# Patient Record
Sex: FEMALE | Race: WHITE | NOT HISPANIC OR LATINO | Employment: UNEMPLOYED | URBAN - METROPOLITAN AREA
[De-identification: names, ages, dates, MRNs, and addresses within clinical notes are randomized per-mention and may not be internally consistent; named-entity substitution may affect disease eponyms.]

---

## 2021-08-20 ENCOUNTER — TRANSFERRED RECORDS (OUTPATIENT)
Dept: HEALTH INFORMATION MANAGEMENT | Facility: CLINIC | Age: 36
End: 2021-08-20
Payer: MEDICAID

## 2021-09-02 ENCOUNTER — TRANSFERRED RECORDS (OUTPATIENT)
Dept: HEALTH INFORMATION MANAGEMENT | Facility: CLINIC | Age: 36
End: 2021-09-02
Payer: MEDICAID

## 2021-09-17 ENCOUNTER — MEDICAL CORRESPONDENCE (OUTPATIENT)
Dept: HEALTH INFORMATION MANAGEMENT | Facility: CLINIC | Age: 36
End: 2021-09-17
Payer: MEDICAID

## 2021-09-17 ENCOUNTER — TRANSFERRED RECORDS (OUTPATIENT)
Dept: HEALTH INFORMATION MANAGEMENT | Facility: CLINIC | Age: 36
End: 2021-09-17
Payer: MEDICAID

## 2021-10-21 ENCOUNTER — TRANSFERRED RECORDS (OUTPATIENT)
Dept: HEALTH INFORMATION MANAGEMENT | Facility: CLINIC | Age: 36
End: 2021-10-21
Payer: MEDICAID

## 2021-11-01 ENCOUNTER — TRANSFERRED RECORDS (OUTPATIENT)
Dept: HEALTH INFORMATION MANAGEMENT | Facility: CLINIC | Age: 36
End: 2021-11-01
Payer: MEDICAID

## 2021-11-07 ENCOUNTER — TRANSFERRED RECORDS (OUTPATIENT)
Dept: HEALTH INFORMATION MANAGEMENT | Facility: CLINIC | Age: 36
End: 2021-11-07
Payer: MEDICAID

## 2021-11-08 ENCOUNTER — TRANSCRIBE ORDERS (OUTPATIENT)
Dept: OTHER | Age: 36
End: 2021-11-08
Payer: MEDICAID

## 2021-11-08 ENCOUNTER — MEDICAL CORRESPONDENCE (OUTPATIENT)
Dept: HEALTH INFORMATION MANAGEMENT | Facility: CLINIC | Age: 36
End: 2021-11-08
Payer: MEDICAID

## 2021-11-08 DIAGNOSIS — K63.1: Primary | ICD-10-CM

## 2021-11-09 ENCOUNTER — TRANSFERRED RECORDS (OUTPATIENT)
Dept: HEALTH INFORMATION MANAGEMENT | Facility: CLINIC | Age: 36
End: 2021-11-09
Payer: MEDICAID

## 2021-11-09 ENCOUNTER — MEDICAL CORRESPONDENCE (OUTPATIENT)
Dept: HEALTH INFORMATION MANAGEMENT | Facility: CLINIC | Age: 36
End: 2021-11-09
Payer: MEDICAID

## 2021-11-09 ENCOUNTER — TRANSCRIBE ORDERS (OUTPATIENT)
Dept: OTHER | Age: 36
End: 2021-11-09
Payer: MEDICAID

## 2021-11-09 DIAGNOSIS — N83.209 UNSPECIFIED OVARIAN CYST, UNSPECIFIED SIDE: Primary | ICD-10-CM

## 2021-12-10 NOTE — TELEPHONE ENCOUNTER
RECORDS RECEIVED FROM: PO upper rectal, sigmoid resection appendectomy (ready for reversal), large cyst near rectal stump   Appt date: 1/5/2022   NOTES STATUS DETAILS   OFFICE NOTE from referring provider  Received [facility]:  11/1/21 - [facility] OV with Dr. Leobardo Pepe   OFFICE NOTE from other specialist   Received [facility]:  10/21/21 - OV with Dr. Singh   DISCHARGE SUMMARY from hospital  N/A    DISCHARGE REPORT from the ER N/A    OPERATIVE REPORT  Received [facility]:  8/20/21 - OP Note for EXPLORATORY LAPAROTOMY, RECTAL SIGMOID RESECTION, APPENDECTOMY with Dr. Hernandez   MEDICATION LIST Received    LABS     PFC TESTING N/A    ANAL PAP N/A    BIOPSIES/PATHOLOGY RELATED TO DIAGNOSIS N/A    DIAGNOSTIC PROCEDURES     COLONOSCOPY N/A    UPPER ENDOSCOPY (EGD) N/A    FLEX SIGMOIDOSCOPY  N/A    ERCP N/A    IMAGING (DISC & REPORT)      CT  Received / Received [facility]:  9/17/21 - CT Abd/Pelvis    [facility]:  9/2/21 - CT Abd/Pelvis  8/20/21 - CT Abd/Pelvis   MRI N/A    XRAY Received [facility]:  12/6/21 - XR Fluroscopy Abdomen  10/21/21 - XR Fluroscopy Abdomen  7/14/21 - XR Fluroscopy Abdomen   ULTRASOUND (ENDOANAL/ENDORECTAL) Received [facility]:  9/26/15 - Us Pelvis     Records Requested  12/10/21    Facility  [facility]  Fax: [phone]   [facility]  Fax: [phone]     Outcome * 12/10/21 2:33 PM Faxed req to [facility] and [facility] for records and images to be sent to the clinic. - Marixa    * 12/15/21 7:28 AM Images received from [facility] and attached to the patient in PACs.  Faxed 2nd urg req to [facility] for recs/images. - Marixa    * 12/24/21 7:46 AM Images received and attached to the patient in PACs. - Marixa      no

## 2021-12-12 ENCOUNTER — HEALTH MAINTENANCE LETTER (OUTPATIENT)
Age: 36
End: 2021-12-12

## 2021-12-21 ENCOUNTER — TELEPHONE (OUTPATIENT)
Dept: OBGYN | Facility: CLINIC | Age: 36
End: 2021-12-21
Payer: MEDICAID

## 2021-12-21 DIAGNOSIS — N73.9 PELVIC ABSCESS IN FEMALE: Primary | ICD-10-CM

## 2021-12-21 NOTE — TELEPHONE ENCOUNTER
I have reviewed patient's CT results.  I recommend she get a pelvic ultrasound to help interpret what is going on as far as a questionable ovarian cyst vs abscess. Please help her schedule this.  The order is placed.  Thanks.    Liliya Hernandez,

## 2022-01-01 ASSESSMENT — ENCOUNTER SYMPTOMS
MUSCLE CRAMPS: 1
BACK PAIN: 1
DECREASED CONCENTRATION: 1
NERVOUS/ANXIOUS: 1
ARTHRALGIAS: 1
INSOMNIA: 1
DECREASED APPETITE: 1
NECK PAIN: 1
MYALGIAS: 1
DEPRESSION: 1

## 2022-01-04 ENCOUNTER — OFFICE VISIT (OUTPATIENT)
Dept: OBGYN | Facility: CLINIC | Age: 37
End: 2022-01-04
Attending: SURGERY
Payer: MEDICAID

## 2022-01-04 ENCOUNTER — ANCILLARY PROCEDURE (OUTPATIENT)
Dept: ULTRASOUND IMAGING | Facility: CLINIC | Age: 37
End: 2022-01-04
Attending: OBSTETRICS & GYNECOLOGY
Payer: MEDICAID

## 2022-01-04 DIAGNOSIS — R10.2 PELVIC PAIN IN FEMALE: Primary | ICD-10-CM

## 2022-01-04 DIAGNOSIS — Z43.3 COLOSTOMY CARE (H): ICD-10-CM

## 2022-01-04 DIAGNOSIS — Z90.710 STATUS POST EMERGENCY CESAREAN HYSTERECTOMY: ICD-10-CM

## 2022-01-04 DIAGNOSIS — N83.202 CYST OF LEFT OVARY: ICD-10-CM

## 2022-01-04 DIAGNOSIS — N73.9 PELVIC ABSCESS IN FEMALE: ICD-10-CM

## 2022-01-04 PROCEDURE — 76856 US EXAM PELVIC COMPLETE: CPT | Performed by: RADIOLOGY

## 2022-01-04 PROCEDURE — 76830 TRANSVAGINAL US NON-OB: CPT | Performed by: RADIOLOGY

## 2022-01-04 PROCEDURE — 99204 OFFICE O/P NEW MOD 45 MIN: CPT | Performed by: OBSTETRICS & GYNECOLOGY

## 2022-01-04 RX ORDER — HYDROCODONE BITARTRATE AND ACETAMINOPHEN 5; 325 MG/1; MG/1
1 TABLET ORAL EVERY 6 HOURS PRN
Status: ON HOLD | COMMUNITY
End: 2022-05-05

## 2022-01-04 NOTE — PROGRESS NOTES
Subjective  36 year old non-pregnant female presents today complaining of an ovarian cyst.  Patient has a complicated surgical history.  She had a c section on   Sept 10, 2020 due to a placenta previa.  At the time of the c section she was found to have an accreta so ended up with a C hysterectomy. She did fine after spending 16 days in the hospital.  She developed right sided abdominal pain in August of 2021.  The pain radiated down to hip and lower back as well.  She went into the ED and her right ovary was very enlarged.  On August 18, 2021 patient had a laparoscopic surgery done and had the cyst drained.  Two days after surgery, she developed severe pain.  She went back to the ED and was found to have a bowel perforation and had urgent surgery.  She ended up with a colostomy.  She is planning on meeting with a general surgeon at the  tomorrow to discuss reversal.  Her surgeon in [place] was not comfortable with this given the amount of scar tissue she states.  A few weeks ago patient developed abdominal pain.  She had an ultrasound done which showed a 2cm left ovarian cyst.  She is wanting to have both ovaries removed at the time of her colostomy reversal because she is prone to painful ovarian cysts and does not want to have to have another surgery.  We discussed this in detail.  We discussed the need for HRT if this was done and she is ok with that.  The pain is still there.  Pain is right sided.  Constant.  She is very nauseated.  Patient has never had any issues with endometriosis.  I discussed how I do not operate at the  and that she may need to find gyn there.  It is a 7 hour drive to [facility].        I personally reviewed the ultrasound and the findings showed a left ovarian cyst.    I also reviewed notes from previous office visits by Dr. Pepe.    ROS: 10 point ROS neg other than the symptoms noted above in the HPI.  History reviewed. No pertinent past medical history.  History reviewed. No pertinent  surgical history.  History reviewed. No pertinent family history.  Social History     Tobacco Use     Smoking status: Current Some Day Smoker     Smokeless tobacco: Never Used   Substance Use Topics     Alcohol use: Not Currently         Objective  Vitals: There were no vitals taken for this visit.  BMI= There is no height or weight on file to calculate BMI.    General appearance=well developed, well-nourished female  Gait=normal  Psych=mood is stable, alert and oriented x3      Pelvic ultrasound=1/4/20322:  FINDINGS:  Examination is limited due to shadowing bowel gas.     The uterus is surgically absent. Sonographic imaging of the midline  pelvis demonstrates no residual fluid collection. No free fluid in the  pelvis.     The right ovary is not visualized. The left ovary measures 4.0 x 2.8 x  2.0 cm. There is a small complex cystic area in the left ovary  measuring 1.9 x 2.8 x 1.2 cm, may represent a small hemorrhagic cyst.  There is normal blood flow to the left ovary.                                                                      IMPRESSION:  1.Limited exam due to bowel gas.   2. No fluid collection is visualized.  3. Small complex cystic area in the left ovary, may represent a small  hemorrhagic cyst. Follow-up ultrasound to ensure resolution to be  considered.  4. Nonvisualization of the right ovary.      Assessment  1.)  Bilateral lower pelvic pain  2.)  Left ovarian cyst  3.)  Colostomy in place  4.)  History of c hysterectomy due to placenta accreta      Plan  1.)  Follow-up with General Surgery at the  as scheduled  2.)  Follow-up with Gyn at the  for surgery      One undiagnosed new problem with uncertain prognosis and interpretation of ultrasound findings ordered by a different provider.  Nursing notes read and reviewed    Liliya Hernandez DO

## 2022-01-05 ENCOUNTER — PRE VISIT (OUTPATIENT)
Dept: SURGERY | Facility: CLINIC | Age: 37
End: 2022-01-05

## 2022-01-05 ENCOUNTER — OFFICE VISIT (OUTPATIENT)
Dept: SURGERY | Facility: CLINIC | Age: 37
End: 2022-01-05
Attending: SURGERY
Payer: MEDICAID

## 2022-01-05 VITALS
HEART RATE: 83 BPM | OXYGEN SATURATION: 97 % | DIASTOLIC BLOOD PRESSURE: 76 MMHG | TEMPERATURE: 98.4 F | WEIGHT: 188.4 LBS | HEIGHT: 67 IN | SYSTOLIC BLOOD PRESSURE: 116 MMHG | BODY MASS INDEX: 29.57 KG/M2

## 2022-01-05 DIAGNOSIS — Z93.3 COLOSTOMY STATUS (H): Primary | ICD-10-CM

## 2022-01-05 PROCEDURE — 99204 OFFICE O/P NEW MOD 45 MIN: CPT | Performed by: SURGERY

## 2022-01-05 ASSESSMENT — MIFFLIN-ST. JEOR: SCORE: 1587.21

## 2022-01-05 ASSESSMENT — PAIN SCALES - GENERAL: PAINLEVEL: MODERATE PAIN (5)

## 2022-01-05 NOTE — PATIENT INSTRUCTIONS
Follow up:  1. Get Colonoscopy locally  2. Follow up in 6 weeks for flex sig in clinic   -2 fleet enema's to prep for this  3. Quit partner referral - someone will call you   Please call with any questions or concerns regarding your clinic visit today.    It is a pleasure being involved in your health care.    Contacts post-consultation depending on your need:    Radiology Appointments [phone]    Schedule Clinic Appointments [phone] # 1   M-F 7:30 - 5 pm    [facility] [phone]    Clinic Fax Number [phone]    Surgery Scheduling [phone]    My Chart is available 24 hours a day and is a secure way to access your records and communicate with your care team.  I strongly recommend signing up if you haven't already done so, if you are comfortable with computers.  If you would like to inquire about this or are having problems with My Chart access, you may call [phone] or go online at [email or web address].  Please allow at least 24 hours for a response and extra time on weekends and Holidays.

## 2022-01-05 NOTE — PROGRESS NOTES
Colon and Rectal Surgery Clinic Note    RE: Vicki Shell.  : 1985.  NORMA: 2022.    Reason for visit: Colostomy reversal    HPI: 37 yo F presenting for reversal of colostomy.  -On , she underwent underwent a diagnostic laparoscopy, extensive lysis of adhesions, and a right ovarian cystectomy. Dr. Pepe was called in the operating room to evaluate for any potential bowel injury. There was an area of intestine, which was actually the appendix, that was sort of stuck to the ovary. This did not appear to be injured. There was no evidence of succus or spillage from what I could tell at that time in the operation. It appeared that all the work had been done on the right side for this cyst.   -On : presented with peritonitis, at which time she underwent emergent ex lap, upper rectal/sigmoid resection, and appendectomy.   Per OR note of Findings from Dr. Pepe: Upon entering the abdomen, there was succus and grossly formed stool within the abdominal cavity, there was a 1.5 cm hole or so along the left side of the rectum, kind of left midline rectum. Where the cystectomy had been performed on the right side, this area looked good, I did not see any evidence of injury on that side. There was fluid around the liver, spleen, mid abdomen. The stoma was created in the left lower quadrant. A blue Prolene stitch was placed on the distal rectal stump.  Hospital course was two weeks long, discharged on . She had an ileus, was on TPN. She had a CT can showing a left pleural effusion, left abdominal fluid collection and pelvic fluid collection. She was on antibiotics, and IR drained the left pleural effusion and placed an abdominal drain.    Interval Hx: Doing well overall, no issues with stoma. Weight stable. Chronic pelvic and back pain, suspected due to left ovarian cyst.  Last Colonoscopy: has never had one, no hx of GI malignancy    Medical history:  Chronic low back pain  Tobacco use. 0.5 ppd for 10  "years  Cervico-occipital Neuralgia    Surgical history:  9/10/20:HYSTERECTOMY ABDOMINAL, BILATERAL SALPINGECTOMY, INTRAOPERATIVE CYSTOSCOPY  Laparoscopic cystectomy and Emergent Ex lap as above.      Family history:  No Hx of IBD or GI malignancy    Medications:  Current Outpatient Medications   Medication Sig Dispense Refill     HYDROcodone-acetaminophen (NORCO) 5-325 MG tablet hydrocodone 5 mg-acetaminophen 325 mg tablet   One po bid as needed for pain       sertraline (ZOLOFT) 50 MG tablet every 24 hours         Allergies:  No Known Allergies    Social history:   Social History     Tobacco Use     Smoking status: Current Some Day Smoker     Smokeless tobacco: Never Used   Substance Use Topics     Alcohol use: Not Currently     Marital status: single.    ROS:  A complete review of systems was performed with the patient and all systems negative except as per HPI.    Physical Examination:  /76 (BP Location: Left arm, Patient Position: Sitting, Cuff Size: Adult Regular)   Pulse 83   Temp 98.4  F (36.9  C) (Oral)   Ht 1.702 m (5' 7\")   Wt 85.5 kg (188 lb 6.4 oz)   SpO2 97%   BMI 29.51 kg/m    General: Well hydrated. No acute distress.  Abdomen: Soft, NT, vertical midline incision well healed, left colostomy pink and healthy with stool in bag  Perianal external examination:  deferred      ASSESSMENT  1. Colostomy status  2. Current smoker  3. Chronic back pain    PLAN  1. I would like to wait at least 6 months prior to operating given the extent of peritonitis she had previously. Last operation was 8/20 and complicated by abscesses. At least March. I will see her in follow up in 6 weeks, mid February to see how she is doing.  2. Smoking cessation, Quit Partner referal. This is a requirement prior to proceeding with an extensive and high risk colostomy reversal.  3. Advised patient to begin protein shakes: Premier or Pure protein given high protein, low carb ratio for pre-operative rehab.  4. At time of " surgery, I would like her to be marked for possible diverting loop ileostomy.   5. At follow up, I will also perform a flex sigmoidoscopy to evaluate her rectal stump, and a CT with PO, IV and rectal contrast to evaluate her anatomy.  6. Colonoscopy in [place] is ok, needed prior to surgery to ensure no other lesions or polyps in remaining colon.    For surgery: will require cysto/stents, GYN involvement for likely left oophorectomy given recurrent cysts/scarring.    Risks, benefits, and alternatives of operative treatment were thoroughly discussed with the patient, she understands these well and agrees to proceed.    Time spent: 45 minutes.  >50% spent in discussing, counseling and coordinating care.    Tad Richardson M.D    Division of Colon and Rectal Surgery  [facility]    Referring Provider:  Leobardo Pepe  [facility]  [address] [place] [address]     Primary Care Provider:  Karen Hernandez

## 2022-01-05 NOTE — NURSING NOTE
"Chief Complaint   Patient presents with     New Patient       Vitals:    01/05/22 1023   BP: 116/76   BP Location: Left arm   Patient Position: Sitting   Cuff Size: Adult Regular   Pulse: 83   Temp: 98.4  F (36.9  C)   TempSrc: Oral   SpO2: 97%   Weight: 188 lb 6.4 oz   Height: 5' 7\"       Body mass index is 29.51 kg/m .          America Daley CMA    "

## 2022-01-07 PROBLEM — Z90.710 STATUS POST EMERGENCY CESAREAN HYSTERECTOMY: Status: ACTIVE | Noted: 2022-01-07

## 2022-01-07 PROBLEM — N83.202 CYST OF LEFT OVARY: Status: ACTIVE | Noted: 2022-01-07

## 2022-01-07 PROBLEM — Z43.3 COLOSTOMY CARE (H): Status: ACTIVE | Noted: 2022-01-07

## 2022-01-07 NOTE — PATIENT INSTRUCTIONS
Please call if you any questions.    [facility]  [address] [place]   [address]  [phone]        Liliya Hernandez,

## 2022-01-11 ENCOUNTER — TELEPHONE (OUTPATIENT)
Dept: SURGERY | Facility: CLINIC | Age: 37
End: 2022-01-11
Payer: MEDICAID

## 2022-01-11 DIAGNOSIS — Z93.3 COLOSTOMY STATUS (H): Primary | ICD-10-CM

## 2022-01-11 NOTE — TELEPHONE ENCOUNTER
[facility] Call Center    Phone Message    May a detailed message be left on voicemail: yes     Reason for Call: Other: Vicki is calling in asking for a call back. She states that she would like to speak with someone about having Dr. Richardson take over her pain management now that she is seeing him. Please call back as soon as possible to discuss.     Action Taken: Message routed to:  [facility] ([facility] Colon/Rectal    Travel Screening: Not Applicable

## 2022-01-22 ENCOUNTER — TELEPHONE (OUTPATIENT)
Dept: ANESTHESIOLOGY | Facility: CLINIC | Age: 37
End: 2022-01-22
Payer: MEDICAID

## 2022-02-07 DIAGNOSIS — Z93.3 COLOSTOMY STATUS (H): Primary | ICD-10-CM

## 2022-02-15 ENCOUNTER — MYC MEDICAL ADVICE (OUTPATIENT)
Dept: SURGERY | Facility: CLINIC | Age: 37
End: 2022-02-15
Payer: MEDICAID

## 2022-02-16 ENCOUNTER — LAB (OUTPATIENT)
Dept: LAB | Facility: CLINIC | Age: 37
End: 2022-02-16
Payer: MEDICAID

## 2022-02-16 ENCOUNTER — ANCILLARY PROCEDURE (OUTPATIENT)
Dept: CT IMAGING | Facility: CLINIC | Age: 37
End: 2022-02-16
Attending: SURGERY
Payer: MEDICAID

## 2022-02-16 ENCOUNTER — OFFICE VISIT (OUTPATIENT)
Dept: SURGERY | Facility: CLINIC | Age: 37
End: 2022-02-16
Payer: MEDICAID

## 2022-02-16 VITALS
BODY MASS INDEX: 29.29 KG/M2 | HEART RATE: 75 BPM | SYSTOLIC BLOOD PRESSURE: 115 MMHG | HEIGHT: 67 IN | DIASTOLIC BLOOD PRESSURE: 71 MMHG | WEIGHT: 186.6 LBS | OXYGEN SATURATION: 100 %

## 2022-02-16 DIAGNOSIS — Z43.3 COLOSTOMY CARE (H): ICD-10-CM

## 2022-02-16 DIAGNOSIS — N83.202 CYST OF LEFT OVARY: Primary | ICD-10-CM

## 2022-02-16 DIAGNOSIS — Z93.3 COLOSTOMY STATUS (H): ICD-10-CM

## 2022-02-16 DIAGNOSIS — N83.202 CYST OF LEFT OVARY: ICD-10-CM

## 2022-02-16 LAB
ALBUMIN SERPL-MCNC: 3.7 G/DL (ref 3.4–5)
ALP SERPL-CCNC: 66 U/L (ref 40–150)
ALT SERPL W P-5'-P-CCNC: 20 U/L (ref 0–50)
ANION GAP SERPL CALCULATED.3IONS-SCNC: 6 MMOL/L (ref 3–14)
APTT PPP: 30 SECONDS (ref 22–38)
AST SERPL W P-5'-P-CCNC: 16 U/L (ref 0–45)
BASOPHILS # BLD AUTO: 0 10E3/UL (ref 0–0.2)
BASOPHILS NFR BLD AUTO: 1 %
BILIRUB SERPL-MCNC: 0.5 MG/DL (ref 0.2–1.3)
BUN SERPL-MCNC: 10 MG/DL (ref 7–30)
CALCIUM SERPL-MCNC: 8.6 MG/DL (ref 8.5–10.1)
CHLORIDE BLD-SCNC: 106 MMOL/L (ref 94–109)
CO2 SERPL-SCNC: 25 MMOL/L (ref 20–32)
CREAT SERPL-MCNC: 0.65 MG/DL (ref 0.52–1.04)
EOSINOPHIL # BLD AUTO: 0.1 10E3/UL (ref 0–0.7)
EOSINOPHIL NFR BLD AUTO: 2 %
ERYTHROCYTE [DISTWIDTH] IN BLOOD BY AUTOMATED COUNT: 13.8 % (ref 10–15)
GFR SERPL CREATININE-BSD FRML MDRD: >90 ML/MIN/1.73M2
GLUCOSE BLD-MCNC: 95 MG/DL (ref 70–99)
HCT VFR BLD AUTO: 39.5 % (ref 35–47)
HGB BLD-MCNC: 12.9 G/DL (ref 11.7–15.7)
IMM GRANULOCYTES # BLD: 0 10E3/UL
IMM GRANULOCYTES NFR BLD: 0 %
INR PPP: 1.02 (ref 0.85–1.15)
LYMPHOCYTES # BLD AUTO: 1.8 10E3/UL (ref 0.8–5.3)
LYMPHOCYTES NFR BLD AUTO: 23 %
MCH RBC QN AUTO: 27.2 PG (ref 26.5–33)
MCHC RBC AUTO-ENTMCNC: 32.7 G/DL (ref 31.5–36.5)
MCV RBC AUTO: 83 FL (ref 78–100)
MONOCYTES # BLD AUTO: 0.5 10E3/UL (ref 0–1.3)
MONOCYTES NFR BLD AUTO: 7 %
NEUTROPHILS # BLD AUTO: 5.4 10E3/UL (ref 1.6–8.3)
NEUTROPHILS NFR BLD AUTO: 67 %
NRBC # BLD AUTO: 0 10E3/UL
NRBC BLD AUTO-RTO: 0 /100
PLATELET # BLD AUTO: 200 10E3/UL (ref 150–450)
POTASSIUM BLD-SCNC: 4 MMOL/L (ref 3.4–5.3)
PREALB SERPL IA-MCNC: 23 MG/DL (ref 15–45)
PROT SERPL-MCNC: 7.3 G/DL (ref 6.8–8.8)
RBC # BLD AUTO: 4.75 10E6/UL (ref 3.8–5.2)
SODIUM SERPL-SCNC: 137 MMOL/L (ref 133–144)
WBC # BLD AUTO: 7.9 10E3/UL (ref 4–11)

## 2022-02-16 PROCEDURE — 85610 PROTHROMBIN TIME: CPT | Performed by: PATHOLOGY

## 2022-02-16 PROCEDURE — 74177 CT ABD & PELVIS W/CONTRAST: CPT | Performed by: RADIOLOGY

## 2022-02-16 PROCEDURE — 85730 THROMBOPLASTIN TIME PARTIAL: CPT | Performed by: PATHOLOGY

## 2022-02-16 PROCEDURE — 84134 ASSAY OF PREALBUMIN: CPT | Performed by: PATHOLOGY

## 2022-02-16 PROCEDURE — 80053 COMPREHEN METABOLIC PANEL: CPT | Performed by: PATHOLOGY

## 2022-02-16 PROCEDURE — 85025 COMPLETE CBC W/AUTO DIFF WBC: CPT | Performed by: PATHOLOGY

## 2022-02-16 PROCEDURE — 99214 OFFICE O/P EST MOD 30 MIN: CPT | Performed by: SURGERY

## 2022-02-16 PROCEDURE — 36415 COLL VENOUS BLD VENIPUNCTURE: CPT | Performed by: PATHOLOGY

## 2022-02-16 RX ORDER — POLYETHYLENE GLYCOL 3350 17 G/17G
238 POWDER, FOR SOLUTION ORAL SEE ADMIN INSTRUCTIONS
Qty: 14 PACKET | Refills: 0 | Status: ON HOLD | OUTPATIENT
Start: 2022-02-16 | End: 2022-05-05

## 2022-02-16 RX ORDER — NEOMYCIN SULFATE 500 MG/1
1000 TABLET ORAL EVERY 6 HOURS
Qty: 6 TABLET | Refills: 0 | Status: ON HOLD | OUTPATIENT
Start: 2022-02-16 | End: 2022-05-05

## 2022-02-16 RX ORDER — IOPAMIDOL 755 MG/ML
115 INJECTION, SOLUTION INTRAVASCULAR ONCE
Status: COMPLETED | OUTPATIENT
Start: 2022-02-16 | End: 2022-02-16

## 2022-02-16 RX ORDER — METRONIDAZOLE 500 MG/1
500 TABLET ORAL EVERY 6 HOURS
Qty: 3 TABLET | Refills: 0 | Status: ON HOLD | OUTPATIENT
Start: 2022-02-16 | End: 2022-05-05

## 2022-02-16 RX ORDER — MORPHINE SULFATE 15 MG/1
TABLET, FILM COATED, EXTENDED RELEASE ORAL
COMMUNITY
End: 2022-04-25

## 2022-02-16 RX ORDER — ONDANSETRON 4 MG/1
4 TABLET, FILM COATED ORAL EVERY 6 HOURS
Qty: 3 TABLET | Refills: 0 | Status: ON HOLD | OUTPATIENT
Start: 2022-02-16 | End: 2022-05-05

## 2022-02-16 RX ADMIN — IOPAMIDOL 115 ML: 755 INJECTION, SOLUTION INTRAVASCULAR at 08:14

## 2022-02-16 ASSESSMENT — PAIN SCALES - GENERAL: PAINLEVEL: MILD PAIN (2)

## 2022-02-16 NOTE — LETTER
2022       RE: Vicki Shell  [address]   [place] [address]     Dear Colleague,    Thank you for referring your patient, Vicki Shell, to the [facility] RE[facility] [place] at [facility]. Please see a copy of my visit note below.    Colon and Rectal Surgery Clinic Note    RE: Vicki Shell.  : 1985.  NORMA: 2022.    Reason for visit: Colostomy reversal     HPI (Initially seen on 22): 35 yo F presenting for reversal of colostomy.  -On , she underwent underwent a diagnostic laparoscopy, extensive lysis of adhesions, and a right ovarian cystectomy. Dr. Pepe was called in the operating room to evaluate for any potential bowel injury. There was an area of intestine, which was actually the appendix, that was sort of stuck to the ovary. This did not appear to be injured. There was no evidence of succus or spillage from what I could tell at that time in the operation. It appeared that all the work had been done on the right side for this cyst.   -On : presented with peritonitis, at which time she underwent emergent ex lap, upper rectal/sigmoid resection, and appendectomy.   Per OR note of Findings from Dr. Pepe: Upon entering the abdomen, there was succus and grossly formed stool within the abdominal cavity, there was a 1.5 cm hole or so along the left side of the rectum, kind of left midline rectum. Where the cystectomy had been performed on the right side, this area looked good, I did not see any evidence of injury on that side. There was fluid around the liver, spleen, mid abdomen. The stoma was created in the left lower quadrant. A blue Prolene stitch was placed on the distal rectal stump.  Hospital course was two weeks long, discharged on . She had an ileus, was on TPN. She had a CT can showing a left pleural effusion, left abdominal fluid collection and pelvic fluid collection. She was on antibiotics,  "and IR drained the left pleural effusion and placed an abdominal drain.  Seen on 1/5/22:: Doing well overall, no issues with stoma. Weight stable. Chronic pelvic and back pain, suspected due to left ovarian cyst.  Last Colonoscopy: reportedly normal, performed on 2/7.    Interval Hx: doing well, no issues. Eager to proceed with surgery. Colonoscopy performed and clean, CT performed, read pending. Quit smoking on 2/7.     Medical history:  Chronic low back pain  Tobacco use. 0.5 ppd for 10 years  Cervico-occipital Neuralgia     Surgical history:  9/10/20:HYSTERECTOMY ABDOMINAL, BILATERAL SALPINGECTOMY, INTRAOPERATIVE CYSTOSCOPY  Laparoscopic cystectomy and Emergent Ex lap as above.    Medications:  Current Outpatient Medications   Medication Sig Dispense Refill     HYDROcodone-acetaminophen (NORCO) 5-325 MG tablet hydrocodone 5 mg-acetaminophen 325 mg tablet   One po bid as needed for pain       sertraline (ZOLOFT) 50 MG tablet every 24 hours         Allergies:  No Known Allergies    Social history:   Social History     Tobacco Use     Smoking status: Current Some Day Smoker     Smokeless tobacco: Never Used   Substance Use Topics     Alcohol use: Not Currently     Marital status: single.    ROS:  A complete review of systems was performed with the patient and all systems negative except as per HPI.    Physical Examination:  Exam was chaperoned by Rama Swift CMA  /71 (BP Location: Left arm, Patient Position: Sitting, Cuff Size: Adult Regular)   Pulse 75   Ht 1.702 m (5' 7\")   Wt 84.6 kg (186 lb 9.6 oz)   SpO2 100%   BMI 29.23 kg/m    General: Well hydrated. No acute distress.  Abdomen: Soft, NT, nondisteded. Well healed midline incision with small lap incisions. Stoma healthy. No inguinal adenopathy palpated.  Perianal external examination:  Perianal skin: Intact with no excoriation or lichenification.  Lesions: No evidence of an external lesion, nodularity, or induration in the perianal " region.  Eversion of buttocks: There was not evidence of an anal fissure. Details: N/A.  Skin tags or external hemorrhoids: None.  Digital rectal examination: Was performed.   Sphincter tone: Good.  Palpable lesions: No.  Other: None.  Bimanual examination: was not performed.    Anoscopy: Was deferred    Procedures:  Prior to the start of the procedure and with procedural staff participation, I verbally confirmed the patient s identity using two indicators, relevant allergies, that the procedure was appropriate and matched the consent or emergent situation, and that the correct equipment/implants were available. Immediately prior to starting the procedure I conducted the Time Out with the procedural staff and re-confirmed the patient s name, procedure, and site/side. (The Joint Commission universal protocol was followed.)  Yes    Sedation (Moderate or Deep): None    Flexible sigmoidoscopy was performed to 22 cm to top of rectosigmoid junction. Marking sutures and staples seen. Pictures taken, uploaded into epic.      Imaging personally reviewed by me:  CT CAP:  pending    ASSESSMENT  1. Colostomy status  2. Current smoker  3. Chronic back pain     PLAN: Laparoscopic possible open colostomy reversal, cyst with stents, possible diverting loop ileostomy, possible bilateral oophorectomy.  1. I would like to wait at least 6 months prior to operating given the extent of peritonitis she had previously. Last operation was 8/20 and complicated by abscesses. Plan for surgery in March.   2. Smoking cessation, Quit Partner referal. This is a requirement prior to proceeding with an extensive and high risk colostomy reversal. Quit, last on 2/7.  3. Advised patient to continue protein shakes: Premier or Pure protein given high protein, low carb ratio for pre-operative rehab.  4. At time of surgery, I would like her to be marked for possible diverting loop ileostomy.   5. Colonoscopy performed locally, clean.   6. Preop labs: CBC,  CMP, prealbumin, coags.     For surgery: will require cysto/stents, GYN involvement for likely bilateral oophorectomy given recurrent cysts/scarring.     Risks, benefits, and alternatives of operative treatment were thoroughly discussed with the patient, she understands these well and agrees to proceed.    Time spent: 45 minutes.  >50% spent in discussing, counseling and coordinating care.    Tad Richardson M.D    Division of Colon and Rectal Surgery  [facility]    Referring Provider:  No referring provider defined for this encounter.     Primary Care Provider:  Karen Hernandez, thank you for allowing me to participate in the care of your patient.      Sincerely,    Tad Richardson MD, MD

## 2022-02-16 NOTE — NURSING NOTE
"Chief Complaint   Patient presents with     Flexible Sigmoidoscopy       Vitals:    02/16/22 0828   BP: 115/71   BP Location: Left arm   Patient Position: Sitting   Cuff Size: Adult Regular   Pulse: 75   SpO2: 100%   Weight: 186 lb 9.6 oz   Height: 5' 7\"       Body mass index is 29.23 kg/m .    Rama Swift CMA    "

## 2022-02-16 NOTE — PATIENT INSTRUCTIONS
Follow up:  1. Schedule surgery with Shavon  Appointments you will need  -Covid test, preop physical, pre op Ostomy marking, pre op labs     2. Referral sent to [facility], please call to set up an appointment with them    3. You will do a full bowel prep the day before surgery. You will receive a surgery packet with full instructions.        Schedule Clinic Appointments [phone] # 1   M-F 7:30 - 5 pm    [facility] [phone]    Surgery Scheduling [phone]      BOWEL PREP: GOLYTELY    FIVE DAYS BEFORE YOUR SURGERY  - Stop taking any of the following over-the-counter medications: Ibuprofen, Asprin,Iron supplements.    - If you are taking a blood thinner medication such as Coumadin, Plavix, or Warfarin, you MUST contact the prescribing physician regarding clearance for this procedure, or instructions for stopping/changing this medication before your procedure.     -Please contact the nurse line at [phone] option #3 for any questions regarding other medications.     THREE DAYS BEFORE YOUR SURGERY  - Begin restricted low-residue diet.  Suggested foods include: white bread or rolls, plain crackers, white rice, skinless potatoes, low fiber cereals, chicken, turkey, fish or seafood, and eggs.  You may also have applesauce, pear sauce, soft honeydew, cantaloupe, ripe banana, canned fruit without seeds or skins.      - Do not eat: Corn (any form), raw vegetables, foods with seeds, whole wheat or grain breads and cereals, brown or wild rice, granola, raisins and dried fruit, berries, popcorn, all varieties of nuts, peanuts, and seeds.     THE DAY BEFORE YOUR SURGERY  - Begin the allowed clear liquid diet at breakfast time.  Drink at least 1 (one) gallon of water or allowed clear liquids throughout the day.      - Fill the jug containing the Golytely powder with lukewarm water.  Shake vigorously until powder is thoroughly dissolved.  It is important that a full gallon of water be used to dissolve the  powder.  You should chill the solution for 3 hours after it has been mixed.      4:00 P.M.  Start drinking one 8-ounce glass of the solution every 15-30 minutes. If you start to feel nauseous, you may space out your drinking intervals.     You may have CLEAR LIQUIDS until 2 hours prior to your procedure.    ALLOWED CLEAR LIQUIDS  - Water  - Regular or decaf coffee (no cream)  - Jell-O or popsicles (no red or purple)  - Plain hard candy (no red or purple)  - Clear juices or Gatorade (no red or purple)  - Chicken broth (no vegetables or noodles)  - Ensure Clear or Boost Clear    DO NOT DRINK  - Milk or mild products such as ice cream, malts, or shakes  - Boost or other protein drinks  - Red or purple juices of any kind  - Maurice-aid, cranberry, cherry, or grape juice  - Juice with pulp (orange, grapefruit, pineapple, or tomato)  - Cream soups of any kind  - Alcoholic beverages

## 2022-02-24 ENCOUNTER — TELEPHONE (OUTPATIENT)
Dept: SURGERY | Facility: CLINIC | Age: 37
End: 2022-02-24

## 2022-02-24 NOTE — TELEPHONE ENCOUNTER
Regarding scheduling combo surgery with Dr. Richardson and Dr. Wills.    Waiting on clearance from Dr. Wills, since he wants some things completed before he will clear patient for surgery.    Shavon Salomon  Kat-op Coordinator  [place]  Direct Phone: [phone]

## 2022-03-03 ENCOUNTER — PREP FOR PROCEDURE (OUTPATIENT)
Dept: SURGERY | Facility: CLINIC | Age: 37
End: 2022-03-03
Payer: MEDICAID

## 2022-03-03 NOTE — TELEPHONE ENCOUNTER
"Tier 2 Case Request received to schedule:    Patient Name: Vicki Shell   MRN: [id number]   Case#: [id number]   Surgeon(s) and Role:      * Tad Richardson MD - Primary   Date requested: * No dates entered *   Location: [facility] OR   Procedure(s):   CLOSURE, COLOSTOMY, LAPAROSCOPIC, POSSBILE OPEN, POSSIBLE LOOP ILEOSTOMY CREATION (N/A)     Debra: 240 mins  Elma: 90 mins - will do case mod after Elma sees patient    Multi-surgeon case:  YES- OBGYN FOR OOPHRECTOMY, DR LORD, AND UROLOGY FOR CYSTO STENTS    Anesthesia: GENERAL  Prep: FULL W/ ABX  Pre-Op: PAC  Labs: YES  WOC: YES STOMA MARKING  Special equipment: NO  Special Instructions: GYN AND URO COMBO    Schedule with Chantal Leahy NP 2-3 weeks after surgery.  Schedule with Surgeon 3-4 weeks after Chantal Leahy NP    On 3/3/2022:  In regards to my conversation with patient, sent the following [employer] message:    \"Hi Vicki,    We spoke on the phone today regarding your combo surgery with:    - Dr. Tad Richardson - primary    - Dr. Henry Lord - role to be determined after appt. on 3/9/2022    - Dr. Inocencio Washburn - urologist for Cystoscopy & Uretal Stent placement    We discussed that surgery is planned for April 28, 2022, but that if anything sooner opens up, we will let you know.    I will call you after you meet with Dr. Lord in order to schedule your related appointments.    For medical questions, please contact Deann RN: [phone], or send a [employer] message to your care team.    For non-medical questions, please contact me by replying to this message, or by calling me at [phone].    Thank-you,    Shavon Salomon  Kat-op Coordinator  [facility]  Direct Phone: [phone]\"     "

## 2022-03-08 NOTE — TELEPHONE ENCOUNTER
RECORDS STATUS - ALL OTHER DIAGNOSIS      RECORDS RECEIVED FROM: TERELL Gonzales (Imaging previously resolved), [facility]   DATE RECEIVED: 3/8   NOTES STATUS DETAILS   OFFICE NOTE from referring provider Tad Vallejo MD in [facility]: 3/8/22   DISCHARGE SUMMARY from hospital [facility] 21, 9/10/20, 9/17/15   DISCHARGE REPORT from the ER [facility] 22, 10/29/19, 9/26/15   OPERATIVE REPORT [facility] 21: EXPLORATORY LAPAROTOMY, colostomy  9/10/20: HYSTERECTOMY ABDOMINAL FOLLOWING  SECTION    MEDICATION LIST [facility]    LABS     ANYTHING RELATED TO DIAGNOSIS Epic 22   IMAGING (NEED IMAGES & REPORT)     CT SCANS PACS 22: Epic    21, 21, 21: [facility]   ULTRASOUND PACS 22: Epic    9/26/15: [facility]

## 2022-03-09 ENCOUNTER — VIRTUAL VISIT (OUTPATIENT)
Dept: ONCOLOGY | Facility: CLINIC | Age: 37
End: 2022-03-09
Attending: SURGERY
Payer: MEDICAID

## 2022-03-09 ENCOUNTER — PRE VISIT (OUTPATIENT)
Dept: ONCOLOGY | Facility: CLINIC | Age: 37
End: 2022-03-09
Payer: MEDICAID

## 2022-03-09 DIAGNOSIS — N83.202 CYST OF LEFT OVARY: ICD-10-CM

## 2022-03-09 PROCEDURE — 99205 OFFICE O/P NEW HI 60 MIN: CPT | Mod: 95 | Performed by: OBSTETRICS & GYNECOLOGY

## 2022-03-09 RX ORDER — MORPHINE SULFATE 30 MG/1
30 TABLET, FILM COATED, EXTENDED RELEASE ORAL EVERY 12 HOURS
Status: ON HOLD | COMMUNITY
End: 2023-03-10

## 2022-03-09 NOTE — PROGRESS NOTES
Vicki is a 36 year old who is being evaluated via a billable video visit.      If the video visit is dropped, the invitation should be resent by: Text to cell phone: [phone]  Will anyone else be joining your video visit? No      Video-Visit Details    60 minutes                            Consult Notes on Referred Patient    Date: 3/9/2022       Dr. Tad Richardson MD  [address] [place] [id number]       RE: Vicki Shell  : 1985  NORMA: 3/9/2022    Dear Dr. Tad Richardson:    I had the pleasure of seeing your patient Vicki Shell here at the Gynecologic Cancer Clinic at the [facility] on 3/9/2022.  As you know she is a very pleasant 36 year old woman with a recent diagnosis of ovarian cyst.  Given these findings she was subsequently sent to the Gynecologic Cancer Clinic for new patient consultation.   The patient is a G5, P4, 3 vaginal deliveries followed by a  section for previa and accreta, underwent a  hysterectomy, is not sure whether likely tubes were removed at that time.  That was about a year and a half ago, then in 2021 she had a right ovarian cyst and underwent a diagnostic laparoscopy, which was complicated by adhesions and then drainage of that ovarian cyst.  She unfortunately developed a bowel leak postoperatively, underwent an exploratory laparotomy, low anterior resection with end colostomy and Mrelin's pouch.  Has since then had significant problems with pelvic pain, which is requiring daily narcotics.  She is taking 30 mg of morphine and then hydrocodone for breakthrough pain.  She has a recently seen one of my colleagues, Dr. Richardson, for consultation and the plan is to try to restore continence and reversal of the end colostomy on .  As a workup she has had a recent ultrasound that shows a physiologic cyst on the left ovary.  A recent CT shows a possible either cyst on the left ovary versus inclusion cyst of 4 x 5 cm.  The  patient otherwise is eating and drinking well, has no nausea, vomiting, fever or chills.  The end colostomy is working well.  She has also no change in her urinary habits.  No vaginal bleeding or discharge.            Past Medical History:  None    Past Surgical History:  1.   hysterectomy.  2.  Drainage of ovarian cyst.  3.  Exploratory laparotomy with end colostomy and washout.        Health Maintenance:  Health Maintenance Due   Topic Date Due     PREVENTIVE CARE VISIT  Never done     ADVANCE CARE PLANNING  Never done     COVID-19 Vaccine (1) Never done     HIV SCREENING  Never done     HEPATITIS C SCREENING  Never done     PAP  Never done     INFLUENZA VACCINE (1) 2021     No history of abnormal Pap smears.  Recent colonoscopy in February.            Current Medications:     has a current medication list which includes the following prescription(s): hydrocodone-acetaminophen, metronidazole, morphine, morphine, neomycin, ondansetron, polyethylene glycol, and sertraline.       Allergies:     Patient has no known allergies.        Social History:     Social History     Tobacco Use     Smoking status: Former Smoker     Smokeless tobacco: Never Used   Substance Use Topics     Alcohol use: Not Currently       History   Drug Use Unknown     Family History:  Father had gastric/esophageal cancer.    Physical Exam:     There were no vitals taken for this visit.  There is no height or weight on file to calculate BMI.    General Appearance: healthy and alert, no distress     Psychiatric: appropriate mood and affect                                   Assessment:    Vicki Shell is a 36 year old woman with a new diagnosis of ovarian cyst.     A total of 60 minutes was spent with the patient, 40 minutes of which were spent in counseling the patient and/or treatment planning.      1.  Ovarian cyst.  2.  Large-bowel leak with end colostomy after drainage of ovarian cyst.  3.  Chronic pelvic pain.    Discussed with  the patient, we will plan to obtain a pelvic MRI to further evaluate the cyst that she has, which given the size is likely not causing her chronic pelvic pain, but rather the adhesive disease and prior surgery.  We did discuss that given her age, we would not, unless needed, remove her ovaries as she would be postmenopausal with that.  If there is an indication for complex cyst, we could evaluate intraoperatively and remove one ovary if necessary.  We further discussed that if both ovaries were removed, she would be requiring hormone replacement therapy with that and discussed some of the pros and cons of that.  Further discussed that given her chronic pelvic pain requiring significant amount of narcotics, I would recommend her to see the Palliative Care Team as well as a pelvic floor physical therapist to further help with some of this more chronic-type pain.  The patient agrees with this plan.  Again, we will see her closer to the time of surgery with an MRI before.  We will plan to get that MRI Tuesday morning the week of her surgery, which is scheduled for Thursday, 04/28, and then I would see her for an in-person exam and visit on 04/27.  Again, we will be available for the surgery with Dr. Richardson as needed.  The patient agrees with the plan, is very appreciative of her care.  All questions were answered.          Thank you for allowing us to participate in the care of your patient.         Sincerely,    Henry Wills MD, MS    Department of Obstetrics and Gynecology   Division of Gynecologic Oncology   [facility]  Phone: [phone]      CC  Patient Care Team:  Karen Hernandez MD as PCP - General (Family Medicine)  Leobardo Pepe Georgina Marie, DO as Assigned OBGYN Provider  Cj Richardson MD as Assigned Surgical Provider  Cj Richardson MD as Referring Physician (Surgery)  Henry Wills MD as MD (Gynecologic Oncology)  CJ RICHARDSON

## 2022-03-09 NOTE — Clinical Note
"    3/9/2022         RE: Vicki Shell  [address] [facility] [address]        Dear Colleague,    Thank you for referring your patient, Vicki Shell, to the [facility]. Please see a copy of my visit note below.    Vicki is a 36 year old who is being evaluated via a billable video visit.      If the video visit is dropped, the invitation should be resent by: Text to cell phone: [phone]  Will anyone else be joining your video visit? No  {If patient encounters technical issues they should call [phone] :990254}    Video Start Time: {video visit start/end time for provider to select:152948}  Video-Visit Details    Type of service:  Video Visit    Video End Time:{video visit start/end time for provider to select:152948}    Originating Location (pt. Location): {video visit patient location:156078::\"Home\"}    Distant Location (provider location):  [facility]     Platform used for Video Visit: {Virtual Visit Platforms:193280::\"Yoopies\"}      Again, thank you for allowing me to participate in the care of your patient.        Sincerely,        Henry Wills MD  "

## 2022-03-09 NOTE — LETTER
3/9/2022       RE: Vicki Shell  [address] [facility] [facility] [address]     Dear Colleague,    Thank you [facility], [facility]icki Shell[facility]. Please see a copy of my visit note below.        60 minutes                            Consult Notes on Referred Patient    Date: 3/9/2022       Dr. Tad Richardson MD  [address] [place] [address]       RE: Vicki Shell  : 1985  NORMA: 3/9/2022    Dear Dr. Tad Richardson:    I had the pleasure of seeing your patient Vicki Shell here at the Gynecologic Cancer Clinic at the [facility] on 3/9/2022.  As you know she is a very pleasant 36 year old woman with a recent diagnosis of ovarian cyst.  Given these findings she was subsequently sent to the Gynecologic Cancer Clinic for new patient consultation.   The patient is a G5, P4, 3 vaginal deliveries followed by a  section for previa and accreta, underwent a  hysterectomy, is not sure whether likely tubes were removed at that time.  That was about a year and a half ago, then in 2021 she had a right ovarian cyst and underwent a diagnostic laparoscopy, which was complicated by adhesions and then drainage of that ovarian cyst.  She unfortunately developed a bowel leak postoperatively, underwent an exploratory laparotomy, low anterior resection with end colostomy and Merlin's pouch.  Has since then had significant problems with pelvic pain, which is requiring daily narcotics.  She is taking 30 mg of morphine and then hydrocodone for breakthrough pain.  She has a recently seen one of my colleagues, Dr. Richardson, for consultation and the plan is to try to restore continence and reversal of the end colostomy on .  As a workup she has had a recent ultrasound that shows a physiologic cyst on the left ovary.  A recent CT shows a possible either cyst on the  left ovary versus inclusion cyst of 4 x 5 cm.  The patient otherwise is eating and drinking well, has no nausea, vomiting, fever or chills.  The end colostomy is working well.  She has also no change in her urinary habits.  No vaginal bleeding or discharge.            Past Medical History:  None    Past Surgical History:  1.   hysterectomy.  2.  Drainage of ovarian cyst.  3.  Exploratory laparotomy with end colostomy and washout.        Health Maintenance:  Health Maintenance Due   Topic Date Due     PREVENTIVE CARE VISIT  Never done     ADVANCE CARE PLANNING  Never done     COVID-19 Vaccine (1) Never done     HIV SCREENING  Never done     HEPATITIS C SCREENING  Never done     PAP  Never done     INFLUENZA VACCINE (1) 2021     No history of abnormal Pap smears.  Recent colonoscopy in February.            Current Medications:     has a current medication list which includes the following prescription(s): hydrocodone-acetaminophen, metronidazole, morphine, morphine, neomycin, ondansetron, polyethylene glycol, and sertraline.       Allergies:     Patient has no known allergies.        Social History:     Social History     Tobacco Use     Smoking status: Former Smoker     Smokeless tobacco: Never Used   Substance Use Topics     Alcohol use: Not Currently       History   Drug Use Unknown           Family History:     The patient's family history is notable for ***.    No family history on file.      Physical Exam:     There were no vitals taken for this visit.  There is no height or weight on file to calculate BMI.    General Appearance: healthy and alert, no distress     Psychiatric: appropriate mood and affect                                   Assessment:    Vicki Shell is a 36 year old woman with a new diagnosis of ovarian cyst.     A total of 60 minutes was spent with the patient, 40 minutes of which were spent in counseling the patient and/or treatment planning.      1.  Ovarian cyst.  2.   Large-bowel leak with end colostomy after drainage of ovarian cyst.  3.  Chronic pelvic pain.    Discussed with the patient, we will plan to obtain a pelvic MRI to further evaluate the cyst that she has, which given the size is likely not causing her chronic pelvic pain, but rather the adhesive disease and prior surgery.  We did discuss that given her age, we would not, unless needed, remove her ovaries as she would be postmenopausal with that.  If there is an indication for complex cyst, we could evaluate intraoperatively and remove one ovary if necessary.  We further discussed that if both ovaries were removed, she would be requiring hormone replacement therapy with that and discussed some of the pros and cons of that.  Further discussed that given her chronic pelvic pain requiring significant amount of narcotics, I would recommend her to see the Palliative Care Team as well as a pelvic floor physical therapist to further help with some of this more chronic-type pain.  The patient agrees with this plan.  Again, we will see her closer to the time of surgery with an MRI before.  We will plan to get that MRI Tuesday morning the week of her surgery, which is scheduled for Thursday, 04/28, and then I would see her for an in-person exam and visit on 04/27.  Again, we will be available for the surgery with Dr. Richardson as needed.  The patient agrees with the plan, is very appreciative of her care.  All questions were answered.          Thank you for allowing us to participate in the care of your patient.         Sincerely,    Henry Wills MD, MS    Department of Obstetrics and Gynecology   Division of Gynecologic Oncology   [facility]  Phone: [phone]      CC  Patient Care Team:  Karen Hernandez MD as PCP - General (Family Medicine)  Leobardo Pepe Georgina Marie, DO as Assigned OBGYN Provider  Tad Richardson MD as Assigned Surgical Provider  Tad Richardson  MD as Referring Physician (Surgery)  Henry Wills MD as MD (Gynecologic Oncology)  CJ DE LA TORRE

## 2022-03-09 NOTE — LETTER
3/9/2022      RE: Vicki Shell  [address]  [facility] [address]                               Consult Notes on Referred Patient    Date: 3/9/2022       Dr. Tad Richardson MD  [address] [place] [id number]       RE: Vicki Shell  : 1985  NORMA: 3/9/2022    Dear Dr. Tad Richardson:    I had the pleasure of seeing your patient Vicki Shell here at the Gynecologic Cancer Clinic at the [facility] on 3/9/2022.  As you know she is a very pleasant 36 year old woman with a recent diagnosis of ovarian cyst.  Given these findings she was subsequently sent to the Gynecologic Cancer Clinic for new patient consultation.   The patient is a G5, P4, 3 vaginal deliveries followed by a  section for previa and accreta, underwent a  hysterectomy, is not sure whether likely tubes were removed at that time.  That was about a year and a half ago, then in 2021 she had a right ovarian cyst and underwent a diagnostic laparoscopy, which was complicated by adhesions and then drainage of that ovarian cyst.  She unfortunately developed a bowel leak postoperatively, underwent an exploratory laparotomy, low anterior resection with end colostomy and Merlin's pouch.  Has since then had significant problems with pelvic pain, which is requiring daily narcotics.  She is taking 30 mg of morphine and then hydrocodone for breakthrough pain.  She has a recently seen one of my colleagues, Dr. Richardson, for consultation and the plan is to try to restore continence and reversal of the end colostomy on .  As a workup she has had a recent ultrasound that shows a physiologic cyst on the left ovary.  A recent CT shows a possible either cyst on the left ovary versus inclusion cyst of 4 x 5 cm.  The patient otherwise is eating and drinking well, has no nausea, vomiting, fever or chills.  The end colostomy is working well.  She has also no change in her urinary habits.  No vaginal bleeding or  discharge.            Past Medical History:  None    Past Surgical History:  1.   hysterectomy.  2.  Drainage of ovarian cyst.  3.  Exploratory laparotomy with end colostomy and washout.        Health Maintenance:  Health Maintenance Due   Topic Date Due     PREVENTIVE CARE VISIT  Never done     ADVANCE CARE PLANNING  Never done     COVID-19 Vaccine (1) Never done     HIV SCREENING  Never done     HEPATITIS C SCREENING  Never done     PAP  Never done     INFLUENZA VACCINE (1) 2021     No history of abnormal Pap smears.  Recent colonoscopy in February.            Current Medications:     has a current medication list which includes the following prescription(s): hydrocodone-acetaminophen, metronidazole, morphine, morphine, neomycin, ondansetron, polyethylene glycol, and sertraline.       Allergies:     Patient has no known allergies.        Social History:     Social History     Tobacco Use     Smoking status: Former Smoker     Smokeless tobacco: Never Used   Substance Use Topics     Alcohol use: Not Currently       History   Drug Use Unknown           Family History:     The patient's family history is notable for ***.    No family history on file.      Physical Exam:     There were no vitals taken for this visit.  There is no height or weight on file to calculate BMI.    General Appearance: healthy and alert, no distress     Psychiatric: appropriate mood and affect                                   Assessment:    Vicki Shell is a 36 year old woman with a new diagnosis of ovarian cyst.     A total of 60 minutes was spent with the patient, 40 minutes of which were spent in counseling the patient and/or treatment planning.      1.  Ovarian cyst.  2.  Large-bowel leak with end colostomy after drainage of ovarian cyst.  3.  Chronic pelvic pain.    Discussed with the patient, we will plan to obtain a pelvic MRI to further evaluate the cyst that she has, which given the size is likely not causing her  chronic pelvic pain, but rather the adhesive disease and prior surgery.  We did discuss that given her age, we would not, unless needed, remove her ovaries as she would be postmenopausal with that.  If there is an indication for complex cyst, we could evaluate intraoperatively and remove one ovary if necessary.  We further discussed that if both ovaries were removed, she would be requiring hormone replacement therapy with that and discussed some of the pros and cons of that.  Further discussed that given her chronic pelvic pain requiring significant amount of narcotics, I would recommend her to see the Palliative Care Team as well as a pelvic floor physical therapist to further help with some of this more chronic-type pain.  The patient agrees with this plan.  Again, we will see her closer to the time of surgery with an MRI before.  We will plan to get that MRI Tuesday morning the week of her surgery, which is scheduled for Thursday, 04/28, and then I would see her for an in-person exam and visit on 04/27.  Again, we will be available for the surgery with Dr. Richardson as needed.  The patient agrees with the plan, is very appreciative of her care.  All questions were answered.          Thank you for allowing us to participate in the care of your patient.         Sincerely,    Henry Wills MD, MS    Department of Obstetrics and Gynecology   Division of Gynecologic Oncology   [facility]  Phone: [phone]      CC  Patient Care Team:  Karen Hernandez MD as PCP - General (Family Medicine)  Leobardo Pepe Georgina Marie, DO as Assigned OBGYN Provider  Henry Wills MD as MD (Gynecologic Oncology)

## 2022-03-10 ENCOUNTER — PREP FOR PROCEDURE (OUTPATIENT)
Dept: ONCOLOGY | Facility: CLINIC | Age: 37
End: 2022-03-10
Payer: MEDICAID

## 2022-03-22 DIAGNOSIS — Z11.59 ENCOUNTER FOR SCREENING FOR OTHER VIRAL DISEASES: Primary | ICD-10-CM

## 2022-03-23 NOTE — TELEPHONE ENCOUNTER
FUTURE VISIT INFORMATION      SURGERY INFORMATION:    Date: 22    Location: [facility] OR    Surgeon:  Tad Richardson MD Elliott, Sean Patrick, MD Winterhoff, Boris J, MD    Anesthesia Type:  general    Procedure: CLOSURE, COLOSTOMY, LAPAROSCOPIC, CREATION, ILEOSTOMY, LAPAROSCOPIC POSSIBLE OPEN CYSTOSCOPY, WITH URETERAL bilateral STENT INSERTION possible possible bilateral salpingo-oophorectomy    RECORDS REQUESTED FROM:       Primary Care Provider: Karen Hernandez MD- [facility]    Most recent EKG+ Tracin22- [facility]

## 2022-03-29 DIAGNOSIS — Z90.710 STATUS POST EMERGENCY CESAREAN HYSTERECTOMY: ICD-10-CM

## 2022-03-29 DIAGNOSIS — Z01.812 PRE-PROCEDURE LAB EXAM: Primary | ICD-10-CM

## 2022-03-30 ENCOUNTER — TELEPHONE (OUTPATIENT)
Dept: UROLOGY | Facility: CLINIC | Age: 37
End: 2022-03-30
Payer: MEDICAID

## 2022-03-30 NOTE — TELEPHONE ENCOUNTER
Action 03/30/22 MMT   Action Taken  CSS faxed orders to [facility] at [phone] per pt request.

## 2022-03-30 NOTE — TELEPHONE ENCOUNTER
----- Message from Maggie Gallagher RN sent at 3/29/2022  3:58 PM CDT -----  Regarding: Pre op [facility]/UC/COVID To [facility] Octavio COOK @ (Fax) [phone]  Thank you, Maggie

## 2022-04-14 ENCOUNTER — TEAM CONFERENCE (OUTPATIENT)
Dept: SURGERY | Facility: CLINIC | Age: 37
End: 2022-04-14
Payer: MEDICAID

## 2022-04-14 NOTE — PROGRESS NOTES
COLON AND RECTAL SURGERY HUDDLE:    Patient was reviewed in preporation for their surgery the following was reviewed and has been completed:    Surgeon: Dr. Tad Richardson    Surgery & Date: 4/28 colostomy closure , possible DLI    Last MD Note: reviewed    Anesthesia Type: General    Other Providers: yes Dr. Plummer for cysto/stends and Dr. Wills for BSO    PAC: Yes    WOC: no- messaged Shavon   Addendum: done     Labs: Yes    Bowel Prep: Yes MiraLAX / Gatorade , Antibiotic and Magnesium Citrate    Packet: No--messaged Shavon   Addendum: done    Imaging: N/A    Post-Op Appointments: No--messaged Shavon   Addendum: per Dr. Debra cherry for one virtual visit     COVID: Yes    Is patient on TPN?: No   If yes, I contacted the TPN pharmacist by paging the  pharmacy at [phone] or calling [phone]. I also contacted Rajwinder CONNELLY with inpatient colon and rectal team.

## 2022-04-15 ENCOUNTER — TRANSFERRED RECORDS (OUTPATIENT)
Dept: HEALTH INFORMATION MANAGEMENT | Facility: CLINIC | Age: 37
End: 2022-04-15
Payer: MEDICAID

## 2022-04-15 NOTE — TELEPHONE ENCOUNTER
Left vm msg for patient explaining that I had just sent her Packet via [employer] (deja mi[employer]hart and Mail, and asked her to please review it and let me know if there are any questions. I also advised her to expect an important phone call next week that is explained in the Packet ([facility] for marking and education):    Your surgery is scheduled:    Date: Thursday April 28, 2022  ________________________________    Time: 7:30 AM*  ________________________________    Please arrive at:  5:30 AM*  ______________________    Surgeons' Names:  - Dr. Tad Richardson  - Dr. Henry Wills  -Dr. Inocencio Washburn      Pre-Op Physical Fax Numbers:         [employer] Pre-Admissions  [facility] Fax:  [phone] / Phone:  [phone]        Your surgery is located at:      [facility]      [facility] [address]C)      [place] [address]      [phone]      [email or web address]     *Times are tentative and may change. You can expect a call from the pre-admission nurses to confirm arrival and surgery start times if the times should change.     Required: Yes, you will need a  18 years or older to drive you home from your procedure as well as stay with you for 24 hours after your procedure, if you are discharged the same day as your procedure.    Surgery: CLOSURE, COLOSTOMY, LAPAROSCOPIC, POSSBILE OPEN, POSSIBLE LOOP ILEOSTOMY CREATION; CYSTOSCOPY, WITH BILATERAL URETERAL STENT INSERTION; POSSIBLE BILATERAL SALPINGO-OOPHORECTOMY    Upcoming Related Appointments:     Pre-operative Wound/Ostomy appointment:  - Please expect an important phone call from [facility] to set up an appointment with them for ostomy marking and education on either 4/26 or 4/27/2022.  - Their main phone number is [phone] (you may also call them).                                                                   Apr 26, 2022  7:30 AM  (Arrive by 7:00  "AM)  MR PELVIS (GYN) W CONTRAST with [id number]  [facility] [place] ([facility] ) [phone]     [address] [address]     Apr 26, 2022  9:15 AM  (Arrive by 9:00 AM)  PAC EVALUATION with Georgia Lisa PA-C  [facility] [place] ([facility] ) [phone]    [address] [facility] [address]th [facility] [address]     Apr 26, 2022 10:30 AM  Pre-procedure Covid with  COVID LAB  [facility] [place] ([facility] ) [phone]    [address] [address]     Apr 26, 2022 10:45 AM  LAB with UC LAB  [facility] [place] ([facility] ) [phone]    [address] [address]     Apr 27, 2022 11:20 AM  (Arrive by 11:05 AM)  Return Visit with Henry Wills MD  [facility] ([facility] ) [phone]    [address] [address]        - Post-operative appointments are needed, and can be set up at any time    Pre-surgical Preparation:      MiraLAX/Gatorade, Magnesium Citrate, Antibiotics, Zofran  - see \"Day Before Surgery\"  - obtain medications and ingredients in advance  - if you have diabetes or blood sugar concerns, please use Gatorade ZERO or Low Sugar, as per recommendation by your physician    Shavon Salomon  Kat-op Coordinator  [place]  Direct Phone: [phone]  "

## 2022-04-18 ENCOUNTER — TELEPHONE (OUTPATIENT)
Dept: SURGERY | Facility: CLINIC | Age: 37
End: 2022-04-18
Payer: MEDICAID

## 2022-04-19 ENCOUNTER — DOCUMENTATION ONLY (OUTPATIENT)
Dept: UROLOGY | Facility: CLINIC | Age: 37
End: 2022-04-19
Payer: MEDICAID

## 2022-04-19 NOTE — TELEPHONE ENCOUNTER
Received vm msg from [facility] stating that they can get patient in for an appointment on 4/26/2022 at 1:00 PM (ostomy marking and education).    I called her back, left  msg requesting that she please schedule patient's appointment with her clinic on 4/26/2022 at 1:00 PM. I requested that she call me back to confirm.    Left  msg for patient requesting that she call me back to review her appointments since a new one is being added.    Received 2nd  msg from [facility] stating that they scheduled patient's appointment as specified above.    Shavon Salomon  Kat-op Coordinator  [place]  Direct Phone: [phone]

## 2022-04-20 NOTE — TELEPHONE ENCOUNTER
Spoke with patient via phone, confirmed the following scheduling, then sent Updated Surgery Packet to patient via [employer] including related scheduling information and prep instructions:    Your surgery is scheduled:    Date: Thursday April 28, 2022  ________________________________    Time: 7:30 AM*  ________________________________    Please arrive at:  5:30 AM*  ______________________    Surgeons' Names:  - Dr. Tad Richardson  - Dr. Henry Wills  -Dr. Inocencio Washburn      Pre-Op Physical Fax Numbers:         [facility] Pre-Admissions  [facility] Fax:  [phone] / Phone:  [phone]        Your surgery is located at:      [facility]      University [address](3C)      [place] [address]      [phone]      [email or web address]     *Times are tentative and may change. You can expect a call from the pre-admission nurses to confirm arrival and surgery start times if the times should change.     Required: Yes, you will need a  18 years or older to drive you home from your procedure as well as stay with you for 24 hours after your procedure, if you are discharged the same day as your procedure.    Surgery: CLOSURE, COLOSTOMY, LAPAROSCOPIC, POSSBILE OPEN, POSSIBLE LOOP ILEOSTOMY CREATION; CYSTOSCOPY, WITH BILATERAL URETERAL STENT INSERTION; POSSIBLE BILATERAL SALPINGO-OOPHORECTOMY    Upcoming Related Appointments:     Pre-operative Wound/Ostomy appointment:  - Please expect an important phone call from [facility] to set up an appointment with them for ostomy marking and education on either 4/26 or 4/27/2022.  - Their main phone number is [phone] (you may also call them).                                                                   Apr 26, 2022  7:30 AM  (Arrive by 7:00 AM)  MR PELVIS (GYN) W CONTRAST with [id number]  [facility]  "and [facility] Center ) [phone]    [address] [facility] [address] [facility] [address]     Apr 26, 2022  9:15 AM  (Arrive by 9:00 AM)  PAC EVALUATION with Georgia Lisa PA-C  [facility] Preoperative Assessment [facility] [place] ([facility] ) [phone]    [address] [place] [address] [address] [facility] [address]     Apr 26, 2022 10:30 AM  Pre-procedure Covid with  COVID LAB  [facility] [place] ([facility] ) [phone]    [address] [facility] [address] [facility] [address]     Apr 26, 2022 10:45 AM  LAB with UC LAB  [facility] [place] ([facility] ) [phone]    [address] [place] [address]     Apr 26, 2022  1:00 PM  Nurse Only with [facility] WOUND OSTOMY CARE  [facility] Wound Ostomy Care ([facility] ) [phone]    [address] [place] [address]     Apr 27, 2022 11:20 AM  (Arrive by 11:05 AM)  Return Visit with Henry Wills MD  [facility] ([facility] ) [phone]    [address] [place] [address]         - Post-operative appointments are needed, and can be set up at any time    Pre-surgical Preparation:      MiraLAX/Gatorade, Magnesium Citrate, Antibiotics, Zofran  - see \"Day Before Surgery\"  - obtain medications and ingredients in advance  - if you have diabetes or blood sugar concerns, please use Gatorade ZERO or Low Sugar, as per recommendation by your physician    Shavon Salomon  Kat-op Coordinator  [place]  Direct Phone: [phone]    "

## 2022-04-22 ENCOUNTER — DOCUMENTATION ONLY (OUTPATIENT)
Dept: SURGERY | Facility: CLINIC | Age: 37
End: 2022-04-22
Payer: MEDICAID

## 2022-04-22 NOTE — PROGRESS NOTES
Per request from Dr. Richardson scheduled a virtual post-op appointment with patient 2-3 weeks after surgery - scheduled with Dr. Richardson on 5/25/2022 at 12:30 PM.    Shavon Salomon  Kat-op Coordinator  [place]  Direct Phone: [phone]

## 2022-04-25 ENCOUNTER — ANESTHESIA EVENT (OUTPATIENT)
Dept: SURGERY | Facility: CLINIC | Age: 37
DRG: 330 | End: 2022-04-25
Payer: MEDICAID

## 2022-04-25 RX ORDER — ACETAMINOPHEN 325 MG/1
325-650 TABLET ORAL EVERY 6 HOURS PRN
Status: ON HOLD | COMMUNITY
End: 2022-05-05

## 2022-04-25 RX ORDER — MULTIPLE VITAMINS W/ MINERALS TAB 9MG-400MCG
1 TAB ORAL DAILY
COMMUNITY

## 2022-04-25 NOTE — PROGRESS NOTES
Preoperative Assessment Center Medication History Note    Medication history completed on April 25, 2022 by this writer. See Epic admission navigator for prior to admission medications. Operating room staff will still need to confirm medications and last dose information on day of surgery.     Medication history interview sources  Patient interview: Yes  Care Everywhere records: Yes  Surescripts pharmacy refill records: No  Other (if applicable): no data in shared [place] ([place] data not available/not working?) - [place] [facility] does have recent rx listed for MS Contin 30 mg     Changes made to PTA medication list  Added: MVI, proimmune supplement   Deleted: MS Contin 15 mg.   Changed: sertraline dose/sig,     Additional medication history information (including reliability of information, actions taken by pharmacist):    -- patient is on daily opioid therapy, approximately 40 mg oral morphine equivalent per day.  No formal pain plan but can consult inpatient pain management team if issues with postoperative pain control.   -- No recent (within 30 days) course of antibiotics  -- No recent (within 30 days) course of systemic steroids  -- Patient reports not being on blood thinning medications    -- Patient declines being on any other prescription or over-the-counter medications    Prior to Admission medications    Medication Sig Last Dose Taking? Auth Provider   acetaminophen (TYLENOL) 325 MG tablet Take 325-650 mg by mouth every 6 hours as needed for mild pain Taking Yes Unknown, Entered By History   HYDROcodone-acetaminophen (NORCO) 5-325 MG tablet Take 1 tablet by mouth every 6 hours as needed Taking Yes Reported, Patient   morphine (MS CONTIN) 30 MG CR tablet Take 30 mg by mouth every morning Taking Yes Reported, Patient   multivitamin w/minerals (THERA-VIT-M) tablet Take 1 tablet by mouth daily Takes when she remembers Taking Yes Unknown, Entered By History   NONFORMULARY Pro Immune supplement (Selenium)  Takes 1  "tablet daily (when she remembers) Taking Yes Unknown, Entered By History   sertraline (ZOLOFT) 50 MG tablet Take 50 mg by mouth At Bedtime Taking Yes Reported, Patient   metroNIDAZOLE (FLAGYL) 500 MG tablet Take 1 tablet (500 mg) by mouth every 6 hours At 8:00 am, 2:00 pm, 8:00 pm the day prior to your surgery with neomycin and zofran.   Tad Richardson MD   neomycin (MYCIFRADIN) 500 MG tablet Take 2 tablets (1,000 mg) by mouth every 6 hours At 8:00 am, 2:00 pm, 8:00 pm the day prior to your surgery with flagyl and zofran.   Tad Richardson MD   ondansetron (ZOFRAN) 4 MG tablet Take 1 tablet (4 mg) by mouth every 6 hours At 8:00 am, 2:00 pm, 8:00 pm the day prior to your surgery with neomycin and flagyl.   Tad Richardson MD   polyethylene glycol (MIRALAX) 17 g packet Take 238 g by mouth See Admin Instructions Starting at 4 pm night prior to surgery. Refer to \"Getting Ready for Surgery\" instructions.   Tad Richardson MD        Medication history completed by: Sunday Ponce, [facility]    "

## 2022-04-26 ENCOUNTER — OFFICE VISIT (OUTPATIENT)
Dept: SURGERY | Facility: CLINIC | Age: 37
End: 2022-04-26
Payer: MEDICAID

## 2022-04-26 ENCOUNTER — PRE VISIT (OUTPATIENT)
Dept: SURGERY | Facility: CLINIC | Age: 37
End: 2022-04-26

## 2022-04-26 ENCOUNTER — HOSPITAL ENCOUNTER (OUTPATIENT)
Facility: CLINIC | Age: 37
Discharge: HOME | End: 2022-04-26
Attending: SURGERY | Admitting: FAMILY MEDICINE
Payer: MEDICAID

## 2022-04-26 ENCOUNTER — ANCILLARY PROCEDURE (OUTPATIENT)
Dept: MRI IMAGING | Facility: CLINIC | Age: 37
End: 2022-04-26
Attending: OBSTETRICS & GYNECOLOGY
Payer: MEDICAID

## 2022-04-26 ENCOUNTER — HOSPITAL ENCOUNTER (OUTPATIENT)
Dept: WOUND CARE | Facility: HOSPITAL | Age: 37
Discharge: HOME | End: 2022-04-26
Attending: SURGERY
Payer: MEDICAID

## 2022-04-26 ENCOUNTER — LAB (OUTPATIENT)
Dept: LAB | Facility: CLINIC | Age: 37
End: 2022-04-26
Attending: SURGERY

## 2022-04-26 VITALS
TEMPERATURE: 97.9 F | RESPIRATION RATE: 16 BRPM | HEART RATE: 69 BPM | SYSTOLIC BLOOD PRESSURE: 105 MMHG | HEIGHT: 67 IN | OXYGEN SATURATION: 99 % | BODY MASS INDEX: 29.18 KG/M2 | WEIGHT: 185.9 LBS | DIASTOLIC BLOOD PRESSURE: 69 MMHG

## 2022-04-26 DIAGNOSIS — N83.202 LEFT OVARIAN CYST: ICD-10-CM

## 2022-04-26 DIAGNOSIS — Z11.59 ENCOUNTER FOR SCREENING FOR OTHER VIRAL DISEASES: ICD-10-CM

## 2022-04-26 DIAGNOSIS — Z01.818 PREOP EXAMINATION: Primary | ICD-10-CM

## 2022-04-26 DIAGNOSIS — Z43.3 COLOSTOMY CARE (H): ICD-10-CM

## 2022-04-26 DIAGNOSIS — Z01.818 PREOP EXAMINATION: ICD-10-CM

## 2022-04-26 DIAGNOSIS — N83.202 CYST OF LEFT OVARY: ICD-10-CM

## 2022-04-26 LAB
ABO/RH(D): NORMAL
ALBUMIN UR-MCNC: NEGATIVE MG/DL
ANION GAP SERPL CALCULATED.3IONS-SCNC: 4 MMOL/L (ref 3–14)
ANTIBODY SCREEN: NEGATIVE
APPEARANCE UR: CLEAR
BILIRUB UR QL STRIP: NEGATIVE
BUN SERPL-MCNC: 9 MG/DL (ref 7–30)
CALCIUM SERPL-MCNC: 8.8 MG/DL (ref 8.5–10.1)
CHLORIDE BLD-SCNC: 107 MMOL/L (ref 94–109)
CO2 SERPL-SCNC: 28 MMOL/L (ref 20–32)
COLOR UR AUTO: ABNORMAL
CREAT SERPL-MCNC: 0.7 MG/DL (ref 0.52–1.04)
ERYTHROCYTE [DISTWIDTH] IN BLOOD BY AUTOMATED COUNT: 13 % (ref 10–15)
GFR SERPL CREATININE-BSD FRML MDRD: >90 ML/MIN/1.73M2
GLUCOSE BLD-MCNC: 90 MG/DL (ref 70–99)
GLUCOSE UR STRIP-MCNC: NEGATIVE MG/DL
HCT VFR BLD AUTO: 43.6 % (ref 35–47)
HGB BLD-MCNC: 13.9 G/DL (ref 11.7–15.7)
HGB UR QL STRIP: ABNORMAL
KETONES UR STRIP-MCNC: NEGATIVE MG/DL
LEUKOCYTE ESTERASE UR QL STRIP: NEGATIVE
MCH RBC QN AUTO: 27.5 PG (ref 26.5–33)
MCHC RBC AUTO-ENTMCNC: 31.9 G/DL (ref 31.5–36.5)
MCV RBC AUTO: 86 FL (ref 78–100)
NITRATE UR QL: NEGATIVE
PH UR STRIP: 6 [PH] (ref 5–7)
PLATELET # BLD AUTO: 227 10E3/UL (ref 150–450)
POTASSIUM BLD-SCNC: 4.2 MMOL/L (ref 3.4–5.3)
RBC # BLD AUTO: 5.05 10E6/UL (ref 3.8–5.2)
RBC URINE: <1 /HPF
SARS-COV-2 RNA RESP QL NAA+PROBE: NEGATIVE
SODIUM SERPL-SCNC: 139 MMOL/L (ref 133–144)
SP GR UR STRIP: 1.01 (ref 1–1.03)
SPECIMEN EXPIRATION DATE: NORMAL
SQUAMOUS EPITHELIAL: <1 /HPF
UROBILINOGEN UR STRIP-MCNC: NORMAL MG/DL
WBC # BLD AUTO: 5.2 10E3/UL (ref 4–11)
WBC URINE: <1 /HPF

## 2022-04-26 PROCEDURE — 81001 URINALYSIS AUTO W/SCOPE: CPT | Performed by: PATHOLOGY

## 2022-04-26 PROCEDURE — 86901 BLOOD TYPING SEROLOGIC RH(D): CPT

## 2022-04-26 PROCEDURE — 87086 URINE CULTURE/COLONY COUNT: CPT | Mod: 90 | Performed by: PATHOLOGY

## 2022-04-26 PROCEDURE — 999N000198 HC STATISTIC WOC PT EDUCATION, 16-30 MIN

## 2022-04-26 PROCEDURE — U0003 INFECTIOUS AGENT DETECTION BY NUCLEIC ACID (DNA OR RNA); SEVERE ACUTE RESPIRATORY SYNDROME CORONAVIRUS 2 (SARS-COV-2) (CORONAVIRUS DISEASE [COVID-19]), AMPLIFIED PROBE TECHNIQUE, MAKING USE OF HIGH THROUGHPUT TECHNOLOGIES AS DESCRIBED BY CMS-2020-01-R: HCPCS

## 2022-04-26 PROCEDURE — 99203 OFFICE O/P NEW LOW 30 MIN: CPT | Performed by: PHYSICIAN ASSISTANT

## 2022-04-26 PROCEDURE — A9585 GADOBUTROL INJECTION: HCPCS | Performed by: RADIOLOGY

## 2022-04-26 PROCEDURE — U0005 INFEC AGEN DETEC AMPLI PROBE: HCPCS

## 2022-04-26 PROCEDURE — 72197 MRI PELVIS W/O & W/DYE: CPT | Performed by: RADIOLOGY

## 2022-04-26 PROCEDURE — 86900 BLOOD TYPING SEROLOGIC ABO: CPT

## 2022-04-26 PROCEDURE — 86850 RBC ANTIBODY SCREEN: CPT

## 2022-04-26 RX ORDER — GADOBUTROL 604.72 MG/ML
10 INJECTION INTRAVENOUS ONCE
Status: COMPLETED | OUTPATIENT
Start: 2022-04-26 | End: 2022-04-26

## 2022-04-26 RX ADMIN — GADOBUTROL 8.5 ML: 604.72 INJECTION INTRAVENOUS at 08:21

## 2022-04-26 ASSESSMENT — LIFESTYLE VARIABLES: TOBACCO_USE: 1

## 2022-04-26 ASSESSMENT — PAIN SCALES - GENERAL: PAINLEVEL: MODERATE PAIN (5)

## 2022-04-26 NOTE — PROGRESS NOTES
PRE-OP OSTOMY CONSULTATION AND ASSESSMENT    INTAKE    Type of Stoma Planned: Potential Ileostomy    Diagnosis Pertinent to Stoma: bowel leak with previous surgery  Date of Diagnosis: August 2021  Type of Surgery: exploratory, takedown colostomy Surgery Date: 4/28  Surgeon: Tad Richardson MD      Hospital: [facility]    Current Living Situation: House  Anticipated Discharge Location Post Hospital Stay: House    EDUCATION ASSESSMENT  Present for Teaching Session: Patient and Spouse/Significant Other   Present: NA    Significant Vision Issues: No  Hand Dexterity Issues: No    Pertinent Information/Identified Barriers to Independent Care: None - patient has had a colostomy since August 2021    Stoma Site Marking    Assessed Patient while: Lying, Sitting and Standing  Marked in RLQ with: Permanent Marker, Covered with Tegaderm and Marker and extra Tegaderm sent with patient    EDUCATION    Teaching Folder Given: Yes  Instruction: Colostomy versus ileostomy expectations    Total Time Spent with Patient: 15 minutes

## 2022-04-26 NOTE — PATIENT INSTRUCTIONS
Preparing for Your Surgery      Name:  Vicki Shell   MRN:  [id number]   :  1985   Today's Date:  2022       Arriving for surgery:  Surgery date:  22  Arrival time:  5:30AM    Restrictions due to COVID 19       Effective 22 [facility] is implementing the following visitor policy:     1 person may accompany the patient through the Pre-Op process.      That same person may wait in the Surgery Waiting room, provided there is enough room to social distance         Inpatients are allowed 2 visitors per day for the duration of their stay.        Visitors must wear a mask.      Visitors must not be ill.      Visiting hours are 8 am to 8 pm.    [employer] parking is available for anyone with mobility limitations or disabilities.  ([place]  24 hours/ 7 days a week; [facility]  7 am- 3:30 pm, Mon- Fri)    Please come to:     [facility] [address]  [address] [place] [address]    -   Parking is available in the Patient Visitor Ramp on [facility].     -   When entering the hospital you will be asked COVID screening questions, you will then be directed to Registration.  Registration will direct you to the 3rd floor Surgery waiting room.     -   Please ask if you need an escort or a wheelchair to the Surgery Waiting Room.  Preop number- [phone]      What can I eat or drink?  - Begin Clear Liquid diet and Bowel prep on 22 per [facility] Rectal Instructions  -  You may have clear liquids until 2 hours before surgery. (Until 22, 5:30AM)      OPTIMAL RECOVERY AFTER SURGERY        Begin hydrating yourself by drinking at least 8-10 glasses of clear liquids for 24 hours before surgery:      Suggested clear liquids:   Water    Clear Juices   Clear Broth   Non- carbonated beverages    (Crystal Light or Maurice Aid)   Sodas    (Sprite, 7 up, ginger ale, seltzer)   Gatorade              Drink clear liquids up until 2 hours  before your surgery.       We would like you to purchase a drink such as Gatorade or Ensure Clear (not the milkshake type).  Drink this before bedtime and on the way into the hospital, drink between 8-10 ounces or until you feel hydrated.        Keeping well hydrated leads to your veins being plump, you wake up faster, and you are less likely to be nauseated. Start drinking water as soon as you can after surgery and advance to clear liquids and food as tolerated.  IV fluids contain salt, drinking fluids will minimize the amount of IV fluids you need and decrease the amount of salt you get.                 The most common reason for the patient to be readmitted is dehydration. Staying hydrated after you go home from the hospital is very important.  Ensure or Ensure Clear are good options to keep you hydrated.       -  No Alcohol for at least 24 hours before surgery     Which medicines can I take?    Hold Aspirin for 7 days before surgery.   Hold Multivitamins for 7 days before surgery.  Hold Supplements for 7 days before surgery.  Hold Ibuprofen (Advil, Motrin) for 1 day before surgery--unless otherwise directed by surgeon.  Hold Naproxen (Aleve) for 4 days before surgery.    -  PLEASE TAKE these medications the day of surgery:    Morphine(MS Contin)   Acetaminophen(Tylenol) as needed    Hydrocodone-Acetaminophen(Norco) as needed      How do I prepare myself?  - Please take 2 showers before surgery using Scrubcare or Hibiclens soap.    Use this soap only from the neck to your toes.     Leave the soap on your skin for one minute--then rinse thoroughly.      You may use your own shampoo and conditioner; no other hair products.   - Please remove all jewelry and body piercings.  - No lotions, deodorants or fragrance.  - No makeup or fingernail polish.   - Bring your ID and insurance card.    -If you have a Deep Brain Stimulator, Spinal Cord Stimulator or any neuro stimulator device---you must bring the remote control to  the hospital     - All patients are required to have a Covid-19 test within 4 days of surgery/procedure.      -Patients will be contacted by the [facility] scheduling team within 1 week of surgery to make an appointment.      - Patients may call the Scheduling team at [phone] if they have not been scheduled within 4 days of  surgery.          Questions or Concerns:    - For any questions regarding the day of surgery or your hospital stay, please contact the Pre Admission Nursing Office at [phone].       - If you have health changes between today and your surgery please call your surgeon.       For questions after surgery please call your surgeons office.

## 2022-04-26 NOTE — H&P
Pre-Operative H & P     CC:  Preoperative exam to assess for increased cardiopulmonary risk while undergoing surgery and anesthesia.    Date of Encounter: 4/26/2022  Primary Care Physician:  Karen Hernandez     Reason for visit:   Encounter Diagnoses   Name Primary?     Preop examination Yes     Colostomy care (H)      Left ovarian cyst        HPI  Vicki Shell is a 37 year old female who presents for pre-operative H & P in preparation for  Procedure Information     Case: [id number] Date/Time: 04/28/22 0730    Procedures:       CLOSURE, COLOSTOMY, LAPAROSCOPIC, (N/A Abdomen)      CREATION, ILEOSTOMY, LAPAROSCOPIC (N/A Abdomen)      POSSIBLE OPEN (N/A Abdomen)      CYSTOSCOPY, WITH URETERAL bilateral STENT INSERTION possible (Bilateral Urethra)      possible bilateral salpingo-oophorectomy (N/A Abdomen)    Anesthesia type: General    Diagnosis:       Colostomy care (H) [Z43.3]      Colostomy status (H) [Z93.3]      Cyst of left ovary [N83.202]    Pre-op diagnosis:       Colostomy care (H) [Z43.3]      Colostomy status (H) [Z93.3]      Cyst of left ovary [N83.202]    Location: [facility]  /  OR    Providers: Tad Richardson MD; Inocencio Washburn MD; Henry Wills MD          This is a pleasant 37 year old female with PMH significant for smoking.  Pt underwent diagnostic laparoscopy 8/2021 for a right ovarian cyst at OSH.  This was c/b adhesions.  She developed bowel leak and underwent subsequent exploratory lapartomy, LAR with end colostomy and Harmann's pouch.  She has significant pelvic pain since that time for which she takes opiate medication.  During work up for colostomy reversal, imaging revealed left ovarian cyst.  She has met with Dr. Richardson and Dr. Wills and is ready to proceed with the above surgery.  Today she states she is otherwise feeling well.  She denies N/V/F.  She has a good appetite and denies issues with her colostomy/stoma.      History is obtained from the patient and  "chart review    Hx of abnormal bleeding or anti-platelet use: denies    Menstrual history: No LMP recorded. Patient has had a hysterectomy.:      Past Medical History  Past Medical History:   Diagnosis Date     Colostomy in place (H)      Cyst of left ovary        Past Surgical History  Past Surgical History:   Procedure Laterality Date     LAPAROSCOPY DIAGNOSTIC (GYN)      for ovarian cyst     LOW ANTERIOR BOWEL RESECTION         Prior to Admission Medications  Current Outpatient Medications   Medication Sig Dispense Refill     acetaminophen (TYLENOL) 325 MG tablet Take 325-650 mg by mouth every 6 hours as needed for mild pain       HYDROcodone-acetaminophen (NORCO) 5-325 MG tablet Take 1 tablet by mouth every 6 hours as needed       morphine (MS CONTIN) 30 MG CR tablet Take 30 mg by mouth every morning       multivitamin w/minerals (THERA-VIT-M) tablet Take 1 tablet by mouth daily Takes when she remembers       NONFORMULARY Pro Immune supplement (Selenium)  Takes 1 tablet daily (when she remembers)       sertraline (ZOLOFT) 50 MG tablet Take 50 mg by mouth At Bedtime       metroNIDAZOLE (FLAGYL) 500 MG tablet Take 1 tablet (500 mg) by mouth every 6 hours At 8:00 am, 2:00 pm, 8:00 pm the day prior to your surgery with neomycin and zofran. 3 tablet 0     neomycin (MYCIFRADIN) 500 MG tablet Take 2 tablets (1,000 mg) by mouth every 6 hours At 8:00 am, 2:00 pm, 8:00 pm the day prior to your surgery with flagyl and zofran. 6 tablet 0     ondansetron (ZOFRAN) 4 MG tablet Take 1 tablet (4 mg) by mouth every 6 hours At 8:00 am, 2:00 pm, 8:00 pm the day prior to your surgery with neomycin and flagyl. 3 tablet 0     polyethylene glycol (MIRALAX) 17 g packet Take 238 g by mouth See Admin Instructions Starting at 4 pm night prior to surgery. Refer to \"Getting Ready for Surgery\" instructions. 14 packet 0       Allergies  No Known Allergies    Social History  Social History     Socioeconomic History     Marital status: Single    "  Spouse name: Not on file     Number of children: Not on file     Years of education: Not on file     Highest education level: Not on file   Occupational History     Not on file   Tobacco Use     Smoking status: Current Some Day Smoker     Smokeless tobacco: Never Used     Tobacco comment: 5-7 cigarettes per day   Vaping Use     Vaping Use: Never used   Substance and Sexual Activity     Alcohol use: Not Currently     Drug use: Never     Sexual activity: Yes     Partners: Male   Other Topics Concern     Not on file   Social History Narrative     Not on file     Social Determinants of Health     Financial Resource Strain: Not on file   Food Insecurity: Not on file   Transportation Needs: Not on file   Physical Activity: Not on file   Stress: Not on file   Social Connections: Not on file   Intimate Partner Violence: Not on file   Housing Stability: Not on file       Family History  No family history on file.    Review of Systems  The complete review of systems is negative other than noted in the HPI or here.     Anesthesia Evaluation   Pt has had prior anesthetic.     No history of anesthetic complications       ROS/MED HX  ENT/Pulmonary:     (+) tobacco use (5-7 cigarettes per day),     Neurologic:  - neg neurologic ROS     Cardiovascular:     (+) -----Previous cardiac testing   Echo: Date: Results:    Stress Test: Date: Results:    ECG Reviewed: Date: 1/18/22 Results:  SR  Cath: Date: Results:      METS/Exercise Tolerance: 4 - Raking leaves, gardening    Hematologic:  - neg hematologic  ROS     Musculoskeletal:       GI/Hepatic: Comment: Bowel leak s/p diag lap for ovarian cyst  Peritonitis s/p low anterior resection with end colostomy and Merlin's pouch      Renal/Genitourinary: Comment: S/p diagnostic laparoscopy c/b adhesions and drainage of ovarian cyst      Endo:  - neg endo ROS     Psychiatric/Substance Use:     (+) psychiatric history anxiety and depression     Infectious Disease:  - neg infectious disease  "ROS     Malignancy:  - neg malignancy ROS     Other:  - neg other ROS    (+) , H/O Chronic Pain,        /69 (BP Location: Left arm, Patient Position: Sitting, Cuff Size: Adult Regular)   Pulse 69   Temp 97.9  F (36.6  C) (Oral)   Resp 16   Ht 1.702 m (5' 7\")   Wt 84.3 kg (185 lb 14.4 oz)   SpO2 99%   Breastfeeding No   BMI 29.12 kg/m      Physical Exam   Constitutional: Awake, alert, cooperative, no apparent distress, and appears stated age.  Eyes: Pupils equal, round and reactive to light, extra ocular muscles intact, sclera clear, conjunctiva normal.  HENT: Normocephalic, oral pharynx with moist mucus membranes, good dentition. No goiter appreciated.   Respiratory: Clear to auscultation bilaterally, no crackles or wheezing.  Cardiovascular: Regular rate and rhythm, normal S1 and S2, and no murmur noted.  Carotids +2, no bruits. No edema. Palpable pulses to radial  DP and PT arteries.   GI: Normal bowel sounds, soft, non-distended, non-tender, no masses palpated, colostomy bag noted  Lymph/Hematologic: No cervical lymphadenopathy and no supraclavicular lymphadenopathy.  Genitourinary:  deferred  Skin: Warm and dry.  No rashes at anticipated surgical site.   Musculoskeletal: Full ROM of neck. There is no redness, warmth, or swelling of the joints. Gross motor strength is normal.    Neurologic: Awake, alert, oriented to name, place and time. Cranial nerves II-XII are grossly intact.   Neuropsychiatric: Calm, cooperative. Normal affect.       Prior Labs/Diagnostic Studies   All labs and imaging personally reviewed     Component      Latest Ref Rng & Units 4/26/2022   WBC      4.0 - 11.0 10e3/uL 5.2   RBC Count      3.80 - 5.20 10e6/uL 5.05   Hemoglobin      11.7 - 15.7 g/dL 13.9   Hematocrit      35.0 - 47.0 % 43.6   MCV      78 - 100 fL 86   MCH      26.5 - 33.0 pg 27.5   MCHC      31.5 - 36.5 g/dL 31.9   RDW      10.0 - 15.0 % 13.0   Platelet Count      150 - 450 10e3/uL 227     Component      Latest " Ref Rng & Units 4/26/2022   Sodium      133 - 144 mmol/L 139   Potassium      3.4 - 5.3 mmol/L 4.2   Chloride      94 - 109 mmol/L 107   Carbon Dioxide      20 - 32 mmol/L 28   Anion Gap      3 - 14 mmol/L 4   Urea Nitrogen      7 - 30 mg/dL 9   Creatinine      0.52 - 1.04 mg/dL 0.70   Calcium      8.5 - 10.1 mg/dL 8.8   Glucose      70 - 99 mg/dL 90   GFR Estimate      >60 mL/min/1.73m2 >90     EKG/ stress test - if available please see in ROS above       The patient's records and results personally reviewed by this provider.     Outside records reviewed from: Care Everywhere        Assessment      Vicki Shell is a 37 year old female seen as a PAC referral for risk assessment and optimization for anesthesia.    Plan/Recommendations  Pt will be optimized for the proposed procedure.  See below for details on the assessment, risk, and preoperative recommendations    NEUROLOGY  - No history of TIA, CVA or seizure  - Chronic Pain  On chronic opiates, morphine equivilant = 50 MME/Day   -Post Op delirium risk factors:  No risk identified    ENT  - No current airway concerns.  Will need to be reassessed day of surgery.  Mallampati: I  TM: > 3    CARDIAC  - No history of CAD, Hypertension and Afib  - METS (Metabolic Equivalents)  Patient performs 4 or more METS exercise without symptoms              RCRI-Low risk: Class 2 0.9% complication rate            Total Score: 1    RCRI: High Risk Surgery        PULMONARY    JOSE Low Risk            Total Score: 0      - Denies asthma or inhaler use     - Tobacco History      Pt endorses smoking 5-7 cigarettes per day.  She has previously quit but started again.  Encouraged cessation and avoidance of smoking 24-48 hours prior to surgery.  Pt declined in patient smoking cessation referral    History   Smoking Status     Current Some Day Smoker   Smokeless Tobacco     Never Used     Comment: 5-7 cigarettes per day       GI    PONV Medium Risk  Total Score: 2           1 AN PONV: Pt  "is Female    1 AN PONV: Intended Post Op Opioids        /RENAL  - Creatinine today 0.70    ENDOCRINE    - BMI: Estimated body mass index is 29.12 kg/m  as calculated from the following:    Height as of this encounter: 1.702 m (5' 7\").    Weight as of this encounter: 84.3 kg (185 lb 14.4 oz).  Overweight (BMI 25.0-29.9)  - No history of Diabetes Mellitus    HEME  VTE Low Risk 0.5%            Total Score: 4    VTE: Family Hx of VTE      - No history of abnormal bleeding or antiplatelet use.        PSYCH  - anxiety and depression treated with Zoloft        The patient is optimized for their procedure. AVS with information on surgery time/arrival time, meds and NPO status given by nursing staff. No further diagnostic testing indicated.      On the day of service:     Prep time: 14 minutes  Visit time: 10 minutes  Documentation time: 15 minutes  ------------------------------------------  Total time: 39 minutes      Georgia Lisa PA-C  Preoperative Assessment Center  [facility]  Clinic and Surgery Center  Phone: [phone]  Fax: [phone]  "

## 2022-04-27 ENCOUNTER — ONCOLOGY VISIT (OUTPATIENT)
Dept: ONCOLOGY | Facility: CLINIC | Age: 37
End: 2022-04-27
Attending: OBSTETRICS & GYNECOLOGY
Payer: MEDICAID

## 2022-04-27 VITALS
WEIGHT: 184.1 LBS | DIASTOLIC BLOOD PRESSURE: 73 MMHG | SYSTOLIC BLOOD PRESSURE: 122 MMHG | HEIGHT: 67 IN | BODY MASS INDEX: 28.9 KG/M2 | TEMPERATURE: 98.1 F | HEART RATE: 66 BPM | OXYGEN SATURATION: 98 %

## 2022-04-27 DIAGNOSIS — N83.202 CYST OF LEFT OVARY: Primary | ICD-10-CM

## 2022-04-27 LAB — BACTERIA UR CULT: NORMAL

## 2022-04-27 PROCEDURE — G0463 HOSPITAL OUTPT CLINIC VISIT: HCPCS

## 2022-04-27 PROCEDURE — 99214 OFFICE O/P EST MOD 30 MIN: CPT | Mod: 24 | Performed by: OBSTETRICS & GYNECOLOGY

## 2022-04-27 RX ORDER — MEPERIDINE HYDROCHLORIDE 25 MG/ML
12.5 INJECTION INTRAMUSCULAR; INTRAVENOUS; SUBCUTANEOUS
Status: CANCELLED | OUTPATIENT
Start: 2022-04-27

## 2022-04-27 RX ORDER — ONDANSETRON 2 MG/ML
4 INJECTION INTRAMUSCULAR; INTRAVENOUS EVERY 30 MIN PRN
Status: CANCELLED | OUTPATIENT
Start: 2022-04-27

## 2022-04-27 RX ORDER — SODIUM CHLORIDE, SODIUM LACTATE, POTASSIUM CHLORIDE, CALCIUM CHLORIDE 600; 310; 30; 20 MG/100ML; MG/100ML; MG/100ML; MG/100ML
INJECTION, SOLUTION INTRAVENOUS CONTINUOUS
Status: CANCELLED | OUTPATIENT
Start: 2022-04-27

## 2022-04-27 RX ORDER — ONDANSETRON 4 MG/1
4 TABLET, ORALLY DISINTEGRATING ORAL EVERY 30 MIN PRN
Status: CANCELLED | OUTPATIENT
Start: 2022-04-27

## 2022-04-27 RX ORDER — OXYCODONE HYDROCHLORIDE 5 MG/1
5 TABLET ORAL EVERY 4 HOURS PRN
Status: CANCELLED | OUTPATIENT
Start: 2022-04-27

## 2022-04-27 ASSESSMENT — PAIN SCALES - GENERAL: PAINLEVEL: MODERATE PAIN (5)

## 2022-04-27 NOTE — NURSING NOTE
"Oncology Rooming Note    April 27, 2022 11:17 AM   Vicki Shell is a 37 year old female who presents for:    Chief Complaint   Patient presents with     Oncology Clinic Visit     Cyst of left ovary      Initial Vitals: /73 (BP Location: Right arm, Patient Position: Sitting, Cuff Size: Adult Regular)   Pulse 66   Temp 98.1  F (36.7  C) (Oral)   Ht 1.702 m (5' 7\")   Wt 83.5 kg (184 lb 1.6 oz)   LMP  (LMP Unknown)   SpO2 98%   BMI 28.83 kg/m   Estimated body mass index is 28.83 kg/m  as calculated from the following:    Height as of this encounter: 1.702 m (5' 7\").    Weight as of this encounter: 83.5 kg (184 lb 1.6 oz). Body surface area is 1.99 meters squared.  Moderate Pain (5) Comment: Data Unavailable   No LMP recorded (lmp unknown). Patient has had a hysterectomy.  Allergies reviewed: Yes  Medications reviewed: Yes    Medications: Medication refills not needed today.  Pharmacy name entered into EPIC: [facility] [place], [place] - [address] [place] [address] [phone]    Clinical concerns: None     Mateo Garcia            "

## 2022-04-27 NOTE — Clinical Note
4/27/2022         RE: Vicki Shell  [address] [facility] [address]        Dear Colleague,    Thank you for referring your patient, Vicki Shell, to the [facility]. Please see a copy of my visit note below.    No notes on file    Again, thank you for allowing me to participate in the care of your patient.        Sincerely,        Henry Wills MD

## 2022-04-27 NOTE — LETTER
4/27/2022      RE: Vicki Shell  [address] [facility] [place] [address]       No notes on file    Henry Wills MD

## 2022-04-28 ENCOUNTER — HOSPITAL ENCOUNTER (INPATIENT)
Facility: CLINIC | Age: 37
LOS: 9 days | Discharge: HOME HEALTH | DRG: 330 | End: 2022-05-07
Attending: SURGERY | Admitting: SURGERY
Payer: MEDICAID

## 2022-04-28 ENCOUNTER — ANESTHESIA (OUTPATIENT)
Dept: SURGERY | Facility: CLINIC | Age: 37
DRG: 330 | End: 2022-04-28
Payer: MEDICAID

## 2022-04-28 DIAGNOSIS — G89.18 ACUTE POST-OPERATIVE PAIN: Primary | ICD-10-CM

## 2022-04-28 DIAGNOSIS — Z93.2 S/P ILEOSTOMY (H): ICD-10-CM

## 2022-04-28 DIAGNOSIS — L02.211 ABSCESS OF ABDOMINAL WALL: ICD-10-CM

## 2022-04-28 PROBLEM — Z93.3 COLOSTOMY STATUS (H): Status: ACTIVE | Noted: 2022-04-28

## 2022-04-28 LAB
CREAT SERPL-MCNC: 0.62 MG/DL (ref 0.52–1.04)
GFR SERPL CREATININE-BSD FRML MDRD: >90 ML/MIN/1.73M2
GLUCOSE BLDC GLUCOMTR-MCNC: 98 MG/DL (ref 70–99)

## 2022-04-28 PROCEDURE — 36415 COLL VENOUS BLD VENIPUNCTURE: CPT | Performed by: SURGERY

## 2022-04-28 PROCEDURE — 0D1B0Z4 BYPASS ILEUM TO CUTANEOUS, OPEN APPROACH: ICD-10-PCS | Performed by: SURGERY

## 2022-04-28 PROCEDURE — 250N000013 HC RX MED GY IP 250 OP 250 PS 637

## 2022-04-28 PROCEDURE — 82565 ASSAY OF CREATININE: CPT | Performed by: SURGERY

## 2022-04-28 PROCEDURE — 250N000011 HC RX IP 250 OP 636

## 2022-04-28 PROCEDURE — 258N000003 HC RX IP 258 OP 636: Performed by: SURGERY

## 2022-04-28 PROCEDURE — 88304 TISSUE EXAM BY PATHOLOGIST: CPT | Mod: TC | Performed by: SURGERY

## 2022-04-28 PROCEDURE — 0DQG0ZZ REPAIR LEFT LARGE INTESTINE, OPEN APPROACH: ICD-10-PCS | Performed by: SURGERY

## 2022-04-28 PROCEDURE — C1769 GUIDE WIRE: HCPCS | Performed by: SURGERY

## 2022-04-28 PROCEDURE — 250N000025 HC SEVOFLURANE, PER MIN: Performed by: SURGERY

## 2022-04-28 PROCEDURE — 120N000002 HC R&B MED SURG/OB UMMC

## 2022-04-28 PROCEDURE — 710N000010 HC RECOVERY PHASE 1, LEVEL 2, PER MIN: Performed by: SURGERY

## 2022-04-28 PROCEDURE — C9290 INJ, BUPIVACAINE LIPOSOME: HCPCS | Performed by: STUDENT IN AN ORGANIZED HEALTH CARE EDUCATION/TRAINING PROGRAM

## 2022-04-28 PROCEDURE — 272N000001 HC OR GENERAL SUPPLY STERILE: Performed by: SURGERY

## 2022-04-28 PROCEDURE — 250N000011 HC RX IP 250 OP 636: Performed by: NURSE ANESTHETIST, CERTIFIED REGISTERED

## 2022-04-28 PROCEDURE — 250N000013 HC RX MED GY IP 250 OP 250 PS 637: Performed by: SURGERY

## 2022-04-28 PROCEDURE — 258N000003 HC RX IP 258 OP 636: Performed by: ANESTHESIOLOGY

## 2022-04-28 PROCEDURE — 44626 REPAIR BOWEL OPENING: CPT | Mod: 22 | Performed by: SURGERY

## 2022-04-28 PROCEDURE — 250N000011 HC RX IP 250 OP 636: Performed by: SURGERY

## 2022-04-28 PROCEDURE — 88304 TISSUE EXAM BY PATHOLOGIST: CPT | Mod: 26 | Performed by: PATHOLOGY

## 2022-04-28 PROCEDURE — 250N000009 HC RX 250: Performed by: NURSE ANESTHETIST, CERTIFIED REGISTERED

## 2022-04-28 PROCEDURE — 250N000011 HC RX IP 250 OP 636: Performed by: ANESTHESIOLOGY

## 2022-04-28 PROCEDURE — 258N000003 HC RX IP 258 OP 636: Performed by: NURSE ANESTHETIST, CERTIFIED REGISTERED

## 2022-04-28 PROCEDURE — 999N000141 HC STATISTIC PRE-PROCEDURE NURSING ASSESSMENT: Performed by: SURGERY

## 2022-04-28 PROCEDURE — 370N000017 HC ANESTHESIA TECHNICAL FEE, PER MIN: Performed by: SURGERY

## 2022-04-28 PROCEDURE — 52005 CYSTO W/URTRL CATHJ: CPT | Mod: GC | Performed by: UROLOGY

## 2022-04-28 PROCEDURE — 250N000011 HC RX IP 250 OP 636: Performed by: STUDENT IN AN ORGANIZED HEALTH CARE EDUCATION/TRAINING PROGRAM

## 2022-04-28 PROCEDURE — C1765 ADHESION BARRIER: HCPCS | Performed by: SURGERY

## 2022-04-28 PROCEDURE — 250N000013 HC RX MED GY IP 250 OP 250 PS 637: Performed by: ANESTHESIOLOGY

## 2022-04-28 PROCEDURE — 0DNW0ZZ RELEASE PERITONEUM, OPEN APPROACH: ICD-10-PCS | Performed by: SURGERY

## 2022-04-28 PROCEDURE — 0DJD8ZZ INSPECTION OF LOWER INTESTINAL TRACT, VIA NATURAL OR ARTIFICIAL OPENING ENDOSCOPIC: ICD-10-PCS | Performed by: SURGERY

## 2022-04-28 PROCEDURE — 44310 ILEOSTOMY/JEJUNOSTOMY: CPT | Mod: GC | Performed by: SURGERY

## 2022-04-28 PROCEDURE — 360N000077 HC SURGERY LEVEL 4, PER MIN: Performed by: SURGERY

## 2022-04-28 RX ORDER — PROPOFOL 10 MG/ML
INJECTION, EMULSION INTRAVENOUS PRN
Status: DISCONTINUED | OUTPATIENT
Start: 2022-04-28 | End: 2022-04-28

## 2022-04-28 RX ORDER — NALOXONE HYDROCHLORIDE 0.4 MG/ML
0.4 INJECTION, SOLUTION INTRAMUSCULAR; INTRAVENOUS; SUBCUTANEOUS
Status: DISCONTINUED | OUTPATIENT
Start: 2022-04-28 | End: 2022-05-07 | Stop reason: HOSPADM

## 2022-04-28 RX ORDER — METHOCARBAMOL 100 MG/ML
1000 INJECTION, SOLUTION INTRAMUSCULAR; INTRAVENOUS ONCE
Status: COMPLETED | OUTPATIENT
Start: 2022-04-28 | End: 2022-04-28

## 2022-04-28 RX ORDER — ENOXAPARIN SODIUM 100 MG/ML
40 INJECTION SUBCUTANEOUS EVERY 24 HOURS
Status: DISCONTINUED | OUTPATIENT
Start: 2022-04-29 | End: 2022-04-29

## 2022-04-28 RX ORDER — ENOXAPARIN SODIUM 100 MG/ML
40 INJECTION SUBCUTANEOUS
Status: COMPLETED | OUTPATIENT
Start: 2022-04-28 | End: 2022-04-28

## 2022-04-28 RX ORDER — FLUMAZENIL 0.1 MG/ML
0.2 INJECTION, SOLUTION INTRAVENOUS
Status: DISCONTINUED | OUTPATIENT
Start: 2022-04-28 | End: 2022-05-07

## 2022-04-28 RX ORDER — ONDANSETRON 2 MG/ML
4 INJECTION INTRAMUSCULAR; INTRAVENOUS ONCE
Status: COMPLETED | OUTPATIENT
Start: 2022-04-28 | End: 2022-04-28

## 2022-04-28 RX ORDER — HYDRALAZINE HYDROCHLORIDE 20 MG/ML
2.5-5 INJECTION INTRAMUSCULAR; INTRAVENOUS EVERY 10 MIN PRN
Status: DISCONTINUED | OUTPATIENT
Start: 2022-04-28 | End: 2022-04-28 | Stop reason: HOSPADM

## 2022-04-28 RX ORDER — GLYCOPYRROLATE 0.2 MG/ML
INJECTION, SOLUTION INTRAMUSCULAR; INTRAVENOUS PRN
Status: DISCONTINUED | OUTPATIENT
Start: 2022-04-28 | End: 2022-04-28

## 2022-04-28 RX ORDER — DEXAMETHASONE SODIUM PHOSPHATE 4 MG/ML
INJECTION, SOLUTION INTRA-ARTICULAR; INTRALESIONAL; INTRAMUSCULAR; INTRAVENOUS; SOFT TISSUE PRN
Status: DISCONTINUED | OUTPATIENT
Start: 2022-04-28 | End: 2022-04-28

## 2022-04-28 RX ORDER — SODIUM CHLORIDE, SODIUM GLUCONATE, SODIUM ACETATE, POTASSIUM CHLORIDE AND MAGNESIUM CHLORIDE 526; 502; 368; 37; 30 MG/100ML; MG/100ML; MG/100ML; MG/100ML; MG/100ML
INJECTION, SOLUTION INTRAVENOUS CONTINUOUS PRN
Status: DISCONTINUED | OUTPATIENT
Start: 2022-04-28 | End: 2022-04-28

## 2022-04-28 RX ORDER — HYDROMORPHONE HCL IN WATER/PF 6 MG/30 ML
.2-.4 PATIENT CONTROLLED ANALGESIA SYRINGE INTRAVENOUS EVERY 10 MIN PRN
Status: DISCONTINUED | OUTPATIENT
Start: 2022-04-28 | End: 2022-04-28 | Stop reason: HOSPADM

## 2022-04-28 RX ORDER — FENTANYL CITRATE 50 UG/ML
25-50 INJECTION, SOLUTION INTRAMUSCULAR; INTRAVENOUS
Status: DISCONTINUED | OUTPATIENT
Start: 2022-04-28 | End: 2022-04-28 | Stop reason: HOSPADM

## 2022-04-28 RX ORDER — ACETAMINOPHEN 500 MG
1000 TABLET ORAL EVERY 6 HOURS
Status: DISPENSED | OUTPATIENT
Start: 2022-04-28 | End: 2022-05-01

## 2022-04-28 RX ORDER — CEFAZOLIN SODIUM/WATER 2 G/20 ML
2 SYRINGE (ML) INTRAVENOUS SEE ADMIN INSTRUCTIONS
Status: DISCONTINUED | OUTPATIENT
Start: 2022-04-28 | End: 2022-04-29

## 2022-04-28 RX ORDER — GABAPENTIN 300 MG/1
600 CAPSULE ORAL
Status: COMPLETED | OUTPATIENT
Start: 2022-04-28 | End: 2022-04-28

## 2022-04-28 RX ORDER — FENTANYL CITRATE 50 UG/ML
INJECTION, SOLUTION INTRAMUSCULAR; INTRAVENOUS PRN
Status: DISCONTINUED | OUTPATIENT
Start: 2022-04-28 | End: 2022-04-28

## 2022-04-28 RX ORDER — BUPIVACAINE HYDROCHLORIDE 2.5 MG/ML
INJECTION, SOLUTION EPIDURAL; INFILTRATION; INTRACAUDAL
Status: COMPLETED | OUTPATIENT
Start: 2022-04-28 | End: 2022-04-28

## 2022-04-28 RX ORDER — LIDOCAINE HYDROCHLORIDE 20 MG/ML
INJECTION, SOLUTION INFILTRATION; PERINEURAL PRN
Status: DISCONTINUED | OUTPATIENT
Start: 2022-04-28 | End: 2022-04-28

## 2022-04-28 RX ORDER — METRONIDAZOLE 500 MG/100ML
500 INJECTION, SOLUTION INTRAVENOUS
Status: COMPLETED | OUTPATIENT
Start: 2022-04-28 | End: 2022-04-28

## 2022-04-28 RX ORDER — ONDANSETRON 4 MG/1
4 TABLET, ORALLY DISINTEGRATING ORAL EVERY 6 HOURS PRN
Status: DISCONTINUED | OUTPATIENT
Start: 2022-04-28 | End: 2022-05-07 | Stop reason: HOSPADM

## 2022-04-28 RX ORDER — HYDROXYZINE HYDROCHLORIDE 25 MG/1
50 TABLET, FILM COATED ORAL EVERY 6 HOURS PRN
Status: DISCONTINUED | OUTPATIENT
Start: 2022-04-28 | End: 2022-04-30

## 2022-04-28 RX ORDER — LABETALOL HYDROCHLORIDE 5 MG/ML
5-10 INJECTION, SOLUTION INTRAVENOUS
Status: DISCONTINUED | OUTPATIENT
Start: 2022-04-28 | End: 2022-04-28 | Stop reason: HOSPADM

## 2022-04-28 RX ORDER — ACETAMINOPHEN 325 MG/1
975 TABLET ORAL ONCE
Status: COMPLETED | OUTPATIENT
Start: 2022-04-28 | End: 2022-04-28

## 2022-04-28 RX ORDER — SODIUM CHLORIDE, SODIUM LACTATE, POTASSIUM CHLORIDE, CALCIUM CHLORIDE 600; 310; 30; 20 MG/100ML; MG/100ML; MG/100ML; MG/100ML
INJECTION, SOLUTION INTRAVENOUS CONTINUOUS PRN
Status: DISCONTINUED | OUTPATIENT
Start: 2022-04-28 | End: 2022-04-28

## 2022-04-28 RX ORDER — SODIUM CHLORIDE, SODIUM LACTATE, POTASSIUM CHLORIDE, CALCIUM CHLORIDE 600; 310; 30; 20 MG/100ML; MG/100ML; MG/100ML; MG/100ML
INJECTION, SOLUTION INTRAVENOUS CONTINUOUS
Status: DISCONTINUED | OUTPATIENT
Start: 2022-04-28 | End: 2022-05-03

## 2022-04-28 RX ORDER — CEFAZOLIN SODIUM/WATER 2 G/20 ML
2 SYRINGE (ML) INTRAVENOUS
Status: COMPLETED | OUTPATIENT
Start: 2022-04-28 | End: 2022-04-28

## 2022-04-28 RX ORDER — DIAZEPAM 10 MG/2ML
2 INJECTION, SOLUTION INTRAMUSCULAR; INTRAVENOUS
Status: COMPLETED | OUTPATIENT
Start: 2022-04-28 | End: 2022-04-28

## 2022-04-28 RX ORDER — FENTANYL CITRATE 50 UG/ML
25-50 INJECTION, SOLUTION INTRAMUSCULAR; INTRAVENOUS EVERY 5 MIN PRN
Status: DISCONTINUED | OUTPATIENT
Start: 2022-04-28 | End: 2022-04-28 | Stop reason: HOSPADM

## 2022-04-28 RX ORDER — SODIUM CHLORIDE, SODIUM LACTATE, POTASSIUM CHLORIDE, CALCIUM CHLORIDE 600; 310; 30; 20 MG/100ML; MG/100ML; MG/100ML; MG/100ML
INJECTION, SOLUTION INTRAVENOUS CONTINUOUS
Status: DISCONTINUED | OUTPATIENT
Start: 2022-04-28 | End: 2022-04-28

## 2022-04-28 RX ORDER — FENTANYL CITRATE 50 UG/ML
25-50 INJECTION, SOLUTION INTRAMUSCULAR; INTRAVENOUS
Status: DISCONTINUED | OUTPATIENT
Start: 2022-04-28 | End: 2022-04-28

## 2022-04-28 RX ORDER — METHOCARBAMOL 500 MG/1
500 TABLET, FILM COATED ORAL ONCE
Status: DISCONTINUED | OUTPATIENT
Start: 2022-04-28 | End: 2022-04-28 | Stop reason: CLARIF

## 2022-04-28 RX ORDER — HYDROXYZINE HYDROCHLORIDE 25 MG/1
25 TABLET, FILM COATED ORAL EVERY 6 HOURS PRN
Status: DISCONTINUED | OUTPATIENT
Start: 2022-04-28 | End: 2022-04-30

## 2022-04-28 RX ORDER — KETAMINE HYDROCHLORIDE 50 MG/ML
INJECTION, SOLUTION INTRAMUSCULAR; INTRAVENOUS PRN
Status: DISCONTINUED | OUTPATIENT
Start: 2022-04-28 | End: 2022-04-28

## 2022-04-28 RX ORDER — NALOXONE HYDROCHLORIDE 0.4 MG/ML
0.2 INJECTION, SOLUTION INTRAMUSCULAR; INTRAVENOUS; SUBCUTANEOUS
Status: DISCONTINUED | OUTPATIENT
Start: 2022-04-28 | End: 2022-05-07 | Stop reason: HOSPADM

## 2022-04-28 RX ORDER — LIDOCAINE 40 MG/G
CREAM TOPICAL
Status: DISCONTINUED | OUTPATIENT
Start: 2022-04-28 | End: 2022-05-07 | Stop reason: HOSPADM

## 2022-04-28 RX ORDER — METHOCARBAMOL 500 MG/1
500 TABLET, FILM COATED ORAL 4 TIMES DAILY
Status: DISCONTINUED | OUTPATIENT
Start: 2022-04-28 | End: 2022-04-28

## 2022-04-28 RX ORDER — ONDANSETRON 2 MG/ML
INJECTION INTRAMUSCULAR; INTRAVENOUS PRN
Status: DISCONTINUED | OUTPATIENT
Start: 2022-04-28 | End: 2022-04-28

## 2022-04-28 RX ORDER — ONDANSETRON 2 MG/ML
4 INJECTION INTRAMUSCULAR; INTRAVENOUS EVERY 6 HOURS PRN
Status: DISCONTINUED | OUTPATIENT
Start: 2022-04-28 | End: 2022-05-07 | Stop reason: HOSPADM

## 2022-04-28 RX ORDER — GABAPENTIN 300 MG/1
300 CAPSULE ORAL 3 TIMES DAILY
Status: DISCONTINUED | OUTPATIENT
Start: 2022-04-28 | End: 2022-05-07 | Stop reason: HOSPADM

## 2022-04-28 RX ORDER — EPHEDRINE SULFATE 50 MG/ML
INJECTION, SOLUTION INTRAMUSCULAR; INTRAVENOUS; SUBCUTANEOUS PRN
Status: DISCONTINUED | OUTPATIENT
Start: 2022-04-28 | End: 2022-04-28

## 2022-04-28 RX ORDER — ACETAMINOPHEN 325 MG/1
975 TABLET ORAL ONCE
Status: DISCONTINUED | OUTPATIENT
Start: 2022-04-28 | End: 2022-04-28

## 2022-04-28 RX ORDER — METHOCARBAMOL 750 MG/1
750 TABLET, FILM COATED ORAL EVERY 6 HOURS PRN
Status: DISCONTINUED | OUTPATIENT
Start: 2022-04-28 | End: 2022-05-07 | Stop reason: HOSPADM

## 2022-04-28 RX ADMIN — FENTANYL CITRATE 100 MCG: 50 INJECTION, SOLUTION INTRAMUSCULAR; INTRAVENOUS at 13:09

## 2022-04-28 RX ADMIN — ACETAMINOPHEN 1000 MG: 500 TABLET ORAL at 22:10

## 2022-04-28 RX ADMIN — ROCURONIUM BROMIDE 10 MG: 50 INJECTION, SOLUTION INTRAVENOUS at 10:53

## 2022-04-28 RX ADMIN — FENTANYL CITRATE 25 MCG: 50 INJECTION, SOLUTION INTRAMUSCULAR; INTRAVENOUS at 14:25

## 2022-04-28 RX ADMIN — HYDROMORPHONE HYDROCHLORIDE 0.4 MG: 0.2 INJECTION, SOLUTION INTRAMUSCULAR; INTRAVENOUS; SUBCUTANEOUS at 14:47

## 2022-04-28 RX ADMIN — FENTANYL CITRATE 50 MCG: 50 INJECTION, SOLUTION INTRAMUSCULAR; INTRAVENOUS at 14:28

## 2022-04-28 RX ADMIN — FENTANYL CITRATE 100 MCG: 50 INJECTION, SOLUTION INTRAMUSCULAR; INTRAVENOUS at 07:48

## 2022-04-28 RX ADMIN — ENOXAPARIN SODIUM 40 MG: 40 INJECTION SUBCUTANEOUS at 07:29

## 2022-04-28 RX ADMIN — SODIUM CHLORIDE, POTASSIUM CHLORIDE, SODIUM LACTATE AND CALCIUM CHLORIDE: 600; 310; 30; 20 INJECTION, SOLUTION INTRAVENOUS at 07:43

## 2022-04-28 RX ADMIN — FENTANYL CITRATE 50 MCG: 50 INJECTION, SOLUTION INTRAMUSCULAR; INTRAVENOUS at 13:13

## 2022-04-28 RX ADMIN — HYDROMORPHONE HYDROCHLORIDE 0.2 MG: 0.2 INJECTION, SOLUTION INTRAMUSCULAR; INTRAVENOUS; SUBCUTANEOUS at 14:35

## 2022-04-28 RX ADMIN — ROCURONIUM BROMIDE 10 MG: 50 INJECTION, SOLUTION INTRAVENOUS at 11:05

## 2022-04-28 RX ADMIN — METHOCARBAMOL 750 MG: 750 TABLET ORAL at 23:10

## 2022-04-28 RX ADMIN — Medication 2 G: at 08:24

## 2022-04-28 RX ADMIN — MIDAZOLAM 2 MG: 1 INJECTION INTRAMUSCULAR; INTRAVENOUS at 07:20

## 2022-04-28 RX ADMIN — Medication: at 19:20

## 2022-04-28 RX ADMIN — ROCURONIUM BROMIDE 10 MG: 50 INJECTION, SOLUTION INTRAVENOUS at 10:10

## 2022-04-28 RX ADMIN — ONDANSETRON 4 MG: 4 TABLET, ORALLY DISINTEGRATING ORAL at 22:09

## 2022-04-28 RX ADMIN — Medication 5 MG: at 09:11

## 2022-04-28 RX ADMIN — SUGAMMADEX 200 MG: 100 INJECTION, SOLUTION INTRAVENOUS at 13:36

## 2022-04-28 RX ADMIN — FENTANYL CITRATE 50 MCG: 50 INJECTION, SOLUTION INTRAMUSCULAR; INTRAVENOUS at 07:34

## 2022-04-28 RX ADMIN — METHOCARBAMOL 500 MG: 500 TABLET ORAL at 19:34

## 2022-04-28 RX ADMIN — ROCURONIUM BROMIDE 10 MG: 50 INJECTION, SOLUTION INTRAVENOUS at 11:52

## 2022-04-28 RX ADMIN — Medication 2 G: at 12:25

## 2022-04-28 RX ADMIN — SODIUM CHLORIDE, POTASSIUM CHLORIDE, SODIUM LACTATE AND CALCIUM CHLORIDE: 600; 310; 30; 20 INJECTION, SOLUTION INTRAVENOUS at 22:11

## 2022-04-28 RX ADMIN — DIAZEPAM 2 MG: 5 INJECTION, SOLUTION INTRAMUSCULAR; INTRAVENOUS at 15:06

## 2022-04-28 RX ADMIN — PHENYLEPHRINE HYDROCHLORIDE 200 MCG: 10 INJECTION INTRAVENOUS at 09:52

## 2022-04-28 RX ADMIN — SODIUM CHLORIDE, SODIUM GLUCONATE, SODIUM ACETATE, POTASSIUM CHLORIDE AND MAGNESIUM CHLORIDE: 526; 502; 368; 37; 30 INJECTION, SOLUTION INTRAVENOUS at 08:19

## 2022-04-28 RX ADMIN — SERTRALINE HYDROCHLORIDE 50 MG: 50 TABLET ORAL at 22:09

## 2022-04-28 RX ADMIN — Medication: at 15:27

## 2022-04-28 RX ADMIN — SODIUM CHLORIDE, POTASSIUM CHLORIDE, SODIUM LACTATE AND CALCIUM CHLORIDE: 600; 310; 30; 20 INJECTION, SOLUTION INTRAVENOUS at 14:45

## 2022-04-28 RX ADMIN — KETAMINE HYDROCHLORIDE 10 MG: 50 INJECTION, SOLUTION INTRAMUSCULAR; INTRAVENOUS at 12:57

## 2022-04-28 RX ADMIN — KETAMINE HYDROCHLORIDE 10 MG: 50 INJECTION, SOLUTION INTRAMUSCULAR; INTRAVENOUS at 08:21

## 2022-04-28 RX ADMIN — ACETAMINOPHEN 1000 MG: 500 TABLET ORAL at 17:45

## 2022-04-28 RX ADMIN — DIAZEPAM 2 MG: 5 INJECTION, SOLUTION INTRAMUSCULAR; INTRAVENOUS at 15:24

## 2022-04-28 RX ADMIN — FENTANYL CITRATE 50 MCG: 50 INJECTION, SOLUTION INTRAMUSCULAR; INTRAVENOUS at 13:34

## 2022-04-28 RX ADMIN — FENTANYL CITRATE 100 MCG: 50 INJECTION, SOLUTION INTRAMUSCULAR; INTRAVENOUS at 09:31

## 2022-04-28 RX ADMIN — DEXAMETHASONE SODIUM PHOSPHATE 6 MG: 4 INJECTION, SOLUTION INTRA-ARTICULAR; INTRALESIONAL; INTRAMUSCULAR; INTRAVENOUS; SOFT TISSUE at 08:43

## 2022-04-28 RX ADMIN — SODIUM CHLORIDE, SODIUM GLUCONATE, SODIUM ACETATE, POTASSIUM CHLORIDE AND MAGNESIUM CHLORIDE: 526; 502; 368; 37; 30 INJECTION, SOLUTION INTRAVENOUS at 11:19

## 2022-04-28 RX ADMIN — FENTANYL CITRATE 50 MCG: 50 INJECTION, SOLUTION INTRAMUSCULAR; INTRAVENOUS at 11:05

## 2022-04-28 RX ADMIN — FENTANYL CITRATE 50 MCG: 50 INJECTION, SOLUTION INTRAMUSCULAR; INTRAVENOUS at 14:09

## 2022-04-28 RX ADMIN — GABAPENTIN 600 MG: 300 CAPSULE ORAL at 07:03

## 2022-04-28 RX ADMIN — KETAMINE HYDROCHLORIDE 20 MG: 50 INJECTION, SOLUTION INTRAMUSCULAR; INTRAVENOUS at 07:53

## 2022-04-28 RX ADMIN — BUPIVACAINE 20 ML: 13.3 INJECTION, SUSPENSION, LIPOSOMAL INFILTRATION at 07:21

## 2022-04-28 RX ADMIN — BUPIVACAINE HYDROCHLORIDE 20 ML: 2.5 INJECTION, SOLUTION EPIDURAL; INFILTRATION; INTRACAUDAL; PERINEURAL at 07:21

## 2022-04-28 RX ADMIN — KETAMINE HYDROCHLORIDE 10 MG: 50 INJECTION, SOLUTION INTRAMUSCULAR; INTRAVENOUS at 11:34

## 2022-04-28 RX ADMIN — ONDANSETRON 4 MG: 2 INJECTION INTRAMUSCULAR; INTRAVENOUS at 12:44

## 2022-04-28 RX ADMIN — FENTANYL CITRATE 50 MCG: 50 INJECTION, SOLUTION INTRAMUSCULAR; INTRAVENOUS at 07:56

## 2022-04-28 RX ADMIN — KETAMINE HYDROCHLORIDE 10 MG: 50 INJECTION, SOLUTION INTRAMUSCULAR; INTRAVENOUS at 10:33

## 2022-04-28 RX ADMIN — KETAMINE HYDROCHLORIDE 10 MG: 50 INJECTION, SOLUTION INTRAMUSCULAR; INTRAVENOUS at 09:30

## 2022-04-28 RX ADMIN — PROPOFOL 200 MG: 10 INJECTION, EMULSION INTRAVENOUS at 08:00

## 2022-04-28 RX ADMIN — SODIUM CHLORIDE, SODIUM GLUCONATE, SODIUM ACETATE, POTASSIUM CHLORIDE AND MAGNESIUM CHLORIDE: 526; 502; 368; 37; 30 INJECTION, SOLUTION INTRAVENOUS at 13:34

## 2022-04-28 RX ADMIN — FENTANYL CITRATE 25 MCG: 50 INJECTION, SOLUTION INTRAMUSCULAR; INTRAVENOUS at 14:15

## 2022-04-28 RX ADMIN — HYDROMORPHONE HYDROCHLORIDE 0.2 MG: 0.2 INJECTION, SOLUTION INTRAMUSCULAR; INTRAVENOUS; SUBCUTANEOUS at 14:55

## 2022-04-28 RX ADMIN — HYDROMORPHONE HYDROCHLORIDE 0.4 MG: 0.2 INJECTION, SOLUTION INTRAMUSCULAR; INTRAVENOUS; SUBCUTANEOUS at 14:43

## 2022-04-28 RX ADMIN — FENTANYL CITRATE 50 MCG: 50 INJECTION, SOLUTION INTRAMUSCULAR; INTRAVENOUS at 07:21

## 2022-04-28 RX ADMIN — METRONIDAZOLE 500 MG: 500 INJECTION, SOLUTION INTRAVENOUS at 07:43

## 2022-04-28 RX ADMIN — GLYCOPYRROLATE 0.1 MG: 0.2 INJECTION, SOLUTION INTRAMUSCULAR; INTRAVENOUS at 09:02

## 2022-04-28 RX ADMIN — METHOCARBAMOL 1000 MG: 100 INJECTION, SOLUTION INTRAMUSCULAR; INTRAVENOUS at 15:49

## 2022-04-28 RX ADMIN — ACETAMINOPHEN 975 MG: 325 TABLET ORAL at 07:02

## 2022-04-28 RX ADMIN — GLYCOPYRROLATE 0.1 MG: 0.2 INJECTION, SOLUTION INTRAMUSCULAR; INTRAVENOUS at 09:09

## 2022-04-28 RX ADMIN — Medication: at 15:56

## 2022-04-28 RX ADMIN — ROCURONIUM BROMIDE 100 MG: 50 INJECTION, SOLUTION INTRAVENOUS at 08:00

## 2022-04-28 RX ADMIN — LIDOCAINE HYDROCHLORIDE 100 MG: 20 INJECTION, SOLUTION INFILTRATION; PERINEURAL at 13:35

## 2022-04-28 RX ADMIN — ROCURONIUM BROMIDE 20 MG: 50 INJECTION, SOLUTION INTRAVENOUS at 09:30

## 2022-04-28 RX ADMIN — HYDROXYZINE HYDROCHLORIDE 50 MG: 25 TABLET, FILM COATED ORAL at 22:09

## 2022-04-28 RX ADMIN — GABAPENTIN 300 MG: 300 CAPSULE ORAL at 19:34

## 2022-04-28 RX ADMIN — ONDANSETRON 4 MG: 2 INJECTION INTRAMUSCULAR; INTRAVENOUS at 14:08

## 2022-04-28 RX ADMIN — FENTANYL CITRATE 50 MCG: 50 INJECTION, SOLUTION INTRAMUSCULAR; INTRAVENOUS at 14:34

## 2022-04-28 ASSESSMENT — ACTIVITIES OF DAILY LIVING (ADL)
ADLS_ACUITY_SCORE: 4

## 2022-04-28 ASSESSMENT — LIFESTYLE VARIABLES: TOBACCO_USE: 1

## 2022-04-28 NOTE — OP NOTE
[facility] Colorectal Surgery Operative Report  April 28, 2022    PREOPERATIVE DIAGNOSIS:  1. Colostomy status    2. History of rectal inury  3. Chronic pain  4. Current smoker    POSTOPERATIVE DIAGNOSIS:   1. Colostomy status    2. History of rectal inury  3. Chronic pain  4. Current smoker    PROCEDURE:  1. Exploratory laparotomy  2. Colostomy reversal  3. Flexible sigmoidoscopy  4. Diverting loop ileostomy  5. Bilateral ureteral stent placement and cystoscopy (Dr. Plummer)  6. Modifier 22 given extensive lysis of adhesions requiring >120 minutes of additional operation, and complexity of operation.    ANESTHESIA: General endotracheal anesthesia plus local anesthesia.    SURGEON:  Tad Richardson M.D.    ASSISTANT(S): Akbar Higuera CRS Fellow    INDICATIONS FOR PROCEDURE  Vicki Shell is a 37 year old female who previously underwent an ovarian cyst drainage complicated by a rectal injury, identified two days after the index operation. At that time, she presented with feculent peritonitis, underwent an exploratory laparotomy with creation of an end colostomy. She wished I thoroughly discussed the risks, benefits, and alternatives of operative treatment with the patient and she agreed to proceed.    General risks related to abdominal surgery were reviewed with the patient. These include, but are not limited to, death, myocardial infarction, pneumonia, urinary tract infection, deep venous thrombosis with or without pulmonary embolus, abdominal infection from bowel injury or abscess, fistula, anastomotic leak that may require reoperation and a stoma, ureteral injury, bowel obstruction, wound infection, and bleeding.      OPERATIVE PROCEDURE: After obtaining informed consent, the patient was brought to the operating room and placed in the lithotomy position. Appropriate preoperative mechanical and chemical deep venous thrombosis prophylaxis, as well as preoperative prophylactic parenteral antibiotics were given. General  "endotracheal anesthesia was gently induced. Bilateral lower extremity pneumatic compression devices were applied and all pressure points were cushioned. The perineum was prepped, and the urology team placed bilateral ureteral stents and a onofre catheter. Please see their operative report. The abdomen, vagina, and rectum were then prepped and draped in the standard sterile fashion. After a \"time-out\" was performed, an incision was made at the mucocutaneous junction of her colostomy, and the colostomy was carefully dissected from from its surrounding soft tissue. The abdomen was entered without any obvious injury to the colon or small bowel. Once we entered the abdomen, it became clear that much of the sigmoid colon was preserved, and readily reached the pelvis without any undue tension.     The abdomen was then re-entered through a vertical midline incision at the site of an old laparotomy scar. Multiple adhesions were encountered. These were taken down with a combination of sharp dissection and electrocautery. Once free in the abdomen, the extra large Brian was placed, and the omentum and small bowel were packed superiorly in the abdomen to expose the pelvis. The end colostomy was closed with 0 silk suture, and tucked into the left upper abdomen.     The pelvis was densely scarred. The rectum was anteriorly scarred to the fallopian tubes and ovaries bilaterally, and then distally to the vaginal cuff. The ureters were bilaterally readily identified by palpating the ureteral stents. Of note, the ureters were protected for the entire duration of the operation.     The TME plane was then identified, as was the superior hemorrhoidal artery. In order to enter the TME plane and dissect the rectum free, the superior hemorrhoidal artery was dissected within the mesorectum, and doubly ligated with suture ligature. We then entered the TME plane and dissected along the plane to mobilize the rectum enough to allow dissection of " the ovaries and fallopian tubes from the rectum. To safely preserve the vagina and rectum, EEA sizers were placed in both structured, which facilitated dissection of dense scar tissues between theses structures. We carefully then were able to identify the planes between the bilateral ovaries and fallopian tubes and the rectum to mobilize them, and lateralize them. Once we did so, it became apparent that they were densely scarred to the rectal stump, which was now able to be directly visualized with the staple line and sutures. We also proceeded to mobilize the anterior pertioneal reflection enough to safely staple with the remaining stump. With the EEA sizer in place, the rectovaginal septum was carefully dissected sharply with scissors.     The assisted the went below and performed a flexible sigmoidoscopy. The rectum appeared healthy and viable, and a pneumatic air leak test was negative for any injuries. The colon was then prepared for the anastomosis. The suture was cut exposing the colonic lumen. The head of the EEA stapler was then brought out through the tinea ~5 cm from the cut edge of the colon. The EEA was fixated with a 2-0 prolene, and the colonic end was stapled using the TA 90 stapler. The staple line was hemostatic. The corresponding mesentery of the colostomy trim was ligated with suture ligation, also was hemostatic.     The assistant then went below.  A 29 mm EEA stapler was then gently passed through the anus up the rectum, and positioned at the of the proximal rectum. The stapler post was then brought through the corner of the rectal staple line with the careful assistance of the abdominal surgeon to not incorporate the linear staple-line. The anvil was then connected to the post of the surgical stapler and both colon ends were brought together. The colon ends appeared to be well vascularized, and with no evidence of tension or torsion. After a short pause, the stapler was fired creating an  end-to-side colorectal anastomosis. The stapler was then carefully removed without difficulty. On the back-table, the assistant surgeon assured adequate integrity of the stapler anastomtoic rings. A repeat flexible sigmoidoscopy was performed with no evidence of ongoing bleeding or air leak.     Peritoneal lavage was performed using several liters of water. The abdomen was inspected for hemostasis which was confirmed. A segment of small intestine was identified 50 cm from the ileocecal valve for the ostomy site. This segment was pulled through the ostomy site, and was confirmed to be afferent superior, efferent superior limbs. The midline fascia was closed using 0 PDS and the subcutaneous tissue was irrigated with dilute betadine.  The skin was closed with interrupted 3-0 vicryl and running 4-0 Monocryl. The loop ileostomy was then matured in standard Joanna fashion at the afferent limb, and efferent limb matured at the mucocutaneous junction.    The Prevena was applied to the midline incision and an ostomy appliance to the ostomy site. All sponge and needle counts were correct x 2 at the end of the procedure. The patient was extubated and taken to PACU in stable condition. The ureteral stents were removed at the end of the case. The urine was notably tea colored without obvious gross blood.    COMPLICATIONS: none.    ESTIMATED BLOOD LOSS: 200 mL.    REPLACEMENT:     - Crystalloid: 3600 mL.     - Colloid: 0 mL.     - Blood products: 0 .    URINE OUTPUT: 400 mL, bloody    SPECIMEN(S): colostomy trim    OPERATIVE COUNT: Complete.    OPERATIVE FINDINGS:   1. Dense adhesions in the pelvis, with bilateral ovaries adhered to the anterior rectum. Vaginal cuff densely adhered below this area. Ovaries and vaginal cuff were protected at all times.   2. Negative rectal stump leak test, negative pneumatic anastomotic leak test.  3. LLQ loop ileostomy at site of colostomy reversal    Tad Richardson MD  Division of Colon and  Rectal Surgery  [facility]  [phone]

## 2022-04-28 NOTE — OR NURSING
TAP block performed without complications.  VSS.  See MAR for medications given.  Pt tolerated well.  Will continue to monitor.

## 2022-04-28 NOTE — ANESTHESIA POSTPROCEDURE EVALUATION
Patient: Vicki Shell    Procedure: Procedure(s):  CLOSURE, COLOSTOMY, LAPAROSCOPIC, converted to open colostomy reversal with loop ileostomy creation  CREATION, ILEOSTOMY, LAPAROSCOPIC converted to open  Sigmoidoscopy flexible  CYSTOSCOPY, WITH URETERAL bilateral STENT INSERTION       Anesthesia Type:  General    Note:  Disposition: Inpatient   Postop Pain Control:            Sign Out: Pain at Preoperative BASELINE   PONV: No   Neuro/Psych: Uneventful            Sign Out: Acceptable/Baseline neuro status   Airway/Respiratory: Uneventful            Sign Out: Acceptable/Baseline resp. status   CV/Hemodynamics: Uneventful            Sign Out: Acceptable CV status; No obvious hypovolemia; No obvious fluid overload   Other NRE: NONE   DID A NON-ROUTINE EVENT OCCUR? No    Event details/Postop Comments:  Pt is resting comfortably with PCA. Awakens easily to verbal stimulation and responds appropriately to questions. She does endorse some ongoing back pain however pt has back pain at baseline, for which she is on chronic opioids and has had multiple injections. Her back pain is likely flared 2/2 positioning/being still for her case and has been improving in PACU with adjunctive medications and Dilaudid PCA. Pt has been vitally stable and sleeping comfortably. She denies any nausea, weakness, chest pain or shortness of breath. Breathing is non-labored and is moving all extremities.              Last vitals:  Vitals Value Taken Time   /76 04/28/22 1600   Temp 37.2  C (99  F) 04/28/22 1530   Pulse 68 04/28/22 1606   Resp 13 04/28/22 1606   SpO2 100 % 04/28/22 1606   Vitals shown include unvalidated device data.    Electronically Signed By: Emerita Tubbs MD  April 28, 2022  4:08 PM

## 2022-04-28 NOTE — PLAN OF CARE
Goal Outcome Evaluation:                    TYLER JAMA and Dr. Shafer at bedside several times during PACU stay.

## 2022-04-28 NOTE — ANESTHESIA PROCEDURE NOTES
Airway       Patient location during procedure: OR       Procedure Start/Stop Times: 4/28/2022 8:02 AM  Staff -        CRNA: Neal Mendosa APRN CRNA       Performed By: CRNAIndications and Patient Condition       Indications for airway management: perfecto-procedural       Induction type:intravenous       Mask difficulty assessment: 1 - vent by mask    Final Airway Details       Final airway type: endotracheal airway       Successful airway: ETT - single  Endotracheal Airway Details        ETT size (mm): 7.0       Cuffed: yes       Successful intubation technique: direct laryngoscopy       DL Blade Type: MAC 3       Adjucts: stylet       Position: Right       Measured from: lips       Secured at (cm): 21       Bite block used: None    Post intubation assessment        Placement verified by: capnometry and equal breath sounds        Number of attempts at approach: 1       Secured with: commercial tube munguia and silk tape       Ease of procedure: easy       Dentition: Intact    Medication(s) Administered   Medication Administration Time: 4/28/2022 8:02 AM

## 2022-04-28 NOTE — OP NOTE
OPERATIVE REPORT  DATE: 04/28/22     PREOPERATIVE DIAGNOSIS:   1. Colostomy status  2. History of rectal injury    POSTOPERATIVE DIAGNOSIS:  Same    PROCEDURES PERFORMED:   1. Cystourethroscopy with placement of bilateral ureteral stents    STAFF SURGEON: Inocencio Washburn MD, MS    RESIDENT: Didier Sky MD    ANESTHESIA: General    ESTIMATED BLOOD LOSS: 0 mL.     DRAINS:   - Bilateral 5 Fr externalized open ended ureteral catheters  - 16 Fr onofre catheter (10 mL in balloon)    OPERATIVE INDICATIONS:   Patient is a 37 year old female who presented with a hx of colostomy status 2/2 prior rectal injury elected for colostomy takedown with Colorectal surgery who requested Urology assistance in placement of bilateral ureteral stents to aid in identification of ureters during their surgery. The patient was counseled on the alternatives, risks, and benefits and elected to proceed with the above stated procedure.    DESCRIPTION OF PROCEDURE:    After informed consent was obtained, the patient was taken to the operating room, and moved to the operating table.  After adequate anesthesia was induced, the patient was repositioned in dorsal lithotomy position and prepped and draped in the usual sterile fashion. A timeout was taken to confirm correct patient, procedure and laterality.     A 19-[place] cystoscope was inserted into a well lubricated urethra. The urethra was unremarkable.  The bladder was free of tumors, stones or diverticuli.  The media was clear.  Bilateral ureteral orifices were orthotopic.  A Sensor guidewire was advanced into the right UO with the aid of a 5-[place] open ended ureteral catheter. This catheter was advanced to approximately 30 cm kourtney and the wire was removed. The wire was then advanced into the left UO with the aid of a 5-[place] open ended ureteral catheter. This catheter was advanced to approximately 30 cm kourtney and the wire was removed. Both catheters passed up under direct visualization and  passed easily with no appreciable resistance. The scope was backed out over both indwelling ureteral stents. A 16 Fr onofre catheter was then placed with 10 mL sterile water in the balloon taking care to keep the ureteral stents in position. Both ureteral stents were tied to the onofre catheter x 2. The ureteral stents were place through the onofre catheter and into the onofre tubing with aid of a 14 gauge angiocath needle and Sensor wire. The onofre and stents were attached to a sterile drainage bag.    The patient tolerated the procedure well.  There were no complications. The case was then turned over to the Colorectal surgery service to perform their portion of the procedure. Please refer to their operative report.     PLAN:   - Onofre and stents to be removed per primary team, Colorectal surgery.    Didier Sky MD  Urology, PGY-4

## 2022-04-28 NOTE — PROVIDER NOTIFICATION
Vicki Shell 22 7b   Pt c/o extreme pain, pca pump is not helping with the pain. Pt was wondering if the frequency or dosage can be change. Or if she can have anything else to help with the pain.  thanks  [id number]

## 2022-04-28 NOTE — ANESTHESIA CARE TRANSFER NOTE
Patient: Vicki Shell    Procedure: Procedure(s):  CLOSURE, COLOSTOMY, LAPAROSCOPIC, converted to open colostomy reversal with loop ileostomy creation  CREATION, ILEOSTOMY, LAPAROSCOPIC converted to open  Sigmoidoscopy flexible  CYSTOSCOPY, WITH URETERAL bilateral STENT INSERTION       Diagnosis: Colostomy care (H) [Z43.3]  Colostomy status (H) [Z93.3]  Cyst of left ovary [N83.202]  Diagnosis Additional Information: No value filed.    Anesthesia Type:   General     Note:    Oropharynx: oropharynx clear of all foreign objects  Level of Consciousness: awake  Oxygen Supplementation: face mask  Level of Supplemental Oxygen (L/min / FiO2): 6  Independent Airway: airway patency satisfactory and stable  Dentition: dentition unchanged  Vital Signs Stable: post-procedure vital signs reviewed and stable    Patient transferred to: PACU    Handoff Report: Identifed the Patient, Identified the Reponsible Provider, Reviewed the pertinent medical history, Discussed the surgical course, Reviewed Intra-OP anesthesia mangement and issues during anesthesia, Set expectations for post-procedure period and Allowed opportunity for questions and acknowledgement of understanding      Vitals:  Vitals Value Taken Time   /79 04/28/22 1356   Temp 36.5  C (97.7  F) 04/28/22 1351   Pulse 105 04/28/22 1356   Resp 10 04/28/22 1356   SpO2 100 % 04/28/22 1356   Vitals shown include unvalidated device data.    Electronically Signed By: CHANCE Nichole CRNA  April 28, 2022  1:59 PM

## 2022-04-28 NOTE — OR NURSING
Patient prolonged PACU stay secondary to chronic back pain increase. Valium give per orders. Patient sleeping soundly between cares. Taking in ice chips without issue. Wakes to verbal stimulation. Utilizing Dilaudid PCA appropriately. TYLER JAMA at bedside. Tearful but reported better pain control. AVSS. Report was given to Beatriz LEDESMA, whom transported patient to .

## 2022-04-28 NOTE — BRIEF OP NOTE
[facility]    Brief Operative Note    Pre-operative diagnosis: Colostomy care (H) [Z43.3]  Colostomy status (H) [Z93.3]  Cyst of left ovary [N83.202]  Post-operative diagnosis Colostomy status    Procedure: Procedure(s):  CLOSURE, COLOSTOMY, LAPAROSCOPIC, converted to open colostomy reversal with loop ileostomy creation  CREATION, ILEOSTOMY, LAPAROSCOPIC converted to open  Sigmoidoscopy flexible  CYSTOSCOPY, WITH URETERAL bilateral STENT INSERTION  Surgeon: Surgeon(s) and Role:  Panel 1:     * Tad Richardson MD - Primary  Panel 2:     * Inocencio Washburn MD - Primary     * Didier Sky MD - Resident - Assisting  Anesthesia: Combined General with Block   Estimated Blood Loss: 200 ml  IVF 3600 mL   mL    Drains: None  Specimens:   ID Type Source Tests Collected by Time Destination   1 : colostomy trim Tissue Other SURGICAL PATHOLOGY EXAM Tad Richardson MD 4/28/2022 12:04 PM      Findings:   Extensive pelvic adhesions with matting of ovaries to anterior rectum and rectal stump. Vagina and vaginal cuff protected. Negative rectal stump airleak test. Healthy end to side colorectal anastomosis with negative airleak test. LLQ ileostomy through same colostomy site.  Complications: None.  Implants:  None      Tad Richardson MD  Division of Colon and Rectal Surgery  [facility]  [phone]

## 2022-04-28 NOTE — PROVIDER NOTIFICATION
"Provider Little Esther Rodríguez, notified, \"7B. BRANDIE Shell ([id number]) Hello, patient is crying and hard to comfort with pain, despite of increase in PCA settings. I offered anti anxiety meds, essential oils, pt refused. Pt wants to see medical team at the bedside. [phone]\"  Sydney Sweet RN on 4/28/2022 at 6:58 PM    "

## 2022-04-28 NOTE — ANESTHESIA PREPROCEDURE EVALUATION
Anesthesia Pre-Procedure Evaluation    Patient: Vicki Shell   MRN: [id number] : 1985        Procedure : Procedure(s):  CLOSURE, COLOSTOMY, LAPAROSCOPIC,  CREATION, ILEOSTOMY, LAPAROSCOPIC  POSSIBLE OPEN  CYSTOSCOPY, WITH URETERAL bilateral STENT INSERTION possible  possible bilateral salpingo-oophorectomy          Past Medical History:   Diagnosis Date     Colostomy in place (H)      Cyst of left ovary       Past Surgical History:   Procedure Laterality Date     LAPAROSCOPY DIAGNOSTIC (GYN)      for ovarian cyst     LOW ANTERIOR BOWEL RESECTION        No Known Allergies   Social History     Tobacco Use     Smoking status: Current Some Day Smoker     Smokeless tobacco: Never Used     Tobacco comment: 5-7 cigarettes per day   Substance Use Topics     Alcohol use: Not Currently      Wt Readings from Last 1 Encounters:   22 83.1 kg (183 lb 3.2 oz)        Anesthesia Evaluation   Pt has had prior anesthetic.     No history of anesthetic complications       ROS/MED HX  ENT/Pulmonary:     (+) tobacco use, Current use,     Neurologic:  - neg neurologic ROS     Cardiovascular:       METS/Exercise Tolerance: >4 METS    Hematologic:       Musculoskeletal: Comment: Low back pain      GI/Hepatic: Comment: Ovarian cyst removal surgery c/b rectal perforation s/p colostomy, abdominal abscess    Abdominal pain      Renal/Genitourinary:  - neg Renal ROS     Endo:  - neg endo ROS     Psychiatric/Substance Use:     (+) H/O chronic opiod use .     Infectious Disease:       Malignancy:       Other:            Physical Exam    Airway        Mallampati: II   TM distance: > 3 FB   Neck ROM: full   Mouth opening: > 3 cm    Respiratory Devices and Support         Dental  no notable dental history         Cardiovascular          Rhythm and rate: regular and normal     Pulmonary           breath sounds clear to auscultation           OUTSIDE LABS:  CBC:   Lab Results   Component Value Date    WBC 5.2 2022    WBC 7.9  02/16/2022    HGB 13.9 04/26/2022    HGB 12.9 02/16/2022    HCT 43.6 04/26/2022    HCT 39.5 02/16/2022     04/26/2022     02/16/2022     BMP:   Lab Results   Component Value Date     04/26/2022     02/16/2022    POTASSIUM 4.2 04/26/2022    POTASSIUM 4.0 02/16/2022    CHLORIDE 107 04/26/2022    CHLORIDE 106 02/16/2022    CO2 28 04/26/2022    CO2 25 02/16/2022    BUN 9 04/26/2022    BUN 10 02/16/2022    CR 0.70 04/26/2022    CR 0.65 02/16/2022    GLC 98 04/28/2022    GLC 90 04/26/2022     COAGS:   Lab Results   Component Value Date    PTT 30 02/16/2022    INR 1.02 02/16/2022     POC: No results found for: BGM, HCG, HCGS  HEPATIC:   Lab Results   Component Value Date    ALBUMIN 3.7 02/16/2022    PROTTOTAL 7.3 02/16/2022    ALT 20 02/16/2022    AST 16 02/16/2022    ALKPHOS 66 02/16/2022    BILITOTAL 0.5 02/16/2022     OTHER:   Lab Results   Component Value Date    MANNY 8.8 04/26/2022       Anesthesia Plan    ASA Status:  2   NPO Status:  NPO Appropriate    Anesthesia Type: General.     - Airway: ETT         Techniques and Equipment:     - Lines/Monitors: 2nd IV, Arterial Line     Consents    Anesthesia Plan(s) and associated risks, benefits, and realistic alternatives discussed. Questions answered and patient/representative(s) expressed understanding.    - Discussed:     - Discussed with:  Patient      - Extended Intubation/Ventilatory Support Discussed: No.      - Patient is DNR/DNI Status: No    Use of blood products discussed: Yes.     - Discussed with: Patient.     - Consented: consented to blood products            Reason for refusal: other.     Postoperative Care            Comments:                Luis Angel Shafer MD

## 2022-04-28 NOTE — ANESTHESIA PROCEDURE NOTES
Arterial Line Procedure Note    Pre-Procedure   Staff -        Anesthesiologist:  Luis Angel Shafer MD       Performed By: anesthesiologist       Location: OR       Pre-Anesthestic Checklist: patient identified, IV checked, risks and benefits discussed, informed consent, monitors and equipment checked, pre-op evaluation and at physician/surgeon's request  Timeout:       Correct Patient: Yes        Correct Procedure: Yes        Correct Site: Yes        Correct Position: Yes   Line Placement:   This line was placed Post Induction starting at 4/28/2022 8:08 AM and ending at 4/28/2022 8:15 AM  Procedure   Procedure: arterial line, new line and elective       Laterality: left       Insertion Site: radial.  Sterile Prep        Standard elements of sterile barrier followed       Skin prep: Chloraprep  Insertion/Injection        Technique: ultrasound guided        Catheter Type/Size: 20 G, 1.75 in/4.5 cm quick cath (integral wire)  Narrative         Secured by: suture       Tegaderm dressing used.       Complications: None apparent,        Arterial waveform: Yes

## 2022-04-28 NOTE — ANESTHESIA PROCEDURE NOTES
TAP Procedure Note    Pre-Procedure   Staff -        Anesthesiologist:  Héctor Wilson MD       Resident/Fellow: Linda Wallis MD       Performed By: resident       Location: pre-op       Procedure Start/Stop Times: 4/28/2022 7:21 AM and 4/28/2022 7:26 AM       Pre-Anesthestic Checklist: patient identified, IV checked, site marked, risks and benefits discussed, informed consent, monitors and equipment checked, pre-op evaluation, at physician/surgeon's request and post-op pain management  Timeout:       Correct Patient: Yes        Correct Procedure: Yes        Correct Site: Yes        Correct Position: Yes        Correct Laterality: Yes        Site Marked: Yes  Procedure Documentation  Procedure: TAP       Laterality: bilateral       Patient Position: supine       Patient Prep/Sterile Barriers: sterile gloves, mask       Skin prep: Chloraprep       Insertion Site: T8-9.       Needle Type: short bevel       Needle Gauge: 21.        Needle Length (millimeters): 110           Catheter threaded easily.             Ultrasound guided       1. Ultrasound was used to identify targeted nerve, plexus, vascular marker, or fascial plane and place a needle adjacent to it in real-time.       2. Ultrasound was used to visualize the spread of anesthetic in close proximity to the above referenced structure.       3. A permanent image is entered into the patient's record.    Assessment/Narrative         The placement was negative for: blood aspirated, painful injection and site bleeding       Paresthesias: No.       Bolus given via needle. no blood aspirated via catheter.        Secured via.        Insertion/Infusion Method: Single Shot       Complications: none       Injection made incrementally with aspirations every 5 mL.    Medication(s) Administered   Bupivacaine 0.25% PF (Infiltration) - Infiltration   20 mL - 4/28/2022 7:21:00 AM  Bupivacaine liposome (Exparel) 1.3% LA inj susp (Infiltration) - Infiltration   20 mL  - 4/28/2022 7:21:00 AM  Medication Administration Time: 4/28/2022 7:21 AM

## 2022-04-28 NOTE — PROGRESS NOTES
Admitted/transferred from: PACU   2 RN   skin assessment completed by Scott Alvarez, RN and Jesus Martinez RN .  Skin assessment finding: L ileostomy,provena on midline incision   Interventions/actions: mepilex on saccrum/coccyx, pillows in use    Will continue to monitor.

## 2022-04-29 ENCOUNTER — APPOINTMENT (OUTPATIENT)
Dept: PHYSICAL THERAPY | Facility: CLINIC | Age: 37
DRG: 330 | End: 2022-04-29
Attending: SURGERY
Payer: MEDICAID

## 2022-04-29 LAB
ANION GAP SERPL CALCULATED.3IONS-SCNC: 4 MMOL/L (ref 3–14)
BUN SERPL-MCNC: 5 MG/DL (ref 7–30)
CALCIUM SERPL-MCNC: 8.1 MG/DL (ref 8.5–10.1)
CHLORIDE BLD-SCNC: 108 MMOL/L (ref 94–109)
CO2 SERPL-SCNC: 28 MMOL/L (ref 20–32)
CREAT SERPL-MCNC: 0.6 MG/DL (ref 0.52–1.04)
ERYTHROCYTE [DISTWIDTH] IN BLOOD BY AUTOMATED COUNT: 13.2 % (ref 10–15)
GFR SERPL CREATININE-BSD FRML MDRD: >90 ML/MIN/1.73M2
GLUCOSE BLD-MCNC: 99 MG/DL (ref 70–99)
HCT VFR BLD AUTO: 35.8 % (ref 35–47)
HGB BLD-MCNC: 11.5 G/DL (ref 11.7–15.7)
MAGNESIUM SERPL-MCNC: 2.3 MG/DL (ref 1.6–2.3)
MCH RBC QN AUTO: 28 PG (ref 26.5–33)
MCHC RBC AUTO-ENTMCNC: 32.1 G/DL (ref 31.5–36.5)
MCV RBC AUTO: 87 FL (ref 78–100)
PLATELET # BLD AUTO: 229 10E3/UL (ref 150–450)
POTASSIUM BLD-SCNC: 4.4 MMOL/L (ref 3.4–5.3)
RBC # BLD AUTO: 4.11 10E6/UL (ref 3.8–5.2)
SODIUM SERPL-SCNC: 140 MMOL/L (ref 133–144)
WBC # BLD AUTO: 10.9 10E3/UL (ref 4–11)

## 2022-04-29 PROCEDURE — 999N000111 HC STATISTIC OT IP EVAL DEFER

## 2022-04-29 PROCEDURE — 36415 COLL VENOUS BLD VENIPUNCTURE: CPT | Performed by: SURGERY

## 2022-04-29 PROCEDURE — G0463 HOSPITAL OUTPT CLINIC VISIT: HCPCS

## 2022-04-29 PROCEDURE — 85027 COMPLETE CBC AUTOMATED: CPT | Performed by: SURGERY

## 2022-04-29 PROCEDURE — 250N000011 HC RX IP 250 OP 636: Performed by: PHYSICIAN ASSISTANT

## 2022-04-29 PROCEDURE — 250N000013 HC RX MED GY IP 250 OP 250 PS 637

## 2022-04-29 PROCEDURE — 258N000003 HC RX IP 258 OP 636: Performed by: SURGERY

## 2022-04-29 PROCEDURE — 120N000002 HC R&B MED SURG/OB UMMC

## 2022-04-29 PROCEDURE — 250N000013 HC RX MED GY IP 250 OP 250 PS 637: Performed by: SURGERY

## 2022-04-29 PROCEDURE — 250N000011 HC RX IP 250 OP 636: Performed by: SURGERY

## 2022-04-29 PROCEDURE — 97530 THERAPEUTIC ACTIVITIES: CPT | Mod: GP

## 2022-04-29 PROCEDURE — 80048 BASIC METABOLIC PNL TOTAL CA: CPT | Performed by: SURGERY

## 2022-04-29 PROCEDURE — 83735 ASSAY OF MAGNESIUM: CPT | Performed by: SURGERY

## 2022-04-29 PROCEDURE — 97161 PT EVAL LOW COMPLEX 20 MIN: CPT | Mod: GP

## 2022-04-29 RX ORDER — ENOXAPARIN SODIUM 100 MG/ML
40 INJECTION SUBCUTANEOUS EVERY 24 HOURS
Status: DISCONTINUED | OUTPATIENT
Start: 2022-04-29 | End: 2022-05-05

## 2022-04-29 RX ORDER — ENOXAPARIN SODIUM 100 MG/ML
40 INJECTION SUBCUTANEOUS EVERY 24 HOURS
Status: DISCONTINUED | OUTPATIENT
Start: 2022-04-29 | End: 2022-04-29

## 2022-04-29 RX ADMIN — ACETAMINOPHEN 1000 MG: 500 TABLET ORAL at 18:02

## 2022-04-29 RX ADMIN — ENOXAPARIN SODIUM 40 MG: 40 INJECTION SUBCUTANEOUS at 12:50

## 2022-04-29 RX ADMIN — ACETAMINOPHEN 1000 MG: 500 TABLET ORAL at 12:49

## 2022-04-29 RX ADMIN — GABAPENTIN 300 MG: 300 CAPSULE ORAL at 16:00

## 2022-04-29 RX ADMIN — GABAPENTIN 300 MG: 300 CAPSULE ORAL at 19:46

## 2022-04-29 RX ADMIN — ACETAMINOPHEN 1000 MG: 500 TABLET ORAL at 05:37

## 2022-04-29 RX ADMIN — METHOCARBAMOL 750 MG: 750 TABLET ORAL at 16:01

## 2022-04-29 RX ADMIN — ACETAMINOPHEN 1000 MG: 500 TABLET ORAL at 23:54

## 2022-04-29 RX ADMIN — GABAPENTIN 300 MG: 300 CAPSULE ORAL at 08:41

## 2022-04-29 RX ADMIN — SODIUM CHLORIDE, POTASSIUM CHLORIDE, SODIUM LACTATE AND CALCIUM CHLORIDE: 600; 310; 30; 20 INJECTION, SOLUTION INTRAVENOUS at 08:40

## 2022-04-29 RX ADMIN — SERTRALINE HYDROCHLORIDE 50 MG: 50 TABLET ORAL at 21:26

## 2022-04-29 RX ADMIN — METHOCARBAMOL 750 MG: 750 TABLET ORAL at 08:41

## 2022-04-29 RX ADMIN — Medication: at 12:10

## 2022-04-29 ASSESSMENT — ACTIVITIES OF DAILY LIVING (ADL)
ADLS_ACUITY_SCORE: 6
ADLS_ACUITY_SCORE: 8
ADLS_ACUITY_SCORE: 6
ADLS_ACUITY_SCORE: 8
ADLS_ACUITY_SCORE: 6
ADLS_ACUITY_SCORE: 8
ADLS_ACUITY_SCORE: 6
ADLS_ACUITY_SCORE: 4
ADLS_ACUITY_SCORE: 6
ADLS_ACUITY_SCORE: 6
ADLS_ACUITY_SCORE: 4
ADLS_ACUITY_SCORE: 6
ADLS_ACUITY_SCORE: 8
ADLS_ACUITY_SCORE: 6
ADLS_ACUITY_SCORE: 6

## 2022-04-29 NOTE — PROVIDER NOTIFICATION
7b rm 22 amie bianchi c/o nausea unrelieved by zofran, can she have more antiemetics? thanks! nina #[phone]

## 2022-04-29 NOTE — PLAN OF CARE
"Shift [id number]  Neuro: A/O x4 calls appropriately.  Respiratory: weaned down to room air oxygen saturation >94%. Denies SOB.  Cardiac: WDL denies cardiac chest pain  Diet: advanced to low fiber diet tolerating with fair appetite.   GI/: faint BM, ileostomy in place adequate output noted, ostomy WDL. Onofre removed this shift voiding with AUOP.  Incision/Drains: midline incision to abdomen wound vac in place. ileostomy to LLQ appliance changed x2 this shift. Output noted to ileostomy   IV Access: R PIV infusing PCA dilaudid and LR at 50ml/hr. L PIV SL.  Pain: reports constant pain to abdominal incision, managed with PCA pain pump, scheduled tylenol, gabapentine and PRN robaxin. Ice/cold pack utilized.   Labs: reviewed.  VS: /57 (BP Location: Right arm)   Pulse 67   Temp 99.1  F (37.3  C) (Oral)   Resp 18   Ht 1.702 m (5' 7\")   Wt 83.1 kg (183 lb 3.2 oz)   SpO2 95%   BMI 28.69 kg/m     Activity: up with assist x1 with gait belt to hallway and bathroom.     New Changes this shift: onofre removed. Ambulated to bathroom and to hallway x2 this shift.   Plan: Continue to manage pain, encourage activity out of bed.         "

## 2022-04-29 NOTE — PROGRESS NOTES
SPIRITUAL HEALTH SERVICES  [facility]  Unit: 7B    Referral Source: Admission     Illness Narrative: Pt shared that she is recovering surgery that took place yesterday.    Distress: Pt has been working through complications of a ruptured colon that took place this past fall.  Pt shared that she is a long way from home and that she has 4 children and a S.O. back home near [place], [place] and it is hard to be away from her family.    Coping: Vicki stated that she does have communications with her children, but it is not the same as being in the same room with them.   Vicki requested a prayer for the restoration of her digestive track and being able to live a more normal life.  Vicki named that family back home has been supportive of her children's needs.    Prayer was shared with Vicki and she is very open to future visits from [facility]..    Plan: I will continue to follow Vicki while she remains on Unit 7B.         Duane F Bauer  Chaplain Resident  Pager number: [phone]  * [facility] remains available 24/7 for emergent requests/referrals, either by having the switchboard page the on-call  or by entering an ASAP/STAT consult in Epic (this will also page the on-call ).*

## 2022-04-29 NOTE — CONSULTS
Pain Service Consultation Note  [facility]      Patient Name: Vicki Shell  MRN: [id number]   Age: 37 year old  Sex: female  Date: April 29, 2022                                      Reviewed: Yes    Referring Provider:  BRENNA Workman                                             Referring Service:  Colorectal Surgery  Reason for Consultation: Post-op pain control eval and recommendations     Assessment/Recommendations:  37 year old old female w/ PMHx significant for Chronic back and abdominal pain (on chronic opioids), ovarian cyst drainage c/b rectal injury with feculent peritonitis s/p ex lap w/ end colostomy creation who underwent Ex lap colostomy reversal , flex sig and DLI on 4/28.  She continues to have poorly controlled pain and is currently on a dilaudid PCA at 0.1 mg/hr with 0.4mg pushes q10min. Objectively pt appears very comfortable during interview, lapsing into sleep frequently though easily arousable. She received a long acting local anesthetic with the regional block so further regional or local anesthetic use is limited. Discussed transition from PCA to the following regimen with pt. She endorsed understanding of plan and agreement.     Plan:   Recommend transitioning from PCA dilaudid to following regimen:     Start:  Dilaudid PO 4-6mg q3h PRN  Dilaudid IV 0.5-1mg q3h PRN  Continue Acetominophen 1000mg q6h  Continue Atarax 25-50mg q6h PRN  Optimize conservative therapies (heat, ice, biofreeze) as they have provided significant relief in the past  Work with PT (has also provided relief in the past)  Could consider starting PRN Robaxin if she is experiencing back spasms    Bowel regimen per primary team      Thank you for the opportunity to participate in the care of Vicki Shell  Pain Service will continue to follow.    Discussed with attending anesthesiologist  Primary Service Contacted with Recommendations? Yes    Thank you for the opportunity to participate  in the care of Vicki Shell    Contact information:  Mon - Friday 8 AM - 3 PM: [phone]  After Hours, Weekends and Holidays: [phone]    Emerita Tubbs MD  4/29/2022      Chief Complaint:  Back and stomach pain      History of Present Illness:  Vicki Shell is a 37 year old old female with a PMHx of Chronic back and abdominal pain (PTA on [place] [facility], and [place]) who had an ovarian cyst drainage that was c/b rectal injury s/p ex lap w/ end colostomy creation who underwent ex lap colostomy reversal , flex sig and DLI on 4/28. She continues to have poorly controlled pain and is currently on a dilaudid PCA at 0.1 mg/hr with 0.4mg pushes q10min.     The pain is reported to be acute on chronic, located in the middle abdomen near her surgical site, and in her low back. Post operatively Her abdominal pain radiates around the stomach and her back pain radiates down her legs. Her pain at base line ranged from 3/10 to 8-9/10. She currently feels her pain is at a constant 7-10/10 and her goal is for it to get to a 3-4/10. She has been able to sleep though notes she frequently wakes up and feels significant pain. She has followed with a a pain clinic near her home in [facility] and has previously utilized PT, Chiropractic, massage, a TENS unit, medication, and injections (epidural and SI joint) with varying degrees of relief. She feels she does not want to pursue injections again due to a traumatic experience having a abdominal abscess and lung drained that was very painful and performed without anesthesia. Additionally he had previously discussed starting pregabalin with her pain provider however never started taking it and she didn't want to take any additional pills and have any of the reported side effects.     Per [place]  review pulled from system on 04/29/22.     Past Medical History:  Past Medical History:   Diagnosis Date     Colostomy in place (H)      Cyst of left ovary        Family History:    History reviewed. No  "pertinent family history.    Social History:  Social History     Tobacco Use     Smoking status: Current Some Day Smoker     Smokeless tobacco: Never Used     Tobacco comment: 5-7 cigarettes per day   Substance Use Topics     Alcohol use: Not Currently         Tobacco:   Current smoker (5-7 cigarettes per day)  ETOH:  Denies current use  H/O Substance Abuse:  denies      Review of Systems:  Complete ROS reviewed. Unless otherwise noted, all other systems found to be negative.        Laboratory Results:  Recent Labs   Lab Test 04/29/22  0849 04/26/22  1017 02/16/22  0952   INR  --   --  1.02      < > 200   PTT  --   --  30   BUN 5*   < > 10    < > = values in this interval not displayed.       Allergies:  No Known Allergies      Pain Medications:  Pain Medications/Prescriber: Karen Hernandez     Current Pain Related Medications:  Medications related to Pain Management (From now, onward)    Start     Dose/Rate Route Frequency Ordered Stop    04/28/22 2136  hydrOXYzine (ATARAX) tablet 25 mg        \"Or\" Linked Group Details    25 mg Oral EVERY 6 HOURS PRN 04/28/22 2136 04/28/22 2136  hydrOXYzine (ATARAX) tablet 50 mg        \"Or\" Linked Group Details    50 mg Oral EVERY 6 HOURS PRN 04/28/22 2136 04/28/22 2100  methocarbamol (ROBAXIN) tablet 750 mg         750 mg Oral EVERY 6 HOURS PRN 04/28/22 2046 04/28/22 2000  gabapentin (NEURONTIN) capsule 300 mg         300 mg Oral 3 TIMES DAILY 04/28/22 1644      04/28/22 1900  HYDROmorphone (DILAUDID) PCA 0.2 mg/mL OPIOID TOLERANT          Intravenous CONTINUOUS 04/28/22 1838      04/28/22 1700  acetaminophen (TYLENOL) tablet 1,000 mg         1,000 mg Oral EVERY 6 HOURS 04/28/22 1644 05/01/22 1659    04/28/22 1558  bupivacaine liposome (EXPAREL) LA inj was given in the infiltration site to produce post-op analgesia. Duration of action is up to 72 hours. Other \"liz\" meds should not be given for 96 hours except for lidocaine 4% patch. This is for INFORMATION " "ONLY.          Does not apply CONTINUOUS PRN 04/28/22 1558 05/02/22 1557    04/28/22 0621  lidocaine 1 % 0.1-1 mL         0.1-1 mL Other EVERY 1 HOUR PRN 04/28/22 0621      04/28/22 0621  lidocaine (LMX4) cream          Topical EVERY 1 HOUR PRN 04/28/22 0621              Physical Exam:  Vitals: /59 (BP Location: Right arm)   Pulse 80   Temp 36.7  C (98  F)   Resp 18   Ht 1.702 m (5' 7\")   Wt 83.1 kg (183 lb 3.2 oz)   SpO2 98%   BMI 28.69 kg/m      Physical Exam:   CONSTITUTIONAL/GENERAL APPEARANCE:  NAD.   EYES: EOMI, sclera anicteric, PERRLA  ENT/NECK: atraumatic, lips and oral mucous membranes dry  RESPIRATORY: non-labored breathing. No cough, wheeze  CV: RRR, no audible rubs, gallops, or murmurs  ABDOMEN: Soft, central wound vac present and new colostomy site. TTP, non-distended  MUSCULOSKELETAL/BACK/SPINE/EXTREMITIES: Moves all extremities purposefully.  No edema or obvious joint deformities   NEURO: Drowsy, frequently dosing during interview. Alert spontaneously or to voice. Oriented x3. Answers questions appropriately  SKIN/VASCULAR EXAM:  No jaundice, no visible rashes or lesions        "

## 2022-04-29 NOTE — PHARMACY-ADMISSION MEDICATION HISTORY
Admission Medication History Completed by Pharmacy    See [facility] Admission Navigator for allergy information, preferred outpatient pharmacy, prior to admission medications and immunization status.     Medication History Sources:     Med history completed by clinic pharmacist 4/26    Changes made to PTA medication list (reason):    Added: None    Deleted: None    Changed: None    Additional Information:    None    Prior to Admission medications    Medication Sig Last Dose Taking? Auth Provider   acetaminophen (TYLENOL) 325 MG tablet Take 325-650 mg by mouth every 6 hours as needed for mild pain Past Month at Unknown time Yes Unknown, Entered By History   HYDROcodone-acetaminophen (NORCO) 5-325 MG tablet Take 1 tablet by mouth every 6 hours as needed 4/28/2022 at 0100 Yes Reported, Patient   metroNIDAZOLE (FLAGYL) 500 MG tablet Take 1 tablet (500 mg) by mouth every 6 hours At 8:00 am, 2:00 pm, 8:00 pm the day prior to your surgery with neomycin and zofran. 4/27/2022 at 2000 Yes Tad Richardson MD   morphine (MS CONTIN) 30 MG CR tablet Take 30 mg by mouth every morning 4/28/2022 at Unknown time Yes Reported, Patient   multivitamin w/minerals (THERA-VIT-M) tablet Take 1 tablet by mouth daily Takes when she remembers Past Month at Unknown time Yes Unknown, Entered By History   neomycin (MYCIFRADIN) 500 MG tablet Take 2 tablets (1,000 mg) by mouth every 6 hours At 8:00 am, 2:00 pm, 8:00 pm the day prior to your surgery with flagyl and zofran. 4/27/2022 at 2000 Yes Tad Richardson MD   NONFORMULARY Pro Immune supplement (Selenium)  Takes 1 tablet daily (when she remembers) Past Week at Unknown time Yes Unknown, Entered By History   ondansetron (ZOFRAN) 4 MG tablet Take 1 tablet (4 mg) by mouth every 6 hours At 8:00 am, 2:00 pm, 8:00 pm the day prior to your surgery with neomycin and flagyl. 4/27/2022 at 2000 Yes Tad Richardson MD   polyethylene glycol (MIRALAX) 17 g packet Take 238 g by mouth See Admin Instructions  "Starting at 4 pm night prior to surgery. Refer to \"Getting Ready for Surgery\" instructions. 4/27/2022 at 1600 Yes Tad Richardson MD   sertraline (ZOLOFT) 50 MG tablet Take 50 mg by mouth At Bedtime 4/27/2022 at 2000 Yes Reported, Patient       Date completed: 04/29/22    Medication history completed by: Catarina Tomas, MarlenyD, BCPS            "

## 2022-04-29 NOTE — PROGRESS NOTES
"/64 (BP Location: Right arm)   Pulse 78   Temp 97.1  F (36.2  C) (Axillary)   Resp 14   Ht 1.702 m (5' 7\")   Wt 83.1 kg (183 lb 3.2 oz)   SpO2 97%   BMI 28.69 kg/m       NEURO: A&Ox4, calls appropriately   RESP: Denies SOB, Sating >95% on 2L nc  CARDIAC: WNL, denies chest pain   GI/: Urethral Catheter with AUOP, Ileostomy with minimal output  DIET: Clear Liquid diet   PAIN: C/o extreme pain, on PCA Dilaudid, scheduled tylenol  SKIN: 2 RN skin checked-L ileostomy, midline incision that is   LDAs: L piv sl, R piv infusing, onofre catheter, provena on midline incision.   ACTIVITY: was not OOB this shift  PLAN: continue POC, discontinue onofre POD1;   "

## 2022-04-29 NOTE — PROGRESS NOTES
CLINICAL NUTRITION SERVICES  Reason for Assessment: s/p colostomy takedown and loop ileostomy creation.  Nutrition education regarding s/p ileostomy diet including low fiber  Diet History: not discussed as pt in some discomfort  Nutrition Diagnosis:  Food- and nutrition-related knowledge deficit r/t no previous knowledge of additional diet recommendations for s/p ileostomy beyond those for prior colostomy.  Food- and nutrition-related knowledge deficit r/t not ready for diet or lifestyle changes AEB  Food- and nutrition-related knowledge deficit r/t limited adherence to nutrition-related recommendations AEB  Interventions:  Provided very brief instruction on diet main points-separate liquids from solids, limit sugar--especially in liquid form, low fiber.  Provided handouts: List of fiber content of menu items, low fiber menu options (noting higher sugar that should be used with caution).  Ileostomy Nutrition Therapy, Low Fiber (13 grams) Nutrition Therapy.   Goals:   Patient will verbalize understanding of diet principles noted above.   Follow-up:    Will plan to check in with pt re: any questions about ed materials early next week as she was in some discomfort.    Patient to ask any further nutrition-related questions before discharge.  In addition, pt may request outpatient RD appointment.    Ivana Vasquez RD, LD   7B (M-F) Pager: [phone]  RD Weekend/Holiday Pager: [phone]

## 2022-04-29 NOTE — PLAN OF CARE
Vital signs:  Temp: 97.5  F (36.4  C) Temp src: Oral BP: 116/64 Pulse: 65   Resp: 16 SpO2: 98 % O2 Device: Nasal cannula Oxygen Delivery: 2 LPM     Shift: 3496-0899    NEURO: lethargic at times. Oriented x4.   RESPIRATORY: denies SOB. sats 90s on 2LNC.   CARDIAC: VSS. Denies cardiac chest pain.   GI/: onofre, adequate red to pink urine output. Team aware. Colostomy. +BS. Scant output.   DIET: CLD. C/o hunger.   PAIN/NAUSEA: pain reportedly unmanaged w/ dilaudid PCA and prn atarax. This writer had to remind pt to use PCA doses. Pt appeared more comfortable, slept, and reported some relief after using the PCA consistently. Nausea worsened w/ episodes of severe pain. Applied pt's biofreeze to lower back. Ordered t-pump.   INCISION/DRAINS/SKIN: midline incision has wound vac in place. New ostomy is WDL. No new deficits.   IV ACCESS: L PIV SL. R PIV w/ NS @90ml/hr and dilaudid PCA.   ACTIVITY: in bed this shift.   LAB: reviewed.   CHANGES: pain still at 7/10 even with persistent PCA use.   PLAN: continue w/ POC. Consider alternative pain management.

## 2022-04-29 NOTE — PROGRESS NOTES
Colorectal Surgery Progress Note  [facility]  POD#1      Subjective:  Pain was very poorly controlled overnight and pt was in tears.  This morning doing a little bit better but much more room for improvement.  Starting to have stoma output.      Vitals:  Vitals:    04/28/22 2045 04/28/22 2231 04/29/22 0228 04/29/22 0712   BP: 107/64 111/54 116/64 110/59   BP Location: Right arm Right arm Right arm Right arm   Cuff Size:       Pulse: 78 72 65 80   Resp:  14 16 18   Temp:  (!) 96.4  F (35.8  C) 97.5  F (36.4  C) 98  F (36.7  C)   TempSrc:  Axillary Oral    SpO2: 97% 96% 98% 98%   Weight:       Height:         I/O:  I/O last 3 completed shifts:  In: 3778 [P.O.:10; I.V.:3768]  Out: 2010 [Urine:1785; Stool:25; Blood:200]    Physical Exam:  Gen: AAOx3, NAD  Pulm: Non-labored breathing  Abd: Soft, non-distended, appropriately tender, no guarding/rebound   Incision with prevena vac in place   Stoma pink and viable with liquid stool and gas in bag  Ext:  Warm and well-perfused    BMP  Recent Labs   Lab 04/28/22  1718 04/28/22  0558 04/26/22  1017   NA  --   --  139   POTASSIUM  --   --  4.2   CHLORIDE  --   --  107   CO2  --   --  28   BUN  --   --  9   CR 0.62  --  0.70   GLC  --  98 90     CBC  Recent Labs   Lab 04/26/22  1017   WBC 5.2   HGB 13.9   HCT 43.6            ASSESSMENT: This is a 37 year old female PMH of ovarian cyst drainage c/b rectal injury with feculent peritonitis s/p ex lap w/ end colostomy creation.  Now s/p Ex Lap, colostomy reversal, flex sig, DLI on 4/28.    Neuro/Pain: would apprec pain team recs.  Can take oral medications.  Currently ordered for dilaudid PCA with 0.1 continuous and 0.4 bumps q10 mins with sub-optimal pain control.  Also tylenol QID scheduled, gabapentin, robaxin, atarax.  Will need to further discuss if pt can take NSADS.   CV: no acute concerns  PULM: encourage IS.  GI/FEN:   - likely adv to LRD  - IVF @ 90  - WOCN for ileostomy  - ambulate  -  pravena vac will be removed prior to discharge  : remove onofre  Heme:  Hgb pending  ID: no acute concerns  Endocrine: no acute concerns    Activity: as tolerated.  Ppx: lovenox ppx  Dispo: TBBRENNA Fernandez.................4/29/2022   7:52 AM  Colon and Rectal Surgery    Patient was seen and discussed with Dr. Higuera    The above plan of care was performed and communicated to me by Dr. Richardson

## 2022-04-29 NOTE — PROGRESS NOTES
"Assessment of new loop Ileostomy:  Surgery date:  4/28/22  Surgeon:  Dr Richardson  Procedure:  s/p Ex Lap, colostomy reversal, flex sig, DLI on 4/28.  Related diagnosis:  ovarian cyst drainage c/b rectal injury with feculent peritonitis   s/p ex lap w/ end colostomy creation.      Pouching system in place on assessment today: Brenda two piece and cut to fit flat barrier with urostomy pouch placed by nursing staff after OR pouching system leaked  Pouch barrier status: Minimal melting  Pouch last changed/wear time: TBA  Reason for pouch change today: needs fecal pouch, protect exposed skin  Effectiveness of current pouching/ supply plan: Waiting for peristomal edema to decrease   Change made with ostomy management today: Yes  Pouching system placed today: [place] two piece flat with fecal pouch.  High output pouch in supplies  Supplies: gathered and at bedside       Last photo: 4/29/22  Stoma location: LLQ at site of previous colostomy   Stoma size: 1 1/2\" x 1 3/4 inches,   Stoma appearance: oval, moist, good turgor, edematous and flat  Mucocutaneous junction:  Intact with puckering from sutures  Peristomal complication(s): Moisture-Associated Skin Damage (MASD) due to leakage   Output: liquid and brown,   Output volume emptied during visit: 20ml  Abdominal assessment: Firm  Surgical site(s): NPWT dressing in use-Provena  NG still in place? No  Pain: Cramping, Sharp and Stabbing  Is patient still on a PCA? Yes    Ostomy education assessment:  Participant of teaching session today: patient   Education completed today: Initial fitting, Pouching system assessment  and Pouch change demonstration  Educational materials/methods: Verbal and Demonstration  Education still needed: Adjustment of pouching plan, Pouch change return demonstration and Low fiber diet   Learning Comprehension:   Psychosocial assessment: pt independent with cares with previous colostomy   Patient readiness for education today: distracted by " pain  Following today's visit: patient  is able to demonstrate;         1. How to empty their pouch? Yes        2. How to change their pouch? No and Demo provided but independent with previous pouching         3. How to read and record intake and output correctly? No  Preparation for discharge completed: No discharge preparation started yet  Preparation for discharge still needed: Placed prescription recommendations in discharge navigator for MD to sign, Ensured patient has extra supplies for discharge, Discussed how to order supplies after discharge , Ordered samples from  after gaining consent from patient/caregiver, Discussed how and when to make an outpatient WOC nurse appointment after discharge, Prepared for discharge home with home care and Discuss signs/symptoms of when to seek medical attention  Pt support system on discharge: lives independently  WOC recommend home care? Yes and By WOC RN if possible

## 2022-04-29 NOTE — PROGRESS NOTES
"   04/29/22 1349   Quick Adds   Type of Visit Initial PT Evaluation       Present no   Living Environment   People in Home significant other;child(santosh), dependent   Current Living Arrangements house   Home Accessibility stairs to enter home   Number of Stairs, Main Entrance 5   Stair Railings, Main Entrance   (wall railing on one side)   Transportation Anticipated car, drives self;family or friend will provide   Living Environment Comments Pt lives in single story home with significant other and 4 children. 4-5 ILIANA. All needs can be met on main level. Pt reports tub shower.   Self-Care   Usual Activity Tolerance good   Current Activity Tolerance fair   Regular Exercise No   Equipment Currently Used at Home colostomy/ostomy supplies   Fall history within last six months no   Activity/Exercise/Self-Care Comment Pt IND at baseline with mobility, ADLs, and IADLs.   General Information   Onset of Illness/Injury or Date of Surgery 04/28/22   Referring Physician Tad Richardson MD   Patient/Family Therapy Goals Statement (PT) to return home   Pertinent History of Current Problem (include personal factors and/or comorbidities that impact the POC) Per EMR: \"This is a 37 year old female PMH of ovarian cyst drainage c/b rectal injury with feculent peritonitis s/p ex lap w/ end colostomy creation.  Now s/p Ex Lap, colostomy reversal, flex sig, DLI on 4/28.\"   Existing Precautions/Restrictions abdominal   Weight-Bearing Status - LUE partial weight-bearing (% in comments)  (10# lifting restriction)   Weight-Bearing Status - RUE partial weight-bearing (% in comments)  (10# lifting restriction)   Weight-Bearing Status - LLE full weight-bearing   Weight-Bearing Status - RLE full weight-bearing   General Observations Activity: ambulate with assist   Cognition   Affect/Mental Status (Cognition) WFL;other (see comments)  (fatigued)   Orientation Status (Cognition) oriented x 4   Pain Assessment   Patient " Currently in Pain Yes, see Vital Sign flowsheet  (8/10 abdominal pain)   Integumentary/Edema   Integumentary/Edema Comments abdominal incision   Posture    Posture Forward head position;Protracted shoulders   Range of Motion (ROM)   ROM Comment B LE WFL   Strength (Manual Muscle Testing)   Strength Comments B LE 3/5, generalized weakness   Bed Mobility   Comment, (Bed Mobility) sup <> sit with CGA, verbal cues for seqeuncing/log roll technique   Transfers   Comment, (Transfers) sit <> stand with CGA and use of IV pole   Gait/Stairs (Locomotion)   Comment, (Gait/Stairs) Pt ambulates with IV pole and CGA. Pt demos slowed gait speed, short step lengths, and flexed forward posture secondary to pain   Balance   Balance Comments Pt demos good seated balance. Uses IV pole for support in standing   Sensory Examination   Sensory Perception patient reports no sensory changes   Clinical Impression   Criteria for Skilled Therapeutic Intervention Yes, treatment indicated   PT Diagnosis (PT) impaired functional mobility   Influenced by the following impairments pain, weakness, decreased endurance/activity tolerance   Functional limitations due to impairments bed mobility, transfers, gait, stairs   Clinical Presentation (PT Evaluation Complexity) Stable/Uncomplicated   Clinical Presentation Rationale clinical judgement   Clinical Decision Making (Complexity) low complexity   Planned Therapy Interventions (PT) balance training;bed mobility training;gait training;neuromuscular re-education;patient/family education;home exercise program;stair training;strengthening;transfer training;progressive activity/exercise   Anticipated Equipment Needs at Discharge (PT)   (TBD)   Risk & Benefits of therapy have been explained evaluation/treatment results reviewed;care plan/treatment goals reviewed   Clinical Impression Comments Pt presenting post surgically with the aformentioned impairments affecting IND with functional mobility and activity  tolerance. Pt to benefit from skilled inpatient PT in order to address these impairments and progress towards PLOF.   PT Discharge Planning   PT Discharge Recommendation (DC Rec) home with assist   PT Rationale for DC Rec Pt below functional mobility baseline, primarily limited by pain. Anticipate over LOS and improved pain control, pt will progress to d/c home with family assist for heavier ADLs/IADLS.   PT Brief overview of current status CGA with IV pole and gait belt. Encourage frequent ambulation   Plan of Care Review   Plan of Care Reviewed With patient   Total Evaluation Time   Total Evaluation Time (Minutes) 8   Physical Therapy Goals   PT Frequency 6x/week   PT Predicted Duration/Target Date for Goal Attainment 05/13/22   PT Goals Bed Mobility;Transfers;Gait;Stairs   PT: Bed Mobility Modified independent;Supine to/from sit;Within precautions   PT: Transfers Modified independent;Sit to/from stand;Within precautions   PT: Gait Modified independent;Greater than 200 feet   PT: Stairs Modified independent;5 stairs  (railing)

## 2022-04-30 LAB
ANION GAP SERPL CALCULATED.3IONS-SCNC: 5 MMOL/L (ref 3–14)
BUN SERPL-MCNC: 5 MG/DL (ref 7–30)
CALCIUM SERPL-MCNC: 8.2 MG/DL (ref 8.5–10.1)
CHLORIDE BLD-SCNC: 106 MMOL/L (ref 94–109)
CO2 SERPL-SCNC: 28 MMOL/L (ref 20–32)
CREAT SERPL-MCNC: 0.51 MG/DL (ref 0.52–1.04)
GFR SERPL CREATININE-BSD FRML MDRD: >90 ML/MIN/1.73M2
GLUCOSE BLD-MCNC: 106 MG/DL (ref 70–99)
HOLD SPECIMEN: NORMAL
MAGNESIUM SERPL-MCNC: 2.1 MG/DL (ref 1.6–2.3)
POTASSIUM BLD-SCNC: 3.8 MMOL/L (ref 3.4–5.3)
SODIUM SERPL-SCNC: 139 MMOL/L (ref 133–144)

## 2022-04-30 PROCEDURE — 250N000011 HC RX IP 250 OP 636: Performed by: SURGERY

## 2022-04-30 PROCEDURE — 36415 COLL VENOUS BLD VENIPUNCTURE: CPT | Performed by: PHYSICIAN ASSISTANT

## 2022-04-30 PROCEDURE — 250N000011 HC RX IP 250 OP 636: Performed by: PHYSICIAN ASSISTANT

## 2022-04-30 PROCEDURE — 250N000013 HC RX MED GY IP 250 OP 250 PS 637: Performed by: SURGERY

## 2022-04-30 PROCEDURE — 120N000002 HC R&B MED SURG/OB UMMC

## 2022-04-30 PROCEDURE — 83735 ASSAY OF MAGNESIUM: CPT | Performed by: PHYSICIAN ASSISTANT

## 2022-04-30 PROCEDURE — 250N000013 HC RX MED GY IP 250 OP 250 PS 637

## 2022-04-30 PROCEDURE — 80048 BASIC METABOLIC PNL TOTAL CA: CPT | Performed by: PHYSICIAN ASSISTANT

## 2022-04-30 PROCEDURE — 999N000248 HC STATISTIC IV INSERT WITH US BY RN

## 2022-04-30 PROCEDURE — 250N000011 HC RX IP 250 OP 636

## 2022-04-30 RX ORDER — MENTHOL AND METHYL SALICYLATE 7.6; 29 G/100G; G/100G
OINTMENT TOPICAL EVERY 6 HOURS PRN
Status: DISCONTINUED | OUTPATIENT
Start: 2022-04-30 | End: 2022-05-07 | Stop reason: HOSPADM

## 2022-04-30 RX ORDER — HYDROXYZINE HYDROCHLORIDE 25 MG/1
25 TABLET, FILM COATED ORAL EVERY 6 HOURS
Status: DISCONTINUED | OUTPATIENT
Start: 2022-04-30 | End: 2022-05-07 | Stop reason: HOSPADM

## 2022-04-30 RX ORDER — HYDROMORPHONE HYDROCHLORIDE 1 MG/ML
.5-1 INJECTION, SOLUTION INTRAMUSCULAR; INTRAVENOUS; SUBCUTANEOUS
Status: DISCONTINUED | OUTPATIENT
Start: 2022-04-30 | End: 2022-04-30

## 2022-04-30 RX ORDER — LIDOCAINE 4 G/G
1 PATCH TOPICAL
Status: DISCONTINUED | OUTPATIENT
Start: 2022-04-30 | End: 2022-05-07 | Stop reason: HOSPADM

## 2022-04-30 RX ORDER — HYDROXYZINE HYDROCHLORIDE 25 MG/1
50 TABLET, FILM COATED ORAL EVERY 6 HOURS
Status: DISCONTINUED | OUTPATIENT
Start: 2022-04-30 | End: 2022-05-07 | Stop reason: HOSPADM

## 2022-04-30 RX ORDER — HYDROMORPHONE HYDROCHLORIDE 4 MG/1
4 TABLET ORAL
Status: DISCONTINUED | OUTPATIENT
Start: 2022-04-30 | End: 2022-04-30

## 2022-04-30 RX ADMIN — HYDROXYZINE HYDROCHLORIDE 50 MG: 25 TABLET, FILM COATED ORAL at 21:03

## 2022-04-30 RX ADMIN — GABAPENTIN 300 MG: 300 CAPSULE ORAL at 14:47

## 2022-04-30 RX ADMIN — ONDANSETRON 4 MG: 2 INJECTION INTRAMUSCULAR; INTRAVENOUS at 09:48

## 2022-04-30 RX ADMIN — HYDROMORPHONE HYDROCHLORIDE 4 MG: 4 TABLET ORAL at 08:40

## 2022-04-30 RX ADMIN — ENOXAPARIN SODIUM 40 MG: 40 INJECTION SUBCUTANEOUS at 11:02

## 2022-04-30 RX ADMIN — PROCHLORPERAZINE EDISYLATE 5 MG: 5 INJECTION INTRAMUSCULAR; INTRAVENOUS at 11:01

## 2022-04-30 RX ADMIN — GABAPENTIN 300 MG: 300 CAPSULE ORAL at 08:40

## 2022-04-30 RX ADMIN — GABAPENTIN 300 MG: 300 CAPSULE ORAL at 21:04

## 2022-04-30 RX ADMIN — ACETAMINOPHEN 1000 MG: 500 TABLET ORAL at 17:35

## 2022-04-30 RX ADMIN — ACETAMINOPHEN 1000 MG: 500 TABLET ORAL at 05:07

## 2022-04-30 RX ADMIN — METHOCARBAMOL 750 MG: 750 TABLET ORAL at 08:40

## 2022-04-30 RX ADMIN — METHOCARBAMOL 750 MG: 750 TABLET ORAL at 14:47

## 2022-04-30 RX ADMIN — ACETAMINOPHEN 1000 MG: 500 TABLET ORAL at 11:08

## 2022-04-30 RX ADMIN — LIDOCAINE 1 PATCH: 246 PATCH TOPICAL at 13:08

## 2022-04-30 RX ADMIN — HYDROMORPHONE HYDROCHLORIDE 6 MG: 4 TABLET ORAL at 11:02

## 2022-04-30 RX ADMIN — SERTRALINE HYDROCHLORIDE 50 MG: 50 TABLET ORAL at 21:07

## 2022-04-30 RX ADMIN — HYDROXYZINE HYDROCHLORIDE 50 MG: 25 TABLET, FILM COATED ORAL at 14:47

## 2022-04-30 RX ADMIN — Medication: at 12:39

## 2022-04-30 RX ADMIN — HYDROMORPHONE HYDROCHLORIDE 1 MG: 1 INJECTION, SOLUTION INTRAMUSCULAR; INTRAVENOUS; SUBCUTANEOUS at 06:12

## 2022-04-30 RX ADMIN — HYDROMORPHONE HYDROCHLORIDE 1 MG: 1 INJECTION, SOLUTION INTRAMUSCULAR; INTRAVENOUS; SUBCUTANEOUS at 09:44

## 2022-04-30 ASSESSMENT — ACTIVITIES OF DAILY LIVING (ADL)
ADLS_ACUITY_SCORE: 8

## 2022-04-30 NOTE — PROGRESS NOTES
"INPATIENT PAIN SERVICE BRIEF PROGRESS NOTE    Vicki was seen laying in bed this evening accompanied by her bedside RN. She states her pain is better controlled compared to yesterday, but she has not yet reached satisfactory analgesia. She states her pain is in her \"pelvis and SI joints.\" She is tearful during discussion. Primary team managing PCA.    Yefri Peoples MD  Anesthesiology, CA-2, PGY3    "

## 2022-04-30 NOTE — PROVIDER NOTIFICATION
7B 223  Vicki Shell.  Patient is sobbing reporting excruciating amount of pain rating 8-10/10 to abdomen, gave PRN Dilaudid but this is not effective.   Karishma [phone]  Paged Pool Max MD.

## 2022-04-30 NOTE — PROGRESS NOTES
"Pain Service Progress Note  [facility]  Date: April 30, 2022      Patient Name: Vicki Shell  MRN: [id number]  Age: 37 year old  Sex: female      Assessment:  37 year old old female w/ PMHx significant for Chronic back and abdominal pain (on chronic opioids), ovarian cyst drainage c/b rectal injury with feculent peritonitis s/p ex lap w/ end colostomy creation who underwent Ex lap colostomy reversal , flex sig and DLI on 4/28.      Plan:     Dilaudid PO 4-6mg q3h PRN  Dilaudid IV 0.5-1mg q3h PRN  Continue Acetominophen 1000mg q6h  Continue Atarax 25-50mg q6h PRN  Optimize conservative therapies (heat, ice, biofreeze) as they have provided significant relief in the past  Work with PT (has also provided relief in the past)  Continue prn Robaxin    Will continue to monitor the transition from continuous      Bowel regimen per primary team      Pain Service will continue to follow.    Discussed with attending anesthesiologist      Overnight Events: Continuous iv dilaudid was stoppend    Subjective: Increased pain after stopping iv infusion, prn uv  Nausea: No  Vomiting: No  Pruritus: No  Symptoms of LAST: No    Pain Location: abdomen    Pain Intensity:    Pain at Rest: 7/10   Pain with Activity: 5/10  Comfort Goal: 4/10   Baseline Pain: 4/10   Satisfied with your level of pain control: No    Diet: Low Fiber Diet  Diet    Relevant Labs:  Recent Labs   Lab Test 04/30/22  0720 04/29/22  0849 04/26/22  1017 02/16/22  0952   INR  --   --   --  1.02   PLT  --  229   < > 200   PTT  --   --   --  30   BUN 5* 5*   < > 10    < > = values in this interval not displayed.       Physical Exam:  Vitals: /70   Pulse 102   Temp 37.1  C (98.7  F) (Oral)   Resp 18   Ht 1.702 m (5' 7\")   Wt 83.1 kg (183 lb 3.2 oz)   SpO2 98%   BMI 28.69 kg/m      Physical Exam:   Orientation:  Alert, oriented, and in no acute distress: Yes  Sedation: No        Relevant Medications:  Current Pain " "Medications:  Medications related to Pain Management (From now, onward)    Start     Dose/Rate Route Frequency Ordered Stop    04/30/22 1600  lidocaine patch in PLACE          Transdermal EVERY 8 HOURS 04/30/22 1142      04/30/22 1200  hydrOXYzine (ATARAX) tablet 25 mg        \"Or\" Linked Group Details    25 mg Oral EVERY 6 HOURS 04/30/22 1139      04/30/22 1200  hydrOXYzine (ATARAX) tablet 50 mg        \"Or\" Linked Group Details    50 mg Oral EVERY 6 HOURS 04/30/22 1139      04/30/22 1200  Lidocaine (LIDOCARE) 4 % Patch 1 patch         1 patch  over 12 Hours Transdermal EVERY 24 HOURS 0800 04/30/22 1142      04/30/22 1200  HYDROmorphone (DILAUDID) PCA 0.2 mg/mL OPIOID TOLERANT          Intravenous CONTINUOUS 04/30/22 1144      04/28/22 2100  methocarbamol (ROBAXIN) tablet 750 mg         750 mg Oral EVERY 6 HOURS PRN 04/28/22 2046      04/28/22 2000  gabapentin (NEURONTIN) capsule 300 mg         300 mg Oral 3 TIMES DAILY 04/28/22 1644      04/28/22 1700  acetaminophen (TYLENOL) tablet 1,000 mg         1,000 mg Oral EVERY 6 HOURS 04/28/22 1644 05/01/22 1659    04/28/22 1558  bupivacaine liposome (EXPAREL) LA inj was given in the infiltration site to produce post-op analgesia. Duration of action is up to 72 hours. Other \"liz\" meds should not be given for 96 hours except for lidocaine 4% patch. This is for INFORMATION ONLY.          Does not apply CONTINUOUS PRN 04/28/22 1558 05/02/22 1557    04/28/22 0621  lidocaine 1 % 0.1-1 mL         0.1-1 mL Other EVERY 1 HOUR PRN 04/28/22 0621      04/28/22 0621  lidocaine (LMX4) cream          Topical EVERY 1 HOUR PRN 04/28/22 0621            Primary Service Contacted with Recommendations? Yes    Heidi Oneal MD  4/30/2022      Total time spent 25 minutes with greater than 50% in consultation, education and coordination of care.      Contact Info (24 hour job code pager is the last 4 digits) For in-house use only:   Job code ID: [id number]   [facility] [address]  [facility] [address]  [place] " phone: dial * * * [phone], enter jobcode ID, then enter call-back number.    Text: Use [employer] on the Intranet <Paging/Directory> tab and enter Jobcode ID.   If no call back at any time, contact the hospital  and ask for Regional Anesthesia attending or backup

## 2022-04-30 NOTE — DISCHARGE SUMMARY
[facility]  Discharge Summary  Colon and Rectal Surgery     Vicki Shell MRN# [id number]   YOB: 1985 Age: 37 year old     Date of Admission:  4/28/2022  Date of Discharge::  5/7/2022  Admitting Physician:  Tad Richardson MD  Discharge Physician:  Tad Richardson MD  Primary Care Physician:        Karen Hernandez          Admission Diagnoses:   Colostomy care (H) [Z43.3]  Colostomy status (H) [Z93.3]  Cyst of left ovary [N83.202]  Undesired colostomy  Chronic pain on chronic opiates           Discharge Diagnosis:   Colostomy care (H) [Z43.3]  Colostomy status (H) [Z93.3]  Cyst of left ovary [N83.202]  Undesired colostomy  Kat-stomal abdominal wall abscess  Chronic pain on chronic opiates   Hypokalemia  Abdominal wall abscess         Procedures:   4/28/22   PROCEDURES PERFORMED:   1. Cystourethroscopy with placement of bilateral ureteral stents  PROCEDURE PERFORMED:  1. Exploratory laparotomy  2. Colostomy reversal  3. Flexible sigmoidoscopy  4. Diverting loop ileostomy  5. Bilateral ureteral stent placement and cystoscopy (Dr. Plummer)  6. Modifier 22 given extensive lysis of adhesions requiring >120 minutes of additional operation, and complexity of operation.     5/6/2022: US guided aspiration of peristomal fluid collection in two locations yielding thick purulent serosanginous fluid, approximately 4mL removed and sent for cultures.          Consultations:   WOUND OSTOMY CONTINENCE NURSE  IP CONSULT  OCCUPATIONAL THERAPY ADULT IP CONSULT  PHYSICAL THERAPY ADULT IP CONSULT  PAIN MANAGEMENT ADULT IP CONSULT  NURSING TO CONSULT FOR VASCULAR ACCESS CARE IP CONSULT  CARE MANAGEMENT / SOCIAL WORK IP CONSULT  NURSING TO CONSULT FOR VASCULAR ACCESS CARE IP CONSULT  SMOKING CESSATION PROGRAM IP CONSULT  INTERVENTIONAL RADIOLOGY ADULT/PEDS IP CONSULT         Imaging Studies:     Results for orders placed or performed in visit on 04/26/22   MR Pelvis (GYN)  wo & w Contrast    Narrative    EXAMINATION: MR PELVIS (GYN) WO & W CONTRAST, 4/26/2022 8:31 AM    COMPARISON: CT 12/16/2022, 1/4/2022    HISTORY: Cyst of left ovary    TECHNIQUE: Multiplanar, multisequence imaging was obtained of the  pelvis without and with intravenous contrast. Contrast dose: 8.5 ML  Gadavist    FINDINGS: Postoperative changes of hysterectomy. The ovaries are  normal in size and abutting one another within the midline pelvis.  Along the posterior aspect of the left ovary, there is a peripherally  enhancing, centrally T2 hypointense and mildly T1 hyperintense lesion  measuring 2.8 x 1.8 cm (image 6:17). The ovaries otherwise demonstrate  normal follicular architecture.    Postoperative changes of the low anterior resection. The proximal  aspect of the rectal stump abuts the superior aspect of ovaries.  Partially visualized diverting left lower quadrant ostomy. Visualized  bowel within the pelvis is nondilated. Urinary bladder is  unremarkable.    No suspicious pelvic adenopathy. No free fluid. Soft tissue is  unremarkable. No suspicious osseous lesions.      Impression    IMPRESSION:   1. Peripheral enhancing, and T2 hypointense/T1 hyperintense ovarian  lesion measuring up to 2.8 cm, previously 5.7 cm, findings consistent  with resolving hemorrhagic cyst.  2. Postoperative changes of hysterectomy and low anterior resection.  The bilateral ovaries and proximal aspect of the rectal stump appear  clumped within the mid pelvis, potentially due to pelvic adhesions.    I have personally reviewed the examination and initial interpretation  and I agree with the findings.    ALBERTO DOZIER DO         SYSTEM ID:  [id number]              Medications Prior to Admission:     Medications Prior to Admission   Medication Sig Dispense Refill Last Dose     morphine (MS CONTIN) 30 MG CR tablet Take 30 mg by mouth every morning   4/28/2022 at Unknown time     multivitamin w/minerals (THERA-VIT-M) tablet Take 1 tablet  "by mouth daily Takes when she remembers   Past Month at Unknown time     NONFORMULARY Pro Immune supplement (Selenium)  Takes 1 tablet daily (when she remembers)   Past Week at Unknown time     sertraline (ZOLOFT) 50 MG tablet Take 50 mg by mouth At Bedtime   4/27/2022 at 2000     [DISCONTINUED] acetaminophen (TYLENOL) 325 MG tablet Take 325-650 mg by mouth every 6 hours as needed for mild pain   Past Month at Unknown time     [DISCONTINUED] HYDROcodone-acetaminophen (NORCO) 5-325 MG tablet Take 1 tablet by mouth every 6 hours as needed   4/28/2022 at 0100     [DISCONTINUED] metroNIDAZOLE (FLAGYL) 500 MG tablet Take 1 tablet (500 mg) by mouth every 6 hours At 8:00 am, 2:00 pm, 8:00 pm the day prior to your surgery with neomycin and zofran. 3 tablet 0 4/27/2022 at 2000     [DISCONTINUED] neomycin (MYCIFRADIN) 500 MG tablet Take 2 tablets (1,000 mg) by mouth every 6 hours At 8:00 am, 2:00 pm, 8:00 pm the day prior to your surgery with flagyl and zofran. 6 tablet 0 4/27/2022 at 2000     [DISCONTINUED] ondansetron (ZOFRAN) 4 MG tablet Take 1 tablet (4 mg) by mouth every 6 hours At 8:00 am, 2:00 pm, 8:00 pm the day prior to your surgery with neomycin and flagyl. 3 tablet 0 4/27/2022 at 2000     [DISCONTINUED] polyethylene glycol (MIRALAX) 17 g packet Take 238 g by mouth See Admin Instructions Starting at 4 pm night prior to surgery. Refer to \"Getting Ready for Surgery\" instructions. 14 packet 0 4/27/2022 at 1600              Discharge Medications:     Current Discharge Medication List      START taking these medications    Details   amoxicillin-clavulanate (AUGMENTIN) 875-125 MG tablet Take 1 tablet by mouth every 12 hours for 10 days  Qty: 20 tablet, Refills: 0    Associated Diagnoses: Abscess of abdominal wall      gabapentin (NEURONTIN) 300 MG capsule Take 1 capsule (300 mg) by mouth 3 times daily  Qty: 40 capsule, Refills: 0    Associated Diagnoses: Acute post-operative pain; S/P ileostomy (H)      hydrOXYzine " (ATARAX) 25 MG tablet Take 1 tablet (25 mg) by mouth every 8 hours as needed for itching or anxiety  Qty: 15 tablet, Refills: 0    Associated Diagnoses: Acute post-operative pain      Lidocaine (LIDOCARE) 4 % Patch Place 1 patch onto the skin every 24 hours To prevent lidocaine toxicity, patient should be patch free for 12 hrs daily.  Qty: 5 patch, Refills: 0    Associated Diagnoses: Acute post-operative pain; S/P ileostomy (H)      methocarbamol (ROBAXIN) 750 MG tablet Take 1 tablet (750 mg) by mouth every 6 hours as needed for muscle spasms  Qty: 50 tablet, Refills: 0    Associated Diagnoses: Acute post-operative pain; S/P ileostomy (H)      !! oxyCODONE (ROXICODONE) 5 MG tablet Take 1-2 tablets (5-10 mg) by mouth every 6 hours as needed for moderate to severe pain Oxycodone 5-10mg every 6 hours as needed x2 days, then oxycodone 5-10mg every 8 hours as needed x2 days, then oxycodone 5-10mg every 12 hours as needed x2 days. Then back to home pre-op pain regimen.  Qty: 36 tablet, Refills: 0    Associated Diagnoses: Acute post-operative pain; S/P ileostomy (H); Abscess of abdominal wall      !! oxyCODONE IR (ROXICODONE) 10 MG tablet Take 1-1.5 tablets (10-15 mg) by mouth every 4 hours as needed for moderate to severe pain Oxycodone 10-15mg every 4 hours as needed x2 days then decrease to oxycodone 10mg every 4 hours as needed x2 days.  Qty: 30 tablet, Refills: 0    Associated Diagnoses: Acute post-operative pain       !! - Potential duplicate medications found. Please discuss with provider.      CONTINUE these medications which have NOT CHANGED    Details   morphine (MS CONTIN) 30 MG CR tablet Take 30 mg by mouth every morning      multivitamin w/minerals (THERA-VIT-M) tablet Take 1 tablet by mouth daily Takes when she remembers      NONFORMULARY Pro Immune supplement (Selenium)  Takes 1 tablet daily (when she remembers)      sertraline (ZOLOFT) 50 MG tablet Take 50 mg by mouth At Bedtime         STOP taking these  medications       acetaminophen (TYLENOL) 325 MG tablet Comments:   Reason for Stopping:         HYDROcodone-acetaminophen (NORCO) 5-325 MG tablet Comments:   Reason for Stopping:         metroNIDAZOLE (FLAGYL) 500 MG tablet Comments:   Reason for Stopping:         neomycin (MYCIFRADIN) 500 MG tablet Comments:   Reason for Stopping:         ondansetron (ZOFRAN) 4 MG tablet Comments:   Reason for Stopping:         polyethylene glycol (MIRALAX) 17 g packet Comments:   Reason for Stopping:                        Brief History of Illness:   Vicki Shell is a 37 year old female who previously underwent an ovarian cyst drainage complicated by a rectal injury, identified two days after the index operation. At that time, she presented with feculent peritonitis, underwent an exploratory laparotomy with creation of an end colostomy. She presents this admission for colostomy reversal.           Hospital Course:   Patient underwent the procedure stated above, please see operative notes for more details. Post-operatively, she was gently fluid resuscitated. Sommer was removed and pt was able to void. She had eventual return of bowel function and was able to tolerate small amounts of a low fiber diet.    On POD# 6 pt was noted to have erythema from inferior aspect of her stoma that extended to her left flank.  Pt was started on ancef and the erythema receded, however pt continued to have focal tenderness of her inferior aspect of stoma.  CT scan was obtained but was not definitive due to lack of oral contrast.  Abdominal US was obtained which showed a circumferential abscess around the stoma.  Pt subsequently underwent IR aspiration of the abscess. Cultures were sent and are pending at the time of discharge .  Pt was transitioned to augmentin for which she is to complete a 10 day course.     Given her chronic opioid use, acute pain service was consulted and provided recommendations for pain control. Post operative pain was  "controlled with dPCA transitioned to PO/IV dilaudid, tylenol, gabapentin, Robaxin and atarax. Pain was controlled on PO medications alone prior to discharge.  Per pain team recommendations, pt was given an oxycodone taper.  Pt was provided her first oxycodone prescription as listed above.  Pt was given a second oxycodone prescription that she can fill starting on May 11th to complete the taper.      Pt was seen by WOCN and taught how to manage her new ostomy. Pt was seen by PT/OT and deemed appropriate for discharge home.    Patient is to follow up in the Colon and Rectal Surgery Clinic in 2-3 week with Chantal Leahy NP and then with Dr. Richardson in 2-3 weeks after.          Day of Discharge Physical Exam:   Blood pressure 115/64, pulse 88, temperature 99.1  F (37.3  C), temperature source Oral, resp. rate 18, height 1.702 m (5' 7\"), weight 45.1 kg (99 lb 6.4 oz), SpO2 96 %, not currently breastfeeding.    Gen: AAOx3, NAD  Pulm: Non-labored breathing  Abd: Soft, appropriately tender, no guarding/rebound   Incision C/D/I    Stoma pink and viable with thick stool and gas in bag.  Left sided erythema has since resolved.  Still some firmness around stoma.   Ext:  Warm and well-perfused         Final Pathology Result:     Case Report   Surgical Pathology Report                         Case: [id number]                                   Authorizing Provider:  Tad Richardson MD       Collected:           04/28/2022 12:04 PM           Ordering Location:     [facility] MAIN OR                 Received:            04/28/2022 04:06 PM           Pathologist:           Deepali Cantrell MD                                                            Specimen:    Other, colostomy trim                                                                      Final Diagnosis   COLON, COLOSTOMY TRIM;  [place]-cutaneous anastomotic junction with nonspecific mucosa inflammation, congestion, and other features consistent with colostomy; no " dysplasia or malignancy        Collected 5/6/2022  3:02 PM     Status: Preliminary result     Visible to patient: No (not released)    Specimen Information: Abdomen; Abscess          0 Result Notes     Culture Culture in progress        1+ Candida albicans Abnormal     Susceptibilities not routinely done                 Discharge Instructions and Follow-Up:     Discharge Procedure Orders   Reason for your hospital stay   Order Comments: 4/28/2022:  PROCEDURE:  1. Exploratory laparotomy  2. Colostomy reversal  3. Flexible sigmoidoscopy  4. Diverting loop ileostomy  5. Bilateral ureteral stent placement and cystoscopy (Dr. Plummer)  6. Modifier 22 given extensive lysis of adhesions requiring >120 minutes of additional operation, and complexity of operation.     Activity   Order Comments: Your activity upon discharge:  ACTIVITY  -No lifting, pushing, pulling greater than 10 lbs and no strenuous exercise for 6 weeks   -No driving while on narcotic analgesics (i.e. Percocet, oxycodone, Vicodin)  -No driving until you are able to fully twist to both sides or slam on brakes quickly and without any pain  -We encourage walking at least 4-5 times per day     Order Specific Question Answer Comments   Is discharge order? Yes      Adult [facility] Follow-up and recommended labs and tests   Order Comments: WOUND CARE  -Inspect your wounds daily for signs of infection (increased redness, drainage, pain)  -Keep your wound clean and dry  -You may shower, but do not soak in tub or pool    NOTIFY  Please contact Anai Durand RN or Deann Lee RN at [phone] for problems after discharge such as:  -Temperature > 101F, chills, rigors, dizziness  -Redness around or purulent drainage from wound  -Inability to tolerate diet, nausea or vomiting  -You stop passing gas (or your stoma stops functioning), develop significant bloating, abdominal pain  -You have dark and low volume urine  -Have blood in stools/vomit  -Have severe  diarrhea/constipation  -Any other questions or concerns.  - At nights (after 4:30pm), on weekends, or if urgent, call [phone] and ask the  to speak with the on-call Colorectal Surgery resident or fellow    -Measure your total daily ileostomy output and record.  If you have LESS than 500mL or GREATER than 1200mL of ileostomy output within a 24 hour period, please notify the Colorectal Nurse.  If you have greater than 1000-1200mL in a 24 hour period or greater than 500cc for three consecutive 8 hour shifts, take Imodium 2mg once, stay hydrated especially with Pedialyte and call the Colorectal Clinic to further discuss.      Medication Instructions  Some of your medications may have changed. Please take only prescribed and resumed medications     FOLLOW-UP  1.  You will need to follow-up with Chantal Leahy NP in the Colon and Rectal Surgery clinic in 2-3 week(s) and then with CRS Staff: Dr. Tad Richardson in 2-3 weeks after.  Please contact our clinic scheduler (phone # [phone]) if you have not heard from our clinic in 3 business days afer discharge to schedule a follow-up appointment. Please bring your log of daily ileostomy outputs to your follow up clinic appointment.         ORAL REHYDRATION RECIPE for Home Use  With your new ileostomy, you are at increased risk for dehydration,  Especially if you have high ileostomy outputs, or low appetites, you can help prevent it by drinking this mixture as directed.  Some examples of commercially prepared Oral Rehydration Solutions are: Pedialyte (solution, powder packet, Freeze Pops), or Drip Drop.     Electrolyte Water   -4 cups of water (equal to 1 quart or 32 ounces)   -  teaspoon salt   -6 teaspoons sugar   -Crystal Light if desired (or other choice Nutrasweet or Splenda flavoring - SUGAR-FREE) to taste (recommend lemonade or orange-pineapple flavors, but any flavor will work)    Add two cups of tap water to a large container. With  measuring spoons (not table silverware), add the dry ingredients and stir or shake well. Add two additional cups of water. This will equal one quart of Electrolyte Water. Add sugar-free flavoring if desired. Best if chilled. Sip solution throughout the day. Discard after 24 hours.        Appointments on [place] and/or [facility] (with [facility] or [facility] provider or service). Call [phone] if you haven't heard regarding these appointments within 7 days of discharge.     Diet   Order Comments: Follow this diet upon discharge:   DIET  -Low Residue Diet for at least 4-6 weeks unless cleared by Colorectal surgery.  No raw vegetables, fruit skins, fibrous foods that require a lot of chewing, nuts, seeds, corn, popcorn.   -We recommend eating slowly, chewing thoroughly, eating small frequent meals throughout the day  -Stay well hydrated.    -Follow the food recommendations given by the Nutritionist and the Wound Ostomy Nurse.     Order Specific Question Answer Comments   Is discharge order? Yes             Home Health Care:     Arranged           Discharge Disposition:     Discharged to home      Condition at discharge: Stable      Attestation:  I have reviewed vital signs, medications, lab work, imaging. I agree with the findings and plan in this note.    Tad Richardson MD  Division of Colon and Rectal Surgery  [facility]  [phone]

## 2022-04-30 NOTE — PLAN OF CARE
"Shift 4386-1100  Neuro: A/O x4 calls appropriately.  Respiratory: on room air oxygen saturation >94%. Denies SOB.  Cardiac: WDL denies cardiac chest pain  Diet: advanced to low fiber diet tolerating with fair appetite.   GI/: +BM, ileostomy in place adequate output noted, ostomy WDL. Sommer removed this shift voiding with AUOP. Emesis x2 this shift gave ishmael and compazine x1.   Incision/Drains: midline incision to abdomen wound vac in place. ileostomy to LLQ with good output.  IV Access: R PIV infusing PCA dilaudid and LR at 50ml/hr. L PIV SL.  Pain: reports constant intolerable pain to abdominal incision, managed with (restarted) PCA pain pump, scheduled tylenol, gabapentine and PRN robaxin. Ice/cold pack utilized.   Labs: reviewed.  VS: BP (P) 116/66 (BP Location: Right arm)   Pulse (P) 85   Temp (P) 98.8  F (37.1  C) (Oral)   Resp (P) 18   Ht 1.702 m (5' 7\")   Wt 83.1 kg (183 lb 3.2 oz)   SpO2 (P) 95%   BMI 28.69 kg/m     Activity: up with assist x1 with gait belt to bathroom.      New Changes this shift: patient reports intolerable pain 10/10 uncontrols with oral and PIV dilaudid. MD notified, patient was restarted on PCA dilaudid which is effective to decrease pain. Currently rating pain at 6/10 with PCA pump.  Plan: Continue to manage pain, encourage activity out of bed.  "

## 2022-04-30 NOTE — PROGRESS NOTES
Colorectal Surgery Progress Note  [facility]  POD#2    Subjective: Feeling slightly improved today. Transitioned off PCA to IV/PO dilaudid this morning. Having abdominal pain one hour after IV dilaudid given and not yet received PO dilaudid. No nausea. Tolerating bites of low fiber foods. Voiding adequately. Has been up out of bed.    Vitals:  Vitals:    04/29/22 1527 04/29/22 1900 04/29/22 2244 04/30/22 0701   BP: 106/57 115/49 108/57 107/53   BP Location: Right arm Right arm Right arm Right arm   Patient Position:       Pulse: 67 91 77 66   Resp: 18 20 18 18   Temp: 99.1  F (37.3  C) 98.4  F (36.9  C) 97.8  F (36.6  C) 98.5  F (36.9  C)   TempSrc: Oral Oral Oral Oral   SpO2: 95% 95% 94% 94%   Weight:       Height:         I/O:  UOP 3150 +1x/ 810 +2x  Ostomy 250/ 300    Physical Exam:  Gen: AAOx3, NAD, appears uncomfortable sitting on side of bed  Pulm: Non-labored breathing  Abd: Soft, non-distended, appropriately tender, no guarding/rebound   Incision with prevena vac in place   Stoma pink and viable with liquid stool and gas in bag  Ext:  Warm and well-perfused    Labs  All labs reveiwed  K 3.8  Mg 2.1    ASSESSMENT: This is a 37 year old female PMH of ovarian cyst drainage c/b rectal injury with feculent peritonitis s/p ex lap w/ end colostomy creation.  Now s/p Ex Lap, colostomy reversal, flex sig, DLI on 4/28.    Neuro/Pain: apprec pain team recs, discontinue PCA and transition to PO Dilaudid with IV Dilaudid for breakthrough pain. These can be given concurrently. Continue tylenol QID scheduled, gabapentin, robaxin, atarax.  Avoiding NSADS.   CV: no acute concerns  PULM: encourage IS and ambulation  GI/FEN:   - LRD  - cap IVF  - WOCN for ileostomy  - monitor ileostomy output, will replace high output with IVF  - continue to monitor and replace electrolytes  - ambulate  - pravena vac will be removed prior to discharge  : onofre removed  Heme:  No acute concerns  ID: no acute  concerns  Endocrine: no acute concerns    Activity: as tolerated.  Ppx: lovenox ppx  Dispo: TBD, likely 1-2 days pending pain control on PO medications    Patient seen and examined with fellow who discussed with staff, Dr. Awan.    Cindi Saravia MD  General [facility] Resident

## 2022-04-30 NOTE — PROGRESS NOTES
Patient awake alert oriented x4. Pain controlled with PCA dilaudid. Vital sign stable. Ambulated to bathroom with stand by assist of 1.

## 2022-05-01 ENCOUNTER — APPOINTMENT (OUTPATIENT)
Dept: PHYSICAL THERAPY | Facility: CLINIC | Age: 37
DRG: 330 | End: 2022-05-01
Attending: SURGERY
Payer: MEDICAID

## 2022-05-01 LAB
ANION GAP SERPL CALCULATED.3IONS-SCNC: 6 MMOL/L (ref 3–14)
BUN SERPL-MCNC: 4 MG/DL (ref 7–30)
CALCIUM SERPL-MCNC: 8.3 MG/DL (ref 8.5–10.1)
CHLORIDE BLD-SCNC: 104 MMOL/L (ref 94–109)
CO2 SERPL-SCNC: 29 MMOL/L (ref 20–32)
CREAT SERPL-MCNC: 0.57 MG/DL (ref 0.52–1.04)
GFR SERPL CREATININE-BSD FRML MDRD: >90 ML/MIN/1.73M2
GLUCOSE BLD-MCNC: 100 MG/DL (ref 70–99)
GLUCOSE BLDC GLUCOMTR-MCNC: 100 MG/DL (ref 70–99)
GLUCOSE BLDC GLUCOMTR-MCNC: 101 MG/DL (ref 70–99)
GLUCOSE BLDC GLUCOMTR-MCNC: 94 MG/DL (ref 70–99)
MAGNESIUM SERPL-MCNC: 1.9 MG/DL (ref 1.6–2.3)
MAGNESIUM SERPL-MCNC: 1.9 MG/DL (ref 1.6–2.3)
PHOSPHATE SERPL-MCNC: 2.6 MG/DL (ref 2.5–4.5)
PLATELET # BLD AUTO: 201 10E3/UL (ref 150–450)
POTASSIUM BLD-SCNC: 3 MMOL/L (ref 3.4–5.3)
POTASSIUM BLD-SCNC: 3 MMOL/L (ref 3.4–5.3)
POTASSIUM BLD-SCNC: 3.1 MMOL/L (ref 3.4–5.3)
SODIUM SERPL-SCNC: 139 MMOL/L (ref 133–144)

## 2022-05-01 PROCEDURE — 84132 ASSAY OF SERUM POTASSIUM: CPT

## 2022-05-01 PROCEDURE — 36415 COLL VENOUS BLD VENIPUNCTURE: CPT | Performed by: PHYSICIAN ASSISTANT

## 2022-05-01 PROCEDURE — 250N000011 HC RX IP 250 OP 636: Performed by: PHYSICIAN ASSISTANT

## 2022-05-01 PROCEDURE — 83735 ASSAY OF MAGNESIUM: CPT | Performed by: PHYSICIAN ASSISTANT

## 2022-05-01 PROCEDURE — 36415 COLL VENOUS BLD VENIPUNCTURE: CPT

## 2022-05-01 PROCEDURE — 120N000002 HC R&B MED SURG/OB UMMC

## 2022-05-01 PROCEDURE — 84132 ASSAY OF SERUM POTASSIUM: CPT | Performed by: SURGERY

## 2022-05-01 PROCEDURE — 97530 THERAPEUTIC ACTIVITIES: CPT | Mod: GP

## 2022-05-01 PROCEDURE — 85049 AUTOMATED PLATELET COUNT: CPT | Performed by: SURGERY

## 2022-05-01 PROCEDURE — 84100 ASSAY OF PHOSPHORUS: CPT | Performed by: SURGERY

## 2022-05-01 PROCEDURE — 83735 ASSAY OF MAGNESIUM: CPT

## 2022-05-01 PROCEDURE — 250N000013 HC RX MED GY IP 250 OP 250 PS 637: Performed by: SURGERY

## 2022-05-01 PROCEDURE — 82310 ASSAY OF CALCIUM: CPT | Performed by: PHYSICIAN ASSISTANT

## 2022-05-01 PROCEDURE — 250N000013 HC RX MED GY IP 250 OP 250 PS 637

## 2022-05-01 PROCEDURE — 250N000011 HC RX IP 250 OP 636

## 2022-05-01 PROCEDURE — 36415 COLL VENOUS BLD VENIPUNCTURE: CPT | Performed by: SURGERY

## 2022-05-01 RX ORDER — MAGNESIUM OXIDE 400 MG/1
400 TABLET ORAL 2 TIMES DAILY
Status: DISCONTINUED | OUTPATIENT
Start: 2022-05-01 | End: 2022-05-02

## 2022-05-01 RX ORDER — POTASSIUM CHLORIDE 7.45 MG/ML
10 INJECTION INTRAVENOUS
Status: COMPLETED | OUTPATIENT
Start: 2022-05-01 | End: 2022-05-01

## 2022-05-01 RX ORDER — POTASSIUM CHLORIDE 750 MG/1
40 TABLET, EXTENDED RELEASE ORAL ONCE
Status: DISCONTINUED | OUTPATIENT
Start: 2022-05-01 | End: 2022-05-01

## 2022-05-01 RX ORDER — POTASSIUM CHLORIDE 750 MG/1
40 TABLET, EXTENDED RELEASE ORAL ONCE
Status: COMPLETED | OUTPATIENT
Start: 2022-05-01 | End: 2022-05-01

## 2022-05-01 RX ADMIN — POTASSIUM CHLORIDE 10 MEQ: 7.46 INJECTION, SOLUTION INTRAVENOUS at 12:32

## 2022-05-01 RX ADMIN — HYDROXYZINE HYDROCHLORIDE 50 MG: 25 TABLET, FILM COATED ORAL at 11:12

## 2022-05-01 RX ADMIN — ACETAMINOPHEN 1000 MG: 500 TABLET ORAL at 05:21

## 2022-05-01 RX ADMIN — Medication: at 06:52

## 2022-05-01 RX ADMIN — ENOXAPARIN SODIUM 40 MG: 40 INJECTION SUBCUTANEOUS at 11:12

## 2022-05-01 RX ADMIN — HYDROXYZINE HYDROCHLORIDE 25 MG: 25 TABLET, FILM COATED ORAL at 05:21

## 2022-05-01 RX ADMIN — ACETAMINOPHEN 1000 MG: 500 TABLET ORAL at 11:13

## 2022-05-01 RX ADMIN — GABAPENTIN 300 MG: 300 CAPSULE ORAL at 20:31

## 2022-05-01 RX ADMIN — POTASSIUM CHLORIDE 10 MEQ: 7.46 INJECTION, SOLUTION INTRAVENOUS at 11:13

## 2022-05-01 RX ADMIN — HYDROXYZINE HYDROCHLORIDE 25 MG: 25 TABLET, FILM COATED ORAL at 18:35

## 2022-05-01 RX ADMIN — SERTRALINE HYDROCHLORIDE 50 MG: 50 TABLET ORAL at 23:32

## 2022-05-01 RX ADMIN — Medication 400 MG: at 20:31

## 2022-05-01 RX ADMIN — POTASSIUM CHLORIDE 40 MEQ: 750 TABLET, EXTENDED RELEASE ORAL at 20:31

## 2022-05-01 RX ADMIN — Medication: at 23:05

## 2022-05-01 RX ADMIN — GABAPENTIN 300 MG: 300 CAPSULE ORAL at 08:24

## 2022-05-01 RX ADMIN — HYDROXYZINE HYDROCHLORIDE 50 MG: 25 TABLET, FILM COATED ORAL at 23:31

## 2022-05-01 RX ADMIN — GABAPENTIN 300 MG: 300 CAPSULE ORAL at 14:00

## 2022-05-01 ASSESSMENT — ACTIVITIES OF DAILY LIVING (ADL)
ADLS_ACUITY_SCORE: 6
ADLS_ACUITY_SCORE: 8
ADLS_ACUITY_SCORE: 6
ADLS_ACUITY_SCORE: 8
ADLS_ACUITY_SCORE: 6
ADLS_ACUITY_SCORE: 6
ADLS_ACUITY_SCORE: 8
ADLS_ACUITY_SCORE: 6
ADLS_ACUITY_SCORE: 8
ADLS_ACUITY_SCORE: 8
ADLS_ACUITY_SCORE: 6
ADLS_ACUITY_SCORE: 8
ADLS_ACUITY_SCORE: 6
ADLS_ACUITY_SCORE: 8

## 2022-05-01 NOTE — PROGRESS NOTES
"Pain Service Progress Note  [facility]  Date: 05/01/2022        Patient Name: Vicki Shell  MRN: [id number]  Age: 37 year old  Sex: female      Assessment:  37 year old old female w/ PMHx significant for Chronic back and abdominal pain (on chronic opioids), ovarian cyst drainage c/b rectal injury with feculent peritonitis s/p ex lap w/ end colostomy creation who underwent Ex lap colostomy reversal , flex sig and DLI on 4/28.      Plan:     Dilaudid PO 4-6mg q3h PRN   Dilaudid IV 0.5-1mg q3h PRN  Dilaudid IV 0.1 mg/hr restarted - will re-assess tomorrow  Continue Acetominophen 1000mg q6h  Continue Atarax 25-50mg q6h PRN  Optimize conservative therapies (heat, ice, biofreeze) as they have provided significant relief in the past  Work with PT (has also provided relief in the past)  Continue prn Robaxin       Bowel regimen per primary team      Pain Service will continue to follow.    Discussed with attending anesthesiologist      Overnight Events: Continuous iv dilaudid was resumed due to c/o worsening pain    Subjective: Increased pain after stopping iv infusion, prn uv  Nausea: No  Vomiting: No  Pruritus: No  Symptoms of LAST: No    Pain Location: abdomen    Pain Intensity:    Pain at Rest: 7/10   Pain with Activity: 5/10  Comfort Goal: 4/10   Baseline Pain: 4/10   Satisfied with your level of pain control: No    Diet: Low Fiber Diet  Diet    Relevant Labs:  Recent Labs   Lab Test 04/30/22  0720 04/29/22  0849 04/26/22  1017 02/16/22  0952   INR  --   --   --  1.02   PLT  --  229   < > 200   PTT  --   --   --  30   BUN 5* 5*   < > 10    < > = values in this interval not displayed.       Physical Exam:  Vitals: /64 (BP Location: Right arm)   Pulse 70   Temp 36.7  C (98  F) (Oral)   Resp 18   Ht 1.702 m (5' 7\")   Wt 45.1 kg (99 lb 6.4 oz)   SpO2 98%   BMI 15.57 kg/m      Physical Exam:   Orientation:  Alert, oriented, and in no acute distress: Yes  Sedation: No        Relevant " "Medications:  Current Pain Medications:  Medications related to Pain Management (From now, onward)      Start     Dose/Rate Route Frequency Ordered Stop    04/30/22 1600  lidocaine patch in PLACE          Transdermal EVERY 8 HOURS 04/30/22 1142      04/30/22 1200  hydrOXYzine (ATARAX) tablet 25 mg        \"Or\" Linked Group Details    25 mg Oral EVERY 6 HOURS 04/30/22 1139      04/30/22 1200  hydrOXYzine (ATARAX) tablet 50 mg        \"Or\" Linked Group Details    50 mg Oral EVERY 6 HOURS 04/30/22 1139      04/30/22 1200  Lidocaine (LIDOCARE) 4 % Patch 1 patch         1 patch  over 12 Hours Transdermal EVERY 24 HOURS 0800 04/30/22 1142      04/30/22 1200  HYDROmorphone (DILAUDID) PCA 0.2 mg/mL OPIOID TOLERANT          Intravenous CONTINUOUS 04/30/22 1144      04/28/22 2100  methocarbamol (ROBAXIN) tablet 750 mg         750 mg Oral EVERY 6 HOURS PRN 04/28/22 2046      04/28/22 2000  gabapentin (NEURONTIN) capsule 300 mg         300 mg Oral 3 TIMES DAILY 04/28/22 1644      04/28/22 1700  acetaminophen (TYLENOL) tablet 1,000 mg         1,000 mg Oral EVERY 6 HOURS 04/28/22 1644 05/01/22 1659    04/28/22 1558  bupivacaine liposome (EXPAREL) LA inj was given in the infiltration site to produce post-op analgesia. Duration of action is up to 72 hours. Other \"liz\" meds should not be given for 96 hours except for lidocaine 4% patch. This is for INFORMATION ONLY.          Does not apply CONTINUOUS PRN 04/28/22 1558 05/02/22 1557    04/28/22 0621  lidocaine 1 % 0.1-1 mL         0.1-1 mL Other EVERY 1 HOUR PRN 04/28/22 0621      04/28/22 0621  lidocaine (LMX4) cream          Topical EVERY 1 HOUR PRN 04/28/22 0621              Primary Service Contacted with Recommendations? Yes    Suri Duong MD  Anesthesiology Resident, PGY-4      Total time spent 25 minutes with greater than 50% in consultation, education and coordination of care.      Contact Info (24 hour job code pager is the last 4 digits) For in-house use only:   Job " code ID: [email or web address] [id number]   [facility] 0599  Peds 0602  [place] phone: dial * * * [phone], enter jobcode ID, then enter call-back number.    Text: Use [employer] on the Intranet <Paging/Directory> tab and enter Jobcode ID.   If no call back at any time, contact the hospital  and ask for [facility] Anest[facility] attending or backup

## 2022-05-01 NOTE — PROGRESS NOTES
"Colorectal Surgery Progress Note  [facility]  POD#3    Subjective: Pain intolerable after transition off PCA yesterday morning. PCA restarted with marginally better pain control. Had several episodes of emesis related to pain yesterday. Feeling better this morning. Describes feeling \"terrified\" to transition of PCA again. Willing to try oral/IV medications again tomorrow. Tolerating low fiber diet. Says Donnavenelijah vac is irritating her skin. Ostomy functioning.    Vitals:  Vitals:    04/30/22 1605 04/30/22 1900 04/30/22 2002 04/30/22 2213   BP: (P) 116/66  97/58 99/57   BP Location: (P) Right arm  Right arm Right leg   Patient Position:   Supine    Pulse: (P) 85  80 75   Resp: (P) 18 18 17 18   Temp: (P) 98.8  F (37.1  C)  98.3  F (36.8  C) 98.4  F (36.9  C)   TempSrc: (P) Oral  Oral Oral   SpO2: (P) 95%  98% 98%   Weight:    45.1 kg (99 lb 6.4 oz)   Height:         I/O:    UOP 1910 + 2x  Ostomy 950    Physical Exam:  Gen: AAOx3, NAD  Pulm: Non-labored breathing  Abd: Soft, mildly distended, appropriately tender, no guarding/rebound   Midline incision with prevena vac in place, good suction, some surrounding skin irritation, no spreading erythema or warmth   Stoma pink and viable with liquid stool and gas in bag  Ext:  Warm and well-perfused    Labs  All labs reveiwed  K 3.1  Mg 1.9    ASSESSMENT: This is a 37 year old female PMH of ovarian cyst drainage c/b rectal injury with feculent peritonitis s/p ex lap w/ end colostomy creation.  Now s/p Ex Lap, colostomy reversal, flex sig, DLI on 4/28.    Neuro/Pain: Failed transition off PCA yesterday. After discussion with patient, will keep PCA today and transition to oral/IV medication tomorrow. Appreciate pain service recommendations on dosing to make this transition as smooth as possible. Continue tylenol QID scheduled, gabapentin, robaxin, atarax.  Avoiding NSADS.   CV: no acute concerns  PULM: encourage IS and ambulation  GI/FEN:   - " LRD  - TKO IVF  - WOCN for ileostomy  - monitor ileostomy output, will replace high output with IVF  - continue to monitor and replace electrolytes, goal K 4.0, Mg 2.0  - ambulate  - pravena vac will be removed prior to discharge, plan to remove tomorrow.  : onofre removed  Heme:  No acute concerns  ID: no acute concerns  Endocrine: no acute concerns    Activity: as tolerated.  Ppx: lovenox ppx  Dispo: TBD, likely 2-3 days pending pain control on PO medications    Patient seen and examined with fellow who discussed with staff, Dr. Awan.    Cindi Saravia MD  General [facility] Resident

## 2022-05-02 ENCOUNTER — APPOINTMENT (OUTPATIENT)
Dept: PHYSICAL THERAPY | Facility: CLINIC | Age: 37
DRG: 330 | End: 2022-05-02
Attending: SURGERY
Payer: MEDICAID

## 2022-05-02 LAB
ANION GAP SERPL CALCULATED.3IONS-SCNC: 7 MMOL/L (ref 3–14)
BUN SERPL-MCNC: 4 MG/DL (ref 7–30)
CALCIUM SERPL-MCNC: 8.6 MG/DL (ref 8.5–10.1)
CHLORIDE BLD-SCNC: 106 MMOL/L (ref 94–109)
CO2 SERPL-SCNC: 25 MMOL/L (ref 20–32)
CREAT SERPL-MCNC: 0.55 MG/DL (ref 0.52–1.04)
GFR SERPL CREATININE-BSD FRML MDRD: >90 ML/MIN/1.73M2
GLUCOSE BLD-MCNC: 110 MG/DL (ref 70–99)
GLUCOSE BLDC GLUCOMTR-MCNC: 105 MG/DL (ref 70–99)
GLUCOSE BLDC GLUCOMTR-MCNC: 109 MG/DL (ref 70–99)
HOLD SPECIMEN: NORMAL
MAGNESIUM SERPL-MCNC: 2 MG/DL (ref 1.6–2.3)
PATH REPORT.COMMENTS IMP SPEC: NORMAL
PATH REPORT.COMMENTS IMP SPEC: NORMAL
PATH REPORT.FINAL DX SPEC: NORMAL
PATH REPORT.GROSS SPEC: NORMAL
PATH REPORT.MICROSCOPIC SPEC OTHER STN: NORMAL
PATH REPORT.RELEVANT HX SPEC: NORMAL
PHOTO IMAGE: NORMAL
POTASSIUM BLD-SCNC: 3.6 MMOL/L (ref 3.4–5.3)
SODIUM SERPL-SCNC: 138 MMOL/L (ref 133–144)

## 2022-05-02 PROCEDURE — 120N000002 HC R&B MED SURG/OB UMMC

## 2022-05-02 PROCEDURE — 250N000011 HC RX IP 250 OP 636: Performed by: PHYSICIAN ASSISTANT

## 2022-05-02 PROCEDURE — 250N000011 HC RX IP 250 OP 636: Performed by: SURGERY

## 2022-05-02 PROCEDURE — G0463 HOSPITAL OUTPT CLINIC VISIT: HCPCS

## 2022-05-02 PROCEDURE — 999N000199 HC STATISTIC WOC PT EDUCATION, 31-45 MIN

## 2022-05-02 PROCEDURE — 83735 ASSAY OF MAGNESIUM: CPT | Performed by: PHYSICIAN ASSISTANT

## 2022-05-02 PROCEDURE — 999N000248 HC STATISTIC IV INSERT WITH US BY RN

## 2022-05-02 PROCEDURE — 250N000013 HC RX MED GY IP 250 OP 250 PS 637

## 2022-05-02 PROCEDURE — 36415 COLL VENOUS BLD VENIPUNCTURE: CPT | Performed by: PHYSICIAN ASSISTANT

## 2022-05-02 PROCEDURE — 250N000011 HC RX IP 250 OP 636

## 2022-05-02 PROCEDURE — 97530 THERAPEUTIC ACTIVITIES: CPT | Mod: GP

## 2022-05-02 PROCEDURE — 99233 SBSQ HOSP IP/OBS HIGH 50: CPT | Performed by: PHYSICIAN ASSISTANT

## 2022-05-02 PROCEDURE — 250N000013 HC RX MED GY IP 250 OP 250 PS 637: Performed by: SURGERY

## 2022-05-02 PROCEDURE — 80048 BASIC METABOLIC PNL TOTAL CA: CPT | Performed by: PHYSICIAN ASSISTANT

## 2022-05-02 PROCEDURE — 97116 GAIT TRAINING THERAPY: CPT | Mod: GP

## 2022-05-02 PROCEDURE — 999N000111 HC STATISTIC OT IP EVAL DEFER

## 2022-05-02 RX ORDER — HYDROMORPHONE HYDROCHLORIDE 1 MG/ML
0.3 INJECTION, SOLUTION INTRAMUSCULAR; INTRAVENOUS; SUBCUTANEOUS 3 TIMES DAILY PRN
Status: DISCONTINUED | OUTPATIENT
Start: 2022-05-02 | End: 2022-05-07 | Stop reason: HOSPADM

## 2022-05-02 RX ORDER — POTASSIUM CHLORIDE 750 MG/1
10 TABLET, EXTENDED RELEASE ORAL ONCE
Status: COMPLETED | OUTPATIENT
Start: 2022-05-02 | End: 2022-05-02

## 2022-05-02 RX ORDER — OXYCODONE HYDROCHLORIDE 5 MG/1
5-10 TABLET ORAL
Status: DISCONTINUED | OUTPATIENT
Start: 2022-05-02 | End: 2022-05-03

## 2022-05-02 RX ORDER — MORPHINE SULFATE 15 MG/1
30 TABLET, FILM COATED, EXTENDED RELEASE ORAL DAILY
Status: DISCONTINUED | OUTPATIENT
Start: 2022-05-02 | End: 2022-05-07 | Stop reason: HOSPADM

## 2022-05-02 RX ORDER — MAGNESIUM OXIDE 400 MG/1
400 TABLET ORAL 2 TIMES DAILY
Status: DISCONTINUED | OUTPATIENT
Start: 2022-05-02 | End: 2022-05-02

## 2022-05-02 RX ORDER — MAGNESIUM SULFATE HEPTAHYDRATE 40 MG/ML
2 INJECTION, SOLUTION INTRAVENOUS ONCE
Status: COMPLETED | OUTPATIENT
Start: 2022-05-02 | End: 2022-05-02

## 2022-05-02 RX ADMIN — SERTRALINE HYDROCHLORIDE 50 MG: 50 TABLET ORAL at 21:23

## 2022-05-02 RX ADMIN — HYDROXYZINE HYDROCHLORIDE 50 MG: 25 TABLET, FILM COATED ORAL at 12:02

## 2022-05-02 RX ADMIN — OXYCODONE HYDROCHLORIDE 10 MG: 5 TABLET ORAL at 18:08

## 2022-05-02 RX ADMIN — OXYCODONE HYDROCHLORIDE 10 MG: 5 TABLET ORAL at 21:21

## 2022-05-02 RX ADMIN — HYDROMORPHONE HYDROCHLORIDE 0.3 MG: 1 INJECTION, SOLUTION INTRAMUSCULAR; INTRAVENOUS; SUBCUTANEOUS at 20:34

## 2022-05-02 RX ADMIN — GABAPENTIN 300 MG: 300 CAPSULE ORAL at 13:23

## 2022-05-02 RX ADMIN — ENOXAPARIN SODIUM 40 MG: 40 INJECTION SUBCUTANEOUS at 10:02

## 2022-05-02 RX ADMIN — Medication 400 MG: at 09:52

## 2022-05-02 RX ADMIN — MORPHINE SULFATE 30 MG: 15 TABLET, EXTENDED RELEASE ORAL at 13:23

## 2022-05-02 RX ADMIN — MAGNESIUM SULFATE IN WATER 2 G: 40 INJECTION, SOLUTION INTRAVENOUS at 20:20

## 2022-05-02 RX ADMIN — LIDOCAINE 1 PATCH: 246 PATCH TOPICAL at 09:53

## 2022-05-02 RX ADMIN — HYDROXYZINE HYDROCHLORIDE 50 MG: 25 TABLET, FILM COATED ORAL at 18:08

## 2022-05-02 RX ADMIN — GABAPENTIN 300 MG: 300 CAPSULE ORAL at 20:20

## 2022-05-02 RX ADMIN — GABAPENTIN 300 MG: 300 CAPSULE ORAL at 09:53

## 2022-05-02 RX ADMIN — HYDROXYZINE HYDROCHLORIDE 25 MG: 25 TABLET, FILM COATED ORAL at 05:37

## 2022-05-02 RX ADMIN — OXYCODONE HYDROCHLORIDE 10 MG: 5 TABLET ORAL at 14:45

## 2022-05-02 RX ADMIN — POTASSIUM CHLORIDE 10 MEQ: 750 TABLET, EXTENDED RELEASE ORAL at 12:03

## 2022-05-02 ASSESSMENT — ACTIVITIES OF DAILY LIVING (ADL)
ADLS_ACUITY_SCORE: 6
DEPENDENT_IADLS:: INDEPENDENT
ADLS_ACUITY_SCORE: 6

## 2022-05-02 NOTE — PROGRESS NOTES
Colorectal Surgery Progress Note  [facility]  POD#4    Subjective: Doing relatively better this morning. Says she finally had a decent night of sleep, sleeping hard for 6 hours straight. Took some longer walks yesterday and was more sore afterward, though pain control has been slightly better on PCA. No nausea. Tolerating low fiber diet well. Ostomy functioning. Passed some clots in urine, voiding spontaneously. Skin still irritated by Pravena vac but manageable with ice packs.    Vitals:  Vitals:    05/01/22 1657 05/01/22 2016 05/01/22 2227 05/02/22 0500   BP: (!) 168/82 123/64 121/70 114/62   BP Location: Right arm  Right arm Right arm   Cuff Size:    Adult Regular   Pulse: 61 84 65 77   Resp: 18 17 14 16   Temp: 97.6  F (36.4  C) 97.4  F (36.3  C) 98.7  F (37.1  C) 98.9  F (37.2  C)   TempSrc: Oral Oral Oral Oral   SpO2: 95% 98% 98% 95%   Weight:       Height:         I/O:    UOP 1850+ 5x   Ostomy 675cc    Physical Exam:  Gen: AAOx3, NAD  Pulm: Non-labored breathing  Abd: Soft, mildly distended, appropriately tender, no guarding/rebound   Midline incision with prevena vac in place, good suction, some surrounding skin irritation, no spreading erythema or warmth   Stoma pink and viable with liquid stool and gas in bag  Ext:  Warm and well-perfused    Labs  All labs reveiwed  BMP pending    ASSESSMENT: This is a 37 year old female PMH of ovarian cyst drainage c/b rectal injury with feculent peritonitis s/p ex lap w/ end colostomy creation.  Now s/p Ex Lap, colostomy reversal, flex sig, DLI on 4/28.    Neuro/Pain: Continuous dilaudid removed from PCA this morning. Will discuss with pain service how to make this transition as smooth as possible - timing to transition off PCA, restart MS contin, etc. Continue tylenol QID scheduled, gabapentin, robaxin, atarax.  Avoiding NSADS.   CV: no acute concerns  PULM: encourage IS and ambulation  GI/FEN:   - LRD  - TKO IVF  - WOCN for  ileostomy, ok to remove Pravena vac  - monitor ileostomy output, will replace high output with IVF  - continue to monitor and replace electrolytes, goal K 4.0, Mg 2.0  - ambulate  : onofre removed  Heme:  No acute concerns  ID: no acute concerns  Endocrine: no acute concerns    Activity: as tolerated.  Ppx: lovenox ppx  Dispo: TBD, likely 1-2 days pending pain control on PO medications    Patient seen and examined with fellow who discussed with staff, Dr. Awan.    Cindi Saravia MD  General [facility] Resident

## 2022-05-02 NOTE — PROGRESS NOTES
CLINICAL NUTRITION SERVICES - BRIEF NOTE     Nutrition Prescription    RECOMMENDATIONS FOR MDs/PROVIDERS TO ORDER:  None at present.     Recommendations already ordered by Registered Dietitian (RD):  - Trial Chocolate Ensure Max @ 2pm.   - Collaborate with providers--bedside RN (preferred use of IV Mg w/ ileostomy vs Mag Oxide), requested updated weight.    Future/Additional Recommendations:  - Follow intake, ileostomy output, electrolytes, ONS acceptance.      EVALUATION OF THE PROGRESS TOWARD GOALS   Diet: low fiber  Intake: Pt tolerating solid foods.         NEW FINDINGS   Labs--K+ 3.6 after replacement today.    Mg 2.0   Phos 2.6 on 5/1    Rx included Mag oxide BID x 2 days ordered 5/1 w/ HIGH Mg++ replacement protocol, HIGH K+ replacement protocol.    Wt: 45.1 kg (error) as prior wt was 83.1 kg     INTERVENTIONS  Education--reviewed ed materials again with pt re: low fiber, watching sugar for potential dumping and  liquids from solids.  Discussed low carb supplement drinking to try in case she'd like to use at home to help meet protein needs.   Collaborate with other providers    Monitoring/Evaluation  Progress toward goals will be monitored and evaluated per protocol.     Ivana Vasquez RD, LD   7B (M-F) Pager: [phone]  RD Weekend/Holiday Pager: [phone]

## 2022-05-02 NOTE — PLAN OF CARE
Occupational Therapy: Orders received. Chart reviewed and discussed with care team.? Occupational Therapy not indicated as no acute OT needs identified. IND with ADLs and PT meeting pt needs.? Defer discharge recommendations to PT.? Will complete orders.

## 2022-05-02 NOTE — DISCHARGE INSTRUCTIONS
__________________________________________________________  [facility]: Please fax to following agencies at time of patient d/c.  ____________________________________________________________    [facility]  Fax: [phone]      [facility]     Name: Vicki Shell  Date: 5/4/22    To order your ostomy supplies    The ostomy Supplier needs this supply list  to process your order. You will need to fax/deliver this list, along with your Insurance information. Your home care nurse can assist with this process.    Your Medical Supplier will need your surgeon's name, phone and fax number    Clinic:                     Phone                            Fax  Colorectal Surgery:    [phone]    Verbal Order for ostomy supplies for 1 Month per:  Dafne Leroy, RN, CWOCN                                                                Authorizing MD: Tad Donnelly    Quantity of pouches:  20 /mo.    Request the following supplies:      [place]        2 piece flat Cera Plus wafer 70 mm  # [id number]  Or   2 piece soft convex CeraPlus wafer 70 mm #62557   With                2 piece Fecal pouch with out Filter 70mm- # [id number]  Or  Coloplast  2 piece convex light AC barrier #09795  With                2 piece AC pouch without filter #04426      Accessories  2  barrier ring #0451     Adapt powder #7991    No sting film barrier # 2649                          Brava elastic barrier strips curved # [id number]                          Adapt medical adhesive remover #8350           Change your pouch every other day, more often if leaking. OK to decrease frequency of changes if seal improves     If you are cutting a hole in the wafer of your pouch, recheck stoma size and adjust pouch opening as needed every week    . Call the Ostomy Nurse at [facility]       [address] [place] [address], [place] : [phone]   Schedule a follow-up visit in 2 to 4 weeks  after your surgery, sooner if having problems Bring a complete set of pouch-changing supplies to this visit    [facility] department  [address] [place] [address]   number- [phone]     Problems you should Report  - The stoma turns blue or darker in color.  - Cuts or sores around the stoma.  - Red, raw or painful skin around the stoma.  - Any bulging of the skin around the stoma.  - A pouch that leaks every day.  - Problems making the right size hole in the pouch wafer.   Please call with any questions or concerns.

## 2022-05-02 NOTE — PLAN OF CARE
"/67 (BP Location: Right arm, Patient Position: Supine, Cuff Size: Adult Regular)   Pulse 69   Temp 98.7  F (37.1  C) (Oral)   Resp 16   Ht 1.702 m (5' 7\")   Wt 45.1 kg (99 lb 6.4 oz)   SpO2 97%   BMI 15.57 kg/m      Neuro: Alert and oriented x 4. Denies numbness and tingling.   Resp: WDL. RA. Rise and fall of chest equal bilaterally.   Cardiac: WDL. Denies chest pain.   GI/: Ileostomy w/ adequate output. Voiding spoanteously. Good appeteite.   Skin: Warm and dry. Erythema on abdomen beneath Tegaderm. Rash on right upper back/shoulder-Outlined with skin marker.   Activity: Independent. Ambulating in room and hallway.   Incision & Drainage: Midline surgical incision w/ slight open area at base, no drainage-provider updated. PIV SL.   Labs: Reviewed. Magnesium replaced.   Pain 6/10 on lower back and abdomen. Gave PRN oxycodone and applied lidocaine patches.   Changes: PCA discontinued. Wound vac removed by [facility] nurse.   Plan: Continue with plan of care.         Goal Outcome Evaluation:      "

## 2022-05-02 NOTE — PROGRESS NOTES
"Assessment of new loop Ileostomy:  Surgery date:  4/28/22  Surgeon:  Dr Richardson  Procedure:  s/p Ex Lap, colostomy reversal, flex sig, DLI on 4/28.  Related diagnosis:  ovarian cyst drainage c/b rectal injury with feculent peritonitis   s/p ex lap w/ end colostomy creation.      Pouching system in place on assessment today: Brenda two piece and cut to fit flat barrier with fecal pouch   Pouch barrier status: 20% melted  Pouch last changed/wear time: 3 days  Reason for pouch change today: ostomy education and routine schedule  Effectiveness of current pouching/ supply plan: Waiting for peristomal edema to decrease  may need convexity   Change made with ostomy management today: Yes  Pouching system placed today: [place] two piece flat with fecal pouch. Additional barrier ring to fill in crease from 3-5 o'clock. High output pouch in supplies  Supplies: at bedside       Last photo: 5/2/22  Stoma location: LLQ at site of previous colostomy   Stoma size: 1 1/2\" x 1 3/4 inches,   Stoma appearance: oval, moist, good turgor, edematous and flat  Mucocutaneous junction:  Intact with puckering from sutures  Peristomal complication(s): mechanical damage-scattered petechia superior to stoma.   Sightly raised confluent pink/red rash that is slightly warm extending from stoma to approximately 12 cm laterally   Output: liquid and brown,   Output volume emptied during visit: none  Abdominal assessment: Firm  Surgical site(s): NPWT dressing in use-Provena removed during visit per Colorectal request  NG still in place? No  Pain: Cramping, Sharp   Is patient still on a PCA? Yes    Ostomy education assessment:  Participant of teaching session today: patient   Education completed today: Stoma assessment, Pouching system assessment , Pouch change demonstration and Ostomy accessory product use   Educational materials/methods: Verbal and Demonstration  Education still needed: Adjustment of pouching plan, Pouch change return " demonstration and Low fiber diet   Learning Comprehension:   Psychosocial assessment: pt independent with cares with previous colostomy.  Her spouse has assisted with cares and is comfortable making new patterns and cutting out barriers  Patient readiness for education today: attentive and active participation   Following today's visit: patient  is able to demonstrate;         1. How to empty their pouch? Yes        2. How to change their pouch? No and Demo provided but independent with previous pouching         3. How to read and record intake and output correctly? No  Preparation for discharge completed: No discharge preparation started yet  Preparation for discharge still needed: Placed prescription recommendations in discharge navigator for MD to sign, Ensured patient has extra supplies for discharge, Discussed how to order supplies after discharge , Ordered samples from  after gaining consent from patient/caregiver, Discussed how and when to make an outpatient WOC nurse appointment after discharge, Prepared for discharge home with home care and Discuss signs/symptoms of when to seek medical attention  Pt support system on discharge: lives independently  WOC recommend home care? Yes and By WOC RN if possible

## 2022-05-02 NOTE — PLAN OF CARE
"BP (!) 168/82 (BP Location: Right arm)   Pulse 61   Temp 97.6  F (36.4  C) (Oral)   Resp 18   Ht 1.702 m (5' 7\")   Wt 45.1 kg (99 lb 6.4 oz)   SpO2 95%   BMI 15.57 kg/m      Neuro: Alert and oriented x 4. Denies numbness and tingling.   Resp: WDL. RA. Denies SOB.   Cardiac: WDL. Hypertensive, within range.   GI/: Ileostomy with adequate output. Voiding spontaneously.   Skin: Warm and dry. Erythema around midline wound.   Activity: Independent. Ambulated in hallway.   Pain: PCA. Pain 7/10.   Incision & Drainage: Wound vac negative pressure on midline abdomen. PIV SL.   Labs: Reviewed. Potassium replaced. Magnesium replacement ordered.   Changes: No significant changes.   Plan: Continue with plan of care.         Goal Outcome Evaluation: Improving         "

## 2022-05-02 NOTE — PLAN OF CARE
Physical Therapy Discharge Summary    Reason for therapy discharge:    All goals and outcomes met, no further needs identified.    Progress towards therapy goal(s). See goals on Care Plan in [facility] electronic health record for goal details.  Goals met    Therapy recommendation(s):    No further therapy is recommended.

## 2022-05-02 NOTE — PROGRESS NOTES
Pain Service Progress Note  [facility]  Date: May 2, 2022      Patient Name: Vicki Shell  MRN: [id number]  Age: 37 year old  Sex: female      Assessment:  Vicki Shell is a 37 year old female who has h/o previous   ovarian cyst drainage c/b rectal injury with feculent peritonitis and s/p ex lap w/ end colostomy creation.  She was admitted on 4/28/22  for Ex Lap, colostomy reversal, flex sig, DLI.  Vicki has been on chronic opioid pain management by her PCP.  PTA was on MSContin 30mg once in the morning and vicodine 5/325mg 1-3tabs/day.    Pain service was consulted and following to assist with acute post operative pain management.    Interval history:  5/2/22. Vicki states her pain has improved from previous days.  Pain is in the abdomen-sore with increased activities. Rates pain 6/10 which is tolerable for her.  Has been able to walk independently. She is ready to transition off the PCA Dilaudid to oral opioids.  Indications, risks and benefits of opioids were reviewed with her.      Plan/Recommendations:  1. Regional Anesthesia/Analgesia- had EXPAREL TAP blocks  2. Discontinue PCA Dilaudid.  3. Resume PTA Morphine continuous release (MSCONTIN) 30mg once daily (confirmed with patient that she takes once/day).   4. Start oxycodone 5-10mg PO Q 3 hours PRN.  (PTA was on Vicodon 5mg, 1-2tabs/day, sometimes 3tabs/day).    If need more pain relief, can consider changing to 10-15mg PO Q 4 hours PRN.  5. Continue gabapentin.  6. Start Dilaudid 0.3mg IV Q TID PRN for rescue.  7. Bowel regimen to prevent opioid induced constipation.        Pain Service will continue to follow.    Discussed with attending anesthesiologist              Diet: Low Fiber Diet  Diet    Relevant Labs:  Recent Labs   Lab Test 05/02/22  0939 05/01/22  0700 04/26/22  1017 02/16/22  0952   INR  --   --   --  1.02   PLT  --  201   < > 200   PTT  --   --   --  30   BUN 4* 4*   < > 10    < > = values in this interval not  "displayed.       Physical Exam:  Vitals: /67 (BP Location: Right arm, Patient Position: Supine, Cuff Size: Adult Regular)   Pulse 69   Temp 98.7  F (37.1  C) (Oral)   Resp 16   Ht 1.702 m (5' 7\")   Wt 45.1 kg (99 lb 6.4 oz)   SpO2 97%   BMI 15.57 kg/m      Physical Exam:     CONSTITUTIONAL/GENERAL APPEARANCE: AAOx3. Conversant.  EYES: EOMI, sclerae clear  ENT/NECK: neck is supple  RESPIRATORY: breathing on room air  CARDIOVASCULAR: HR within normal limits  GI:soft,  Generalized tenderness  MUSCULOSKELETAL/BACK/SPINE/EXTREMITIES: Moving UE and LE independently.     NEURO:  SILT to UE and LE.  SKIN/VASCULAR EXAM:  Dry and warm.  PSYCHIATRIC/BEHAVIORAL/OBSERVATIONS:  No objective signs of pain observed during our interview.   Judgment/Insight -fair   Orientation - x3   Memory -good   Mood and affect - calm, pleasant, cooperative          Relevant Medications:  Current Pain Medications:  Medications related to Pain Management (From now, onward)    Start     Dose/Rate Route Frequency Ordered Stop    05/02/22 0600  HYDROmorphone (DILAUDID) PCA 0.2 mg/mL OPIOID TOLERANT          Intravenous CONTINUOUS 05/02/22 0546      04/30/22 1600  lidocaine patch in PLACE          Transdermal EVERY 8 HOURS 04/30/22 1142      04/30/22 1200  hydrOXYzine (ATARAX) tablet 25 mg        \"Or\" Linked Group Details    25 mg Oral EVERY 6 HOURS 04/30/22 1139      04/30/22 1200  hydrOXYzine (ATARAX) tablet 50 mg        \"Or\" Linked Group Details    50 mg Oral EVERY 6 HOURS 04/30/22 1139      04/30/22 1200  Lidocaine (LIDOCARE) 4 % Patch 1 patch         1 patch  over 12 Hours Transdermal EVERY 24 HOURS 0800 04/30/22 1142      04/28/22 2100  methocarbamol (ROBAXIN) tablet 750 mg         750 mg Oral EVERY 6 HOURS PRN 04/28/22 2046      04/28/22 2000  gabapentin (NEURONTIN) capsule 300 mg         300 mg Oral 3 TIMES DAILY 04/28/22 1644      04/28/22 1558  bupivacaine liposome (EXPAREL) LA inj was given in the infiltration site to produce " "post-op analgesia. Duration of action is up to 72 hours. Other \"liz\" meds should not be given for 96 hours except for lidocaine 4% patch. This is for INFORMATION ONLY.          Does not apply CONTINUOUS PRN 04/28/22 1558 05/02/22 1557    04/28/22 0621  lidocaine 1 % 0.1-1 mL         0.1-1 mL Other EVERY 1 HOUR PRN 04/28/22 0621      04/28/22 0621  lidocaine (LMX4) cream          Topical EVERY 1 HOUR PRN 04/28/22 0621            Primary Service Contacted with Recommendations? Yes    Dyan Griffiths PA-C  5/2/2022    Time/Communication:  I personally spent 35  minutes with greater than 50% in consultation, education, counseling and coordination of care with primary team, RN.  See note above for details on conversation.      Contact Info (24 hour job code pager is the last 4 digits) For in-house use only:   Job code ID: [email or web address] [id number]   [facility]99  [facility] 0602  [email or web address] phone: dial * * * [phone], enter jobcode ID, then enter call-back number.    Text: Use [employer] on the Intranet <Paging/Directory> tab and enter Jobcode ID.   If no call back at any time, contact the hospital  and ask for Regional Anesthesia attending or backup   "

## 2022-05-02 NOTE — CONSULTS
Care Management Initial Consult    General Information  Assessment completed with: Patient,    Type of CM/SW Visit: Initial Assessment    Primary Care Provider verified and updated as needed: Yes   Readmission within the last 30 days:        Reason for Consult: discharge planning  Advance Care Planning: Advance Care Planning Reviewed: no concerns identified          Communication Assessment  Patient's communication style: spoken language (English or Bilingual)    Hearing Difficulty or Deaf: no   Wear Glasses or Blind: yes    Cognitive  Cognitive/Neuro/Behavioral: WDL  Level of Consciousness: alert  Arousal Level: opens eyes spontaneously  Orientation: oriented x 4  Mood/Behavior: calm, cooperative  Best Language: 0 - No aphasia  Speech: spontaneous, logical    Living Environment:   People in home: child(santosh), dependent     Current living Arrangements: house      Able to return to prior arrangements: yes       Family/Social Support:  Care provided by: self  Provides care for:    Marital Status: Lives with Significant Other  Significant Other          Description of Support System: Supportive, Involved    Support Assessment: Adequate family and caregiver support, Adequate social supports    Current Resources:   Patient receiving home care services: No     Community Resources: None  Equipment currently used at home: colostomy/ostomy supplies  Supplies currently used at home: None    Employment/Financial:  Employment Status:  (did not discuss)        Financial Concerns:             Lifestyle & Psychosocial Needs:  Social Determinants of Health     Tobacco Use: High Risk     Smoking Tobacco Use: Current Some Day Smoker     Smokeless Tobacco Use: Never Used   Alcohol Use: Not on file   Financial Resource Strain: Not on file   Food Insecurity: Not on file   Transportation Needs: Not on file   Physical Activity: Not on file   Stress: Not on file   Social Connections: Not on file   Intimate Partner Violence: Not on file    Depression: Not at risk     PHQ-2 Score: 0   Housing Stability: Not on file       Functional Status:  Prior to admission patient needed assistance:   Dependent ADLs:: Independent  Dependent IADLs:: Independent  Assesssment of Functional Status: At functional baseline    Mental Health Status:  Mental Health Status: No Current Concerns       Chemical Dependency Status:  Chemical Dependency Status: No Current Concerns             Values/Beliefs:  Spiritual, Cultural Beliefs, Baptism Practices, Values that affect care: no  Description of Beliefs that Will Affect Care: [employer]ian            Additional Information:  Patient is a 37 year old female with past medical history of ovarian cyst drainage c/b rectal injury with feculent peritonitis s/p ex lap with end colostomy creation.  Now s/p ex lap, colostomy reversal, flex sig, diverting loop ileostomy.  C RN is following for ileostomy education and is recommending home care at discharge.  Met with patient at bedside to introduce RNCC role and discuss discharge planning.  Patient lives in [place], [place] with her family.  Prior to admission she was independent with all ADL's and IADL's including caring for her colostomy.  Discussed home care recommendations.  She reported that she is not home bound so would not qualify for home care.  Pt feels like she will be ok without home care, but would like assistance with faxing a prescription for new ostomy supplies.  She gets her supplies from [facility](Ph: [phone], F: [phone]).  Will fax orders at time of discharge.  RNCC will continue to follow and remain available if further needs arise.              TRA Perez  Phone: [phone]  Pager: [phone]  To contact the weekend RNCC  [place] (0800 - 1630) Saturday and Sunday    Units: 4A, 4C, 4E, 5A and 5B- Pager 1: [phone]    Units: 6A, 6B, 6C, 6D- Pager 2: [phone]    Units: 7A, 7B, 7C, 7D, and 5C-Pager 3:  [phone]

## 2022-05-03 LAB
ANION GAP SERPL CALCULATED.3IONS-SCNC: 5 MMOL/L (ref 3–14)
BUN SERPL-MCNC: 5 MG/DL (ref 7–30)
CALCIUM SERPL-MCNC: 8.2 MG/DL (ref 8.5–10.1)
CHLORIDE BLD-SCNC: 108 MMOL/L (ref 94–109)
CO2 SERPL-SCNC: 26 MMOL/L (ref 20–32)
CREAT SERPL-MCNC: 0.56 MG/DL (ref 0.52–1.04)
ERYTHROCYTE [DISTWIDTH] IN BLOOD BY AUTOMATED COUNT: 13.1 % (ref 10–15)
GFR SERPL CREATININE-BSD FRML MDRD: >90 ML/MIN/1.73M2
GLUCOSE BLD-MCNC: 100 MG/DL (ref 70–99)
HCT VFR BLD AUTO: 30.1 % (ref 35–47)
HGB BLD-MCNC: 9.7 G/DL (ref 11.7–15.7)
HOLD SPECIMEN: NORMAL
MAGNESIUM SERPL-MCNC: 2.2 MG/DL (ref 1.6–2.3)
MCH RBC QN AUTO: 27.7 PG (ref 26.5–33)
MCHC RBC AUTO-ENTMCNC: 32.2 G/DL (ref 31.5–36.5)
MCV RBC AUTO: 86 FL (ref 78–100)
PLATELET # BLD AUTO: 243 10E3/UL (ref 150–450)
POTASSIUM BLD-SCNC: 3.2 MMOL/L (ref 3.4–5.3)
POTASSIUM BLD-SCNC: 3.9 MMOL/L (ref 3.4–5.3)
RBC # BLD AUTO: 3.5 10E6/UL (ref 3.8–5.2)
SODIUM SERPL-SCNC: 139 MMOL/L (ref 133–144)
WBC # BLD AUTO: 6.1 10E3/UL (ref 4–11)

## 2022-05-03 PROCEDURE — 82310 ASSAY OF CALCIUM: CPT | Performed by: PHYSICIAN ASSISTANT

## 2022-05-03 PROCEDURE — 36415 COLL VENOUS BLD VENIPUNCTURE: CPT | Performed by: STUDENT IN AN ORGANIZED HEALTH CARE EDUCATION/TRAINING PROGRAM

## 2022-05-03 PROCEDURE — 83735 ASSAY OF MAGNESIUM: CPT | Performed by: PHYSICIAN ASSISTANT

## 2022-05-03 PROCEDURE — 250N000013 HC RX MED GY IP 250 OP 250 PS 637: Performed by: PHYSICIAN ASSISTANT

## 2022-05-03 PROCEDURE — 99232 SBSQ HOSP IP/OBS MODERATE 35: CPT | Performed by: PHYSICIAN ASSISTANT

## 2022-05-03 PROCEDURE — 36415 COLL VENOUS BLD VENIPUNCTURE: CPT | Performed by: PHYSICIAN ASSISTANT

## 2022-05-03 PROCEDURE — 250N000011 HC RX IP 250 OP 636: Performed by: PHYSICIAN ASSISTANT

## 2022-05-03 PROCEDURE — 250N000011 HC RX IP 250 OP 636: Performed by: STUDENT IN AN ORGANIZED HEALTH CARE EDUCATION/TRAINING PROGRAM

## 2022-05-03 PROCEDURE — 250N000013 HC RX MED GY IP 250 OP 250 PS 637

## 2022-05-03 PROCEDURE — 250N000011 HC RX IP 250 OP 636

## 2022-05-03 PROCEDURE — 999N000199 HC STATISTIC WOC PT EDUCATION, 31-45 MIN

## 2022-05-03 PROCEDURE — 120N000002 HC R&B MED SURG/OB UMMC

## 2022-05-03 PROCEDURE — 84132 ASSAY OF SERUM POTASSIUM: CPT | Performed by: STUDENT IN AN ORGANIZED HEALTH CARE EDUCATION/TRAINING PROGRAM

## 2022-05-03 PROCEDURE — 85014 HEMATOCRIT: CPT | Performed by: PHYSICIAN ASSISTANT

## 2022-05-03 PROCEDURE — 250N000013 HC RX MED GY IP 250 OP 250 PS 637: Performed by: SURGERY

## 2022-05-03 RX ORDER — CEFAZOLIN SODIUM 1 G/3ML
1 INJECTION, POWDER, FOR SOLUTION INTRAMUSCULAR; INTRAVENOUS EVERY 8 HOURS
Status: DISCONTINUED | OUTPATIENT
Start: 2022-05-03 | End: 2022-05-05

## 2022-05-03 RX ORDER — OXYCODONE HYDROCHLORIDE 10 MG/1
10 TABLET ORAL EVERY 4 HOURS PRN
Status: DISCONTINUED | OUTPATIENT
Start: 2022-05-03 | End: 2022-05-07 | Stop reason: HOSPADM

## 2022-05-03 RX ORDER — POTASSIUM CHLORIDE 7.45 MG/ML
10 INJECTION INTRAVENOUS
Status: COMPLETED | OUTPATIENT
Start: 2022-05-03 | End: 2022-05-03

## 2022-05-03 RX ORDER — OXYCODONE HYDROCHLORIDE 10 MG/1
10 TABLET ORAL EVERY 4 HOURS PRN
Status: DISCONTINUED | OUTPATIENT
Start: 2022-05-03 | End: 2022-05-03

## 2022-05-03 RX ADMIN — ENOXAPARIN SODIUM 40 MG: 40 INJECTION SUBCUTANEOUS at 10:57

## 2022-05-03 RX ADMIN — OXYCODONE HYDROCHLORIDE 10 MG: 5 TABLET ORAL at 00:12

## 2022-05-03 RX ADMIN — OXYCODONE HYDROCHLORIDE 10 MG: 5 TABLET ORAL at 11:08

## 2022-05-03 RX ADMIN — CEFAZOLIN 1 G: 1 INJECTION, POWDER, FOR SOLUTION INTRAMUSCULAR; INTRAVENOUS at 12:36

## 2022-05-03 RX ADMIN — SERTRALINE HYDROCHLORIDE 50 MG: 50 TABLET ORAL at 21:32

## 2022-05-03 RX ADMIN — HYDROXYZINE HYDROCHLORIDE 50 MG: 25 TABLET, FILM COATED ORAL at 12:34

## 2022-05-03 RX ADMIN — HYDROXYZINE HYDROCHLORIDE 50 MG: 25 TABLET, FILM COATED ORAL at 05:31

## 2022-05-03 RX ADMIN — HYDROXYZINE HYDROCHLORIDE 50 MG: 25 TABLET, FILM COATED ORAL at 18:33

## 2022-05-03 RX ADMIN — OXYCODONE HYDROCHLORIDE 15 MG: 5 TABLET ORAL at 14:16

## 2022-05-03 RX ADMIN — POTASSIUM CHLORIDE 10 MEQ: 7.46 INJECTION, SOLUTION INTRAVENOUS at 10:56

## 2022-05-03 RX ADMIN — MORPHINE SULFATE 30 MG: 15 TABLET, EXTENDED RELEASE ORAL at 08:07

## 2022-05-03 RX ADMIN — OXYCODONE HYDROCHLORIDE 10 MG: 5 TABLET ORAL at 05:31

## 2022-05-03 RX ADMIN — CEFAZOLIN 1 G: 1 INJECTION, POWDER, FOR SOLUTION INTRAMUSCULAR; INTRAVENOUS at 20:59

## 2022-05-03 RX ADMIN — GABAPENTIN 300 MG: 300 CAPSULE ORAL at 08:07

## 2022-05-03 RX ADMIN — GABAPENTIN 300 MG: 300 CAPSULE ORAL at 20:27

## 2022-05-03 RX ADMIN — OXYCODONE HYDROCHLORIDE 15 MG: 5 TABLET ORAL at 18:32

## 2022-05-03 RX ADMIN — HYDROXYZINE HYDROCHLORIDE 50 MG: 25 TABLET, FILM COATED ORAL at 00:13

## 2022-05-03 RX ADMIN — GABAPENTIN 300 MG: 300 CAPSULE ORAL at 14:16

## 2022-05-03 RX ADMIN — LIDOCAINE 1 PATCH: 246 PATCH TOPICAL at 08:06

## 2022-05-03 RX ADMIN — HYDROMORPHONE HYDROCHLORIDE 0.3 MG: 1 INJECTION, SOLUTION INTRAMUSCULAR; INTRAVENOUS; SUBCUTANEOUS at 21:33

## 2022-05-03 RX ADMIN — POTASSIUM CHLORIDE 10 MEQ: 7.46 INJECTION, SOLUTION INTRAVENOUS at 12:28

## 2022-05-03 RX ADMIN — OXYCODONE HYDROCHLORIDE 15 MG: 5 TABLET ORAL at 22:49

## 2022-05-03 ASSESSMENT — ACTIVITIES OF DAILY LIVING (ADL)
ADLS_ACUITY_SCORE: 4
ADLS_ACUITY_SCORE: 6
ADLS_ACUITY_SCORE: 6
ADLS_ACUITY_SCORE: 4
ADLS_ACUITY_SCORE: 6
ADLS_ACUITY_SCORE: 6
ADLS_ACUITY_SCORE: 4
ADLS_ACUITY_SCORE: 4
ADLS_ACUITY_SCORE: 6
ADLS_ACUITY_SCORE: 4
ADLS_ACUITY_SCORE: 6
ADLS_ACUITY_SCORE: 4
ADLS_ACUITY_SCORE: 4
ADLS_ACUITY_SCORE: 6
ADLS_ACUITY_SCORE: 6
ADLS_ACUITY_SCORE: 4

## 2022-05-03 NOTE — PLAN OF CARE
Vitals:    22 1515 22 2307 22 0738   BP: 136/84 117/70 118/68 105/59   BP Location: Right arm Right arm Right arm Right arm   Patient Position:  Sitting  Semi-Obrien's   Cuff Size:       Pulse: 78 77 73 66   Resp: 18 16 16 16   Temp: 99.6  F (37.6  C) 99.4  F (37.4  C) 98.5  F (36.9  C) 99  F (37.2  C)   TempSrc: Oral Oral Oral Oral   SpO2:  100% 96% 95%   Weight:       Height:           Neuro:  alert and oriented     VS:afebrile vital stable, sating 96% on room air, non labor breathing,     B    Cardiac: HR 73, potassium replaced, recheck 3.6    Pain/Nausea: denies any nausea, schedule Morphine and increased Prn Oxy 15 mg Q 4 hrs with better pain control,     Lines/Drains: PIV TKO, Ancef antibiotic started and Ileostomy     Incision/Wound: midline incision derma bond intact, redness left flank                             Antibiotic started,     GI/: voiding adequate, with liquid out put from Ileostomy.    Diet/Appetite: good appetite, on regular diet     Activity: up ambulated independently,     Plan:  continue with plan of care.

## 2022-05-03 NOTE — PROGRESS NOTES
Pain Service Progress Note  [facility]  Date: May 3, 2022      Patient Name: Vicki Shell  MRN: [id number]  Age: 37 year old  Sex: female      Assessment:  Vicki Shell is a 37 year old female who has h/o previous   ovarian cyst drainage c/b rectal injury with feculent peritonitis and s/p ex lap w/ end colostomy creation.  She was admitted on 4/28/22       for Ex Lap, colostomy reversal, flex sig, DLI.  Vicki has been on chronic opioid pain management by her PCP.  PTA was on MSContin 30mg once in the morning and vicodin 5/325mg 1-3tabs/day.     Pain service was consulted and following to assist with acute post operative pain management.     Interval history:  5/3/22.  Vicki states pain is in the lower abdomen and is sore. Rates pain 7/10 which was more than yesterday and feels that she can use more pain medication. States that oral oxycodone works okay but can tell that PCA Dilaudid was more helpful.       Plan/Recommendations:  1. Increase oxycodone to 10-15mg PO Q 4 hours PRN.  Start with 15mg.    When closer to discharge taper to oxycodone 10mg PO Q 4 hours PRN x 1-2 days then to Q 6  Hours PRN x 1-2 days then to Q 8 hours PRN x 1-2 days then to Q12 hours PRN x 1-2 days then return to PTA baseline vicodin 5/325mg TID PRN (as prescribed by PCP-rodolfo Nugent [place]).  2. Continue PTA Morphine continuous release (MSCONTIN) 30mg once daily (confirmed with patient that she takes once/day).  3. Continue gabapentin.  4. Start Dilaudid 0.3mg IV Q TID PRN for rescue.   Discontinue on 5/4/22.  5. Bowel regimen to prevent opioid induced constipation.          Pain Service will sign off.    Discussed with attending anesthesiologist        Diet: Low Fiber Diet  Diet  Snacks/Supplements Adult: Ensure Max Protein (bariatric); Between Meals    Relevant Labs:  Recent Labs   Lab Test 05/03/22  0758 05/03/22  0742 04/26/22  1017 02/16/22  0952   INR  --   --   --  1.02     --    < > 200   PTT  --   " --   --  30   BUN  --  5*   < > 10    < > = values in this interval not displayed.       Physical Exam:  Vitals: /59 (BP Location: Right arm, Patient Position: Semi-Obrien's)   Pulse 66   Temp 99  F (37.2  C) (Oral)   Resp 16   Ht 1.702 m (5' 7\")   Wt 45.1 kg (99 lb 6.4 oz)   SpO2 95%   BMI 15.57 kg/m      CONSTITUTIONAL/GENERAL APPEARANCE: NAD. Conversant.  EYES: EOMI, sclerae clear  ENT/NECK: neck is supple  RESPIRATORY: breathing on room air  CARDIOVASCULAR: HR within normal limits  GI:soft, nontender, ostomy with output. Incision-c,d,i.  MUSCULOSKELETAL/BACK/SPINE/EXTREMITIES: Moving UE and LE independently.     NEURO:  AAOx3  SKIN/VASCULAR EXAM:  Dry and warm.  PSYCHIATRIC/BEHAVIORAL/OBSERVATIONS:  No objective signs of pain observed during our interview.   Judgment/Insight -fair   Orientation - x3   Memory -good   Mood and affect - calm, pleasant, cooperative          Relevant Medications:  Current Pain Medications:  Medications related to Pain Management (From now, onward)    Start     Dose/Rate Route Frequency Ordered Stop    05/02/22 1230  morphine (MS CONTIN) 12 hr tablet 30 mg         30 mg Oral DAILY 05/02/22 1222      05/02/22 1221  HYDROmorphone (PF) (DILAUDID) injection 0.3 mg         0.3 mg Intravenous 3 TIMES DAILY PRN 05/02/22 1222      05/02/22 1221  oxyCODONE (ROXICODONE) tablet 5-10 mg         5-10 mg Oral EVERY 3 HOURS PRN 05/02/22 1222      04/30/22 1600  lidocaine patch in PLACE          Transdermal EVERY 8 HOURS 04/30/22 1142 04/30/22 1200  hydrOXYzine (ATARAX) tablet 25 mg        \"Or\" Linked Group Details    25 mg Oral EVERY 6 HOURS 04/30/22 1139      04/30/22 1200  hydrOXYzine (ATARAX) tablet 50 mg        \"Or\" Linked Group Details    50 mg Oral EVERY 6 HOURS 04/30/22 1139      04/30/22 1200  Lidocaine (LIDOCARE) 4 % Patch 1 patch         1 patch  over 12 Hours Transdermal EVERY 24 HOURS 0800 04/30/22 1142 04/28/22 2100  methocarbamol (ROBAXIN) tablet 750 mg         " 750 mg Oral EVERY 6 HOURS PRN 04/28/22 2046      04/28/22 2000  gabapentin (NEURONTIN) capsule 300 mg         300 mg Oral 3 TIMES DAILY 04/28/22 1644      04/28/22 0621  lidocaine 1 % 0.1-1 mL         0.1-1 mL Other EVERY 1 HOUR PRN 04/28/22 0621      04/28/22 0621  lidocaine (LMX4) cream          Topical EVERY 1 HOUR PRN 04/28/22 0621            Primary Service Contacted with Recommendations? Yes    Dyan Griffiths PA-C  5/3/2022    Time/Communication:  I personally spent 25  minutes with greater than 50% in consultation, education, counseling and coordination of care.  See note above for details on conversation.      Contact Info (24 hour job code pager is the last 4 digits) For in-house use only:   Job code ID: [place] [id number]   [employer] [employer] 0599  Peds 0602  [place] phone: dial * * * [phone], enter jobcode ID, then enter call-back number.    Text: Use [employer] on the Intranet <Paging/Directory> tab and enter Jobcode ID.   If no call back at any time, contact the hospital  and ask for Regional Anesthesia attending or backup

## 2022-05-03 NOTE — PROGRESS NOTES
"/68 (BP Location: Right arm)   Pulse 73   Temp 98.5  F (36.9  C) (Oral)   Resp 16   Ht 1.702 m (5' 7\")   Wt 45.1 kg (99 lb 6.4 oz)   SpO2 96%   BMI 15.57 kg/m        Patient alert oriented x 4. Able to make needs known. Pain controlled with Oxy and Dilaudid. Uneventful night.  "

## 2022-05-03 NOTE — PROGRESS NOTES
Colorectal Surgery Progress Note  [facility]  POD#5    Subjective: was feeling pretty good yesterday.  But has not felt the greatest since yesterday late afternoon to evening.  Took a shower yesterday afernoon/evening and was quite sore from that.  Did also receive some pain meds about 30 minutes late and feels like she has not caught up.  Has no energy to get up today.      Vitals:  Vitals:    05/02/22 1515 05/02/22 2014 05/02/22 2307 05/03/22 0738   BP: 136/84 117/70 118/68 105/59   BP Location: Right arm Right arm Right arm Right arm   Patient Position:  Sitting  Semi-Obrien's   Cuff Size:       Pulse: 78 77 73 66   Resp: 18 16 16 16   Temp: 99.6  F (37.6  C) 99.4  F (37.4  C) 98.5  F (36.9  C) 99  F (37.2  C)   TempSrc: Oral Oral Oral Oral   SpO2:  100% 96% 95%   Weight:       Height:         I/O:    UOP 1850+ 5x   Ostomy 675cc    Physical Exam:  Gen: AAOx3, NAD  Pulm: Non-labored breathing  Abd: Soft, mildly distended, appropriately tender, no guarding/rebound   Mid-line incision relatively clean and dry.  Mild scattered erythema.  A small tiny blister on the right side of incision.     Stoma pink and viable with liquid stool and gas in bag.  Erythema along the inferior aspect of the stoma that extended beyond the stoma wafer and continues to the left flank.  This is marked with purple pen.  Erythema is warm and blanching.   Ext:  Warm and well-perfused    Labs  All labs reveiwed    ASSESSMENT: This is a 37 year old female PMH of ovarian cyst drainage c/b rectal injury with feculent peritonitis s/p ex lap w/ end colostomy creation.  Now s/p Ex Lap, colostomy reversal, flex sig, DLI on 4/28.    Neuro/Pain: greatly appreciate pain service recs how to make this transition as smooth as possible.  Restart MS contin, etc. Continue tylenol QID scheduled, gabapentin, robaxin, atarax.  Avoiding NSADS.  Increase to oxycodone 10-15mg q4 hours today.  Will taper per Pain recs.    CV: no  acute concerns  PULM: encourage IS and ambulation  GI/FEN:   - LRD  - WOCN for ileostomy.  Pravena removed 5/2.  - monitor ileostomy output, will replace high output with IVF  - continue to monitor and replace electrolytes, goal K 4.0, Mg 2.0  - ambulate  - start ancef for the presume peristomal cellulitis.  WBC is 6 today though.    : no acute concerns  Heme:  No acute concerns  ID: no acute concerns  Endocrine: no acute concerns    Activity: as tolerated.  Ppx: lovenox ppx  Dispo: TBD, likely 1-2 days pending pain control on PO medications    Katia Workman PA-C ..................5/3/2022   11:28 AM  Colon and Rectal Surgery    The above plan of care was performed and communicated to me by Dr. Richardson.

## 2022-05-03 NOTE — PROGRESS NOTES
"SPIRITUAL HEALTH SERVICES  [facility]  Unit: 7B    Referral Source: Following     Illness Narrative: Pt had surgery for a past surgery correction.    Distress: Pt mentioned that her children are ready to have \"mom\" home and it remains hard for her to be as far as she is away from them as she is.  Vicki also named an experience of coming off her IV pain medication this Sunday as a bad experience because her pain was not being managed at the level that it needed to be.    Coping: Pt shared that it is likely that she is going to be able to be discharged from the hospital this Thursday that thought is bringing Vicki landy..   Vicki's expressed landy regarding the plan of her S.O. to drive down to the hospital tomorrow and bring her home to [place] on Thursday.    We shared prayers for her continued healing and that her children would continue to do OK while she remains away from home.    Plan: No plan to follow because of pt's discharge pending.         Duane F Bauer  Chaplain Resident  Pager number: [phone]  * [facility] remains available 24/7 for emergent requests/referrals, either by having the switchboard page the on-call  or by entering an ASAP/STAT consult in Epic (this will also page the on-call ).*   "

## 2022-05-03 NOTE — PROGRESS NOTES
"Assessment of new loop Ileostomy:  Surgery date:  4/28/22  Surgeon:  Dr Richardson  Procedure:  s/p Ex Lap, colostomy reversal, flex sig, DLI on 4/28.  Related diagnosis:  ovarian cyst drainage c/b rectal injury with feculent peritonitis   s/p ex lap w/ end colostomy creation.      Pouching system in place on assessment today: Brenda two piece and cut to fit flat barrier with fecal pouch   Pouch last changed/wear time: 3 days  Effectiveness of current pouching/ supply plan: Pouching was still intact with no leakage detected around barrier. Greenish-brown, thin liquid effluent present on assessment in pouch today.  Change made with ostomy management today: No  Pouching system placed today: Rudyard two piece flat with fecal pouch. Additional barrier ring to fill in crease from 3-5 o'clock. High output pouch in supplies  Supplies: at bedside       Last photo: 5/2/22  Stoma location: LLQ at site of previous colostomy   Stoma size: 1 1/2\" x 1 3/4 inches,   Stoma appearance: oval, moist, good turgor, edematous and flat  Mucocutaneous junction:  Intact with puckering from sutures  Peristomal complication(s): mechanical damage-scattered petechia superior to stoma.   Sightly raised confluent pink/red rash that is slightly warm extending from stoma to approximately 12 cm laterally -  ColoRectal team has outlined the area of erythema, and have noted Significant reduction with about 10% still pink/red near stoma site,compared to last picture on 5/2/2022.    Output: greenish-brown  Output volume emptied during visit: none  Abdominal assessment: Soft  Surgical site(s): midline surgical incision post Provena Vac removal on 4/28/22. Incision line CDI.   NG still in place? No  Pain: N/A  Is patient still on a PCA? No -  oral Oxycodone given to her at the beginning of Illeostomy teaching education.     Ostomy education assessment:  Participant of teaching session today: patient   Education completed today: Intake and output " recording, Fluid and electrolyte balance , Importance of hydration, When to seek medical attention, Low fiber diet , Odor/flatus management , Infection prevention/hygiene  and Lifestyle adjustments   Educational materials/methods: Verbal and Written  Education still needed: Pouch change return demonstration and Pouch emptying return demonstration  Learning Comprehension:   Psychosocial assessment: pt independent with cares with previous colostomy.  Her spouse has assisted with cares and is comfortable making new patterns and cutting out barriers  Patient readiness for education today: attentive, active participation and in significant pain via IV during Potassium IV infusion but this addressed and decreased to run at a slower rate in which pt was then able to resume education teaching.   Following today's visit: patient  is able to demonstrate;         1. How to empty their pouch? Yes        2. How to change their pouch? No; but independent with previous pouching         3. How to read and record intake and output correctly? No  Preparation for discharge completed: No discharge preparation started yet  Preparation for discharge still needed: No discharge preparation started yet, Placed prescription recommendations in discharge navigator for MD to sign, Ensured patient has extra supplies for discharge, Discussed how to order supplies after discharge , Ordered samples from  after gaining consent from patient/caregiver, Discussed how and when to make an outpatient [facility] nurse appointment after discharge, Prepared for discharge home with home care and Discuss signs/symptoms of when to seek medical attention  Pt support system on discharge: lives independently  WO recommend home care? pt is not anticipating being home bound and therefore does not qualify for home care

## 2022-05-04 ENCOUNTER — APPOINTMENT (OUTPATIENT)
Dept: CT IMAGING | Facility: CLINIC | Age: 37
DRG: 330 | End: 2022-05-04
Attending: PHYSICIAN ASSISTANT
Payer: MEDICAID

## 2022-05-04 LAB
MAGNESIUM SERPL-MCNC: 2.2 MG/DL (ref 1.6–2.3)
PLATELET # BLD AUTO: 240 10E3/UL (ref 150–450)

## 2022-05-04 PROCEDURE — 99406 BEHAV CHNG SMOKING 3-10 MIN: CPT

## 2022-05-04 PROCEDURE — 250N000011 HC RX IP 250 OP 636: Performed by: SURGERY

## 2022-05-04 PROCEDURE — G0463 HOSPITAL OUTPT CLINIC VISIT: HCPCS

## 2022-05-04 PROCEDURE — 74177 CT ABD & PELVIS W/CONTRAST: CPT | Mod: 26 | Performed by: RADIOLOGY

## 2022-05-04 PROCEDURE — 85049 AUTOMATED PLATELET COUNT: CPT | Performed by: SURGERY

## 2022-05-04 PROCEDURE — 250N000013 HC RX MED GY IP 250 OP 250 PS 637: Performed by: PHYSICIAN ASSISTANT

## 2022-05-04 PROCEDURE — 999N000033 HC STATISTIC CHRONIC PULMONARY DISEASE SPECIALIST

## 2022-05-04 PROCEDURE — 250N000013 HC RX MED GY IP 250 OP 250 PS 637: Performed by: SURGERY

## 2022-05-04 PROCEDURE — 250N000013 HC RX MED GY IP 250 OP 250 PS 637

## 2022-05-04 PROCEDURE — 74177 CT ABD & PELVIS W/CONTRAST: CPT

## 2022-05-04 PROCEDURE — 36415 COLL VENOUS BLD VENIPUNCTURE: CPT | Performed by: SURGERY

## 2022-05-04 PROCEDURE — 250N000011 HC RX IP 250 OP 636

## 2022-05-04 PROCEDURE — 83735 ASSAY OF MAGNESIUM: CPT | Performed by: STUDENT IN AN ORGANIZED HEALTH CARE EDUCATION/TRAINING PROGRAM

## 2022-05-04 PROCEDURE — 120N000002 HC R&B MED SURG/OB UMMC

## 2022-05-04 PROCEDURE — 250N000011 HC RX IP 250 OP 636: Performed by: PHYSICIAN ASSISTANT

## 2022-05-04 RX ORDER — IOPAMIDOL 755 MG/ML
70 INJECTION, SOLUTION INTRAVASCULAR ONCE
Status: COMPLETED | OUTPATIENT
Start: 2022-05-04 | End: 2022-05-04

## 2022-05-04 RX ADMIN — HYDROXYZINE HYDROCHLORIDE 50 MG: 25 TABLET, FILM COATED ORAL at 00:56

## 2022-05-04 RX ADMIN — HYDROXYZINE HYDROCHLORIDE 50 MG: 25 TABLET, FILM COATED ORAL at 06:53

## 2022-05-04 RX ADMIN — HYDROXYZINE HYDROCHLORIDE 50 MG: 25 TABLET, FILM COATED ORAL at 12:48

## 2022-05-04 RX ADMIN — GABAPENTIN 300 MG: 300 CAPSULE ORAL at 14:41

## 2022-05-04 RX ADMIN — HYDROXYZINE HYDROCHLORIDE 50 MG: 25 TABLET, FILM COATED ORAL at 17:28

## 2022-05-04 RX ADMIN — GABAPENTIN 300 MG: 300 CAPSULE ORAL at 19:39

## 2022-05-04 RX ADMIN — HYDROMORPHONE HYDROCHLORIDE 0.3 MG: 1 INJECTION, SOLUTION INTRAMUSCULAR; INTRAVENOUS; SUBCUTANEOUS at 17:30

## 2022-05-04 RX ADMIN — OXYCODONE HYDROCHLORIDE 15 MG: 5 TABLET ORAL at 19:38

## 2022-05-04 RX ADMIN — SERTRALINE HYDROCHLORIDE 50 MG: 50 TABLET ORAL at 22:11

## 2022-05-04 RX ADMIN — METHOCARBAMOL 750 MG: 750 TABLET ORAL at 22:11

## 2022-05-04 RX ADMIN — OXYCODONE HYDROCHLORIDE 15 MG: 5 TABLET ORAL at 23:41

## 2022-05-04 RX ADMIN — OXYCODONE HYDROCHLORIDE 15 MG: 5 TABLET ORAL at 10:33

## 2022-05-04 RX ADMIN — ENOXAPARIN SODIUM 40 MG: 40 INJECTION SUBCUTANEOUS at 12:48

## 2022-05-04 RX ADMIN — HYDROXYZINE HYDROCHLORIDE 50 MG: 25 TABLET, FILM COATED ORAL at 23:41

## 2022-05-04 RX ADMIN — GABAPENTIN 300 MG: 300 CAPSULE ORAL at 07:33

## 2022-05-04 RX ADMIN — LIDOCAINE 1 PATCH: 246 PATCH TOPICAL at 07:31

## 2022-05-04 RX ADMIN — HYDROMORPHONE HYDROCHLORIDE 0.3 MG: 1 INJECTION, SOLUTION INTRAMUSCULAR; INTRAVENOUS; SUBCUTANEOUS at 12:54

## 2022-05-04 RX ADMIN — CEFAZOLIN 1 G: 1 INJECTION, POWDER, FOR SOLUTION INTRAMUSCULAR; INTRAVENOUS at 12:48

## 2022-05-04 RX ADMIN — OXYCODONE HYDROCHLORIDE 15 MG: 5 TABLET ORAL at 14:41

## 2022-05-04 RX ADMIN — HYDROMORPHONE HYDROCHLORIDE 0.3 MG: 1 INJECTION, SOLUTION INTRAMUSCULAR; INTRAVENOUS; SUBCUTANEOUS at 06:54

## 2022-05-04 RX ADMIN — CEFAZOLIN 1 G: 1 INJECTION, POWDER, FOR SOLUTION INTRAMUSCULAR; INTRAVENOUS at 19:39

## 2022-05-04 RX ADMIN — CEFAZOLIN 1 G: 1 INJECTION, POWDER, FOR SOLUTION INTRAMUSCULAR; INTRAVENOUS at 05:06

## 2022-05-04 RX ADMIN — MORPHINE SULFATE 30 MG: 15 TABLET, EXTENDED RELEASE ORAL at 07:33

## 2022-05-04 RX ADMIN — IOPAMIDOL 70 ML: 755 INJECTION, SOLUTION INTRAVENOUS at 16:56

## 2022-05-04 RX ADMIN — OXYCODONE HYDROCHLORIDE 15 MG: 5 TABLET ORAL at 03:14

## 2022-05-04 ASSESSMENT — ACTIVITIES OF DAILY LIVING (ADL)
ADLS_ACUITY_SCORE: 4

## 2022-05-04 NOTE — PROGRESS NOTES
Colorectal Surgery Progress Note  [facility]  POD#6    Subjective:   Not feeling any better today compared to yesterday, still lacking energy. Pain medication increased yesterday but she feels like it has not made a difference. Notes having stool output from stoma that is watery in the morning and thickens throughout the day. No stool from rectum but has passed some blood. Her appetite is intact, eating well    Vitals:  Vitals:    05/03/22 0738 05/03/22 1521 05/03/22 2249 05/04/22 0735   BP: 105/59 109/61 110/54 103/56   BP Location: Right arm Right arm Right arm Right arm   Patient Position: Semi-Obrien's      Pulse: 66 78 81 62   Resp: 16 16 16 16   Temp: 99  F (37.2  C) 98.7  F (37.1  C) (!) 96  F (35.6  C) (!) 96.3  F (35.7  C)   TempSrc: Oral Oral Axillary Oral   SpO2: 95% 96% 97% 97%   Weight:       Height:         I/O:    UOP 1850+ 5x   Ostomy 675cc    Physical Exam:  Gen: AAOx3, NAD  Pulm: Non-labored breathing  Abd: Soft, mildly distended, appropriately tender, no guarding/rebound   Mid-line incision relatively clean and dry.  Mild scattered erythema.  A small tiny blister on the right side of incision.     Stoma pink and viable with liquid stool and gas in bag.  Erythema along the inferior aspect of the stoma resolved.  Ext:  Warm and well-perfused    Labs  All labs reveiwed  Plt and Mg Pending       ASSESSMENT: This is a 37 year old female PMH of ovarian cyst drainage c/b rectal injury with feculent peritonitis s/p ex lap w/ end colostomy creation.  Now s/p Ex Lap, colostomy reversal, flex sig, Diverting loop iliostomy on 4/28.    Neuro/Pain: greatly appreciate pain service recs how to make this transition as smooth as possible.  PCA was discontinued MS contin, etc. Continue tylenol QID scheduled, gabapentin, robaxin, atarax.  Avoiding NSADS.  Increase to oxycodone 10-15mg q4 hours 05/03.  Will taper per Pain recs.    CV: no acute concerns  PULM: encourage IS and  ambulation  GI/FEN:   - LRD  - WOCN for ileostomy.  Pravena removed 5/2.  - monitor ileostomy output, will replace high output with IVF  - continue to monitor and replace electrolytes, goal K 4.0, Mg 2.0  - ambulate  : no acute concerns  Heme:  No acute concerns  ID: feculent peritonitis  -- Continue Ancef today 05/04, transition to PO Bactrim starting tomorrow for 1 week.   Endocrine: no acute concerns    Activity: as tolerated.  Ppx: lovenox ppx  Dispo: Likely 1-2 days pending pain control on PO medications    Elmira Young, MS4        ---------------     I was present with the medical student who participated in the service and in the documentation of the note.  I have verified the history and personally performed the physical exam and medical decision making. Addenda have been made to the note as appropriate.  I agree with the assessment and plan of care as documented in the note.    Ian Griffith MD  Surgery Resident  PGY1

## 2022-05-04 NOTE — PLAN OF CARE
Time: 1900-0730  Admit Reason: Ostomy takedown reversal    Activity: Independent.  Diet: Low fiber diet.  Pain: Patient reporting moderate pain throughout shift; reported new onset of pain to left of ostomy. Given PRN oxycodone last at 0315.   Neuro: Alert, oriented x4. CMS intact. Denies numbness and tingling.  Cardiac: WDL. Denies chest pain.  Respiratory: WDL. Denies SOB. Room air. LS clear.  Skin: Bruising on R&L forearms. Redness on Left lower abdomen. Ostomy. Midline incision open to air, approximated, no drainage or edema.  GI/: Voiding spontaneously. Abdominal discomfort. Ostomy.   Lines/inf: Left PIV saline locked.

## 2022-05-04 NOTE — PROGRESS NOTES
"Assessment of new loop Ileostomy:  Surgery date:  4/28/22  Surgeon:  Dr Richardson  Procedure:  s/p Ex Lap, colostomy reversal, flex sig, DLI on 4/28.  Related diagnosis:  ovarian cyst drainage c/b rectal injury with feculent peritonitis   s/p ex lap w/ end colostomy creation.      Pouching system in place on assessment today: Brenda two piece and cut to fit flat barrier with fecal pouch   Pouch last changed/wear time: 2-3 days  Effectiveness of current pouching/ supply plan: Pouching was still intact with no leakage detected around barrier. 25% melted from 5-8 o'clock.  otherwise minimal melting    Change made with ostomy management today: No  Pouching system placed today: Brenda two piece flat with fecal pouch. Additional barrier ring to fill in crease from 3-5 o'clock. High output pouch in supplies  Supplies: at bedside       Date of photos: 5/2/22 and 5/4/22  Stoma location: LLQ at site of previous colostomy   Stoma size: 1 1/2\" x 1 3/4 inches,   Stoma appearance: oval, moist, good turgor, edematous and flat  Mucocutaneous junction:  Intact with puckering from sutures  Peristomal complication(s): mechanical damage-scattered petechia superior to stoma. Erythema at outside edges of the barrier  The previously raised confluent pink/red rash extending from stoma to approximately 12 cm laterally has resolved.  Peristomal induration from 3-9 o'clock does extend more laterally today     Output: greenish-brown  Output volume emptied during visit: none  Abdominal assessment: Soft  Surgical site(s): midline surgical incision post Provena Vac removal on 4/28/22. Incision line CDI.   NG still in place? No  Pain: N/A  Is patient still on a PCA? No -      Ostomy education assessment:  Participant of teaching session today: patient   Education completed today: Pouch change return demonstration and Discharge instructions  Educational materials/methods: Verbal and Written  Education still needed: Pouch change return " demonstration and Discharge instructions  Learning Comprehension:   Psychosocial assessment: pt independent with cares with previous colostomy.  Her spouse has assisted with cares and is comfortable making new patterns and cutting out barriers  Patient readiness for education today: attentive and active participation   Following today's visit: patient  is able to demonstrate;         1. How to empty their pouch? Yes        2. How to change their pouch? Yes;t independent with previous pouching         3. How to read and record intake and output correctly? Yes  Preparation for discharge completed: Placed prescription recommendations in discharge navigator for MD to sign, Ensured patient has extra supplies for discharge, Discussed how to order supplies after discharge , Ordered samples from  after gaining consent from patient/caregiver, Discussed how and when to make an outpatient [facility] nurse appointment after discharge and Discuss signs/symptoms of when to seek medical attention  Preparation for discharge still needed: none  Pt support system on discharge: lives independently with her spouse  [facility] recommend home care? pt is not anticipating being home bound and therefore does not qualify for home care  Quantity of pouches:  20 /mo.    Request the following supplies:      Brenda        2 piece flat Cera Plus wafer 70 mm  # [id number]  Or   2 piece soft convex CeraPlus wafer 70 mm #[id number]   With                2 piece Fecal pouch with out Filter 70mm- # [id number]  Or  Coloplast  2 piece convex light AC barrier #[id number]  With                2 piece AC pouch without filter #07775      Accessories  2  barrier ring #[id number]     Adapt powder #4054    No sting film barrier # [id number]                          Brava elastic barrier strips curved # [id number]                          Adapt medical adhesive remover #8899

## 2022-05-04 NOTE — CONSULTS
"Smoking Cessation Consult   2022    Patient: Vicki Shell      :  1985                    MRN:[id number]      Colostomy care (H) [Z43.3];Colostomy status (H) [Z93.3];Cyst of left ovary [N83.202] @HX    History of Present Illness: This is a 37 year old female PMH of ovarian cyst drainage c/b rectal injury with feculent peritonitis s/p ex lap w/ end colostomy creation.  Now s/p Ex Lap, colostomy reversal, flex sig, Diverting loop iliostomy on .    Reason for Consult: Patient identified as current everyday smoker per patient chart.     Patient willing to discuss current tobacco use. Smoking 5-7 cigs/dayy. Denies experiencing symptoms of withdrawal such as sleeplessness, headache, anxiety, irritability, and intense cravings to smoke. Not interested in NRT and counseling or relapse prevention plan. Offered and accepted the educational workbook, \"Quitting for Good with Treatment and Support.\"     Sukh Felix, RRT, CTTS  Chronic Pulmonary Disease Specialist  Cardiopulmonary Services   Office: [phone]  Pager: [phone]    "

## 2022-05-04 NOTE — PLAN OF CARE
Vitals:    05/03/22 0738 05/03/22 1521 05/03/22 2249 05/04/22 0735   BP: 105/59 109/61 110/54 103/56   BP Location: Right arm Right arm Right arm Right arm   Patient Position: Semi-Obrien's      Pulse: 66 78 81 62   Resp: 16 16 16 16   Temp: 99  F (37.2  C) 98.7  F (37.1  C) (!) 96  F (35.6  C) (!) 96.3  F (35.7  C)   TempSrc: Oral Oral Axillary Oral   SpO2: 95% 96% 97% 97%   Weight:       Height:           Neuro: alert and oriented     VS:afebrile vital stable, sating 97% on room air, lungs clear     Cardiac: HR  81    Pain/Nausea: denies any  nausea, Oxy and IV dilaudid for pain,    Lines/Drains: right PIV SL between antibiotic, Ileostomy pouch changed today, site little tender.                        Abdomen and pelvis CT ordered,     Incision/Wound:  abdominal midline incision derma bond intact, open to air.    GI/: voiding adequate, +BS ileostomy liquid soft out put recorded in I and O.    Diet/Appetite: good appetite with low fiber diet     Activity:up independently ambulated down the [place].    Plan: possible discharge tomorrow depending on what MD think the left lower quadrant site redness, continue with plan of care.

## 2022-05-05 ENCOUNTER — APPOINTMENT (OUTPATIENT)
Dept: ULTRASOUND IMAGING | Facility: CLINIC | Age: 37
DRG: 330 | End: 2022-05-05
Attending: PHYSICIAN ASSISTANT
Payer: MEDICAID

## 2022-05-05 LAB
HOLD SPECIMEN: NORMAL
MAGNESIUM SERPL-MCNC: 2.1 MG/DL (ref 1.6–2.3)
POTASSIUM BLD-SCNC: 3.9 MMOL/L (ref 3.4–5.3)

## 2022-05-05 PROCEDURE — 250N000011 HC RX IP 250 OP 636: Performed by: PHYSICIAN ASSISTANT

## 2022-05-05 PROCEDURE — 250N000013 HC RX MED GY IP 250 OP 250 PS 637: Performed by: PHYSICIAN ASSISTANT

## 2022-05-05 PROCEDURE — 250N000013 HC RX MED GY IP 250 OP 250 PS 637

## 2022-05-05 PROCEDURE — 250N000011 HC RX IP 250 OP 636

## 2022-05-05 PROCEDURE — 76705 ECHO EXAM OF ABDOMEN: CPT | Mod: 26 | Performed by: RADIOLOGY

## 2022-05-05 PROCEDURE — 36415 COLL VENOUS BLD VENIPUNCTURE: CPT | Performed by: SURGERY

## 2022-05-05 PROCEDURE — 76705 ECHO EXAM OF ABDOMEN: CPT

## 2022-05-05 PROCEDURE — 250N000013 HC RX MED GY IP 250 OP 250 PS 637: Performed by: SURGERY

## 2022-05-05 PROCEDURE — 120N000002 HC R&B MED SURG/OB UMMC

## 2022-05-05 PROCEDURE — 83735 ASSAY OF MAGNESIUM: CPT | Performed by: SURGERY

## 2022-05-05 PROCEDURE — 84132 ASSAY OF SERUM POTASSIUM: CPT | Performed by: SURGERY

## 2022-05-05 RX ORDER — GABAPENTIN 300 MG/1
300 CAPSULE ORAL 3 TIMES DAILY
Qty: 40 CAPSULE | Refills: 0 | Status: SHIPPED | OUTPATIENT
Start: 2022-05-05 | End: 2022-09-12

## 2022-05-05 RX ORDER — METHOCARBAMOL 750 MG/1
750 TABLET, FILM COATED ORAL EVERY 6 HOURS PRN
Qty: 50 TABLET | Refills: 0 | Status: SHIPPED | OUTPATIENT
Start: 2022-05-05 | End: 2022-09-12

## 2022-05-05 RX ORDER — LIDOCAINE 4 G/G
1 PATCH TOPICAL EVERY 24 HOURS
Qty: 5 PATCH | Refills: 0 | Status: SHIPPED | OUTPATIENT
Start: 2022-05-05 | End: 2023-01-02

## 2022-05-05 RX ADMIN — GABAPENTIN 300 MG: 300 CAPSULE ORAL at 07:48

## 2022-05-05 RX ADMIN — ENOXAPARIN SODIUM 40 MG: 40 INJECTION SUBCUTANEOUS at 11:37

## 2022-05-05 RX ADMIN — HYDROXYZINE HYDROCHLORIDE 50 MG: 25 TABLET, FILM COATED ORAL at 11:37

## 2022-05-05 RX ADMIN — LIDOCAINE 1 PATCH: 246 PATCH TOPICAL at 07:49

## 2022-05-05 RX ADMIN — OXYCODONE HYDROCHLORIDE 15 MG: 5 TABLET ORAL at 04:22

## 2022-05-05 RX ADMIN — HYDROXYZINE HYDROCHLORIDE 50 MG: 25 TABLET, FILM COATED ORAL at 18:04

## 2022-05-05 RX ADMIN — MORPHINE SULFATE 30 MG: 15 TABLET, EXTENDED RELEASE ORAL at 07:49

## 2022-05-05 RX ADMIN — SERTRALINE HYDROCHLORIDE 50 MG: 50 TABLET ORAL at 21:41

## 2022-05-05 RX ADMIN — GABAPENTIN 300 MG: 300 CAPSULE ORAL at 19:58

## 2022-05-05 RX ADMIN — AMOXICILLIN AND CLAVULANATE POTASSIUM 1 TABLET: 875; 125 TABLET, FILM COATED ORAL at 07:56

## 2022-05-05 RX ADMIN — GABAPENTIN 300 MG: 300 CAPSULE ORAL at 14:26

## 2022-05-05 RX ADMIN — CEFAZOLIN 1 G: 1 INJECTION, POWDER, FOR SOLUTION INTRAMUSCULAR; INTRAVENOUS at 04:23

## 2022-05-05 RX ADMIN — AMOXICILLIN AND CLAVULANATE POTASSIUM 1 TABLET: 875; 125 TABLET, FILM COATED ORAL at 19:58

## 2022-05-05 RX ADMIN — OXYCODONE HYDROCHLORIDE 15 MG: 5 TABLET ORAL at 16:03

## 2022-05-05 RX ADMIN — OXYCODONE HYDROCHLORIDE 15 MG: 5 TABLET ORAL at 11:37

## 2022-05-05 RX ADMIN — OXYCODONE HYDROCHLORIDE 15 MG: 5 TABLET ORAL at 20:04

## 2022-05-05 RX ADMIN — HYDROMORPHONE HYDROCHLORIDE 0.3 MG: 1 INJECTION, SOLUTION INTRAMUSCULAR; INTRAVENOUS; SUBCUTANEOUS at 14:21

## 2022-05-05 RX ADMIN — HYDROXYZINE HYDROCHLORIDE 50 MG: 25 TABLET, FILM COATED ORAL at 06:48

## 2022-05-05 RX ADMIN — HYDROMORPHONE HYDROCHLORIDE 0.3 MG: 1 INJECTION, SOLUTION INTRAMUSCULAR; INTRAVENOUS; SUBCUTANEOUS at 18:03

## 2022-05-05 ASSESSMENT — ACTIVITIES OF DAILY LIVING (ADL)
ADLS_ACUITY_SCORE: 4

## 2022-05-05 NOTE — PLAN OF CARE
Time: 1900-0730    Activity: Independent  Diet: Low fiber.  Pain: rated 7-8. Oxycodone given last at 0422.   Neuro: Alert, oriented x4. Denies numbness and tingling.  Cardiac: WDL. Denies chest pain.   Respiratory: Lung sounds clear. Denies SOB. Room air.  Skin: Bruising on forearms. Redness to left of ostomy. Midline incision dermabonded, no drainage or edema. Ileostomy. Skin around ostomy appliance is discolored from irritation.  GI/: Voiding spontaneously. Abdominal discomfort. Adequate ostomy output. CT scan on 5/4 shows possible abscess in abdomen.   Lines/inf: Left PIV saline locked.

## 2022-05-05 NOTE — PROGRESS NOTES
Colorectal Surgery Progress Note  [facility]  POD#7    Subjective:   Feeling about the same today. Pain is unchanged, still feels tenderness around ostomy where CT showed possible fluid collection. Continues having stool from ostomy. Appetite remains intact, eating well. No nausea or vomiting.     Vitals:  Vitals:    05/04/22 0735 05/04/22 1522 05/05/22 0016 05/05/22 0652   BP: 103/56 111/58 96/53 105/58   BP Location: Right arm Right arm Right arm Right arm   Patient Position:    Semi-Obrien's   Pulse: 62 67 68 65   Resp: 16 16 18 16   Temp: (!) 96.3  F (35.7  C) 96.8  F (36  C) 99  F (37.2  C) 98  F (36.7  C)   TempSrc: Oral Oral Oral Oral   SpO2: 97% 99% 97% 95%   Weight:       Height:         I/O:  PO 1877  + 2x   Ostomy 200    Physical Exam:  Gen: AAOx3, NAD  Pulm: Non-labored breathing  Abd: Soft, mildly distended, moderately tender near to ostomy with some underlying induration, no fluctuance, no purulent drainage expressed, no guarding/rebound. Mid-line incision relatively clean and dry.  Mild scattered erythema, improving. Stoma pink and viable with liquid stool and gas in bag.  Erythema along the inferiolateral aspect of the stoma resolving.  Ext:  Warm and well-perfused    Imaging   CT 05/04   IMPRESSION:   1. Post surgical changes of rectosigmoidectomy with interval takedown  left lower quadrant ileostomy. New fluid collection within the right  pelvis, difficult to evaluate given adjacent ascites and closely  opposing loops of bowel, however this collection does not appear to be  in connection with the adjacent bowel, raising concern for abscess.  2. Fluid and air collection at the ostomy site that makes a loop.  Difficult to determine if this is a loop of bowel in the patient's  ostomy or an abscess.  3. Stable hypodensity within the right lobe the liver, unchanged since  9/2/2021, favored benign.  4. Continued evolution of the left ovarian hemorrhagic  cyst.    Labs  All labs reveiwed  K and Mg Pending       ASSESSMENT: This is a 37 year old female PMH of ovarian cyst drainage c/b rectal injury with feculent peritonitis s/p ex lap w/ end colostomy creation.  Now s/p Ex Lap, colostomy reversal, flex sig, Diverting loop iliostomy on 4/28.    Neuro/Pain:   - MS contin. Continue tylenol QID scheduled, gabapentin, robaxin, atarax.  Avoiding NSAIDS.  Oxycodone 10-15mg q4 hours. Dilaudid 0.3mg TID PRN.  Will taper per Pain recs.    CV: no acute concerns  PULM: encourage IS and ambulation  GI/FEN:   - LRD  - WOCN for ileostomy.  Pravena removed 5/2.  - monitor ileostomy output, will replace high output with IVF  - continue to monitor and replace electrolytes, goal K 4.0, Mg 2.0  - ambulate  : no acute concerns  Heme:  No acute concerns  ID: feculent peritonitis  Streak of erythema and tenderness inferolateral to ostomy extending to flank  - Possible Fluid collection found on CT inferolateral to ostomy, unable to determine if this is a loop of bowel or an abscess, will obtain US of the abdomen to look for abscess.   - If abscess is seen, patient will undergo aspiration of the abscess.  - Discontinue Ancef and started on Augmentin 05/05    Endocrine: no acute concerns    Activity: as tolerated.  Ppx: lovenox ppx  Dispo: Likely 1-2 days pending pain control on PO medications    Elmira Young, MS4      ---------------     I was present with the medical student who participated in the service and in the documentation of the note.  I have verified the history and personally performed the physical exam and medical decision making. Addenda have been made to the note as appropriate.  I agree with the assessment and plan of care as documented in the note.    Ian Griffith MD  Surgery Resident  PGY1

## 2022-05-05 NOTE — CONSULTS
Patient is on IR schedule 5/6/2022 for a perfecto-stomal abscess aspiration - possible drain placement. Patient is a 37 year old female with past history of ovarian cyst drainage complicated by rectal injury and colostomy formation. now colostomy take down with loop ileostomy formation complicated by peristomal abscess. Team is open to IR discretion regarding aspiration vs. Drain placement. Plan to discuss at rounds and have a clear plan.   Labs WNL for procedure.    Orders for NPO, scrubs and antibiotics have been entered.   Medications to be held include: lovenox  Consent will be done prior to procedure.      Please contact IR charge nurse at [phone] for estimated time of procedure.     Case discussed with requesting provider Rajwinder Workman PA-C.    Jim Hernandez PA-C  Interventional Radiology  Phone: [phone]  Pager: [phone]

## 2022-05-05 NOTE — PLAN OF CARE
Pt alert follow commands, have difficulty finding words .  Denies pain this AM, up to bathroom voiding, belly distended,  Right abdominal Pleurex drain  dressing changed,   drain 500 ml dark brown drainage this AM,   Poor appetite, had a few bite of breakfast,  Bed alarm on, continue with comfort care.

## 2022-05-05 NOTE — PROGRESS NOTES
CRS Update    Abd US with circumferential perfecto-stomal abscess.  Discussed at length with radiology.     Appreciate IR evaluation for perfecto-stomal abscess aspiration vs drain placement.  Likely tomorrow 5/6.  NPO at MN.  Hold lovenox. Abscess cultures ordered they are signed and held.      Discussed with attending, Dr. Richardson.     Katia Workman PA-C  Colon and Rectal Surgery

## 2022-05-06 ENCOUNTER — APPOINTMENT (OUTPATIENT)
Dept: INTERVENTIONAL RADIOLOGY/VASCULAR | Facility: CLINIC | Age: 37
DRG: 330 | End: 2022-05-06
Attending: PHYSICIAN ASSISTANT
Payer: MEDICAID

## 2022-05-06 LAB
APPEARANCE FLD: ABNORMAL
CELL COUNT BODY FLUID SOURCE: ABNORMAL
COLOR FLD: ABNORMAL
EOSINOPHIL NFR FLD MANUAL: 1 %
GLUCOSE BLDC GLUCOMTR-MCNC: 109 MG/DL (ref 70–99)
GLUCOSE BLDC GLUCOMTR-MCNC: 82 MG/DL (ref 70–99)
GRAM STAIN RESULT: NORMAL
GRAM STAIN RESULT: NORMAL
HOLD SPECIMEN: NORMAL
INR PPP: 1.09 (ref 0.85–1.15)
KOH PREPARATION: NORMAL
KOH PREPARATION: NORMAL
LYMPHOCYTES NFR FLD MANUAL: 4 %
MAGNESIUM SERPL-MCNC: 2.1 MG/DL (ref 1.6–2.3)
MAGNESIUM SERPL-MCNC: 2.2 MG/DL (ref 1.6–2.3)
MONOS+MACROS NFR FLD MANUAL: 6 %
NEUTS BAND NFR FLD MANUAL: 91 %
POTASSIUM BLD-SCNC: 4 MMOL/L (ref 3.4–5.3)
SARS-COV-2 RNA RESP QL NAA+PROBE: NEGATIVE
WBC # FLD AUTO: 1363 /UL

## 2022-05-06 PROCEDURE — 250N000011 HC RX IP 250 OP 636: Performed by: PHYSICIAN ASSISTANT

## 2022-05-06 PROCEDURE — 250N000013 HC RX MED GY IP 250 OP 250 PS 637: Performed by: SURGERY

## 2022-05-06 PROCEDURE — 87210 SMEAR WET MOUNT SALINE/INK: CPT | Performed by: PHYSICIAN ASSISTANT

## 2022-05-06 PROCEDURE — 85610 PROTHROMBIN TIME: CPT | Performed by: PHYSICIAN ASSISTANT

## 2022-05-06 PROCEDURE — 87075 CULTR BACTERIA EXCEPT BLOOD: CPT | Performed by: PHYSICIAN ASSISTANT

## 2022-05-06 PROCEDURE — 84132 ASSAY OF SERUM POTASSIUM: CPT | Performed by: STUDENT IN AN ORGANIZED HEALTH CARE EDUCATION/TRAINING PROGRAM

## 2022-05-06 PROCEDURE — 76942 ECHO GUIDE FOR BIOPSY: CPT | Mod: 26 | Performed by: PHYSICIAN ASSISTANT

## 2022-05-06 PROCEDURE — 272N000502 IR SKIN SUBQ/SEROMA ABSCESS DRAIN

## 2022-05-06 PROCEDURE — 87205 SMEAR GRAM STAIN: CPT | Performed by: PHYSICIAN ASSISTANT

## 2022-05-06 PROCEDURE — 250N000011 HC RX IP 250 OP 636

## 2022-05-06 PROCEDURE — 76942 ECHO GUIDE FOR BIOPSY: CPT

## 2022-05-06 PROCEDURE — 83735 ASSAY OF MAGNESIUM: CPT | Performed by: SURGERY

## 2022-05-06 PROCEDURE — 250N000013 HC RX MED GY IP 250 OP 250 PS 637

## 2022-05-06 PROCEDURE — 89051 BODY FLUID CELL COUNT: CPT | Performed by: PHYSICIAN ASSISTANT

## 2022-05-06 PROCEDURE — 36415 COLL VENOUS BLD VENIPUNCTURE: CPT | Performed by: SURGERY

## 2022-05-06 PROCEDURE — 99152 MOD SED SAME PHYS/QHP 5/>YRS: CPT | Performed by: PHYSICIAN ASSISTANT

## 2022-05-06 PROCEDURE — U0005 INFEC AGEN DETEC AMPLI PROBE: HCPCS | Performed by: SURGERY

## 2022-05-06 PROCEDURE — 87070 CULTURE OTHR SPECIMN AEROBIC: CPT | Performed by: PHYSICIAN ASSISTANT

## 2022-05-06 PROCEDURE — 120N000002 HC R&B MED SURG/OB UMMC

## 2022-05-06 PROCEDURE — 0W9G3ZX DRAINAGE OF PERITONEAL CAVITY, PERCUTANEOUS APPROACH, DIAGNOSTIC: ICD-10-PCS | Performed by: PHYSICIAN ASSISTANT

## 2022-05-06 PROCEDURE — 250N000009 HC RX 250: Performed by: PHYSICIAN ASSISTANT

## 2022-05-06 PROCEDURE — 87106 FUNGI IDENTIFICATION YEAST: CPT | Performed by: PHYSICIAN ASSISTANT

## 2022-05-06 PROCEDURE — 83735 ASSAY OF MAGNESIUM: CPT | Performed by: STUDENT IN AN ORGANIZED HEALTH CARE EDUCATION/TRAINING PROGRAM

## 2022-05-06 PROCEDURE — 36415 COLL VENOUS BLD VENIPUNCTURE: CPT | Performed by: STUDENT IN AN ORGANIZED HEALTH CARE EDUCATION/TRAINING PROGRAM

## 2022-05-06 PROCEDURE — 250N000013 HC RX MED GY IP 250 OP 250 PS 637: Performed by: PHYSICIAN ASSISTANT

## 2022-05-06 PROCEDURE — 10160 PNXR ASPIR ABSC HMTMA BULLA: CPT | Performed by: PHYSICIAN ASSISTANT

## 2022-05-06 RX ORDER — NALOXONE HYDROCHLORIDE 0.4 MG/ML
0.4 INJECTION, SOLUTION INTRAMUSCULAR; INTRAVENOUS; SUBCUTANEOUS
Status: DISCONTINUED | OUTPATIENT
Start: 2022-05-06 | End: 2022-05-06

## 2022-05-06 RX ORDER — LIDOCAINE HYDROCHLORIDE 10 MG/ML
1-30 INJECTION, SOLUTION EPIDURAL; INFILTRATION; INTRACAUDAL; PERINEURAL
Status: COMPLETED | OUTPATIENT
Start: 2022-05-06 | End: 2022-05-06

## 2022-05-06 RX ORDER — LIDOCAINE 40 MG/G
CREAM TOPICAL
Status: DISCONTINUED | OUTPATIENT
Start: 2022-05-06 | End: 2022-05-07 | Stop reason: HOSPADM

## 2022-05-06 RX ORDER — HYDROXYZINE HYDROCHLORIDE 25 MG/1
25 TABLET, FILM COATED ORAL EVERY 8 HOURS PRN
Qty: 15 TABLET | Refills: 0 | Status: SHIPPED | OUTPATIENT
Start: 2022-05-06

## 2022-05-06 RX ORDER — FENTANYL CITRATE 50 UG/ML
25-50 INJECTION, SOLUTION INTRAMUSCULAR; INTRAVENOUS EVERY 5 MIN PRN
Status: DISCONTINUED | OUTPATIENT
Start: 2022-05-06 | End: 2022-05-07

## 2022-05-06 RX ORDER — OXYCODONE HYDROCHLORIDE 5 MG/1
5-10 TABLET ORAL EVERY 6 HOURS PRN
Qty: 36 TABLET | Refills: 0 | Status: SHIPPED | OUTPATIENT
Start: 2022-05-11 | End: 2022-09-12

## 2022-05-06 RX ORDER — NALOXONE HYDROCHLORIDE 0.4 MG/ML
0.2 INJECTION, SOLUTION INTRAMUSCULAR; INTRAVENOUS; SUBCUTANEOUS
Status: DISCONTINUED | OUTPATIENT
Start: 2022-05-06 | End: 2022-05-06

## 2022-05-06 RX ORDER — OXYCODONE HYDROCHLORIDE 5 MG/1
5-10 TABLET ORAL EVERY 6 HOURS PRN
Status: DISCONTINUED | OUTPATIENT
Start: 2022-05-11 | End: 2022-05-07 | Stop reason: HOSPADM

## 2022-05-06 RX ORDER — OXYCODONE HYDROCHLORIDE 10 MG/1
10-15 TABLET ORAL EVERY 4 HOURS PRN
Qty: 30 TABLET | Refills: 0 | Status: SHIPPED | OUTPATIENT
Start: 2022-05-07 | End: 2022-09-12

## 2022-05-06 RX ORDER — FLUMAZENIL 0.1 MG/ML
0.2 INJECTION, SOLUTION INTRAVENOUS
Status: DISCONTINUED | OUTPATIENT
Start: 2022-05-06 | End: 2022-05-07

## 2022-05-06 RX ADMIN — LIDOCAINE HYDROCHLORIDE 20 ML: 10 INJECTION, SOLUTION EPIDURAL; INFILTRATION; INTRACAUDAL; PERINEURAL at 15:17

## 2022-05-06 RX ADMIN — AMOXICILLIN AND CLAVULANATE POTASSIUM 1 TABLET: 875; 125 TABLET, FILM COATED ORAL at 20:30

## 2022-05-06 RX ADMIN — MORPHINE SULFATE 30 MG: 15 TABLET, EXTENDED RELEASE ORAL at 08:00

## 2022-05-06 RX ADMIN — HYDROMORPHONE HYDROCHLORIDE 0.3 MG: 1 INJECTION, SOLUTION INTRAMUSCULAR; INTRAVENOUS; SUBCUTANEOUS at 20:30

## 2022-05-06 RX ADMIN — MIDAZOLAM 0.5 MG: 1 INJECTION INTRAMUSCULAR; INTRAVENOUS at 14:56

## 2022-05-06 RX ADMIN — OXYCODONE HYDROCHLORIDE 15 MG: 5 TABLET ORAL at 22:27

## 2022-05-06 RX ADMIN — MIDAZOLAM 0.5 MG: 1 INJECTION INTRAMUSCULAR; INTRAVENOUS at 14:42

## 2022-05-06 RX ADMIN — FENTANYL CITRATE 25 MCG: 50 INJECTION, SOLUTION INTRAMUSCULAR; INTRAVENOUS at 14:47

## 2022-05-06 RX ADMIN — HYDROXYZINE HYDROCHLORIDE 50 MG: 25 TABLET, FILM COATED ORAL at 17:03

## 2022-05-06 RX ADMIN — HYDROXYZINE HYDROCHLORIDE 25 MG: 25 TABLET, FILM COATED ORAL at 00:22

## 2022-05-06 RX ADMIN — GABAPENTIN 300 MG: 300 CAPSULE ORAL at 13:21

## 2022-05-06 RX ADMIN — SERTRALINE HYDROCHLORIDE 50 MG: 50 TABLET ORAL at 22:27

## 2022-05-06 RX ADMIN — OXYCODONE HYDROCHLORIDE 15 MG: 5 TABLET ORAL at 06:40

## 2022-05-06 RX ADMIN — FENTANYL CITRATE 25 MCG: 50 INJECTION, SOLUTION INTRAMUSCULAR; INTRAVENOUS at 14:39

## 2022-05-06 RX ADMIN — HYDROXYZINE HYDROCHLORIDE 50 MG: 25 TABLET, FILM COATED ORAL at 12:18

## 2022-05-06 RX ADMIN — GABAPENTIN 300 MG: 300 CAPSULE ORAL at 20:30

## 2022-05-06 RX ADMIN — OXYCODONE HYDROCHLORIDE 15 MG: 5 TABLET ORAL at 17:03

## 2022-05-06 RX ADMIN — MIDAZOLAM 0.5 MG: 1 INJECTION INTRAMUSCULAR; INTRAVENOUS at 14:46

## 2022-05-06 RX ADMIN — HYDROMORPHONE HYDROCHLORIDE 0.3 MG: 1 INJECTION, SOLUTION INTRAMUSCULAR; INTRAVENOUS; SUBCUTANEOUS at 09:16

## 2022-05-06 RX ADMIN — MIDAZOLAM 0.5 MG: 1 INJECTION INTRAMUSCULAR; INTRAVENOUS at 14:40

## 2022-05-06 RX ADMIN — FENTANYL CITRATE 25 MCG: 50 INJECTION, SOLUTION INTRAMUSCULAR; INTRAVENOUS at 14:57

## 2022-05-06 RX ADMIN — HYDROXYZINE HYDROCHLORIDE 25 MG: 25 TABLET, FILM COATED ORAL at 06:41

## 2022-05-06 RX ADMIN — AMOXICILLIN AND CLAVULANATE POTASSIUM 1 TABLET: 875; 125 TABLET, FILM COATED ORAL at 08:00

## 2022-05-06 RX ADMIN — GABAPENTIN 300 MG: 300 CAPSULE ORAL at 08:00

## 2022-05-06 RX ADMIN — OXYCODONE HYDROCHLORIDE 15 MG: 5 TABLET ORAL at 11:13

## 2022-05-06 RX ADMIN — OXYCODONE HYDROCHLORIDE 15 MG: 5 TABLET ORAL at 00:22

## 2022-05-06 RX ADMIN — FENTANYL CITRATE 25 MCG: 50 INJECTION, SOLUTION INTRAMUSCULAR; INTRAVENOUS at 14:42

## 2022-05-06 RX ADMIN — HYDROMORPHONE HYDROCHLORIDE 0.3 MG: 1 INJECTION, SOLUTION INTRAMUSCULAR; INTRAVENOUS; SUBCUTANEOUS at 13:03

## 2022-05-06 ASSESSMENT — ACTIVITIES OF DAILY LIVING (ADL)
ADLS_ACUITY_SCORE: 4

## 2022-05-06 NOTE — PRE-PROCEDURE
GENERAL PRE-PROCEDURE:   Procedure:  Image guided aspiration of perfecto stomal fluid collection   Date/Time:  5/6/2022 2:17 PM    Written consent obtained?: Yes    Risks and benefits: Risks, benefits and alternatives were discussed    Consent given by:  Patient  Patient states understanding of procedure being performed: Yes    Patient's understanding of procedure matches consent: Yes    Procedure consent matches procedure scheduled: Yes    Expected level of sedation:  Moderate  Appropriately NPO:  Yes  ASA Class:  3  Mallampati  :  Grade 2- soft palate, base of uvula, tonsillar pillars, and portion of posterior pharyngeal wall visible  Lungs:  Lungs clear with good breath sounds bilaterally  Heart:  Normal heart sounds and rate  History & Physical reviewed:  History and physical reviewed and no updates needed  Statement of review:  I have reviewed the lab findings, diagnostic data, medications, and the plan for sedation

## 2022-05-06 NOTE — IR NOTE
Patient Name: Vicki Shell  Medical Record Number: [id number]  Today's Date: 5/6/2022    Procedure: ultrasound guided perfecto-stomal abscess aspiration  Proceduralist: Stella Workman PA-C  Pathology present: n/a    Procedure Start: 1439  Procedure end: 1518  Sedation medications administered: fentanyl 100mcg IV, versed 2mg IV    Report given to: Tiffany LEDESMA  : n/a    Other Notes: Pt arrived to IR room 6 from . Consent reviewed. Pt denies any questions or concerns regarding procedure. Pt positioned supine and monitored per protocol. Pt tolerated procedure without any noted complications. Specimens collected and sent to lab. Pt transferred back to .

## 2022-05-06 NOTE — PLAN OF CARE
"/58 (BP Location: Right arm, Patient Position: Supine)   Pulse 96   Temp 99.2  F (37.3  C) (Oral)   Resp 14   Ht 1.702 m (5' 7\")   Wt 45.1 kg (99 lb 6.4 oz)   SpO2 96%   BMI 15.57 kg/m        Neuro: A&O x 4. Denies numbness and tingling.   Resp: WDL. RA. Denies SOB.   Cardiac: WDL. Denies chest pain.   GI/: Voiding spontaneously. Ileostomy w/ adequate output.   Skin: Warm and dry.   Activity: Up ad el.   Incision & Drainage: PIV SL. Band aid at insertion site from IR abscess drainage procedure.    Labs: Reviewed. Potassium and magnesium replacement protocol performed.  Pain: 8/10. Gave PRN oxycodone and PRN dilaudid.   Changes: IR procedure this afternoon for abscess drainage.   Plan: Continue with plan of care.           Goal Outcome Evaluation: Improving     Plan of Care Reviewed With: patient                 "

## 2022-05-06 NOTE — PROCEDURES
[facility]    Procedure: IR Procedure Note    Date/Time: 5/6/2022 3:48 PM  Performed by: Stella Workman PA-C  Authorized by: Stella Workman PA-C       UNIVERSAL PROTOCOL   Site Marked: NA  Prior Images Obtained and Reviewed:  Yes  Required items: Required blood products, implants, devices and special equipment available    Patient identity confirmed:  Verbally with patient, arm band, provided demographic data and hospital-assigned identification number  Patient was reevaluated immediately before administering moderate or deep sedation or anesthesia  Confirmation Checklist:  Patient's identity using two indicators, relevant allergies, procedure was appropriate and matched the consent or emergent situation and correct equipment/implants were available  Time out: Immediately prior to the procedure a time out was called    Universal Protocol: the Joint Commission Universal Protocol was followed    Preparation: Patient was prepped and draped in usual sterile fashion       ANESTHESIA    Anesthesia: Local infiltration  Local Anesthetic:  Lidocaine 1% without epinephrine      SEDATION  Patient Sedated: Yes    Sedation:  Fentanyl and midazolam  Vital signs: Vital signs monitored during sedation    See dictated procedure note for full details.  Findings: 2 mg versed, 100 mcg fentanyl    Specimens: fluid and/or tissue for gram stain and culture and fluid and/or tissue for laboratory analysis    Complications: None    Condition: Stable    Plan: Transport back to inpatient bed for recovery       PROCEDURE  Describe Procedure: Completed US guided aspiration of peristomal fluid collection in two locations yielding thick purulent serosanguinous fluid collection with approximately 4 ml removed and sent for diagnostic testing.  Pass off given to Tad Donnelly at 3:45P.    See dictated note under imaging for more detailed information.        Patient Tolerance:  Patient tolerated the  procedure well with no immediate complications  Length of time physician/provider present for 1:1 monitoring during sedation: 20

## 2022-05-06 NOTE — PROGRESS NOTES
CLINICAL NUTRITION SERVICES - ASSESSMENT NOTE     Nutrition Prescription    RECOMMENDATIONS FOR MDs/PROVIDERS TO ORDER:  None at present    Malnutrition Status:    Patient does not meet two of the established criteria necessary for diagnosing malnutrition but is at risk for malnutrition (w/ high nutrient needs for healing if complications prevent return to good intake).    Recommendations already ordered by Registered Dietitian (RD):  - Continue Ensure Max Protein.    Future/Additional Recommendations:  Follow resume of diet, intake, ileostomy output, electrolytes, weights.      REASON FOR ASSESSMENT  Vicki Shell is a/an 37 year old female assessed by the dietitian for LOS.  Pt was hopefully for discharge 5/5 but abscess confirmed near ileostomy (located on LLQ using same location of prior colostomy).     Chart reviewed.  Per chart, PMH of ovarian cyst drainage (8/2021) c/b rectal injury with feculent peritonitis s/p ex lap w/ end colostomy creation.  Now s/p Ex Lap, colostomy reversal, flex sig, Diverting loop iliostomy on 4/28. Patient was found to have a peristomal abscess for which she is going down to IR for an abscess drain placement 5/6.      Pt seen 4/29 for brief education on post ileostomy diet and left ed materials and list of fiber content of hospital menu and foods allowed on low fiber diet.  Returned on 5/2 to review ed. Reinforced need benefits of  liquids from solids and minimizing sugar (especially in liquid form) to avoid rapid GI transit.     NUTRITION HISTORY  [place] and children.  Noted during her admission at OSH for ovarian cyst drainage c/b rectal injury and colostomy creation, she did require short term TPN.  Pt reports she was able to resume eating without much trouble with intake.      CURRENT NUTRITION ORDERS  Diet: { NPO (for IR aspiration of abscess).  Ensure Max protein @ 2pm q d  Intake/Tolerance: Pt advanced quickly to low fiber diet POD #1 as she had some  "stool output in her ileostomy.  Intake was noted to improve by 5/1 and she has been eating % of meals that are recorded.     Pt has been able to eat despite persistent abdominal pain and high narcotic needs.    She reports that her urine is not concentrated except this AM due to NPO status so feels she's been getting adequate fluids in recent days. Would like to continue the Ensure Max Protein.     LABS  Labs reviewed    MEDICATIONS  Medications reviewed    GI Pt's ileostomy output has been < 1000 ml despite good intake.   Suspect high narcotic needs have slowed transit time to improve absorption.   Noted peristomal abscess (surrounding majority of stoma) per Abd US 5/5.  Plan for IR to aspirate and possibly place drain.     ANTHROPOMETRICS  Height: 170.2 cm (5' 7\")  Most Recent Weight: 45.1 kg (99 lb 6.4 oz) ERROR 4/28: 83.1 kg (183 lb 3.2 oz)  IBW: 61.4 kg (135% IBW)  BMI: 28.7--Overweight BMI 25-29.9  Weight History: No (accurate) weight since admission.   Wt Readings from Last 10 Encounters:   04/28/22 83.1 g (183 lb 3.2 oz)   04/27/22 83.5 kg (184 lb 1.6 oz)   04/26/22 84.3 kg (185 lb 14.4 oz)   02/16/22 84.6 kg (186 lb 9.6 oz)   01/05/22 85.5 kg (188 lb 6.4 oz)   09/02/21  88.9 kg (195 lb 15.8 oz) Hospital stay   08/20/21 86.3 kg (190 lb 3.2 oz) Hosptal stay   07/01/21 87.6 kg (193 lb 1.6 oz)       Dosing Weight: 67 kg (based on 4/28 wt of 83.1 kg and IBW of 61.4 kg)    ASSESSED NUTRITION NEEDS  Estimated Energy Needs: 4024-9879 kcals/day (25 - 30 kcals/kg)  Justification: Maintenance  Estimated Protein Needs:  grams protein/day (1.2 - 1.5 grams of pro/kg)  Justification: Post-op  Estimated Fluid Needs: (1 mL/kcal) or more pending ostomy output  Justification: Maintenance and Per provider pending fluid status    PHYSICAL FINDINGS  See malnutrition section below.    MALNUTRITION  % Intake: No decreased intake noted  % Weight Loss: Weight loss does not meet criteria  Subcutaneous Fat Loss: None " observed  Muscle Loss: None observed  Fluid Accumulation/Edema: None noted  Malnutrition Diagnosis: Patient does not meet two of the established criteria necessary for diagnosing malnutrition but is at risk for malnutrition    NUTRITION DIAGNOSIS  Altered GI function related to takedown of colostomy and creation of loop ileostomy as evidenced by need for modified diet, op report.     INTERVENTIONS  Implementation  Nutrition Education: previously discussed 4/29 and 5/2.  Discussed possible increased ileostomy output once pt able to decrease narcotic use. Potential need to follow recommendations more closely re:  meals and liquids, limiting sugar in liquid form, increasing intake of electrolytes.   Medical food supplement therapy--continue.     Goals  Patient to consume % of nutritionally adequate meal trays TID, or the equivalent with supplements/snacks.     Monitoring/Evaluation  Progress toward goals will be monitored and evaluated per protocol.    Ivana Vasquez RD, LD   7B (M-F) Pager: [phone]  RD Weekend/Holiday Pager: [phone]

## 2022-05-06 NOTE — PROGRESS NOTES
Colorectal Surgery Progress Note  [facility]  POD#8    Subjective:   Feeling the same as she did yesterday. Patient is frustrated that she is unable to go home but understands that she has an abscess that needs to be drained and she is in agreement. No nausea or vomiting.No worsening abdominal pain. Going to IR today for drain placement      Vitals:  Vitals:    05/05/22 0016 05/05/22 0652 05/05/22 1755 05/05/22 2322   BP: 96/53 105/58 122/72 111/52   BP Location: Right arm Right arm Right arm Right arm   Patient Position:  Semi-Obrien's     Pulse: 68 65 93 73   Resp: 18 16 16 16   Temp: 99  F (37.2  C) 98  F (36.7  C) 98.2  F (36.8  C) 98.2  F (36.8  C)   TempSrc: Oral Oral Oral Oral   SpO2: 97% 95% 96% 97%   Weight:       Height:         I/O:    Intake/Output Summary (Last 24 hours) at 5/6/2022 0744  Last data filed at 5/6/2022 0600  Gross per 24 hour   Intake 620 ml   Output 2925 ml   Net -2305 ml         Physical Exam:  Gen: AAOx3, NAD  Pulm: Non-labored breathing  Abd: Soft, mildly distended, relatively unchanged from yesterdays exam: moderately tender near to ostomy with some underlying induration, no fluctuance, no purulent drainage expressed, no guarding/rebound. Mid-line incision relatively clean and dry.  Mild scattered erythema, improving. Stoma pink and viable with liquid stool and gas in bag.  Erythema along the inferiolateral aspect of the stoma resolving.  Ext:  Warm and well-perfused    Imaging   CT 05/04   IMPRESSION:   1. Post surgical changes of rectosigmoidectomy with interval takedown  left lower quadrant ileostomy. New fluid collection within the right  pelvis, difficult to evaluate given adjacent ascites and closely  opposing loops of bowel, however this collection does not appear to be  in connection with the adjacent bowel, raising concern for abscess.  2. Fluid and air collection at the ostomy site that makes a loop.  Difficult to determine if this is a loop of  bowel in the patient's  ostomy or an abscess.  3. Stable hypodensity within the right lobe the liver, unchanged since  9/2/2021, favored benign.  4. Continued evolution of the left ovarian hemorrhagic cyst.    Labs  All labs reveiwed      ASSESSMENT: This is a 37 year old female PMH of ovarian cyst drainage c/b rectal injury with feculent peritonitis s/p ex lap w/ end colostomy creation.  Now s/p Ex Lap, colostomy reversal, flex sig, Diverting loop iliostomy on 4/28. Patient was found to have a peristomal abscess for which she is going down to IR for an abscess drain placement 5/6    Neuro/Pain:   - MS contin. Continue tylenol QID scheduled, gabapentin, robaxin, atarax.  Avoiding NSAIDS.  Oxycodone 10-15mg q4 hours. Dilaudid 0.3mg TID PRN.  Will taper per Pain recs.    CV: no acute concerns  PULM: encourage IS and ambulation  GI/FEN:   - NPO for IR drain placement this morning  - WOCN for ileostomy.  Pravena removed 5/2.  - monitor ileostomy output, will replace high output with IVF  - continue to monitor and replace electrolytes, goal K 4.0, Mg 2.0  - ambulate  : no acute concerns  Heme:  No acute concerns  ID: feculent peritonitis, resolved  Streak of erythema and tenderness inferolateral to ostomy extending to flank  - Patient does have a perfecto-stomal abscess for which she will go down to IR for a drain placement. She is currently NPO for the procedure  - Augmentin 05/05    Endocrine: no acute concerns    Activity: as tolerated.  Ppx: lovenox ppx  Dispo: Likely in the next few days after source control with IR drain and Abx    Ian Griffith MD  Surgery Resident  [id number]

## 2022-05-07 VITALS
HEIGHT: 67 IN | SYSTOLIC BLOOD PRESSURE: 115 MMHG | WEIGHT: 99.4 LBS | HEART RATE: 88 BPM | RESPIRATION RATE: 18 BRPM | BODY MASS INDEX: 15.6 KG/M2 | TEMPERATURE: 99.1 F | OXYGEN SATURATION: 96 % | DIASTOLIC BLOOD PRESSURE: 64 MMHG

## 2022-05-07 LAB
MAGNESIUM SERPL-MCNC: 2.1 MG/DL (ref 1.6–2.3)
PLATELET # BLD AUTO: 311 10E3/UL (ref 150–450)
POTASSIUM BLD-SCNC: 4.3 MMOL/L (ref 3.4–5.3)

## 2022-05-07 PROCEDURE — 83735 ASSAY OF MAGNESIUM: CPT | Performed by: SURGERY

## 2022-05-07 PROCEDURE — 250N000013 HC RX MED GY IP 250 OP 250 PS 637: Performed by: SURGERY

## 2022-05-07 PROCEDURE — 250N000013 HC RX MED GY IP 250 OP 250 PS 637: Performed by: PHYSICIAN ASSISTANT

## 2022-05-07 PROCEDURE — 250N000013 HC RX MED GY IP 250 OP 250 PS 637

## 2022-05-07 PROCEDURE — 85049 AUTOMATED PLATELET COUNT: CPT | Performed by: SURGERY

## 2022-05-07 PROCEDURE — 36415 COLL VENOUS BLD VENIPUNCTURE: CPT | Performed by: SURGERY

## 2022-05-07 PROCEDURE — 250N000011 HC RX IP 250 OP 636

## 2022-05-07 PROCEDURE — 84132 ASSAY OF SERUM POTASSIUM: CPT | Performed by: SURGERY

## 2022-05-07 RX ADMIN — HYDROXYZINE HYDROCHLORIDE 50 MG: 25 TABLET, FILM COATED ORAL at 00:04

## 2022-05-07 RX ADMIN — HYDROMORPHONE HYDROCHLORIDE 0.3 MG: 1 INJECTION, SOLUTION INTRAMUSCULAR; INTRAVENOUS; SUBCUTANEOUS at 09:42

## 2022-05-07 RX ADMIN — LIDOCAINE 1 PATCH: 246 PATCH TOPICAL at 08:23

## 2022-05-07 RX ADMIN — HYDROXYZINE HYDROCHLORIDE 50 MG: 25 TABLET, FILM COATED ORAL at 11:30

## 2022-05-07 RX ADMIN — HYDROXYZINE HYDROCHLORIDE 50 MG: 25 TABLET, FILM COATED ORAL at 06:37

## 2022-05-07 RX ADMIN — MORPHINE SULFATE 30 MG: 15 TABLET, EXTENDED RELEASE ORAL at 08:21

## 2022-05-07 RX ADMIN — GABAPENTIN 300 MG: 300 CAPSULE ORAL at 08:21

## 2022-05-07 RX ADMIN — OXYCODONE HYDROCHLORIDE 15 MG: 5 TABLET ORAL at 06:37

## 2022-05-07 RX ADMIN — AMOXICILLIN AND CLAVULANATE POTASSIUM 1 TABLET: 875; 125 TABLET, FILM COATED ORAL at 08:22

## 2022-05-07 RX ADMIN — OXYCODONE HYDROCHLORIDE 15 MG: 5 TABLET ORAL at 11:31

## 2022-05-07 ASSESSMENT — ACTIVITIES OF DAILY LIVING (ADL)
ADLS_ACUITY_SCORE: 4

## 2022-05-07 NOTE — PLAN OF CARE
Vitals:    05/06/22 1515 05/06/22 2030 05/07/22 0011 05/07/22 0823   BP: 109/58 109/59 100/45 115/64   BP Location: Right arm Right arm Right arm Right arm   Patient Position: Supine      Cuff Size:       Pulse: 96 88 77 88   Resp: 14 17 18 18   Temp:  (!) 96  F (35.6  C) 98.1  F (36.7  C) 99.1  F (37.3  C)   TempSrc:  Oral Oral Oral   SpO2:   96% 96%   Weight:       Height:        Discharge script written, teaching has been done,  A copy of  discharge script given to pt,   discharge med script is ready at discharge pharmacy,  Pt discharged home.

## 2022-05-07 NOTE — PROGRESS NOTES
"/45 (BP Location: Right arm)   Pulse 77   Temp 98.1  F (36.7  C) (Oral)   Resp 18   Ht 1.702 m (5' 7\")   Wt 45.1 kg (99 lb 6.4 oz)   SpO2 96%   BMI 15.57 kg/m       Patient hopeful to go home tomorrow. Alert oriented x 4. Pain controlled with Dilaudid and Oxy.  "

## 2022-05-07 NOTE — PROGRESS NOTES
Colorectal Surgery Progress Note  [facility]  POD#9    Subjective:   Feeling fine today, same as she has been. Looking forward to going home. No nausea or vomiting.No worsening abdominal pain.      Vitals:  Vitals:    05/06/22 1515 05/06/22 2030 05/07/22 0011 05/07/22 0823   BP: 109/58 109/59 100/45 115/64   BP Location: Right arm Right arm Right arm Right arm   Patient Position: Supine      Cuff Size:       Pulse: 96 88 77 88   Resp: 14 17 18 18   Temp:  (!) 96  F (35.6  C) 98.1  F (36.7  C) 99.1  F (37.3  C)   TempSrc:  Oral Oral Oral   SpO2:   96% 96%   Weight:       Height:         I/O:    Intake/Output Summary (Last 24 hours) at 5/6/2022 0744  Last data filed at 5/6/2022 0600  Gross per 24 hour   Intake 620 ml   Output 2925 ml   Net -2305 ml         Physical Exam:  Gen: AAOx3, NAD  Pulm: Non-labored breathing  Abd: Soft, mildly distended, mildly tender near to ostomy where abscess was drained, no guarding/rebound. Mid-line incision relatively clean and dry.  Mild scattered erythema, improving. Stoma pink and viable with liquid stool and gas in bag.  Erythema along the inferiolateral aspect of the stoma resolving.  Ext:  Warm and well-perfused    Imaging   CT 05/04   IMPRESSION:   1. Post surgical changes of rectosigmoidectomy with interval takedown  left lower quadrant ileostomy. New fluid collection within the right  pelvis, difficult to evaluate given adjacent ascites and closely  opposing loops of bowel, however this collection does not appear to be  in connection with the adjacent bowel, raising concern for abscess.  2. Fluid and air collection at the ostomy site that makes a loop.  Difficult to determine if this is a loop of bowel in the patient's  ostomy or an abscess.  3. Stable hypodensity within the right lobe the liver, unchanged since  9/2/2021, favored benign.  4. Continued evolution of the left ovarian hemorrhagic cyst.    Labs  All labs reveiwed      ASSESSMENT: This  is a 37 year old female PMH of ovarian cyst drainage c/b rectal injury with feculent peritonitis s/p ex lap w/ end colostomy creation.  Now s/p Ex Lap, colostomy reversal, flex sig, Diverting loop iliostomy on 4/28. Patient was found to have a peristomal abscess for which she is going down to IR for an abscess drain placement 5/6. Patient to discharge 05/07    Neuro/Pain:   - Discharging with baseline home MS Contin and oxycodone for breakthrough pain.    CV: no acute concerns  PULM: encourage IS and ambulation  GI/FEN:   - IR site healing well  - WOCN for ileostomy.  Pravena removed 5/2.  - monitor ileostomy output, will replace high output with IVF  - continue to monitor and replace electrolytes, goal K 4.0, Mg 2.0  - ambulate  : no acute concerns  Heme:  No acute concerns  ID: feculent peritonitis, resolved  Streak of erythema and tenderness inferolateral to ostomy extending to flank  - Peristomal abscess drained in IR 05/06  - Continue Augmentin on discharge  Endocrine: no acute concerns    Activity: as tolerated.  Ppx: lovenox ppx  Dispo: Today 05/07    Elmira Young, MS4    Agree w/ above.  Kennedy Maier MD  Fellow in Colon and Rectal Surgery  [facility]  Pager: [phone]

## 2022-05-09 ENCOUNTER — PATIENT OUTREACH (OUTPATIENT)
Dept: SURGERY | Facility: CLINIC | Age: 37
End: 2022-05-09
Payer: MEDICAID

## 2022-05-09 NOTE — PROGRESS NOTES
Post Op Note     Called to check on patient postoperatively after hospital discharge.   Patient is s/p colostomy take down, colorectal anastomosis, DLI c/b peristomal abscess s/p IR aspiration with Dr. Tad Richardson   Admitted 4/28/2022 and discharged on 5/7/2022.    LVM x2 and sent a [facility] message to check in on patient. Will wait to hear back from patient     Patient's questions were answered to their stated satisfaction and they are in agreement with this plan.    BALTAZAR Ospina [phone]  Colon & Rectal Surgery Clinic  [facility]

## 2022-05-11 LAB — BACTERIA ABSC ANAEROBE+AEROBE CULT: ABNORMAL

## 2022-05-13 LAB — BACTERIA ABSC ANAEROBE+AEROBE CULT: NORMAL

## 2022-05-18 NOTE — PROGRESS NOTES
"Colon and Rectal Surgery Consult Video Note     Referring provider:  Tad Richardson MD  [address] [place] [address]     RE: Vicki Shell  : 1985  NORMA: 2022    Vicki Shell is a 37 year old female who is being evaluated via a billable video visit.      The patient has been notified of following:     \"This video visit will be conducted via a call between you and your physician/provider. We have found that certain health care needs can be provided without the need for an in-person physical exam.  This service lets us provide the care you need with a video conversation.  If a prescription is necessary we can send it directly to your pharmacy.  If lab work is needed we can place an order for that and you can then stop by our lab to have the test done at a later time.    Video visits are billed at different rates depending on your insurance coverage.  Please reach out to your insurance provider with any questions.    If during the course of the call the physician/provider feels a video visit is not appropriate, you will not be charged for this service.\"    Patient has given verbal consent for Video visit? Yes    Video Start Time: 12:40 PM       Assessment/Plan: 37 year old female who is 4 weeks s/p Exploratory laparotomy, Colostomy reversal, Flexible sigmoidoscopy, Diverting loop ileostomy with myself, and Bilateral ureteral stent placement and cystoscopy (Dr. Plummer) on 2022.  1. Schedule in person visit with me for a flexible sigmoidoscopy, to be seen same week as ileostomy reversal to minimize traveling.  2. Schedule GGE, may be done locally. After I have reviewed GGE, then we can schedule surgery.     Video-Visit Details    Type of service:  Video Visit    Video End Time (time video stopped): 12:55 PM    Originating Location (pt. Location): Home    Distant Location (provider location):  [facility] [place]     Mode of Communication:  " Video Conference via [employer]     Medical history:  Past Medical History:   Diagnosis Date     Colostomy in place (H)      Cyst of left ovary        Surgical history:  Past Surgical History:   Procedure Laterality Date     COMBINED CYSTOSCOPY, INSERT STENT URETER(S) Bilateral 2022    Procedure: CYSTOSCOPY, WITH URETERAL bilateral STENT INSERTION;  Surgeon: Inocencio Washburn MD;  Location: [facility] OR     IR SKIN SUBQ/SEROMA ABSCESS DRAIN  2022     LAPAROSCOPIC ASSISTED COLOSTOMY TAKEDOWN N/A 2022    Procedure: CLOSURE, COLOSTOMY, LAPAROSCOPIC, converted to open colostomy reversal with loop ileostomy creation;  Surgeon: Tad Richardson MD;  Location: [facility] OR     LAPAROSCOPIC ILEOSTOMY N/A 2022    Procedure: CREATION, ILEOSTOMY, LAPAROSCOPIC converted to open;  Surgeon: Tad Richardson MD;  Location: [facility] OR     LAPAROSCOPY DIAGNOSTIC (GYN)      for ovarian cyst     LOW ANTERIOR BOWEL RESECTION       SIGMOIDOSCOPY FLEXIBLE N/A 2022    Procedure: Sigmoidoscopy flexible;  Surgeon: Tad Richardson MD;  Location: [facility] OR       Problem list:  Patient Active Problem List    Diagnosis Date Noted     Colostomy status (H) 2022     Priority: Medium     Cyst of left ovary 2022     Priority: Medium     Colostomy care (H) 2022     Priority: Medium     Status post emergency  hysterectomy 2022     Priority: Medium       Medications:  Current Outpatient Medications   Medication Sig Dispense Refill     gabapentin (NEURONTIN) 300 MG capsule Take 1 capsule (300 mg) by mouth 3 times daily 40 capsule 0     hydrOXYzine (ATARAX) 25 MG tablet Take 1 tablet (25 mg) by mouth every 8 hours as needed for itching or anxiety 15 tablet 0     Lidocaine (LIDOCARE) 4 % Patch Place 1 patch onto the skin every 24 hours To prevent lidocaine toxicity, patient should be patch free for 12 hrs daily. 5 patch 0     methocarbamol (ROBAXIN) 750 MG tablet Take 1 tablet (750 mg) by mouth every 6 hours as  needed for muscle spasms 50 tablet 0     morphine (MS CONTIN) 30 MG CR tablet Take 30 mg by mouth every morning       multivitamin w/minerals (THERA-VIT-M) tablet Take 1 tablet by mouth daily Takes when she remembers       NONFORMULARY Pro Immune supplement (Selenium)  Takes 1 tablet daily (when she remembers)       oxyCODONE (ROXICODONE) 5 MG tablet Take 1-2 tablets (5-10 mg) by mouth every 6 hours as needed for moderate to severe pain Oxycodone 5-10mg every 6 hours as needed x2 days, then oxycodone 5-10mg every 8 hours as needed x2 days, then oxycodone 5-10mg every 12 hours as needed x2 days. Then back to home pre-op pain regimen. 36 tablet 0     oxyCODONE IR (ROXICODONE) 10 MG tablet Take 1-1.5 tablets (10-15 mg) by mouth every 4 hours as needed for moderate to severe pain Oxycodone 10-15mg every 4 hours as needed x2 days then decrease to oxycodone 10mg every 4 hours as needed x2 days. 30 tablet 0     sertraline (ZOLOFT) 50 MG tablet Take 50 mg by mouth At Bedtime         Allergies:  No Known Allergies    Family history:  No family history on file.    Social history:  Social History     Tobacco Use     Smoking status: Current Some Day Smoker     Smokeless tobacco: Never Used     Tobacco comment: 5-7 cigarettes per day   Substance Use Topics     Alcohol use: Not Currently    Marital status: single.      Tad Richardson MD  Division of Colon and Rectal Surgery  [facility]  [phone]       pt reports yesterday he hurt his left ankle during basketball , pt unable to stand for weight

## 2022-05-23 DIAGNOSIS — Z93.3 COLOSTOMY STATUS (H): Primary | ICD-10-CM

## 2022-05-25 ENCOUNTER — VIRTUAL VISIT (OUTPATIENT)
Dept: SURGERY | Facility: CLINIC | Age: 37
End: 2022-05-25
Payer: MEDICAID

## 2022-05-25 VITALS — WEIGHT: 171 LBS | BODY MASS INDEX: 26.84 KG/M2 | HEIGHT: 67 IN

## 2022-05-25 DIAGNOSIS — Z93.3 COLOSTOMY STATUS (H): Primary | ICD-10-CM

## 2022-05-25 PROCEDURE — 99024 POSTOP FOLLOW-UP VISIT: CPT | Mod: 95 | Performed by: SURGERY

## 2022-05-25 ASSESSMENT — PAIN SCALES - GENERAL: PAINLEVEL: MODERATE PAIN (5)

## 2022-05-25 NOTE — LETTER
2022       RE: Vicki Shell  [address] [facility] [address]     Dear Colleague,    Thank you for referring your patient, Vicki Shell, to the [facility] RE[facility]ERY [facility]IN[facility] [place] at [facility]. Please see a copy of my visit note below.    Colon and Rectal Surgery Consult Video Note     Referring provider:  Tad Richardson MD  [address] [place] [address]     RE: Vicki Shell  : 1985  NORMA: 2022      Assessment/Plan: 37 year old female who is 4 weeks s/p Exploratory laparotomy, Colostomy reversal, Flexible sigmoidoscopy, Diverting loop ileostomy with myself, and Bilateral ureteral stent placement and cystoscopy (Dr. Plummer) on 2022.  1. Schedule in person visit with me for a flexible sigmoidoscopy, to be seen same week as ileostomy reversal to minimize traveling.  2. Schedule GGE, may be done locally. After I have reviewed GGE, then we can schedule surgery.     Medical history:  Past Medical History:   Diagnosis Date     Colostomy in place (H)      Cyst of left ovary        Surgical history:  Past Surgical History:   Procedure Laterality Date     COMBINED CYSTOSCOPY, INSERT STENT URETER(S) Bilateral 2022    Procedure: CYSTOSCOPY, WITH URETERAL bilateral STENT INSERTION;  Surgeon: Inocencio Washburn MD;  Location: [facility]     IR SKIN SUBQ/SEROMA ABSCESS DRAIN  2022     LAPAROSCOPIC ASSISTED COLOSTOMY TAKEDOWN N/A 2022    Procedure: CLOSURE, COLOSTOMY, LAPAROSCOPIC, converted to open colostomy reversal with loop ileostomy creation;  Surgeon: Tad Richardson MD;  Location: [facility] OR     LAPAROSCOPIC ILEOSTOMY N/A 2022    Procedure: CREATION, ILEOSTOMY, LAPAROSCOPIC converted to open;  Surgeon: Tad Richardson MD;  Location: [facility] OR     LAPAROSCOPY DIAGNOSTIC (GYN)      for ovarian cyst     LOW ANTERIOR BOWEL RESECTION       SIGMOIDOSCOPY FLEXIBLE N/A 2022    Procedure:  Sigmoidoscopy flexible;  Surgeon: Tad Richardson MD;  Location: [facility] OR       Problem list:  Patient Active Problem List    Diagnosis Date Noted     Colostomy status (H) 2022     Priority: Medium     Cyst of left ovary 2022     Priority: Medium     Colostomy care (H) 2022     Priority: Medium     Status post emergency  hysterectomy 2022     Priority: Medium       Medications:  Current Outpatient Medications   Medication Sig Dispense Refill     gabapentin (NEURONTIN) 300 MG capsule Take 1 capsule (300 mg) by mouth 3 times daily 40 capsule 0     hydrOXYzine (ATARAX) 25 MG tablet Take 1 tablet (25 mg) by mouth every 8 hours as needed for itching or anxiety 15 tablet 0     Lidocaine (LIDOCARE) 4 % Patch Place 1 patch onto the skin every 24 hours To prevent lidocaine toxicity, patient should be patch free for 12 hrs daily. 5 patch 0     methocarbamol (ROBAXIN) 750 MG tablet Take 1 tablet (750 mg) by mouth every 6 hours as needed for muscle spasms 50 tablet 0     morphine (MS CONTIN) 30 MG CR tablet Take 30 mg by mouth every morning       multivitamin w/minerals (THERA-VIT-M) tablet Take 1 tablet by mouth daily Takes when she remembers       NONFORMULARY Pro Immune supplement (Selenium)  Takes 1 tablet daily (when she remembers)       oxyCODONE (ROXICODONE) 5 MG tablet Take 1-2 tablets (5-10 mg) by mouth every 6 hours as needed for moderate to severe pain Oxycodone 5-10mg every 6 hours as needed x2 days, then oxycodone 5-10mg every 8 hours as needed x2 days, then oxycodone 5-10mg every 12 hours as needed x2 days. Then back to home pre-op pain regimen. 36 tablet 0     oxyCODONE IR (ROXICODONE) 10 MG tablet Take 1-1.5 tablets (10-15 mg) by mouth every 4 hours as needed for moderate to severe pain Oxycodone 10-15mg every 4 hours as needed x2 days then decrease to oxycodone 10mg every 4 hours as needed x2 days. 30 tablet 0     sertraline (ZOLOFT) 50 MG tablet Take 50 mg by mouth At Bedtime          Allergies:  No Known Allergies    Family history:  No family history on file.    Social history:  Social History     Tobacco Use     Smoking status: Current Some Day Smoker     Smokeless tobacco: Never Used     Tobacco comment: 5-7 cigarettes per day   Substance Use Topics     Alcohol use: Not Currently    Marital status: single.        Tad Richardson MD  Division of Colon and Rectal Surgery  [facility]  [phone]

## 2022-05-30 NOTE — PROGRESS NOTES
Follow Up Notes on Referred Patient    Date: 2022       Dr. Nikki Sanchez MD  No address on file       RE: Vicki Shell  : 1985  NORMA: 2022    Dear Dr. Nikki Sanchez:    Vicki Shell is a 37 year old woman with a diagnosis of ovarian cyst. The patient is a G5, P4, 3 vaginal deliveries followed by a  section for previa and accreta, underwent a  hysterectomy, is not sure whether likely tubes were removed at that time.  That was about a year and a half ago, then in 2021 she had a right ovarian cyst and underwent a diagnostic laparoscopy, which was complicated by adhesions and then drainage of that ovarian cyst.  She unfortunately developed a bowel leak postoperatively, underwent an exploratory laparotomy, low anterior resection with end colostomy and Merlin's pouch.  Has since then had significant problems with pelvic pain, which is requiring daily narcotics.  She is taking 30 mg of morphine and then hydrocodone for breakthrough pain.  She has a recently seen one of my colleagues, Dr. Richardson, for consultation and the plan is to try to restore continence and reversal of the end colostomy on .  As a workup she has had a recent ultrasound that shows a physiologic cyst on the left ovary.  A recent CT shows a possible either cyst on the left ovary versus inclusion cyst of 4 x 5 cm.  The patient otherwise is eating and drinking well, has no nausea, vomiting, fever or chills.  The end colostomy is working well.  She has also no change in her urinary habits.  No vaginal bleeding or discharge.    Past Medical History:    Past Medical History:   Diagnosis Date     Colostomy in place (H)      Cyst of left ovary          Past Surgical History:    Past Surgical History:   Procedure Laterality Date     COMBINED CYSTOSCOPY, INSERT STENT URETER(S) Bilateral 2022    Procedure: CYSTOSCOPY, WITH URETERAL bilateral STENT INSERTION;  Surgeon: Inocencio Washburn MD;   [facility]     IR SKIN SUBQ/SEROMA ABSCESS DRAIN  5/6/2022     LAPAROSCOPIC ASSISTED COLOSTOMY TAKEDOWN N/A 4/28/2022    Procedure: CLOSURE, COLOSTOMY, LAPAROSCOPIC, converted to open colostomy reversal with loop ileostomy creation;  Surgeon: Tad Richardson MD;  [facility]     LAPAROSCOPIC ILEOSTOMY N/A 4/28/2022    Procedure: CREATION, ILEOSTOMY, LAPAROSCOPIC converted to open;  Surgeon: Tad Richardson MD;  [facility]     LAPAROSCOPY DIAGNOSTIC (GYN)      for ovarian cyst     LOW ANTERIOR BOWEL RESECTION       SIGMOIDOSCOPY FLEXIBLE N/A 4/28/2022    Procedure: Sigmoidoscopy flexible;  Surgeon: Tad Richardson MD;  Location[facility]         Health Maintenance Due   Topic Date Due     PREVENTIVE CARE VISIT  Never done     ADVANCE CARE PLANNING  Never done     COVID-19 Vaccine (1) Never done     Pneumococcal Vaccine: Pediatrics (0 to 5 Years) and At-Risk Patients (6 to 64 Years) (1 - PCV) Never done     HIV SCREENING  Never done     HEPATITIS C SCREENING  Never done     PAP  Never done       Current Medications:     Current Outpatient Medications   Medication Sig Dispense Refill     morphine (MS CONTIN) 30 MG CR tablet Take 30 mg by mouth every morning       multivitamin w/minerals (THERA-VIT-M) tablet Take 1 tablet by mouth daily Takes when she remembers       NONFORMULARY Pro Immune supplement (Selenium)  Takes 1 tablet daily (when she remembers)       sertraline (ZOLOFT) 50 MG tablet Take 50 mg by mouth At Bedtime       gabapentin (NEURONTIN) 300 MG capsule Take 1 capsule (300 mg) by mouth 3 times daily 40 capsule 0     hydrOXYzine (ATARAX) 25 MG tablet Take 1 tablet (25 mg) by mouth every 8 hours as needed for itching or anxiety 15 tablet 0     Lidocaine (LIDOCARE) 4 % Patch Place 1 patch onto the skin every 24 hours To prevent lidocaine toxicity, patient should be patch free for 12 hrs daily. 5 patch 0     methocarbamol (ROBAXIN) 750 MG tablet Take 1 tablet (750 mg) by mouth every 6 hours as  "needed for muscle spasms 50 tablet 0     oxyCODONE (ROXICODONE) 5 MG tablet Take 1-2 tablets (5-10 mg) by mouth every 6 hours as needed for moderate to severe pain Oxycodone 5-10mg every 6 hours as needed x2 days, then oxycodone 5-10mg every 8 hours as needed x2 days, then oxycodone 5-10mg every 12 hours as needed x2 days. Then back to home pre-op pain regimen. 36 tablet 0     oxyCODONE IR (ROXICODONE) 10 MG tablet Take 1-1.5 tablets (10-15 mg) by mouth every 4 hours as needed for moderate to severe pain Oxycodone 10-15mg every 4 hours as needed x2 days then decrease to oxycodone 10mg every 4 hours as needed x2 days. 30 tablet 0         Allergies:      No Known Allergies     Social History:     Social History     Tobacco Use     Smoking status: Current Some Day Smoker     Smokeless tobacco: Never Used     Tobacco comment: 5-7 cigarettes per day   Substance Use Topics     Alcohol use: Not Currently       History   Drug Use Unknown         Family History:     Father had gastric/esophageal cancer.    Physical Exam:     /73 (BP Location: Right arm, Patient Position: Sitting, Cuff Size: Adult Regular)   Pulse 66   Temp 98.1  F (36.7  C) (Oral)   Ht 1.702 m (5' 7\")   Wt 83.5 kg (184 lb 1.6 oz)   LMP  (LMP Unknown)   SpO2 98%   BMI 28.83 kg/m    Body mass index is 28.83 kg/m .    General Appearance: healthy and alert, no distress     Musculoskeletal: extremities non tender and without edema    Skin: no lesions or rashes     Neurological: normal gait, no gross defects     Psychiatric: appropriate mood and affect                               Hematological: normal cervical, supraclavicular and inguinal lymph nodes     Gastrointestinal:      Soft nontender nondistended no organomegaly well-healed midline incision healthy working and end colostomy.    Genitourinary: External genitalia and urethral meatus appears normal.  Vagina is smooth without nodularity or masses.  Cervix appears normal and without lesions.  " Bimanual exam reveal no masses, nodularity or fullness.  Recto-vaginal exam confirms these findings.      Assessment:    Vicki Shell is a 37 year old woman with a diagnosis of  ovarian cyst.     A total of 30 minutes was spent with the patient, 20 minutes of which were spent in counseling the patient and/or treatment planning.      #1 left ovarian cyst   #2 large bowel perforation with end colostomy after ovarian cyst drainage   #3 chronic narcotic use     I have reviewed with patient recent MRI suggesting a resolving left hemorrhage (hemorrhagic cyst.  Significantly decreased in size from over 5 cm to 20 cm.  Patient has no symptoms clear symptoms from this.  I discussed with the patient that we will be available during the surgery with my clinical colorectal surgery however we will plan not to remove her ovaries if they are normal.  Patient agrees with this plan she verbalized all care all questions were answered.    Henry Wills MD, MS    Department of Obstetrics and Gynecology   Division of Gynecologic Oncology   [facility]  Phone: [phone]        CC  Patient Care Team:  Karen Hernandez MD as PCP - General (Family Medicine)  Leobardo Pepe Georgina Marie, DO as Assigned OBGYN Provider  Tad Richardson MD as Assigned Surgical Provider  Tad Richardson MD as Referring Physician (Surgery)  Henry Wills MD as MD (Gynecologic Oncology)  Henry Wills MD as Assigned Cancer Care Provider  SELF, REFERRED

## 2022-06-03 ENCOUNTER — TRANSFERRED RECORDS (OUTPATIENT)
Dept: HEALTH INFORMATION MANAGEMENT | Facility: CLINIC | Age: 37
End: 2022-06-03
Payer: MEDICAID

## 2022-06-03 LAB — BACTERIA ABSC ANAEROBE+AEROBE CULT: ABNORMAL

## 2022-06-23 DIAGNOSIS — Z93.2 S/P ILEOSTOMY (H): Primary | ICD-10-CM

## 2022-06-28 ENCOUNTER — TRANSFERRED RECORDS (OUTPATIENT)
Dept: HEALTH INFORMATION MANAGEMENT | Facility: CLINIC | Age: 37
End: 2022-06-28

## 2022-06-28 LAB
CREATININE (EXTERNAL): 0.7 MG/DL (ref 0.6–1.3)
TSH SERPL-ACNC: 1.25 MIU/L (ref 0.45–5.33)

## 2022-06-29 ENCOUNTER — TELEPHONE (OUTPATIENT)
Dept: SURGERY | Facility: CLINIC | Age: 37
End: 2022-06-29

## 2022-06-29 ENCOUNTER — TRANSFERRED RECORDS (OUTPATIENT)
Dept: HEALTH INFORMATION MANAGEMENT | Facility: CLINIC | Age: 37
End: 2022-06-29

## 2022-06-29 LAB — TSH SERPL-ACNC: 1.25 MIU/L (ref 0.45–5.33)

## 2022-06-29 NOTE — TELEPHONE ENCOUNTER
Records Requested  06/29/22    [facility]ility  [facility]  Phone: [phone]   Outcome * 6/29/22 2:20 PM Called out to [facility] and MAIKEL asking for CT to be pushed to [place] PACs. - Marixa    * 6/30/22 9:01 AM Images received and attached to the patient in PACs. - Marixa    6/28/22 - CT Abd/Pelvis

## 2022-06-30 ENCOUNTER — HOSPITAL ENCOUNTER (INPATIENT)
Dept: GENERAL RADIOLOGY | Facility: CLINIC | Age: 37
Discharge: HOME | End: 2022-06-30
Attending: SURGERY
Payer: MEDICAID

## 2022-06-30 DIAGNOSIS — Z93.2 S/P ILEOSTOMY (H): Primary | ICD-10-CM

## 2022-06-30 DIAGNOSIS — Z93.2 S/P ILEOSTOMY (H): ICD-10-CM

## 2022-06-30 PROCEDURE — 72193 CT PELVIS W/DYE: CPT | Mod: 26 | Performed by: RADIOLOGY

## 2022-07-20 ENCOUNTER — TELEPHONE (OUTPATIENT)
Dept: SURGERY | Facility: CLINIC | Age: 37
End: 2022-07-20

## 2022-07-20 DIAGNOSIS — Z93.3 COLOSTOMY STATUS (H): Primary | ICD-10-CM

## 2022-07-20 NOTE — TELEPHONE ENCOUNTER
Records Requested  07/20/22    Facility  [facility]  Fax: [phone]   Outcome * 7/20/22 9:08 AM Faxed urg req to [facility] for images to be pushed to [place] PACs. - Marixa    * 7/21/22 10:53 AM Images received from [facility] and attached to the patient in PACs. - Marixa    7/18/22 - MRI Pelvis

## 2022-07-22 ENCOUNTER — HOSPITAL ENCOUNTER (INPATIENT)
Dept: GENERAL RADIOLOGY | Facility: CLINIC | Age: 37
Discharge: HOME | End: 2022-07-22
Attending: SURGERY
Payer: MEDICAID

## 2022-07-22 DIAGNOSIS — Z93.3 COLOSTOMY STATUS (H): ICD-10-CM

## 2022-07-22 PROCEDURE — 72197 MRI PELVIS W/O & W/DYE: CPT | Mod: 26 | Performed by: RADIOLOGY

## 2022-07-25 ENCOUNTER — PATIENT OUTREACH (OUTPATIENT)
Dept: GASTROENTEROLOGY | Facility: CLINIC | Age: 37
End: 2022-07-25

## 2022-07-25 ENCOUNTER — PREP FOR PROCEDURE (OUTPATIENT)
Dept: GASTROENTEROLOGY | Facility: CLINIC | Age: 37
End: 2022-07-25

## 2022-07-25 DIAGNOSIS — K91.30 ANASTOMOTIC STRICTURE OF COLORECTAL REGION: Primary | ICD-10-CM

## 2022-07-25 NOTE — TELEPHONE ENCOUNTER
Per Dr. Prieto  this would be a great case for a sigmoidoscopy and stent placement. Anai, if the patient can bowel prep that would be preferred over enemas. This could be at either [facility] or [facility], though Tad likes to join and therefore [facility] is preferred.     Please assist in scheduling:     Procedure/Imaging/Clinic: flex sig  Physician: Dr. Prieto  Timin/3  Procedure length:provider average  Anesthesia:MAC  Dx: anastomotic stricture  Tier:2  Location: [facility]       Called to discuss with patient. Explained they can expect a call from  for date and time of procedure, will need a , someone to stay with them for 24 hours and should stay in town for 24 hours (within 45 min of Hospital) post procedure    Patient needs to get pre-op physical completed. If outside [facility] system will need physical faxed to number [phone]   If you do not get a preop physical, your procedure could be cancelled, patient voiced understanding*    Preop Plan: PCP will do, PCP art, Karen Hernandez Review    Blood thinner -  no  ASA - no  Diabetic - no    COVID test discussed: will do home test    Patient Education r/t procedure:done    Does patient have any history of gastric bypass/gastric surgery/altered panc/bili anatomy?no    A pre-op nurse will call 1-2 days prior to the procedure.    NPO/Prep: Prep details reviewed sent via [employer]    Other specific details/comments:none     Verbalized understanding of all instructions. All questions answered.

## 2022-07-28 ENCOUNTER — TRANSFERRED RECORDS (OUTPATIENT)
Dept: HEALTH INFORMATION MANAGEMENT | Facility: CLINIC | Age: 37
End: 2022-07-28

## 2022-08-02 ENCOUNTER — ANESTHESIA EVENT (OUTPATIENT)
Dept: SURGERY | Facility: CLINIC | Age: 37
End: 2022-08-02
Payer: MEDICAID

## 2022-08-02 ASSESSMENT — LIFESTYLE VARIABLES: TOBACCO_USE: 1

## 2022-08-03 ENCOUNTER — APPOINTMENT (OUTPATIENT)
Dept: GENERAL RADIOLOGY | Facility: CLINIC | Age: 37
End: 2022-08-03
Attending: INTERNAL MEDICINE
Payer: MEDICAID

## 2022-08-03 ENCOUNTER — ANESTHESIA (OUTPATIENT)
Dept: SURGERY | Facility: CLINIC | Age: 37
End: 2022-08-03
Payer: MEDICAID

## 2022-08-03 ENCOUNTER — HOSPITAL ENCOUNTER (OUTPATIENT)
Facility: CLINIC | Age: 37
Discharge: HOME | End: 2022-08-03
Attending: INTERNAL MEDICINE | Admitting: INTERNAL MEDICINE
Payer: MEDICAID

## 2022-08-03 VITALS
OXYGEN SATURATION: 98 % | TEMPERATURE: 98.2 F | DIASTOLIC BLOOD PRESSURE: 74 MMHG | HEART RATE: 79 BPM | SYSTOLIC BLOOD PRESSURE: 103 MMHG | HEIGHT: 67 IN | RESPIRATION RATE: 16 BRPM | WEIGHT: 166.67 LBS | BODY MASS INDEX: 26.16 KG/M2

## 2022-08-03 LAB
FLEXIBLE SIGMOIDOSCOPY: NORMAL
GLUCOSE BLDC GLUCOMTR-MCNC: 106 MG/DL (ref 70–99)

## 2022-08-03 PROCEDURE — 250N000009 HC RX 250: Performed by: NURSE ANESTHETIST, CERTIFIED REGISTERED

## 2022-08-03 PROCEDURE — 250N000009 HC RX 250: Performed by: INTERNAL MEDICINE

## 2022-08-03 PROCEDURE — 370N000017 HC ANESTHESIA TECHNICAL FEE, PER MIN: Performed by: INTERNAL MEDICINE

## 2022-08-03 PROCEDURE — C1726 CATH, BAL DIL, NON-VASCULAR: HCPCS | Performed by: INTERNAL MEDICINE

## 2022-08-03 PROCEDURE — 999N000141 HC STATISTIC PRE-PROCEDURE NURSING ASSESSMENT: Performed by: INTERNAL MEDICINE

## 2022-08-03 PROCEDURE — 250N000011 HC RX IP 250 OP 636: Performed by: NURSE ANESTHETIST, CERTIFIED REGISTERED

## 2022-08-03 PROCEDURE — 82962 GLUCOSE BLOOD TEST: CPT

## 2022-08-03 PROCEDURE — 250N000011 HC RX IP 250 OP 636: Performed by: INTERNAL MEDICINE

## 2022-08-03 PROCEDURE — 999N000181 XR SURGERY CARM FLUORO GREATER THAN 5 MIN W STILLS: Mod: TC

## 2022-08-03 PROCEDURE — 258N000003 HC RX IP 258 OP 636: Performed by: INTERNAL MEDICINE

## 2022-08-03 PROCEDURE — 272N000001 HC OR GENERAL SUPPLY STERILE: Performed by: INTERNAL MEDICINE

## 2022-08-03 PROCEDURE — C1769 GUIDE WIRE: HCPCS | Performed by: INTERNAL MEDICINE

## 2022-08-03 PROCEDURE — 360N000075 HC SURGERY LEVEL 2, PER MIN: Performed by: INTERNAL MEDICINE

## 2022-08-03 PROCEDURE — 710N000012 HC RECOVERY PHASE 2, PER MINUTE: Performed by: INTERNAL MEDICINE

## 2022-08-03 PROCEDURE — 258N000003 HC RX IP 258 OP 636: Performed by: NURSE ANESTHETIST, CERTIFIED REGISTERED

## 2022-08-03 RX ORDER — HYDRALAZINE HYDROCHLORIDE 20 MG/ML
2.5-5 INJECTION INTRAMUSCULAR; INTRAVENOUS EVERY 10 MIN PRN
Status: DISCONTINUED | OUTPATIENT
Start: 2022-08-03 | End: 2022-08-03 | Stop reason: HOSPADM

## 2022-08-03 RX ORDER — ACETAMINOPHEN 325 MG/1
975 TABLET ORAL ONCE
Status: DISCONTINUED | OUTPATIENT
Start: 2022-08-03 | End: 2022-08-03 | Stop reason: HOSPADM

## 2022-08-03 RX ORDER — LIDOCAINE 40 MG/G
CREAM TOPICAL
Status: DISCONTINUED | OUTPATIENT
Start: 2022-08-03 | End: 2022-08-03 | Stop reason: HOSPADM

## 2022-08-03 RX ORDER — HYDROMORPHONE HYDROCHLORIDE 1 MG/ML
0.4 INJECTION, SOLUTION INTRAMUSCULAR; INTRAVENOUS; SUBCUTANEOUS EVERY 5 MIN PRN
Status: DISCONTINUED | OUTPATIENT
Start: 2022-08-03 | End: 2022-08-03 | Stop reason: HOSPADM

## 2022-08-03 RX ORDER — SODIUM CHLORIDE, SODIUM LACTATE, POTASSIUM CHLORIDE, CALCIUM CHLORIDE 600; 310; 30; 20 MG/100ML; MG/100ML; MG/100ML; MG/100ML
INJECTION, SOLUTION INTRAVENOUS CONTINUOUS
Status: DISCONTINUED | OUTPATIENT
Start: 2022-08-03 | End: 2022-08-03 | Stop reason: HOSPADM

## 2022-08-03 RX ORDER — METOPROLOL TARTRATE 1 MG/ML
1-2 INJECTION, SOLUTION INTRAVENOUS EVERY 5 MIN PRN
Status: DISCONTINUED | OUTPATIENT
Start: 2022-08-03 | End: 2022-08-03 | Stop reason: HOSPADM

## 2022-08-03 RX ORDER — PROPOFOL 10 MG/ML
INJECTION, EMULSION INTRAVENOUS CONTINUOUS PRN
Status: DISCONTINUED | OUTPATIENT
Start: 2022-08-03 | End: 2022-08-03

## 2022-08-03 RX ORDER — LIDOCAINE HYDROCHLORIDE 20 MG/ML
INJECTION, SOLUTION INFILTRATION; PERINEURAL PRN
Status: DISCONTINUED | OUTPATIENT
Start: 2022-08-03 | End: 2022-08-03

## 2022-08-03 RX ORDER — HALOPERIDOL 5 MG/ML
1 INJECTION INTRAMUSCULAR
Status: DISCONTINUED | OUTPATIENT
Start: 2022-08-03 | End: 2022-08-03 | Stop reason: HOSPADM

## 2022-08-03 RX ORDER — PROPOFOL 10 MG/ML
INJECTION, EMULSION INTRAVENOUS PRN
Status: DISCONTINUED | OUTPATIENT
Start: 2022-08-03 | End: 2022-08-03

## 2022-08-03 RX ORDER — SODIUM CHLORIDE, SODIUM LACTATE, POTASSIUM CHLORIDE, CALCIUM CHLORIDE 600; 310; 30; 20 MG/100ML; MG/100ML; MG/100ML; MG/100ML
INJECTION, SOLUTION INTRAVENOUS CONTINUOUS PRN
Status: DISCONTINUED | OUTPATIENT
Start: 2022-08-03 | End: 2022-08-03

## 2022-08-03 RX ORDER — CEFAZOLIN SODIUM 1 G/3ML
INJECTION, POWDER, FOR SOLUTION INTRAMUSCULAR; INTRAVENOUS PRN
Status: DISCONTINUED | OUTPATIENT
Start: 2022-08-03 | End: 2022-08-03

## 2022-08-03 RX ORDER — ONDANSETRON 2 MG/ML
4 INJECTION INTRAMUSCULAR; INTRAVENOUS
Status: DISCONTINUED | OUTPATIENT
Start: 2022-08-03 | End: 2022-08-03 | Stop reason: HOSPADM

## 2022-08-03 RX ORDER — OXYCODONE HYDROCHLORIDE 5 MG/1
5 TABLET ORAL
Status: DISCONTINUED | OUTPATIENT
Start: 2022-08-03 | End: 2022-08-03 | Stop reason: HOSPADM

## 2022-08-03 RX ADMIN — PROPOFOL 150 MCG/KG/MIN: 10 INJECTION, EMULSION INTRAVENOUS at 14:46

## 2022-08-03 RX ADMIN — CEFAZOLIN 2 G: 1 INJECTION, POWDER, FOR SOLUTION INTRAMUSCULAR; INTRAVENOUS at 14:51

## 2022-08-03 RX ADMIN — SODIUM CHLORIDE, POTASSIUM CHLORIDE, SODIUM LACTATE AND CALCIUM CHLORIDE: 600; 310; 30; 20 INJECTION, SOLUTION INTRAVENOUS at 14:41

## 2022-08-03 RX ADMIN — LIDOCAINE HYDROCHLORIDE 60 MG: 20 INJECTION, SOLUTION INFILTRATION; PERINEURAL at 14:45

## 2022-08-03 RX ADMIN — HYDROMORPHONE HYDROCHLORIDE 0.5 MG: 1 INJECTION, SOLUTION INTRAMUSCULAR; INTRAVENOUS; SUBCUTANEOUS at 15:51

## 2022-08-03 RX ADMIN — PROPOFOL 50 MG: 10 INJECTION, EMULSION INTRAVENOUS at 14:46

## 2022-08-03 NOTE — DISCHARGE INSTRUCTIONS
- Options include EUS guided recannalization with possible tandem approach from upstream across the   ileostomy (lower of the two) with a pediatric gastroscope to provide light guidance and or fluid target; this would need to occur at [facility] under deep sedation; we will reach out with a plan once reviewed with Dr Richardson                            - All medications, diet and activity may resume without delay

## 2022-08-03 NOTE — OR NURSING
Upon arrival pt states she does not have responsible adult here with her, Dr Prieto at bedside and confirmed that pt does require responsible adult. Pt has been trying to confirm friend or family member for over an hour. Dr Prieto aware.     Responsible adult contacted and confirmed to transport/stay with patient overnight.

## 2022-08-03 NOTE — ANESTHESIA CARE TRANSFER NOTE
Patient: Vicki Shell    Procedure: Procedure(s):  SIGMOIDOSCOPY, FLEXIBLE WITH CONTRAST       Diagnosis: Anastomotic stricture of colorectal region [K91.30]  Diagnosis Additional Information: No value filed.    Anesthesia Type:   MAC     Note:    Oropharynx: oropharynx clear of all foreign objects and spontaneously breathing  Level of Consciousness: awake  Oxygen Supplementation: room air    Independent Airway: airway patency satisfactory and stable  Dentition: dentition unchanged  Vital Signs Stable: post-procedure vital signs reviewed and stable  Report to RN Given: handoff report given  Patient transferred to: Phase II    Handoff Report: Identifed the Patient, Identified the Reponsible Provider, Reviewed the pertinent medical history, Discussed the surgical course, Reviewed Intra-OP anesthesia mangement and issues during anesthesia, Set expectations for post-procedure period and Allowed opportunity for questions and acknowledgement of understanding      Vitals:  Vitals Value Taken Time   BP     Temp     Pulse     Resp     SpO2         Electronically Signed By: CHANCE Hawkins CRNA  August 3, 2022  4:00 PM

## 2022-08-03 NOTE — OP NOTE
Flex Sig 08/03/2022 11:21 AM Nate Molina   [address] [place] [address] [phone]     Endoscopy Department   _______________________________________________________________________________   Patient Name: Vicki Shell            Procedure Date: 8/3/2022 11:21 AM   MRN: [id number]                       Account Number: [id number]   YOB: 1985              Admit Type: Outpatient   Age: 37                               Room: [place] [address]   Gender: Female                        Note Status: Finalized   Attending MD: PJ LUI MD  Total Sedation Time:   _______________________________________________________________________________       Procedure:             Flexible Sigmoidoscopy   Indications:           Ileo-rectal stenosis   Providers:             PJ LUI MD, Ashley JACOBSON   Patient Profile:       Ms Shell is a 38yo woman who had a complicated                          surgical procedure requiring ileostomy and ileorectal                          anastomosis. Imaging suggests tight stenosis of the                          latter and she now proceeds to sigmoidoscopy for                          management.   Referring MD:          CJ DE LA TORRE MD   Medicines:             Deep sedation was administered   Complications:         No immediate complications.   _______________________________________________________________________________   Procedure:             Pre-Anesthesia Assessment:                          - Prior to the procedure, a History and Physical was                          performed, and patient medications and allergies were                          reviewed. The patient is competent. The risks and                          benefits of the procedure and the sedation options and                          risks were discussed with the patient. All questions                           were answered and informed consent was obtained.                          Patient identification and proposed procedure were                          verified by the nurse in the pre-procedure area.                          Mental Status Examination: alert and oriented. Airway                          Examination: Mallampati Class II (the uvula but not                          tonsillar pillars visualized). Respiratory                          Examination: clear to auscultation. CV Examination:                          normal. ASA Grade Assessment: II - A patient with mild                          systemic disease. After reviewing the risks and                          benefits, the patient was deemed in satisfactory                          condition to undergo the procedure. The anesthesia                          plan was to use deep sedation / analgesia. Immediately                          prior to administration of medications, the patient                          was re-assessed for adequacy to receive sedatives. The                          heart rate, respiratory rate, oxygen saturations,                          blood pressure, adequacy of pulmonary ventilation, and                          response to care were monitored throughout the                          procedure. The physical status of the patient was                          re-assessed after the procedure. After obtaining                          informed consent, the scope was passed under direct                          vision. The was introduced through the anus and                          advanced to the ileo-rectal anastomosis. The 1T                          gastroscope was accomplished without difficulty. The                          patient tolerated the procedure well. The quality of                          the bowel preparation was adequate.                                                                                      Findings:        The patient was supine, frog legged and under deep sedation. Rectal exam        was unremarkable. The residual rectum was unremarkable appeared to        terminate at a blnid end. Neither contrast nor gas was found refluxing        upstream of fluoroscopy despite persistent wire and catheter probing,        contrast injection and use of a distal attachment. We attempted to        approach the stenosis from the ileostomy, however access to the        downstream bowel was not readily available and this was deferred.                                                                                     Impression:            - Complete obliteration of the ileorectal anastomosis;                          no intervention performed   Recommendation:        - Deeps sedation recovery with probable discharge home                          this afternoon                          - Options include EUS guided recannalization with                          possible tandem approach from upstream across the                          ileostomy (lower of the two) with a pediatric                          gastroscope to provide light guidance and or fluid for                          target; this would need to occur at [facility] under deep                          sedation; we will reach out with a plan once reviewed                          with Dr Richardson                          - All medications, diet and activity may resume                          without delay                          - The findings and recommendations were discussed with                          the patient                                                                                       electronically signed by GRETCHEN Prieto

## 2022-08-03 NOTE — ANESTHESIA POSTPROCEDURE EVALUATION
Patient: Vicki Shell    Procedure: Procedure(s):  SIGMOIDOSCOPY, FLEXIBLE WITH CONTRAST       Anesthesia Type:  MAC    Note:  Disposition: Outpatient   Postop Pain Control: Uneventful            Sign Out: Well controlled pain   PONV: No   Neuro/Psych: Uneventful            Sign Out: Acceptable/Baseline neuro status   Airway/Respiratory: Uneventful            Sign Out: Acceptable/Baseline resp. status   CV/Hemodynamics: Uneventful            Sign Out: Acceptable CV status; No obvious hypovolemia; No obvious fluid overload   Other NRE: NONE   DID A NON-ROUTINE EVENT OCCUR? No           Last vitals:  Vitals Value Taken Time   /71 08/03/22 1615   Temp 36.8  C (98.2  F) 08/03/22 1601   Pulse 64 08/03/22 1613   Resp 16 08/03/22 1615   SpO2 99 % 08/03/22 1615   Vitals shown include unvalidated device data.    Electronically Signed By: ALEXUS CAMARILLO MD  August 3, 2022  4:16 PM

## 2022-08-03 NOTE — ANESTHESIA PREPROCEDURE EVALUATION
Anesthesia Pre-Procedure Evaluation    Patient: Vicki Shell   MRN: [id number] : 1985        Procedure : Procedure(s):  SIGMOIDOSCOPY, FLEXIBLE          Past Medical History:   Diagnosis Date     Colostomy in place (H)      Cyst of left ovary       Past Surgical History:   Procedure Laterality Date     COMBINED CYSTOSCOPY, INSERT STENT URETER(S) Bilateral 2022    Procedure: CYSTOSCOPY, WITH URETERAL bilateral STENT INSERTION;  Surgeon: Inocencio Washburn MD;  [facility]     IR SKIN SUBQ/SEROMA ABSCESS DRAIN  2022     LAPAROSCOPIC ASSISTED COLOSTOMY TAKEDOWN N/A 2022    Procedure: CLOSURE, COLOSTOMY, LAPAROSCOPIC, converted to open colostomy reversal with loop ileostomy creation;  Surgeon: Tad Richardson MD;  [facility]     LAPAROSCOPIC ILEOSTOMY N/A 2022    Procedure: CREATION, ILEOSTOMY, LAPAROSCOPIC converted to open;  Surgeon: Tad Richardson MD;  [facility]     LAPAROSCOPY DIAGNOSTIC (GYN)      for ovarian cyst     LOW ANTERIOR BOWEL RESECTION       SIGMOIDOSCOPY FLEXIBLE N/A 2022    Procedure: Sigmoidoscopy flexible;  Surgeon: Tad Richardson MD;  [facility]: [facility]      No Known Allergies   Social History     Tobacco Use     Smoking status: Current Some Day Smoker     Smokeless tobacco: Never Used     Tobacco comment: 5-7 cigarettes per day   Substance Use Topics     Alcohol use: Not Currently      Wt Readings from Last 1 Encounters:   22 77.6 kg (171 lb)        Anesthesia Evaluation   Pt has had prior anesthetic.     No history of anesthetic complications       ROS/MED HX  ENT/Pulmonary:     (+) tobacco use, Current use,     Neurologic:  - neg neurologic ROS     Cardiovascular:  - neg cardiovascular ROS     METS/Exercise Tolerance: >4 METS    Hematologic:  - neg hematologic  ROS     Musculoskeletal: Comment: Low back pain      GI/Hepatic: Comment: Ovarian cyst removal surgery c/b rectal perforation s/p colostomy, abdominal abscess, diverting loop  ileostomy      Renal/Genitourinary: Comment: Hx bilateral ureteral stents      Endo:  - neg endo ROS     Psychiatric/Substance Use:     (+) psychiatric history anxiety H/O chronic opiod use .     Infectious Disease:  - neg infectious disease ROS     Malignancy:       Other:            Physical Exam    Airway        Mallampati: III   TM distance: > 3 FB   Neck ROM: full   Mouth opening: > 3 cm    Respiratory Devices and Support         Dental  no notable dental history         Cardiovascular          Rhythm and rate: regular and normal     Pulmonary           breath sounds clear to auscultation           OUTSIDE LABS:  CBC:   Lab Results   Component Value Date    WBC 6.1 05/03/2022    WBC 10.9 04/29/2022    HGB 9.7 (L) 05/03/2022    HGB 11.5 (L) 04/29/2022    HCT 30.1 (L) 05/03/2022    HCT 35.8 04/29/2022     05/07/2022     05/04/2022     BMP:   Lab Results   Component Value Date     05/03/2022     05/02/2022    POTASSIUM 4.3 05/07/2022    POTASSIUM 4.0 05/06/2022    CHLORIDE 108 05/03/2022    CHLORIDE 106 05/02/2022    CO2 26 05/03/2022    CO2 25 05/02/2022    BUN 5 (L) 05/03/2022    BUN 4 (L) 05/02/2022    CR 0.56 05/03/2022    CR 0.55 05/02/2022    GLC 82 05/06/2022     (H) 05/06/2022     COAGS:   Lab Results   Component Value Date    PTT 30 02/16/2022    INR 1.09 05/06/2022     POC: No results found for: BGM, HCG, HCGS  HEPATIC:   Lab Results   Component Value Date    ALBUMIN 3.7 02/16/2022    PROTTOTAL 7.3 02/16/2022    ALT 20 02/16/2022    AST 16 02/16/2022    ALKPHOS 66 02/16/2022    BILITOTAL 0.5 02/16/2022     OTHER:   Lab Results   Component Value Date    MANNY 8.2 (L) 05/03/2022    PHOS 2.6 05/01/2022    MAG 2.1 05/07/2022       Anesthesia Plan    ASA Status:  2   NPO Status:  NPO Appropriate    Anesthesia Type: MAC.   Induction: N/a.   Maintenance: TIVA.        Consents    Anesthesia Plan(s) and associated risks, benefits, and realistic alternatives discussed. Questions  answered and patient/representative(s) expressed understanding.    - Discussed:     - Discussed with:  Patient      - Extended Intubation/Ventilatory Support Discussed: No.      - Patient is DNR/DNI Status: No    Use of blood products discussed: No .     Postoperative Care    Pain management: IV analgesics, Oral pain medications, Multi-modal analgesia.   PONV prophylaxis: Ondansetron (or other 5HT-3), Background Propofol Infusion     Comments:                Ian Velasco MD

## 2022-08-04 ENCOUNTER — TELEPHONE (OUTPATIENT)
Dept: SURGERY | Facility: CLINIC | Age: 37
End: 2022-08-04

## 2022-08-04 NOTE — TELEPHONE ENCOUNTER
Called patient to offer her to move up her Ileostomy Closure surgery to 8/10 since there was a cancellation. Patient says she thinks she needs to do another procedure ahead of this now, after her colonoscopy results yesterday.    Spoke with Dr. Richardson via Deann, BALTAZAR, patient must see Dr. Prieto ahead of C&R surgery. Dr. Richardson said not to plan any date yet (so removing from wait list date 9/13/2022).    Spoke with patient again, confirmed the above. Agreed to be in touch after she meets with Dr. Prieto again.    Shavon Salomon  Kat-op Coordinator  [facility]  Direct Phone: [phone]

## 2022-08-08 ENCOUNTER — PATIENT OUTREACH (OUTPATIENT)
Dept: GASTROENTEROLOGY | Facility: CLINIC | Age: 37
End: 2022-08-08

## 2022-08-08 NOTE — TELEPHONE ENCOUNTER
Post flex sig w/ Dr. Prieto 8/3/22  Options include EUS guided recannalization with    possible tandem approach from upstream across the       ileostomy (lower of the two) with a pediatric    gastroscope to provide light guidance and or fluid for    target; this would need to occur at [facility] under deep   sedation; we will reach out with a plan once reviewed   with Dr Richardson     Please assist in scheduling:     Procedure/Imaging/Clinic: lower EUS/flex sig  Physician: Dr. Prieto  Timin/13  Procedure length:provider average  Anesthesia:MAC  Dx: colonic stricture  Tier:2  Location: [facility]       Comments: pt needs full prep- Miralax prep, MyChart sent    Dr. Prieto can update last H&P, pt will get covid test    ML

## 2022-08-16 ENCOUNTER — PREP FOR PROCEDURE (OUTPATIENT)
Dept: GASTROENTEROLOGY | Facility: CLINIC | Age: 37
End: 2022-08-16

## 2022-08-16 DIAGNOSIS — K91.30 COLORECTAL ANASTOMOTIC STRICTURE: Primary | ICD-10-CM

## 2022-09-13 ENCOUNTER — ANESTHESIA (OUTPATIENT)
Dept: SURGERY | Facility: CLINIC | Age: 37
End: 2022-09-13
Payer: MEDICAID

## 2022-09-13 ENCOUNTER — APPOINTMENT (OUTPATIENT)
Dept: GENERAL RADIOLOGY | Facility: CLINIC | Age: 37
End: 2022-09-13
Attending: INTERNAL MEDICINE
Payer: MEDICAID

## 2022-09-13 ENCOUNTER — ANESTHESIA EVENT (OUTPATIENT)
Dept: SURGERY | Facility: CLINIC | Age: 37
End: 2022-09-13
Payer: MEDICAID

## 2022-09-13 ENCOUNTER — HOSPITAL ENCOUNTER (OUTPATIENT)
Facility: CLINIC | Age: 37
Discharge: HOME | End: 2022-09-13
Attending: INTERNAL MEDICINE | Admitting: INTERNAL MEDICINE
Payer: MEDICAID

## 2022-09-13 VITALS
RESPIRATION RATE: 18 BRPM | BODY MASS INDEX: 26.4 KG/M2 | WEIGHT: 168.21 LBS | HEIGHT: 67 IN | TEMPERATURE: 98.2 F | HEART RATE: 82 BPM | OXYGEN SATURATION: 99 % | DIASTOLIC BLOOD PRESSURE: 75 MMHG | SYSTOLIC BLOOD PRESSURE: 108 MMHG

## 2022-09-13 LAB
FLEXIBLE SIGMOIDOSCOPY: NORMAL
GLUCOSE BLDC GLUCOMTR-MCNC: 97 MG/DL (ref 70–99)

## 2022-09-13 PROCEDURE — 250N000009 HC RX 250: Performed by: INTERNAL MEDICINE

## 2022-09-13 PROCEDURE — 710N000010 HC RECOVERY PHASE 1, LEVEL 2, PER MIN: Performed by: INTERNAL MEDICINE

## 2022-09-13 PROCEDURE — 250N000011 HC RX IP 250 OP 636: Performed by: STUDENT IN AN ORGANIZED HEALTH CARE EDUCATION/TRAINING PROGRAM

## 2022-09-13 PROCEDURE — 272N000001 HC OR GENERAL SUPPLY STERILE: Performed by: INTERNAL MEDICINE

## 2022-09-13 PROCEDURE — 250N000025 HC SEVOFLURANE, PER MIN: Performed by: INTERNAL MEDICINE

## 2022-09-13 PROCEDURE — 710N000012 HC RECOVERY PHASE 2, PER MINUTE: Performed by: INTERNAL MEDICINE

## 2022-09-13 PROCEDURE — 82962 GLUCOSE BLOOD TEST: CPT

## 2022-09-13 PROCEDURE — 370N000017 HC ANESTHESIA TECHNICAL FEE, PER MIN: Performed by: INTERNAL MEDICINE

## 2022-09-13 PROCEDURE — 255N000002 HC RX 255 OP 636: Performed by: INTERNAL MEDICINE

## 2022-09-13 PROCEDURE — C1769 GUIDE WIRE: HCPCS | Performed by: INTERNAL MEDICINE

## 2022-09-13 PROCEDURE — 250N000009 HC RX 250: Performed by: STUDENT IN AN ORGANIZED HEALTH CARE EDUCATION/TRAINING PROGRAM

## 2022-09-13 PROCEDURE — 999N000180 XR SURGERY CARM FLUORO LESS THAN 5 MIN: Mod: TC

## 2022-09-13 PROCEDURE — 360N000078 HC SURGERY LEVEL 5, PER MIN: Performed by: INTERNAL MEDICINE

## 2022-09-13 PROCEDURE — 258N000003 HC RX IP 258 OP 636: Performed by: STUDENT IN AN ORGANIZED HEALTH CARE EDUCATION/TRAINING PROGRAM

## 2022-09-13 PROCEDURE — 999N000141 HC STATISTIC PRE-PROCEDURE NURSING ASSESSMENT: Performed by: INTERNAL MEDICINE

## 2022-09-13 RX ORDER — FENTANYL CITRATE 50 UG/ML
25 INJECTION, SOLUTION INTRAMUSCULAR; INTRAVENOUS EVERY 5 MIN PRN
Status: DISCONTINUED | OUTPATIENT
Start: 2022-09-13 | End: 2022-09-13 | Stop reason: HOSPADM

## 2022-09-13 RX ORDER — SODIUM CHLORIDE, SODIUM LACTATE, POTASSIUM CHLORIDE, CALCIUM CHLORIDE 600; 310; 30; 20 MG/100ML; MG/100ML; MG/100ML; MG/100ML
INJECTION, SOLUTION INTRAVENOUS CONTINUOUS
Status: DISCONTINUED | OUTPATIENT
Start: 2022-09-13 | End: 2022-09-13 | Stop reason: HOSPADM

## 2022-09-13 RX ORDER — FENTANYL CITRATE 50 UG/ML
25 INJECTION, SOLUTION INTRAMUSCULAR; INTRAVENOUS
Status: DISCONTINUED | OUTPATIENT
Start: 2022-09-13 | End: 2022-09-13 | Stop reason: HOSPADM

## 2022-09-13 RX ORDER — LIDOCAINE 40 MG/G
CREAM TOPICAL
Status: DISCONTINUED | OUTPATIENT
Start: 2022-09-13 | End: 2022-09-13 | Stop reason: HOSPADM

## 2022-09-13 RX ORDER — CIPROFLOXACIN 2 MG/ML
INJECTION, SOLUTION INTRAVENOUS PRN
Status: DISCONTINUED | OUTPATIENT
Start: 2022-09-13 | End: 2022-09-13

## 2022-09-13 RX ORDER — ONDANSETRON 4 MG/1
4 TABLET, ORALLY DISINTEGRATING ORAL EVERY 30 MIN PRN
Status: DISCONTINUED | OUTPATIENT
Start: 2022-09-13 | End: 2022-09-13 | Stop reason: HOSPADM

## 2022-09-13 RX ORDER — ONDANSETRON 2 MG/ML
4 INJECTION INTRAMUSCULAR; INTRAVENOUS EVERY 30 MIN PRN
Status: DISCONTINUED | OUTPATIENT
Start: 2022-09-13 | End: 2022-09-13 | Stop reason: HOSPADM

## 2022-09-13 RX ORDER — IOPAMIDOL 510 MG/ML
INJECTION, SOLUTION INTRAVASCULAR PRN
Status: DISCONTINUED | OUTPATIENT
Start: 2022-09-13 | End: 2022-09-13 | Stop reason: HOSPADM

## 2022-09-13 RX ORDER — LIDOCAINE HYDROCHLORIDE 20 MG/ML
INJECTION, SOLUTION INFILTRATION; PERINEURAL PRN
Status: DISCONTINUED | OUTPATIENT
Start: 2022-09-13 | End: 2022-09-13

## 2022-09-13 RX ORDER — FENTANYL CITRATE 50 UG/ML
INJECTION, SOLUTION INTRAMUSCULAR; INTRAVENOUS PRN
Status: DISCONTINUED | OUTPATIENT
Start: 2022-09-13 | End: 2022-09-13

## 2022-09-13 RX ORDER — ONDANSETRON 2 MG/ML
INJECTION INTRAMUSCULAR; INTRAVENOUS PRN
Status: DISCONTINUED | OUTPATIENT
Start: 2022-09-13 | End: 2022-09-13

## 2022-09-13 RX ORDER — PROPOFOL 10 MG/ML
INJECTION, EMULSION INTRAVENOUS PRN
Status: DISCONTINUED | OUTPATIENT
Start: 2022-09-13 | End: 2022-09-13

## 2022-09-13 RX ORDER — DEXAMETHASONE SODIUM PHOSPHATE 4 MG/ML
INJECTION, SOLUTION INTRA-ARTICULAR; INTRALESIONAL; INTRAMUSCULAR; INTRAVENOUS; SOFT TISSUE PRN
Status: DISCONTINUED | OUTPATIENT
Start: 2022-09-13 | End: 2022-09-13

## 2022-09-13 RX ORDER — MEPERIDINE HYDROCHLORIDE 25 MG/ML
12.5 INJECTION INTRAMUSCULAR; INTRAVENOUS; SUBCUTANEOUS
Status: DISCONTINUED | OUTPATIENT
Start: 2022-09-13 | End: 2022-09-13 | Stop reason: HOSPADM

## 2022-09-13 RX ORDER — SODIUM CHLORIDE, SODIUM LACTATE, POTASSIUM CHLORIDE, CALCIUM CHLORIDE 600; 310; 30; 20 MG/100ML; MG/100ML; MG/100ML; MG/100ML
INJECTION, SOLUTION INTRAVENOUS CONTINUOUS PRN
Status: DISCONTINUED | OUTPATIENT
Start: 2022-09-13 | End: 2022-09-13

## 2022-09-13 RX ADMIN — Medication 50 MG: at 12:36

## 2022-09-13 RX ADMIN — FENTANYL CITRATE 100 MCG: 50 INJECTION, SOLUTION INTRAMUSCULAR; INTRAVENOUS at 12:35

## 2022-09-13 RX ADMIN — SUGAMMADEX 200 MG: 100 INJECTION, SOLUTION INTRAVENOUS at 14:25

## 2022-09-13 RX ADMIN — ONDANSETRON 4 MG: 2 INJECTION INTRAMUSCULAR; INTRAVENOUS at 14:16

## 2022-09-13 RX ADMIN — MIDAZOLAM 2 MG: 1 INJECTION INTRAMUSCULAR; INTRAVENOUS at 12:24

## 2022-09-13 RX ADMIN — FENTANYL CITRATE 50 MCG: 50 INJECTION, SOLUTION INTRAMUSCULAR; INTRAVENOUS at 13:46

## 2022-09-13 RX ADMIN — CIPROFLOXACIN 400 MG: 2 INJECTION INTRAVENOUS at 12:39

## 2022-09-13 RX ADMIN — LIDOCAINE HYDROCHLORIDE 60 MG: 20 INJECTION, SOLUTION INFILTRATION; PERINEURAL at 12:34

## 2022-09-13 RX ADMIN — DEXAMETHASONE SODIUM PHOSPHATE 8 MG: 4 INJECTION, SOLUTION INTRA-ARTICULAR; INTRALESIONAL; INTRAMUSCULAR; INTRAVENOUS; SOFT TISSUE at 12:39

## 2022-09-13 RX ADMIN — PROPOFOL 150 MG: 10 INJECTION, EMULSION INTRAVENOUS at 12:35

## 2022-09-13 RX ADMIN — SODIUM CHLORIDE, POTASSIUM CHLORIDE, SODIUM LACTATE AND CALCIUM CHLORIDE: 600; 310; 30; 20 INJECTION, SOLUTION INTRAVENOUS at 12:25

## 2022-09-13 ASSESSMENT — ACTIVITIES OF DAILY LIVING (ADL)
ADLS_ACUITY_SCORE: 35
ADLS_ACUITY_SCORE: 20
ADLS_ACUITY_SCORE: 20

## 2022-09-13 NOTE — DISCHARGE INSTRUCTIONS
[facility], [place]  Same-Day Surgery   Adult Discharge Orders & Instructions     For 24 hours after surgery    Get plenty of rest.  A responsible adult must stay with you for at least 24 hours after you leave the hospital.   Do not drive or use heavy equipment.  If you have weakness or tingling, don't drive or use heavy equipment until this feeling goes away.  Do not drink alcohol.  Avoid strenuous or risky activities.  Ask for help when climbing stairs.   You may feel lightheaded.  IF so, sit for a few minutes before standing.  Have someone help you get up.   If you have nausea (feel sick to your stomach): Drink only clear liquids such as apple juice, ginger ale, broth or 7-Up.  Rest may also help.  Be sure to drink enough fluids.  Move to a regular diet as you feel able.  You may have a slight fever. Call the doctor if your fever is over 100 F (37.7 C) (taken under the tongue) or lasts longer than 24 hours.  You may have a dry mouth, a sore throat, muscle aches or trouble sleeping.  These should go away after 24 hours.  Do not make important or legal decisions.   Call your doctor for any of the followin.  Signs of infection (fever, growing tenderness at the surgery site, a large amount of drainage or bleeding, severe pain, foul-smelling drainage, redness, swelling).    2. It has been over 8 to 10 hours since surgery and you are still not able to urinate (pass water).    3.  Headache for over 24 hours.    4.  Numbness, tingling or weakness the day after surgery (if you had spinal anesthesia).  To contact a doctor, call Dr. Prieto [phone] [OFFICE]  or:  [phone] and ask for the resident on call for gastroenterology  (answered 24 hours a day)      [facility]: [phone]       (TTY for hearing impaired: [phone])    [facility]: [phone]       (TTY for hearing impaired: [phone])

## 2022-09-13 NOTE — ANESTHESIA POSTPROCEDURE EVALUATION
Patient: Vicki Shell    Procedure: Procedure(s):  ULTRASOUND, LOWER GI TRACT, ENDOSCOPIC  esophagastroduodenoscopy,SIGMOIDOSCOPY, FLEXIBLE,possible stent placement, and necoratization       Anesthesia Type:  General    Note:  Disposition: Outpatient   Postop Pain Control: Uneventful            Sign Out: Well controlled pain   PONV: No   Neuro/Psych: Uneventful            Sign Out: Acceptable/Baseline neuro status   Airway/Respiratory: Uneventful            Sign Out: Acceptable/Baseline resp. status   CV/Hemodynamics: Uneventful            Sign Out: Acceptable CV status; No obvious hypovolemia; No obvious fluid overload   Other NRE: NONE   DID A NON-ROUTINE EVENT OCCUR? No           Last vitals:  Vitals Value Taken Time   /80 09/13/22 1500   Temp     Pulse 82 09/13/22 1506   Resp 48 09/13/22 1506   SpO2 100 % 09/13/22 1506   Vitals shown include unvalidated device data.    Electronically Signed By: Julio Cesar Shaw MD  September 13, 2022  3:07 PM

## 2022-09-13 NOTE — OP NOTE
Flex Sig 09/13/2022 12:28 PM Nate Molina   [address] [place] [address] [phone]     Endoscopy Department   _______________________________________________________________________________   Patient Name: Vciki Shell            Procedure Date: 9/13/2022 12:28 PM   MRN: [id number]                       Account Number: [id number]   YOB: 1985              Admit Type: Outpatient   Age: 37                               Room:  OR    Gender: Female                        Note Status: Finalized   Attending MD: PJ LUI MD  Total Sedation Time:   _______________________________________________________________________________       Procedure:             Flexible Sigmoidoscopy   Indications:           Anastomotic stenosis   Providers:             PJ LUI MD   Patient Profile:       Ms Shell is a 36yo woman status post ovarian cyst                          drainage complicated by a sigmoid/rectal injury                          requiring a sigmoidectomy with colorectal anastomosis                          and ileostomy, the former further complicated by                          stenosis and apparent obliteration of the lumen. She                          now returns for repeat attempt a recanalization by                          [facility] endoscopy.   Referring MD:          CJ DE LA TORRE MD   Medicines:             General Anesthesia   Complications:         No immediate complications.   _______________________________________________________________________________   Procedure:             Pre-Anesthesia Assessment:                          - Prior to the procedure, a History and Physical was                          performed, and patient medications and allergies were                          reviewed. The patient is competent. The risks and                          benefits of the procedure and the sedation options  and                          risks were discussed with the patient. All questions                          were answered and informed consent was obtained.                          Patient identification and proposed procedure were                          verified by the nurse in the pre-procedure area.                          Mental Status Examination: alert and oriented. Airway                          Examination: Mallampati Class II (the uvula but not                          tonsillar pillars visualized). Respiratory                          Examination: clear to auscultation. CV Examination:                          normal. ASA Grade Assessment: II - A patient with mild                          systemic disease. After reviewing the risks and                          benefits, the patient was deemed in satisfactory                          condition to undergo the procedure. The anesthesia                          plan was to use general anesthesia. Immediately prior                          to administration of medications, the patient was                          re-assessed for adequacy to receive sedatives. The                          heart rate, respiratory rate, oxygen saturations,                          blood pressure, adequacy of pulmonary ventilation, and                          response to care were monitored throughout the                          procedure. The physical status of the patient was                          re-assessed after the procedure. After obtaining                          informed consent, the scope was passed under direct                          vision. The 1T gastroscope was introduced through the                          anus and advanced to the sigmoid colon. The pediatric                          gastroscope was introduced through the ileostomy and                          advanced to the ascending colon. The flexible                          sigmoidoscopy was  technically difficult and complex                          due to abnormal anatomy. The quality of the bowel                          preparation was adequate.                                                                                    Findings:        The patient was supine and fluoroscopy was used for adjunct imaging        throughout.  films were grossly unremarkable. Abdominal exam        revealed a recessed double barrel ileostomy. A 1T gastroscope was passed        into the rectum which appeared to be a blind limb where the staples from        the anastomosis were discovered. This region was extensively explored        with wires, contrast and devices, however neither gas or contrast was        found refluxing upstream. We then passed a pediatric gastroscope per the        efferent limb of the deverting ileostomy. With some effort and time,        utilizing wires and contrast the distal ileum was traversed and found        multiple tight loops in the region of the anastomosis. Ultimately the        endoscope reached the IC valve and ascending colon, however we had no        more length to continue insertion. A decision was made to NOT pursue        relatively blind EUS recanalization given the risk to bypass a large        portion of the colon.                                                                                     Impression:            - Obliterated anastomosis not amenable to endoscopy                          recanalizaiton as described above   Recommendation:        - General anesthesia recovery with probable discharge                          home this afternoon                          - All medications, diet and activity may resume                          without delay                          - Follow up with Dr Richardson as scheduled                          - No plans for further endoscopy for management of                          this obliterated lumen at this moment                           - The findings and recommendations were discussed with                          the patient and their family                                                                                       electronically signed by GRETCHEN Lui   ________________________   PJ LUI MD   9/13/2022 2:43:41 PM   I was physically present for the entire viewing portion of the exam.   __________________________   Signature of teaching physician   Valery/Armand LUI MD   Number of Addenda: 0     Note Initiated On: 9/13/2022 12:28 PM   Scope In:   Scope Out:

## 2022-09-13 NOTE — H&P
Updated HP  Please refer to the HP from 8/1 of this year. Since then she underwent lower endoscopy for placement of a stent across a stenotic ileorectal anastomosis, however the lumen appeared obliterated. She now returns for repeat attempt at recanalization.     She has no interval medical issues.    No current facility-administered medications on file prior to encounter.  hydrOXYzine (ATARAX) 25 MG tablet, Take 1 tablet (25 mg) by mouth every 8 hours as needed for itching or anxiety  morphine (MS CONTIN) 30 MG CR tablet, Take 30 mg by mouth every morning  Lidocaine (LIDOCARE) 4 % Patch, Place 1 patch onto the skin every 24 hours To prevent lidocaine toxicity, patient should be patch free for 12 hrs daily.  multivitamin w/minerals (THERA-VIT-M) tablet, Take 1 tablet by mouth daily Takes when she remembers  NONFORMULARY, Pro Immune supplement (Selenium)  Takes 1 tablet daily (when she remembers)       No Known Allergies    Past Medical History:   Diagnosis Date     Colostomy in place (H)      Cyst of left ovary      Past Surgical History:   Procedure Laterality Date     COMBINED CYSTOSCOPY, INSERT STENT URETER(S) Bilateral 4/28/2022    Procedure: CYSTOSCOPY, WITH URETERAL bilateral STENT INSERTION;  Surgeon: Inocencio Washburn MD;  [facility]: [facility]     IR SKIN SUBQ/SEROMA ABSCESS DRAIN  5/6/2022     LAPAROSCOPIC ASSISTED COLOSTOMY TAKEDOWN N/A 4/28/2022    Procedure: CLOSURE, COLOSTOMY, LAPAROSCOPIC, converted to open colostomy reversal with loop ileostomy creation;  Surgeon: Tad Richardson MD;  Location: [facility] OR     LAPAROSCOPIC ILEOSTOMY N/A 4/28/2022    Procedure: CREATION, ILEOSTOMY, LAPAROSCOPIC converted to open;  Surgeon: Tad Richardson MD;  Location: [facility] OR     LAPAROSCOPY DIAGNOSTIC (GYN)      for ovarian cyst     LOW ANTERIOR BOWEL RESECTION       SIGMOIDOSCOPY FLEXIBLE N/A 4/28/2022    Procedure: Sigmoidoscopy flexible;  Surgeon: Tad Richardson MD;  Location: [facility] OR     SIGMOIDOSCOPY FLEXIBLE N/A  "8/3/2022    Procedure: SIGMOIDOSCOPY, FLEXIBLE WITH CONTRAST;  Surgeon: James Prieto MD;  Location: [facility] OR     /78 (BP Location: Left arm)   Pulse 84   Temp 98.4  F (36.9  C) (Oral)   Resp 15   Ht 1.702 m (5' 7\")   Wt 76.3 kg (168 lb 3.4 oz)   LMP  (LMP Unknown)   SpO2 98%   BMI 26.35 kg/m    Ileostomy appears healthy  CTAB  RRR    Plan: Per ileostomy/per rectal evaluation by white light end ultrasound at recanalization across the anastomosis  "

## 2022-09-13 NOTE — ANESTHESIA CARE TRANSFER NOTE
Patient: Vicki Shell    Procedure: Procedure(s):  ULTRASOUND, LOWER GI TRACT, ENDOSCOPIC  esophagastroduodenoscopy,SIGMOIDOSCOPY, FLEXIBLE,possible stent placement, and necoratization       Diagnosis: Colorectal anastomotic stricture [K91.30]  Diagnosis Additional Information: No value filed.    Anesthesia Type:   General     Note:    Oropharynx: oropharynx clear of all foreign objects and spontaneously breathing  Level of Consciousness: awake  Oxygen Supplementation: face mask    Independent Airway: airway patency satisfactory and stable  Dentition: dentition unchanged  Vital Signs Stable: post-procedure vital signs reviewed and stable  Report to RN Given: handoff report given  Patient transferred to: PACU  Comments: Patient transferred to PACU in stable condition, breathing spontaneously on face mask, VSS.  Report given to RN.  Handoff Report: Identifed the Patient, Identified the Reponsible Provider, Reviewed the pertinent medical history, Discussed the surgical course, Reviewed Intra-OP anesthesia mangement and issues during anesthesia, Set expectations for post-procedure period and Allowed opportunity for questions and acknowledgement of understanding      Vitals:  Vitals Value Taken Time   BP     Temp     Pulse 101 09/13/22 1440   Resp 34 09/13/22 1442   SpO2 100 % 09/13/22 1442   Vitals shown include unvalidated device data.    Electronically Signed By: CHANCE Bo CRNA  September 13, 2022  2:44 PM

## 2022-09-13 NOTE — ANESTHESIA PREPROCEDURE EVALUATION
Anesthesia Pre-Procedure Evaluation    Patient: Vicki Shell   MRN: [id number] : 1985        Procedure : Procedure(s):  ULTRASOUND, LOWER GI TRACT, ENDOSCOPIC  SIGMOIDOSCOPY, FLEXIBLE          Past Medical History:   Diagnosis Date     Colostomy in place (H)      Cyst of left ovary       Past Surgical History:   Procedure Laterality Date     COMBINED CYSTOSCOPY, INSERT STENT URETER(S) Bilateral 2022    Procedure: CYSTOSCOPY, WITH URETERAL bilateral STENT INSERTION;  Surgeon: Inocencio Washburn MD;  Location: [facility]     IR SKIN SUBQ/SEROMA ABSCESS DRAIN  2022     LAPAROSCOPIC ASSISTED COLOSTOMY TAKEDOWN N/A 2022    Procedure: CLOSURE, COLOSTOMY, LAPAROSCOPIC, converted to open colostomy reversal with loop ileostomy creation;  Surgeon: Tad Richardson MD;  [facility]: [facility]     LAPAROSCOPIC ILEOSTOMY N/A 2022    Procedure: CREATION, ILEOSTOMY, LAPAROSCOPIC converted to open;  Surgeon: Tad Richardson MD;  [facility]: [facility]     LAPAROSCOPY DIAGNOSTIC (GYN)      for ovarian cyst     LOW ANTERIOR BOWEL RESECTION       SIGMOIDOSCOPY FLEXIBLE N/A 2022    Procedure: Sigmoidoscopy flexible;  Surgeon: Tad Richardson MD;  Location: [facility]     SIGMOIDOSCOPY FLEXIBLE N/A 8/3/2022    Procedure: SIGMOIDOSCOPY, FLEXIBLE WITH CONTRAST;  Surgeon: James Prieto MD;  Location: [facility] OR      No Known Allergies   Social History     Tobacco Use     Smoking status: Current Some Day Smoker     Smokeless tobacco: Never Used     Tobacco comment: 5-7 cigarettes per day   Substance Use Topics     Alcohol use: Not Currently      Wt Readings from Last 1 Encounters:   22 76.3 kg (168 lb 3.4 oz)        Anesthesia Evaluation   Pt has had prior anesthetic. Type: General.        ROS/MED HX  ENT/Pulmonary:  - neg pulmonary ROS     Neurologic:  - neg neurologic ROS     Cardiovascular:  - neg cardiovascular ROS     METS/Exercise Tolerance: >4 METS    Hematologic:  - neg hematologic  ROS      Musculoskeletal:  - neg musculoskeletal ROS     GI/Hepatic:       Renal/Genitourinary:  - neg Renal ROS     Endo:  - neg endo ROS     Psychiatric/Substance Use: Comment: Morphine 30 mg ER BID - neg psychiatric ROS   (+) H/O chronic opiod use .     Infectious Disease:  - neg infectious disease ROS     Malignancy:  - neg malignancy ROS     Other:            Physical Exam    Airway        Mallampati: II   TM distance: > 3 FB   Neck ROM: full   Mouth opening: > 3 cm    Respiratory Devices and Support         Dental  no notable dental history         Cardiovascular   cardiovascular exam normal          Pulmonary   pulmonary exam normal                OUTSIDE LABS:  CBC:   Lab Results   Component Value Date    WBC 6.1 05/03/2022    WBC 10.9 04/29/2022    HGB 9.7 (L) 05/03/2022    HGB 11.5 (L) 04/29/2022    HCT 30.1 (L) 05/03/2022    HCT 35.8 04/29/2022     05/07/2022     05/04/2022     BMP:   Lab Results   Component Value Date     05/03/2022     05/02/2022    POTASSIUM 4.3 05/07/2022    POTASSIUM 4.0 05/06/2022    CHLORIDE 108 05/03/2022    CHLORIDE 106 05/02/2022    CO2 26 05/03/2022    CO2 25 05/02/2022    BUN 5 (L) 05/03/2022    BUN 4 (L) 05/02/2022    CR 0.56 05/03/2022    CR 0.55 05/02/2022    GLC 97 09/13/2022     (H) 08/03/2022     COAGS:   Lab Results   Component Value Date    PTT 30 02/16/2022    INR 1.09 05/06/2022     POC: No results found for: BGM, HCG, HCGS  HEPATIC:   Lab Results   Component Value Date    ALBUMIN 3.7 02/16/2022    PROTTOTAL 7.3 02/16/2022    ALT 20 02/16/2022    AST 16 02/16/2022    ALKPHOS 66 02/16/2022    BILITOTAL 0.5 02/16/2022     OTHER:   Lab Results   Component Value Date    MANNY 8.2 (L) 05/03/2022    PHOS 2.6 05/01/2022    MAG 2.1 05/07/2022       Anesthesia Plan    ASA Status:  2      Anesthesia Type: General.     - Airway: ETT   Induction: Intravenous.   Maintenance: Balanced.        Consents    Anesthesia Plan(s) and associated risks, benefits,  and realistic alternatives discussed. Questions answered and patient/representative(s) expressed understanding.    - Discussed:     - Discussed with:  Patient      - Extended Intubation/Ventilatory Support Discussed: No.      - Patient is DNR/DNI Status: No    Use of blood products discussed: Yes.     - Discussed with: Patient.     - Consented: consented to blood products            Reason for refusal: other.     Postoperative Care    Pain management: Multi-modal analgesia.   PONV prophylaxis: Ondansetron (or other 5HT-3), Dexamethasone or Solumedrol     Comments:              PAC Discussion and Assessment    ASA Classification: 2    Anesthetic techniques and relevant risks discussed: GA  Invasive monitoring and risk discussed: Yes    Possibility and Risk of blood transfusion discussed: Yes  NPO instructions given: No solids 6 hours before surgery, stop clear liquids 2 hours before surgery    Needs early admission to pre-op area: No                                             Chantal Crawford MD

## 2022-09-13 NOTE — ANESTHESIA POSTPROCEDURE EVALUATION
Patient: Vicki Shell    Procedure: Procedure(s):  ULTRASOUND, LOWER GI TRACT, ENDOSCOPIC  esophagastroduodenoscopy,SIGMOIDOSCOPY, FLEXIBLE,possible stent placement, and necoratization       Anesthesia Type:  General    Note:  Disposition: Outpatient   Postop Pain Control: Uneventful            Sign Out: Well controlled pain   PONV: No   Neuro/Psych: Uneventful            Sign Out: Acceptable/Baseline neuro status   Airway/Respiratory: Uneventful            Sign Out: Acceptable/Baseline resp. status   CV/Hemodynamics: Uneventful            Sign Out: Acceptable CV status; No obvious hypovolemia; No obvious fluid overload   Other NRE: NONE   DID A NON-ROUTINE EVENT OCCUR? No           Last vitals:  Vitals Value Taken Time   /70 09/13/22 1515   Temp     Pulse 83 09/13/22 1516   Resp 26 09/13/22 1516   SpO2 100 % 09/13/22 1516   Vitals shown include unvalidated device data.    Electronically Signed By: Chantal Crawford MD  September 13, 2022  3:17 PM

## 2022-09-13 NOTE — ANESTHESIA PROCEDURE NOTES
Airway       Patient location during procedure: OR       Procedure Start/Stop Times: 9/13/2022 12:38 PM  Staff -        Other Anesthesia Staff: Rylee Shaw       Performed By: ELYSE and with CRNAs       Procedure performed by resident/fellow/CRNA in presence of a teaching physician.    Consent for Airway        Urgency: elective  Indications and Patient Condition       Indications for airway management: perfecto-procedural       Induction type:intravenous       Mask difficulty assessment: 1 - vent by mask    Final Airway Details       Final airway type: endotracheal airway       Successful airway: ETT - single  Endotracheal Airway Details        ETT size (mm): 7.0       Cuffed: yes       Successful intubation technique: direct laryngoscopy       DL Blade Type: Lerma 2       Grade View of Cords: 1       Adjucts: stylet       Position: Right       Measured from: gums/teeth       Secured at (cm): 22       Bite block used: None    Post intubation assessment        Placement verified by: capnometry, equal breath sounds and chest rise        Number of attempts at approach: 1       Number of other approaches attempted: 0       Secured with: silk tape       Ease of procedure: easy       Dentition: Intact and Unchanged    Medication(s) Administered   Medication Administration Time: 9/13/2022 12:38 PM

## 2022-09-14 DIAGNOSIS — K56.699 COLON STRICTURE (H): Primary | ICD-10-CM

## 2022-09-14 DIAGNOSIS — Z93.3 COLOSTOMY STATUS (H): ICD-10-CM

## 2022-10-03 ENCOUNTER — HEALTH MAINTENANCE LETTER (OUTPATIENT)
Age: 37
End: 2022-10-03

## 2022-10-10 ENCOUNTER — TELEPHONE (OUTPATIENT)
Dept: SURGERY | Facility: CLINIC | Age: 37
End: 2022-10-10

## 2022-10-10 NOTE — TELEPHONE ENCOUNTER
Case Request received to schedule:    Patient Name: Vicki Shell   MRN: [id number]   Case#: [id number]   Surgeon(s) and Role:      * Tad Richardson MD - Primary   Date requested: * No dates entered *   Location: [facility]   Procedure(s):   exploratory laparotomy, redo colorectal anastomosis.     Additional Instructions for the Case  Multi-surgeon case:  Yes, urology for cysto stents  Time in minutes:  180  Anesthesia:gen  Prep: none  Pre-Op: pacno  Labs: yes  WOC: no  Special equipment: no  Special Instructions: no    Schedule with Chantal Leahy NP or Sharlene CONNELLY 2-3 weeks after surgery.  Schedule with Surgeon 3-4 weeks after Chantal Leahy NP      Schedule with Chantal Leahy NP or Sharlene CONNELLY 2-3 weeks after surgery.  Schedule with Surgeon 3-4 weeks after Chantal Leahy NP    On 10/10/2022:  Sent in basket message to Dr. Richardson asking if we are able to schedule surgery now that patient has had her endo procedures with Dr. Prieto. If we are able to move forward, then surgery date 12/14/2022 may work. Awaiting response. Ugo said that Dr. Inocencio Washburn can do Cysto/Stents on 12/14/2022.    Dr. Richardson replied that patient can move forward with scheduling this procedure (NOT the Ileostomy Closure). He said, though, that patient told him that she wants to schedule surgery in January and that it is no hurry.    Called patient to discuss scheduling, left vm msg for return call regarding scheduling.    Shavon Salomon  Kat-op Coordinator  [facility]  Direct Phone: [phone]

## 2022-10-11 NOTE — NURSING NOTE
"Chief Complaint   Patient presents with     Post-op Visit       Vitals:    05/25/22 1229   Weight: 171 lb   Height: 5' 7\"       Body mass index is 26.78 kg/m .    Rama Swift CMA    " Lithium 900mg Qdaily (not taken in two weeks)

## 2022-10-26 NOTE — TELEPHONE ENCOUNTER
"I spoke with patient about scheduling hers surgery with Dr. Richardson in January (surgery month per Dr. Richardson). Patient says that Dr. Richardson said that surgery could be anytime after October. I called Dr. Richardson and he again said January.    Patient was still concerned about waiting until January, so I said that I'd ask Dr. Richardson or BALTAZAR Zacarias, to call her to discuss her medical concerns before we proceed with scheduling.    Sent the following message to Dr. Tad Richardson and BALTAZAR Zacarias:    \"Hi Deann Mishra,    RE: Exploratory laparotomy, redo colorectal anastomosis (NOT Ileostomy Closure, correct?)     I told patient that you said January, and she again said that she said anytime after October. She said she's concerned about waiting until January since she 'has stuff past her loop ileostomy, and she's bloated and uncomfortable.'    I told her that I would check with you about her medical concerns, but said that you had told me all along that the surgery should be scheduled in January.    Could one of you please follow up with her regarding her medical concerns and maybe reassure her about a surgery date in January?    Thank-you,  Shavon\"    Shavon Salomon  Kat-op Coordinator  [facility]  Direct Phone: [phone]  "

## 2022-10-28 ENCOUNTER — MYC MEDICAL ADVICE (OUTPATIENT)
Dept: SURGERY | Facility: CLINIC | Age: 37
End: 2022-10-28

## 2022-10-28 NOTE — TELEPHONE ENCOUNTER
Patient called stating that neither Dr. Richardson nor Deann, RN, called her to discuss her concerns about waiting until January for her surgery. She is experiencing symptoms. Sent another message Dr. Richardson and Deann, RN, asking them to call patient regarding her medical concerns. Once this happens, then I will schedule patient's surgery.    Shavon Salomon  Kat-op Coordinator  [facility]  Direct Phone: [phone]

## 2022-11-17 ENCOUNTER — PREP FOR PROCEDURE (OUTPATIENT)
Dept: SURGERY | Facility: CLINIC | Age: 37
End: 2022-11-17

## 2022-11-22 ENCOUNTER — TELEPHONE (OUTPATIENT)
Dept: SURGERY | Facility: CLINIC | Age: 37
End: 2022-11-22

## 2022-11-22 NOTE — TELEPHONE ENCOUNTER
FUTURE VISIT INFORMATION      SURGERY INFORMATION:    Date: 23    [facility]    Surgeon:  Prasanth Alba MD Jahansouz, Cyrus, MD    Anesthesia Type:  General    Procedure: CYSTOSCOPY, WITH URETERAL STENT INSERTION exploratory laparotomy, redo colorectal anastomosis  RECORDS REQUESTED FROM:       Primary Care Provider:Karen Hernandez MD    Most recent EKG+ Tracin22- [facility]

## 2022-11-22 NOTE — TELEPHONE ENCOUNTER
Dr. Richardson can do patient's surgery on Weds 1/4/2023.     2 x Called patient to schedule surgery and related appointments, m for return call.    Shavon Salomon  Kat-op Coordinator  [facility]  Direct Phone: [phone]

## 2022-12-06 ENCOUNTER — MYC MEDICAL ADVICE (OUTPATIENT)
Dept: SURGERY | Facility: CLINIC | Age: 37
End: 2022-12-06

## 2022-12-06 NOTE — CONFIDENTIAL NOTE
Sent upcoming appointment reminder via [employer].     Appt date/time: 12/15/2022 at 11:30 am  Provider: Dr. Tad Richardson  Appt type: Video appt    Rama Swift CMA

## 2022-12-07 ENCOUNTER — TRANSFERRED RECORDS (OUTPATIENT)
Dept: HEALTH INFORMATION MANAGEMENT | Facility: CLINIC | Age: 37
End: 2022-12-07

## 2022-12-09 NOTE — PROGRESS NOTES
"Colon and Rectal Surgery Virtual Note    RE: Vicki Shell.  : 1985.  NORMA: 12/15/2022.    Vicki Shell is a 37 year old female who is being evaluated via a billable video visit.      The patient has been notified of following:     \"This video visit will be conducted via a call between you and your physician/provider. We have found that certain health care needs can be provided without the need for an in-person physical exam.  This service lets us provide the care you need with a video conversation.  If a prescription is necessary we can send it directly to your pharmacy.  If lab work is needed we can place an order for that and you can then stop by our lab to have the test done at a later time.    Video visits are billed at different rates depending on your insurance coverage.  Please reach out to your insurance provider with any questions.    If during the course of the call the physician/provider feels a video visit is not appropriate, you will not be charged for this service.\"    Patient has given verbal consent for telephone visit? Yes    Telephone Start Time: 10: 20 AM    Reason for visit: Follow up before surgery on 23    HPI: 37 year old female who is 4 weeks s/p Exploratory laparotomy, Colostomy reversal, Flexible sigmoidoscopy, Diverting loop ileostomy with myself, and Bilateral ureteral stent placement and cystoscopy (Dr. Plummer) on 2022.    On , she underwent underwent a diagnostic laparoscopy, extensive lysis of adhesions, and a right ovarian cystectomy. Dr. Pepe was called in the operating room to evaluate for any potential bowel injury. There was an area of intestine, which was actually the appendix, that was sort of stuck to the ovary. This did not appear to be injured. There was no evidence of succus or spillage from what I could tell at that time in the operation. It appeared that all the work had been done on the right side for this cyst.   -On : presented with " peritonitis, at which time she underwent emergent ex lap, upper rectal/sigmoid resection, and appendectomy.   Per OR note of Findings from Dr. Pepe: Upon entering the abdomen, there was succus and grossly formed stool within the abdominal cavity, there was a 1.5 cm hole or so along the left side of the rectum, kind of left midline rectum. Where the cystectomy had been performed on the right side, this area looked good, I did not see any evidence of injury on that side. There was fluid around the liver, spleen, mid abdomen. The stoma was created in the left lower quadrant. A blue Prolene stitch was placed on the distal rectal stump.  Hospital course was two weeks long, discharged on 9/5. She had an ileus, was on TPN. She had a CT can showing a left pleural effusion, left abdominal fluid collection and pelvic fluid collection. She was on antibiotics, and IR drained the left pleural effusion and placed an abdominal drain.  -Seen on 1/5/22:: Doing well overall, no issues with stoma. Weight stable. Chronic pelvic and back pain, suspected due to left ovarian cyst.      Last Colonoscopy: reportedly normal, performed on 2/7.     Interval Hx: Pain improved from yesterday. CT in ER yesterday negative for acute pathology contributing to her pelvic/coccygeal pain.     Flex sig with myself 9/13/22:  Findings:        The patient was supine and fluoroscopy was used for adjunct imaging throughout.  films were grossly unremarkable. Abdominal exam revealed a recessed double barrel ileostomy. A 1T gastroscope was passed into the rectum which appeared to be a blind limb where the staples from the anastomosis were discovered. This region was extensively explored with wires, contrast and devices, however neither gas or contrast was found refluxing upstream. We then passed a pediatric gastroscope per the efferent limb of the deverting ileostomy. With some effort and time, utilizing wires and contrast the distal ileum was traversed  and found multiple tight loops in the region of the anastomosis. Ultimately the endoscope reached the IC valve and ascending colon, however we had no more length to continue insertion. A decision was made to NOT pursue relatively blind EUS recanalization given the risk to bypass a large portion of the colon.     CT A/P 12/7/22  IMPRESSION: No evidence of extravasation or leakage of rectal contrast. No evidence of free air or free fluid. Cystic lesions in the low pelvis adjacent to the surgical changes may actually represent ovarian cysts given the appearance of associated   gonadal vessels.    CT A/P 12/14/22  1.No dilated loops of large or small bowel.   2.Loop of nonobstructed bowel in the ventral hernia.   3.Large amount of contrast within a loop of bowel in the right lower quadrant and rectum are unchanged since 12/7/2022, this is indeterminate, per report patient received oral contrast last week but not today.   4.There is one cystic lesion in the left lower quadrant that is getting bigger and one that is getting smaller.  These could represent ovarian follicles however infection not excluded.  Clinical correlation with prior surgery recommended. Consider transvaginal ultrasound if indicated.   5.Hepatomegaly.       Medical history:  Chronic low back pain  Tobacco use. 0.5 ppd for 10 years  Cervico-occipital Neuralgia     Surgical history:  9/10/20:HYSTERECTOMY ABDOMINAL, BILATERAL SALPINGECTOMY, INTRAOPERATIVE CYSTOSCOPY  Laparoscopic cystectomy and Emergent Ex lap as above.    Medical history:  Past Medical History:   Diagnosis Date     Colostomy in place (H)      Cyst of left ovary        Surgical history:  Past Surgical History:   Procedure Laterality Date     COMBINED CYSTOSCOPY, INSERT STENT URETER(S) Bilateral 4/28/2022    Procedure: CYSTOSCOPY, WITH URETERAL bilateral STENT INSERTION;  Surgeon: Inocencio Washburn MD;  [facility]     ENDOSCOPIC ULTRASOUND LOWER GASTROINTESTIONAL TRACT (GI) N/A  9/13/2022    Procedure: ULTRASOUND, LOWER GI TRACT, ENDOSCOPIC;  Surgeon: James Prieto MD;  [facility]     IR SKIN SUBQ/SEROMA ABSCESS DRAIN  5/6/2022     LAPAROSCOPIC ASSISTED COLOSTOMY TAKEDOWN N/A 4/28/2022    Procedure: CLOSURE, COLOSTOMY, LAPAROSCOPIC, converted to open colostomy reversal with loop ileostomy creation;  Surgeon: Tad Richardson MD;  [facility]     LAPAROSCOPIC ILEOSTOMY N/A 4/28/2022    Procedure: CREATION, ILEOSTOMY, LAPAROSCOPIC converted to open;  Surgeon: Tad Richardson MD;  [facility]     LAPAROSCOPY DIAGNOSTIC (GYN)      for ovarian cyst     LOW ANTERIOR BOWEL RESECTION       SIGMOIDOSCOPY FLEXIBLE N/A 4/28/2022    Procedure: Sigmoidoscopy flexible;  Surgeon: Tad Richardson MD;  [facility]     SIGMOIDOSCOPY FLEXIBLE N/A 8/3/2022    Procedure: SIGMOIDOSCOPY, FLEXIBLE WITH CONTRAST;  Surgeon: James Prieto MD;  [facility]     SIGMOIDOSCOPY FLEXIBLE N/A 9/13/2022    Procedure: esophagastroduodenoscopy,SIGMOIDOSCOPY, FLEXIBLE,possible stent placement, and necoratization;  Surgeon: James Prieto MD;  [facility]       Family history:  No family history on file.    Medications:  Current Outpatient Medications   Medication Sig Dispense Refill     hydrOXYzine (ATARAX) 25 MG tablet Take 1 tablet (25 mg) by mouth every 8 hours as needed for itching or anxiety 15 tablet 0     Lidocaine (LIDOCARE) 4 % Patch Place 1 patch onto the skin every 24 hours To prevent lidocaine toxicity, patient should be patch free for 12 hrs daily. 5 patch 0     morphine (MS CONTIN) 30 MG CR tablet Take 30 mg by mouth every morning       multivitamin w/minerals (THERA-VIT-M) tablet Take 1 tablet by mouth daily Takes when she remembers       NONFORMULARY Pro Immune supplement (Selenium)  Takes 1 tablet daily (when she remembers)         Allergies:  No Known Allergies    Social history:   Social History     Tobacco Use     Smoking status: Some Days     Smokeless  tobacco: Never     Tobacco comments:     5-7 cigarettes per day   Substance Use Topics     Alcohol use: Not Currently     Marital status: single.      ASSESSMENT  1. Missed rectal injury s/p ovarian cystectomy requiring emergent ex lap with End colostomy. Now s/p colostomy takedown and diverting loop ileostomy complicated by anastomotic stricture.  2. Former tobacco use.  3. Chronic back and pelvic pain.  4. Ovarian cysts     PLAN  1. I discussed at length our plan for the operation in January. I will resect her anastomosis and perform an end to end colorectal anastomosis and keep the diverting loop ileostomy, also plan for cysto/stents.    1. Preoperative labs: CBC, CMP, PTT/INR, Prealbumin.  2. No prep needed  3. Hold these medications prior to surgery: NSAIDs  4. Advised patient to begin protein shakes: Premier or Pure protein given high protein, low carb ratio for pre-operative rehab.    Risks, benefits, and alternatives of operative treatment were thoroughly discussed with the patient, he/she understands these well and agrees to proceed.    Telephone Visit Details    Type of service:  telephone Visit    telephone End Time (time telephone stopped): 10:35 AM    Originating Location (pt. Location): Home    Distant Location (provider location):  [facility] [place]     Mode of Communication:  Video Conference via [employer]il    20 minutes spent on the date of the encounter doing chart review, history, review of imaging, documentation ,and further activities as noted above, which includes my time spent on video with the patient/family.       Tad Richardson MD  Division of Colon and Rectal Surgery  [facility]  [phone]      Referring Provider:  Referred Self, MD  No address on file     Primary Care Provider:  Karen Hernandez

## 2022-12-13 ENCOUNTER — TELEPHONE (OUTPATIENT)
Dept: SURGERY | Facility: CLINIC | Age: 37
End: 2022-12-13

## 2022-12-13 NOTE — TELEPHONE ENCOUNTER
Records Requested    Facility  [facility] [place]  Fax: [phone]   Outcome * 12/13/22 8:49 AM Faxed urg req to [facility] for images to be pushed to vivit PACs. - Marixa    * 12/15/22 1:26 PM Images received from [facility] and attached to the patient in PACs. - Marixa    12/7/22 - CT Abd/Pelvis

## 2022-12-15 ENCOUNTER — VIRTUAL VISIT (OUTPATIENT)
Dept: SURGERY | Facility: CLINIC | Age: 37
End: 2022-12-15
Payer: MEDICAID

## 2022-12-15 VITALS — BODY MASS INDEX: 27 KG/M2 | WEIGHT: 172 LBS | HEIGHT: 67 IN

## 2022-12-15 DIAGNOSIS — Z93.2 S/P ILEOSTOMY (H): ICD-10-CM

## 2022-12-15 DIAGNOSIS — K56.699 COLON STRICTURE (H): Primary | ICD-10-CM

## 2022-12-15 PROCEDURE — 99212 OFFICE O/P EST SF 10 MIN: CPT | Mod: 95 | Performed by: SURGERY

## 2022-12-15 RX ORDER — HYDROCODONE BITARTRATE AND ACETAMINOPHEN 5; 325 MG/1; MG/1
1 TABLET ORAL EVERY 6 HOURS PRN
Status: ON HOLD | COMMUNITY
End: 2023-01-10

## 2022-12-15 ASSESSMENT — PAIN SCALES - GENERAL: PAINLEVEL: SEVERE PAIN (6)

## 2022-12-15 NOTE — LETTER
"12/15/2022       RE: Vicki Shell  [address] [facility] [address]     Dear Colleague,    Thank you for referring your patient, Vicki Shell, to the [facility] [place] [facility]. Please see a copy of my visit note below.    Colon and Rectal Surgery Virtual Note    RE: Vicki Shell.  : 1985.  NORMA: 12/15/2022.    Vicki Shell is a 37 year old female who is being evaluated via a billable video visit.      The patient has been notified of following:     \"This video visit will be conducted via a call between you and your physician/provider. We have found that certain health care needs can be provided without the need for an in-person physical exam.  This service lets us provide the care you need with a video conversation.  If a prescription is necessary we can send it directly to your pharmacy.  If lab work is needed we can place an order for that and you can then stop by our lab to have the test done at a later time.    Video visits are billed at different rates depending on your insurance coverage.  Please reach out to your insurance provider with any questions.    If during the course of the call the physician/provider feels a video visit is not appropriate, you will not be charged for this service.\"    Patient has given verbal consent for telephone visit? Yes    Telephone Start Time: 10: 20 AM    Reason for visit: Follow up before surgery on 23    HPI: 37 year old female who is 4 weeks s/p Exploratory laparotomy, Colostomy reversal, Flexible sigmoidoscopy, Diverting loop ileostomy with myself, and Bilateral ureteral stent placement and cystoscopy (Dr. Plummer) on 2022.    On , she underwent underwent a diagnostic laparoscopy, extensive lysis of adhesions, and a right ovarian cystectomy. Dr. Pepe was called in the operating room to evaluate for any potential bowel injury. There was an area of " intestine, which was actually the appendix, that was sort of stuck to the ovary. This did not appear to be injured. There was no evidence of succus or spillage from what I could tell at that time in the operation. It appeared that all the work had been done on the right side for this cyst.   -On 8/20: presented with peritonitis, at which time she underwent emergent ex lap, upper rectal/sigmoid resection, and appendectomy.   Per OR note of Findings from Dr. Pepe: Upon entering the abdomen, there was succus and grossly formed stool within the abdominal cavity, there was a 1.5 cm hole or so along the left side of the rectum, kind of left midline rectum. Where the cystectomy had been performed on the right side, this area looked good, I did not see any evidence of injury on that side. There was fluid around the liver, spleen, mid abdomen. The stoma was created in the left lower quadrant. A blue Prolene stitch was placed on the distal rectal stump.  Hospital course was two weeks long, discharged on 9/5. She had an ileus, was on TPN. She had a CT can showing a left pleural effusion, left abdominal fluid collection and pelvic fluid collection. She was on antibiotics, and IR drained the left pleural effusion and placed an abdominal drain.  -Seen on 1/5/22:: Doing well overall, no issues with stoma. Weight stable. Chronic pelvic and back pain, suspected due to left ovarian cyst.      Last Colonoscopy: reportedly normal, performed on 2/7.     Interval Hx: Pain improved from yesterday. CT in ER yesterday negative for acute pathology contributing to her pelvic/coccygeal pain.     Flex sig with myself 9/13/22:  Findings:        The patient was supine and fluoroscopy was used for adjunct imaging throughout.  films were grossly unremarkable. Abdominal exam revealed a recessed double barrel ileostomy. A 1T gastroscope was passed into the rectum which appeared to be a blind limb where the staples from the anastomosis were  discovered. This region was extensively explored with wires, contrast and devices, however neither gas or contrast was found refluxing upstream. We then passed a pediatric gastroscope per the efferent limb of the deverting ileostomy. With some effort and time, utilizing wires and contrast the distal ileum was traversed and found multiple tight loops in the region of the anastomosis. Ultimately the endoscope reached the IC valve and ascending colon, however we had no more length to continue insertion. A decision was made to NOT pursue relatively blind EUS recanalization given the risk to bypass a large portion of the colon.     CT A/P 12/7/22  IMPRESSION: No evidence of extravasation or leakage of rectal contrast. No evidence of free air or free fluid. Cystic lesions in the low pelvis adjacent to the surgical changes may actually represent ovarian cysts given the appearance of associated   gonadal vessels.    CT A/P 12/14/22  1.No dilated loops of large or small bowel.   2.Loop of nonobstructed bowel in the ventral hernia.   3.Large amount of contrast within a loop of bowel in the right lower quadrant and rectum are unchanged since 12/7/2022, this is indeterminate, per report patient received oral contrast last week but not today.   4.There is one cystic lesion in the left lower quadrant that is getting bigger and one that is getting smaller.  These could represent ovarian follicles however infection not excluded.  Clinical correlation with prior surgery recommended. Consider transvaginal ultrasound if indicated.   5.Hepatomegaly.       Medical history:  Chronic low back pain  Tobacco use. 0.5 ppd for 10 years  Cervico-occipital Neuralgia     Surgical history:  9/10/20:HYSTERECTOMY ABDOMINAL, BILATERAL SALPINGECTOMY, INTRAOPERATIVE CYSTOSCOPY  Laparoscopic cystectomy and Emergent Ex lap as above.    Medical history:  Past Medical History:   Diagnosis Date     Colostomy in place (H)      Cyst of left ovary         Surgical history:  Past Surgical History:   Procedure Laterality Date     COMBINED CYSTOSCOPY, INSERT STENT URETER(S) Bilateral 4/28/2022    Procedure: CYSTOSCOPY, WITH URETERAL bilateral STENT INSERTION;  Surgeon: Inocencio Washburn MD;  [facility]     ENDOSCOPIC ULTRASOUND LOWER GASTROINTESTIONAL TRACT (GI) N/A 9/13/2022    Procedure: ULTRASOUND, LOWER GI TRACT, ENDOSCOPIC;  Surgeon: James Prieto MD;  [facility]     IR SKIN SUBQ/SEROMA ABSCESS DRAIN  5/6/2022     LAPAROSCOPIC ASSISTED COLOSTOMY TAKEDOWN N/A 4/28/2022    Procedure: CLOSURE, COLOSTOMY, LAPAROSCOPIC, converted to open colostomy reversal with loop ileostomy creation;  Surgeon: Tad Richardson MD;  [facility]     LAPAROSCOPIC ILEOSTOMY N/A 4/28/2022    Procedure: CREATION, ILEOSTOMY, LAPAROSCOPIC converted to open;  Surgeon: Tad Richardson MD;  [facility]     LAPAROSCOPY DIAGNOSTIC (GYN)      for ovarian cyst     LOW ANTERIOR BOWEL RESECTION       SIGMOIDOSCOPY FLEXIBLE N/A 4/28/2022    Procedure: Sigmoidoscopy flexible;  Surgeon: Tad Richardson MD;  [facility]     SIGMOIDOSCOPY FLEXIBLE N/A 8/3/2022    Procedure: SIGMOIDOSCOPY, FLEXIBLE WITH CONTRAST;  Surgeon: James Prieto MD;  [facility]     SIGMOIDOSCOPY FLEXIBLE N/A 9/13/2022    Procedure: esophagastroduodenoscopy,SIGMOIDOSCOPY, FLEXIBLE,possible stent placement, and necoratization;  Surgeon: James Prieto MD;  [facility]       Family history:  No family history on file.    Medications:  Current Outpatient Medications   Medication Sig Dispense Refill     hydrOXYzine (ATARAX) 25 MG tablet Take 1 tablet (25 mg) by mouth every 8 hours as needed for itching or anxiety 15 tablet 0     Lidocaine (LIDOCARE) 4 % Patch Place 1 patch onto the skin every 24 hours To prevent lidocaine toxicity, patient should be patch free for 12 hrs daily. 5 patch 0     morphine (MS CONTIN) 30 MG CR tablet Take 30 mg by mouth every morning        multivitamin w/minerals (THERA-VIT-M) tablet Take 1 tablet by mouth daily Takes when she remembers       NONFORMULARY Pro Immune supplement (Selenium)  Takes 1 tablet daily (when she remembers)         Allergies:  No Known Allergies    Social history:   Social History     Tobacco Use     Smoking status: Some Days     Smokeless tobacco: Never     Tobacco comments:     5-7 cigarettes per day   Substance Use Topics     Alcohol use: Not Currently     Marital status: single.      ASSESSMENT  1. Missed rectal injury s/p ovarian cystectomy requiring emergent ex lap with End colostomy. Now s/p colostomy takedown and diverting loop ileostomy complicated by anastomotic stricture.  2. Former tobacco use.  3. Chronic back and pelvic pain.  4. Ovarian cysts     PLAN  1. I discussed at length our plan for the operation in January. I will resect her anastomosis and perform an end to end colorectal anastomosis and keep the diverting loop ileostomy, also plan for cysto/stents.    1. Preoperative labs: CBC, CMP, PTT/INR, Prealbumin.  2. No prep needed  3. Hold these medications prior to surgery: NSAIDs  4. Advised patient to begin protein shakes: Premier or Pure protein given high protein, low carb ratio for pre-operative rehab.    Risks, benefits, and alternatives of operative treatment were thoroughly discussed with the patient, he/she understands these well and agrees to proceed.    Telephone Visit Details    Type of service:  telephone Visit    telephone End Time (time telephone stopped): 10:35 AM    Originating Location (pt. Location): Home    Distant Location (provider location):  [facility] [place]     Mode of Communication:  Video Conference via Savision    20 minutes spent on the date of the encounter doing chart review, history, review of imaging, documentation ,and further activities as noted above, which includes my time spent on video with the patient/family.          Referring Provider:  Referred Self, MD  No address on file     Primary Care Provider:  Karen Hernandez       Again, thank you for allowing me to participate in the care of your patient.      Sincerely,    Tad Richardson MD, MD

## 2022-12-15 NOTE — NURSING NOTE
"Chief Complaint   Patient presents with     Follow Up       Vitals:    12/15/22 1005   Weight: 172 lb   Height: 5' 7\"       Body mass index is 26.94 kg/m .    Rama Swift CMA    "

## 2022-12-16 NOTE — TELEPHONE ENCOUNTER
On 11/29/2022:  Spoke with patient via phone, completed the following scheduling, then sent Surgery Packet to patient via MyChart and Mail including related scheduling information and instructions:    Your surgery is scheduled:    Date: Wednesday January 4, 2023  ________________________________    Time: 7:30 AM*  ________________________________    Please arrive at:  5:30 AM*  ______________________    Surgeon's Name: Dr. Tad Richardson  _______________________  - with Urologist Dr. Prasanth Alba for Cystoscopy and Uretal Stent Insertion      Pre-Op Physical Fax Numbers:         eal Pre-Admissions  [facility] Fax:  [phone] / Phone:  [phone]        Your surgery is located at:      [facility]      [facility] [address](3C)      [place] [address]      [phone]      [email or web address]     *Times are tentative and may change. You can expect a call from the pre-admission nurses to confirm arrival and surgery start times if the times should change.     Required: Yes, if discharging the same date as your procedure, then you will need someone age +18 to drive you home and to stay with you for 24 hours afterwards.    Surgery: Cystoscopy with Uretal Stent Insertion, Exploratory Laparotomy, Redo Colorectal Anastomosis    Upcoming Related Appointments:     Jan 03, 2023 10:45 AM  (Arrive by 10:30 AM)  PAC EVALUATION with Meagan Robles PA-C  [facility] Preoperative Assessment Center [facility] [place] ) [phone]    [address] [facility] 5th [facility] [address]     Jan 03, 2023 12:15 PM  LAB with [facility]  [facility] Lab [facility] [place] ) [phone] [address] [facility] [address] [place] [address]     Jan 20, 2023 11:30 AM  (Arrive by 11:15 AM)  Post Op with BRENNA Meyer [facility] and  [facility] [place] ([facility] [place] )   [phone]    [address] [facility] 4th [facility] [address]     Mar 09, 2023  3:30 PM  (Arrive by 3:15 PM)  Post Op with Tad Ricahrdson MD  [facility] [place] ([facility] [place] ) [phone]    [address] [address]     Shavon Salomon  Kat-op Coordinator  [place]  Direct Phone: [phone]

## 2022-12-19 DIAGNOSIS — K52.89 DIVERSION COLITIS: Primary | ICD-10-CM

## 2022-12-19 RX ORDER — METRONIDAZOLE 500 MG/1
500 TABLET ORAL 3 TIMES DAILY
Qty: 21 TABLET | Refills: 0 | Status: SHIPPED | OUTPATIENT
Start: 2022-12-19 | End: 2022-12-26

## 2022-12-20 ENCOUNTER — TEAM CONFERENCE (OUTPATIENT)
Dept: SURGERY | Facility: CLINIC | Age: 37
End: 2022-12-20

## 2022-12-20 NOTE — CONFIDENTIAL NOTE
COLON AND RECTAL SURGERY HUDDLE:    Patient was reviewed in preporation for their surgery the following was reviewed and has been completed:    Surgeon: Dr. Tad Richardson    Surgery & Date: 1/4 exploratory laparotomy, redo colorectal anastomosis     Last MD Note: reviewed    Anesthesia Type: General    Other Providers: Yes    PAC: Yes 1/3    WOC: No    Labs: Yes 1/3    Bowel Prep: N/A No Prep    Packet: Yes    Imaging: N/A    Post-Op Appointments: Yes    COVID: N/A    Is patient on TPN?: N/A   If yes, I contacted the TPN pharmacist by paging the  pharmacy at [phone] or calling [phone]. I also contacted Rajwinder CONNELLY with inpatient colon and rectal team.     Pre op call complete: Yes

## 2023-01-02 ENCOUNTER — ANESTHESIA EVENT (OUTPATIENT)
Dept: SURGERY | Facility: CLINIC | Age: 38
DRG: 331 | End: 2023-01-02
Payer: MEDICAID

## 2023-01-02 LAB
ABO/RH(D): NORMAL
ANTIBODY SCREEN: NEGATIVE
SPECIMEN EXPIRATION DATE: NORMAL

## 2023-01-02 RX ORDER — ACETAMINOPHEN 500 MG
500-1000 TABLET ORAL EVERY 6 HOURS PRN
Status: ON HOLD | COMMUNITY
End: 2023-01-06

## 2023-01-02 NOTE — ANESTHESIA PREPROCEDURE EVALUATION
Anesthesia Pre-Procedure Evaluation    Patient: Vicki Shell   MRN: [id number] : 1985        Procedure : Procedure(s):  CYSTOSCOPY, WITH URETERAL STENT INSERTION  exploratory laparotomy, redo colorectal anastomosis  PAC EVALUATION       Past Medical History:   Diagnosis Date     Colostomy in place (H)      Cyst of left ovary       Past Surgical History:   Procedure Laterality Date     COMBINED CYSTOSCOPY, INSERT STENT URETER(S) Bilateral 2022    Procedure: CYSTOSCOPY, WITH URETERAL bilateral STENT INSERTION;  Surgeon: Inocencio Washburn MD;  [facility]     ENDOSCOPIC ULTRASOUND LOWER GASTROINTESTIONAL TRACT (GI) N/A 2022    Procedure: ULTRASOUND, LOWER GI TRACT, ENDOSCOPIC;  Surgeon: James Prieto MD;  Location: [facility] OR     IR SKIN SUBQ/SEROMA ABSCESS DRAIN  2022     LAPAROSCOPIC ASSISTED COLOSTOMY TAKEDOWN N/A 2022    Procedure: CLOSURE, COLOSTOMY, LAPAROSCOPIC, converted to open colostomy reversal with loop ileostomy creation;  Surgeon: Tad Richardson MD;  [facility] OR     LAPAROSCOPIC ILEOSTOMY N/A 2022    Procedure: CREATION, ILEOSTOMY, LAPAROSCOPIC converted to open;  Surgeon: Tad Richardson MD;  [facility] OR     LAPAROSCOPY DIAGNOSTIC (GYN)      for ovarian cyst     LOW ANTERIOR BOWEL RESECTION       SIGMOIDOSCOPY FLEXIBLE N/A 2022    Procedure: Sigmoidoscopy flexible;  Surgeon: Tad Richardson MD;  [facility] OR     SIGMOIDOSCOPY FLEXIBLE N/A 8/3/2022    Procedure: SIGMOIDOSCOPY, FLEXIBLE WITH CONTRAST;  Surgeon: James Prieto MD;  [facility] OR     SIGMOIDOSCOPY FLEXIBLE N/A 2022    Procedure: esophagastroduodenoscopy,SIGMOIDOSCOPY, FLEXIBLE,possible stent placement, and necoratization;  Surgeon: James Prieto MD;  Location[facility] OR      No Known Allergies   Social History     Tobacco Use     Smoking status: Some Days     Smokeless tobacco: Never     Tobacco comments:     5-7 cigarettes per day   Substance Use  Topics     Alcohol use: Not Currently      Wt Readings from Last 1 Encounters:   12/15/22 78 kg (172 lb)        Anesthesia Evaluation   Pt has had prior anesthetic. Type: General and Regional.    No history of anesthetic complications       ROS/MED HX  ENT/Pulmonary:     (+) tobacco use, Past use, recent URI, resolved,     Neurologic:  - neg neurologic ROS     Cardiovascular:     (+) -----No previous cardiac testing  (-) hypertension, CAD and arrhythmias   METS/Exercise Tolerance: >4 METS    Hematologic:     (+) anemia,     Musculoskeletal:       GI/Hepatic: Comment: Rectal perforation s/p ovarian cystectomy with resection and anastomosis c/b anastomotic stricture   (-) GERD and esophageal disease   Renal/Genitourinary:  - neg Renal ROS     Endo:  - neg endo ROS     Psychiatric/Substance Use:     (+) psychiatric history anxiety H/O chronic opiod use .     Infectious Disease:  - neg infectious disease ROS     Malignancy:  - neg malignancy ROS     Other:  - neg other ROS    (+) , H/O Chronic Pain,        Physical Exam    Airway        Mallampati: II   TM distance: > 3 FB   Neck ROM: full   Mouth opening: > 3 cm    Respiratory Devices and Support         Dental  no notable dental history         Cardiovascular          Rhythm and rate: regular and normal     Pulmonary           breath sounds clear to auscultation           OUTSIDE LABS:  CBC:   Lab Results   Component Value Date    WBC 6.1 05/03/2022    WBC 10.9 04/29/2022    HGB 9.7 (L) 05/03/2022    HGB 11.5 (L) 04/29/2022    HCT 30.1 (L) 05/03/2022    HCT 35.8 04/29/2022     05/07/2022     05/04/2022     BMP:   Lab Results   Component Value Date     05/03/2022     05/02/2022    POTASSIUM 4.3 05/07/2022    POTASSIUM 4.0 05/06/2022    CHLORIDE 108 05/03/2022    CHLORIDE 106 05/02/2022    CO2 26 05/03/2022    CO2 25 05/02/2022    BUN 5 (L) 05/03/2022    BUN 4 (L) 05/02/2022    CR 0.56 05/03/2022    CR 0.55 05/02/2022    GLC 97 09/13/2022      (H) 08/03/2022     COAGS:   Lab Results   Component Value Date    PTT 30 02/16/2022    INR 1.09 05/06/2022     POC: No results found for: BGM, HCG, HCGS  HEPATIC:   Lab Results   Component Value Date    ALBUMIN 3.7 02/16/2022    PROTTOTAL 7.3 02/16/2022    ALT 20 02/16/2022    AST 16 02/16/2022    ALKPHOS 66 02/16/2022    BILITOTAL 0.5 02/16/2022     OTHER:   Lab Results   Component Value Date    MANNY 8.2 (L) 05/03/2022    PHOS 2.6 05/01/2022    MAG 2.1 05/07/2022       Anesthesia Plan    ASA Status:  2   NPO Status:  NPO Appropriate    Anesthesia Type: General.     - Airway: ETT   Induction: Intravenous.   Maintenance: Balanced.   Techniques and Equipment:     - Lines/Monitors: 2nd IV, BIS     - Blood: T&S     Consents    Anesthesia Plan(s) and associated risks, benefits, and realistic alternatives discussed. Questions answered and patient/representative(s) expressed understanding.    - Discussed:     - Discussed with:  Patient         Postoperative Care    Pain management: IV analgesics, Peripheral nerve block (Single Shot), Multi-modal analgesia, Oral pain medications.     - Plan for long acting post-op opioid use   PONV prophylaxis: Ondansetron (or other 5HT-3), Dexamethasone or Solumedrol, Background Propofol Infusion     Comments:              PAC Discussion and Assessment                                                      PAC Pharmacist Assessment: PREOPERATIVE PAIN CONSULT FOR POSTOPERATIVE PAIN MANAGEMENT  Vicki Shell was interviewed via phone on January 2, 2023 prior to PAC Clinic appointment. Patient is preparing for the planned procedure with Dr. Su and Dr. Richardson on 1/4/23 at the [facility] for CYSTOSCOPY, WITH URETERAL STENT INSERTION; exploratory laparotomy, redo colorectal anastomosis.  These recommendations are intended for patients admitted to the hospital after a procedure and are only valid for 30 days from the date of service. If there are  significant changes in opioid dosing between today and day of procedure the below recommendations may have to be adjusted.      RECOMMENDATIONS:   The following pain management recommendations are made based on information from today's visit and should not replace medical decision-making based on patient condition at the time of procedure or postoperatively.      - PREOPERATIVE:  + Long acting opioid -morphine extended release (MS Contin) 30 mg PO twice a day. Take usual dose on the morning of procedure.  + Before procedure recommend acetaminophen 975 mg PO in pre-op (Written in pre-op by PAC VAZQUEZ)    - INTRAOPERATIVE (Anesthesiologist/CRNA to consider):   + Regional anesthesia - Defer to RAPS team   + Ketamine IV intraoperatively   + Avoid remifentanil - to reduce risk of developing hyperalgesia    - POSTOPERATIVE INPATIENT MANAGEMENT:  + Please consult the inpatient pain management service for postoperative pain management recommendations.     Opioid analgesic:  + Note: if neuraxial opioid or other long acting opioid (eg. Methadone) is given during procedure contact on-call pain provider for adjustment in opioid plan  + Note if regional approach includes an opioid via the epidural then defer opioid management to RAPS team.     + Continue morphine extended release (MS Contin) 30 mg BID   + HYDROmorphone (Dilaudid) PCA recommended dose range 0.2-0.4 mg Q 10 min lockout interval with NO continuous rate. Start with 0.2 mg PCA dose Q 10 min lockout interval. If necessary for pain control and improvement in physical function, notify provider for PCA dose increase orders per recommended dose range. Hold or reduce dose for sedation.            Nonopioid analgesics:   + Defer use of NSAID to surgeon  + Start acetaminophen 975 mg PO every 6 hr scheduled if no concern about masking fevers  + Initiate lidocaine 4% patch - 1-3 patches every 24 hours scheduled (12 hr on 12 hr off). Apply to intact skin only.    + Initiate  menthol 5% patch - 1 patch every 8 hours PRN pain.  Apply to intact skin only.   + heat/ice PRN     Muscle Relaxant:   + start methocarbamol 500 mg PO q6hr PRN muscle spasm    Stool softeners/Laxatives:   + When appropriate start senna-docusate 1-2 tabs PO BID and Miralax 17 g daily to prevent opioid induced constipation.     Other:  + Recommend close monitoring of respiratory status postoperatively with capnography and continuous SpO2 monitoring. Would recommend continuing capnography beyond the usual 24 hr due to patient's opioid tolerance.     + Ketamine IV infusion.  If needed for acute postop uncontrolled pain, the primary service may decide to start ketamine infusion at 5 mg/hr.  Ketamine for acute pain management will be used as an analgesic medication in addition to opioids when usual analgesics are suboptimal.Only start ketamine IV if patient is stable from a cardiovascular standpoint.  Low dose ketamine may be administered on a regular nursing unit according to [facility] ketamine-low dose policy.  Please see policy for details on contraindications, adverse effects and monitoring.  Of note, sialorrhea is another potential side effect and must weight benefit/risk if patient having trouble protecting airway.  [email or web address]     -------------------------------------------------------------------------------------------------------------------  - OUTPATIENT MEDICATIONS (related to pain management):  -- Long-acting opioid: Morphine extended release (MS Contin) 30 mg every 12 hours  -- Short-acting opioid: Hydrocodone-acetaminophen (Norco) take 1 tablet every 6 hours as needed for pain (averages about 4 tablets/day)  -- Oral adjuvant(s): Acetaminophen 500 to 1000 mg every 6 hours as needed for pain  -- Topicals: none  -- Bowel regimen: none (pt has ileostomy)    -- Other relevant medications: sertraline, hydroxyzine     Verbal consent  was given by patient to access pharmacy records and [place] Prescription Monitoring Profile: Yes  Outpatient opioids prescribed by Dr. Karen Curtis   Outpatient opioid oral morphine equivalent (OME): 80 mg OME/day     ASSESSMENT:    Vicki Shell has a history of chronic low back pain and chronic neck pain as well as multiple previous abdominal surgeries (see Dr. Richardson note for full details) and ongoing low abdominal pain and is preparing for above mentioned surgery.  Today Vicki is interviewed via phone call by this writer.  She is alert oriented and answers questions appropriately.  She endorses daily pain and rates this as a 6 out of 10 on a 0-10 numeric scale.  Her worst pain is in her lower abdomen and is aching in nature.  She also has pain in her low back, neck and shoulders that is generally dull but sharp intermittently.  She endorses a walking tolerance of a few blocks.  She reports that she sleeps well.  She denies any issues with sedation from her opioids.  She has an ileostomy and the output is quite consistent for her.  She denies any history of obstructive sleep apnea.  She denies any current alcohol use, denies any recreational drug use and denies any history of opioid abuse misuse or diversion.  She is a former smoker.  She endorses that with her last surgery she did not have a PCA after she woke up from anesthesia and she did throw up from uncontrolled pain.  Then she was placed on a PCA and did better.  She feels that PCA is the best way to manage her immediate postoperative pain.  She does have some muscle spasms in her abdomen and back.  She has some Flexeril from a previous prescription but she rarely if ever uses this because it does not help much and makes her quite sleepy which inhibits her ability to care for her children.  In general she is open to a multimodal pain management approach as outlined above and all questions were answered to patient's apparent satisfaction.    Other  "pain therapies tried in the past (not an all inclusive list):   PCA - patient has received PCA in the past without complication, this is her preferred modality for postoperative pain management.   Flexeril - not that helpful, makes her sleepy  norco - helpful  Morphine -helpful  Dilaudid - helpful.     Pain therapies never tried (not an all inclusive list):   Ketamine - denies seizures, uncontrolled hypertension, cardiac arrhythmias, PTSD, BPAD, schizophrenia, psychiatric disorders (eg. hallucinations/delirium), history of brain cancer, cardiac failure, previous CVA and increased IOP/glaucoma and is open to using ketamine for pain control.     If immediate assistance is needed please contact the pain service at the number below.     Sunday Ponce RPH  January 2, 2023  1:57 PM    If questions or concerns, please contact the Inpatient Pain Service via [facility]: \"PAIN MANAGEMENT ACUTE INPATIENT/ [facility] or [facility] (depending on location of patient)   Reviewed and Signed by PAC Pharmacist  Pharmacist: KEILY  Date: 1/2/23  Time: 1412   Sunday Ponce RPH  "

## 2023-01-02 NOTE — PROGRESS NOTES
Preoperative Assessment Center Medication History Note    Medication history completed on January 2, 2023 by this writer. See Epic admission navigator for prior to admission medications. Operating room staff will still need to confirm medications and last dose information on day of surgery.     Medication history interview sources  Patient interview: Yes  Care Everywhere records: Yes  Surescripts pharmacy refill records: Yes  Other (if applicable):     Changes made to PTA medication list  Added: sertraline, acetaminophen  Deleted: lidocaine patch  Changed: MS contin dosing, norco,     Additional medication history information (including reliability of information, actions taken by pharmacist):    -- Did have a course of antibiotics for possible intrabdominal infection (metronidazole) (completed 12/26.   -- Reports not being on blood thinning medications    -- Declines being on any other prescription or over-the-counter medications    Prior to Admission medications    Medication Sig Last Dose Taking? Auth Provider Long Term End Date   acetaminophen (TYLENOL) 500 MG tablet Take 500-1,000 mg by mouth every 6 hours as needed for mild pain Taking Yes Unknown, Entered By History     HYDROcodone-acetaminophen (NORCO) 5-325 MG tablet Take 1 tablet by mouth every 6 hours as needed Taking Yes Reported, Patient     hydrOXYzine (ATARAX) 25 MG tablet Take 1 tablet (25 mg) by mouth every 8 hours as needed for itching or anxiety Taking Yes Katia Workman PA-C     morphine (MS CONTIN) 30 MG CR tablet Take 30 mg by mouth every 12 hours Taking Yes Reported, Patient     multivitamin w/minerals (THERA-VIT-M) tablet Take 1 tablet by mouth daily Takes when she remembers Taking Yes Unknown, Entered By History     sertraline (ZOLOFT) 50 MG tablet Take 50 mg by mouth every evening Taking Yes Unknown, Entered By History No    NONFORMULARY Pro Immune supplement (Selenium)  Takes 1 tablet daily (when she remembers)  Patient not  taking: Reported on 1/2/2023 Not Taking  Unknown, Entered By History            Medication history completed by: Sunday Ponce [facility]

## 2023-01-02 NOTE — PHARMACY - PREOPERATIVE ASSESSMENT CENTER
PREOPERATIVE PAIN CONSULT FOR POSTOPERATIVE PAIN MANAGEMENT  Vicki Shell was interviewed via phone on January 2, 2023 prior to PAC Clinic appointment. Patient is preparing for the planned procedure with Dr. Su and Dr. Richardson on 1/4/23 at the [facility] for CYSTOSCOPY, WITH URETERAL STENT INSERTION; exploratory laparotomy, redo colorectal anastomosis.  These recommendations are intended for patients admitted to the hospital after a procedure and are only valid for 30 days from the date of service. If there are significant changes in opioid dosing between today and day of procedure the below recommendations may have to be adjusted.      RECOMMENDATIONS:   The following pain management recommendations are made based on information from today's visit and should not replace medical decision-making based on patient condition at the time of procedure or postoperatively.      - PREOPERATIVE:  + Long acting opioid -morphine extended release (MS Contin) 30 mg PO twice a day. Take usual dose on the morning of procedure.  + Before procedure recommend acetaminophen 975 mg PO in pre-op (Written in pre-op by PAC VAZQUEZ)    - INTRAOPERATIVE (Anesthesiologist/CRNA to consider):   + Regional anesthesia - Defer to RAPS team   + Ketamine IV intraoperatively   + Avoid remifentanil - to reduce risk of developing hyperalgesia    - POSTOPERATIVE INPATIENT MANAGEMENT:  + Please consult the inpatient pain management service for postoperative pain management recommendations.     Opioid analgesic:  + Note: if neuraxial opioid or other long acting opioid (eg. Methadone) is given during procedure contact on-call pain provider for adjustment in opioid plan  + Note if regional approach includes an opioid via the epidural then defer opioid management to RAPS team.     + Continue morphine extended release (MS Contin) 30 mg PO BID   + HYDROmorphone (Dilaudid) PCA recommended dose range 0.2-0.4 mg Q 10 min lockout  interval with NO continuous rate. Start with 0.2 mg PCA dose Q 10 min lockout interval. If necessary for pain control and improvement in physical function, notify provider for PCA dose increase orders per recommended dose range. Hold or reduce dose for sedation.            Nonopioid analgesics:   + Defer use of NSAID to surgeon  + Start acetaminophen 975 mg PO every 6 hr scheduled if no concern about masking fevers  + Initiate lidocaine 4% patch - 1-3 patches every 24 hours scheduled (12 hr on 12 hr off). Apply to intact skin only.    + Initiate menthol 5% patch - 1 patch every 8 hours PRN pain.  Apply to intact skin only.   + heat/ice PRN     Muscle Relaxant:   + start methocarbamol 500 mg PO q6hr PRN muscle spasm    Stool softeners/Laxatives:   + When appropriate start senna-docusate 1-2 tabs PO BID and Miralax 17 g daily to prevent opioid induced constipation.     Other:  + Recommend close monitoring of respiratory status postoperatively with capnography and continuous SpO2 monitoring. Would recommend continuing capnography beyond the usual 24 hr due to patient's opioid tolerance.     + Ketamine IV infusion.  If needed for acute postop uncontrolled pain, the primary service may decide to start ketamine infusion at 5 mg/hr.  Ketamine for acute pain management will be used as an analgesic medication in addition to opioids when usual analgesics are suboptimal.Only start ketamine IV if patient is stable from a cardiovascular standpoint.  Low dose ketamine may be administered on a regular nursing unit according to [facility] ketamine-low dose policy.  Please see policy for details on contraindications, adverse effects and monitoring.  Of note, sialorrhea is another potential side effect and must weight benefit/risk if patient having trouble protecting airway.  [email or web address]      -------------------------------------------------------------------------------------------------------------------  - OUTPATIENT MEDICATIONS (related to pain management):  -- Long-acting opioid: Morphine extended release (MS Contin) 30 mg every 12 hours  -- Short-acting opioid: Hydrocodone-acetaminophen (Norco) take 1 tablet every 6 hours as needed for pain (averages about 4 tablets/day)  -- Oral adjuvant(s): Acetaminophen 500 to 1000 mg every 6 hours as needed for pain  -- Topicals: none  -- Bowel regimen: none (pt has ileostomy)    -- Other relevant medications: sertraline, hydroxyzine     Verbal consent was given by patient to access pharmacy records and [place] Prescription Monitoring Profile: Yes  Outpatient opioids prescribed by Dr. Karen Curtis   Outpatient opioid oral morphine equivalent (OME): 80 mg OME/day     ASSESSMENT:    Vicki Shell has a history of chronic low back pain and chronic neck pain as well as multiple previous abdominal surgeries (see Dr. Richardson note for full details) and ongoing low abdominal pain and is preparing for above mentioned surgery.  Today Vicki is interviewed via phone call by this writer.  She is alert oriented and answers questions appropriately.  She endorses daily pain and rates this as a 6 out of 10 on a 0-10 numeric scale.  Her worst pain is in her lower abdomen and is aching in nature.  She also has pain in her low back, neck and shoulders that is generally dull but sharp intermittently.  She endorses a walking tolerance of a few blocks.  She reports that she sleeps well.  She denies any issues with sedation from her opioids.  She has an ileostomy and the output is quite consistent for her.  She denies any history of obstructive sleep apnea.  She denies any current alcohol use, denies any recreational drug use and denies any history of opioid abuse misuse or diversion.  She is a former smoker.  She endorses that with her last surgery she did not have a PCA  "after she woke up from anesthesia and she did throw up from uncontrolled pain.  Then she was placed on a PCA and did better.  She feels that PCA is the best way to manage her immediate postoperative pain.  She does have some muscle spasms in her abdomen and back.  She has some Flexeril from a previous prescription but she rarely if ever uses this because it does not help much and makes her quite sleepy which inhibits her ability to care for her children.  In general she is open to a multimodal pain management approach as outlined above and all questions were answered to patient's apparent satisfaction.    Other pain therapies tried in the past (not an all inclusive list):   PCA - patient has received PCA in the past without complication, this is her preferred modality for postoperative pain management.   Flexeril - not that helpful, makes her sleepy  norco - helpful  Morphine -helpful  Dilaudid - helpful.     Pain therapies never tried (not an all inclusive list):   Ketamine - denies seizures, uncontrolled hypertension, cardiac arrhythmias, PTSD, BPAD, schizophrenia, psychiatric disorders (eg. hallucinations/delirium), history of brain cancer, cardiac failure, previous CVA and increased IOP/glaucoma and is open to using ketamine for pain control.     If immediate assistance is needed please contact the pain service at the number below.     [facility]  January 2, 2023  1:57 PM    If questions or concerns, please contact the Inpatient Pain Service via [facility]: \"PAIN MANAGEMENT ACUTE INPATIENT/ [facility] EAST [facility] or WEST [facility] (depending on location of patient)   "

## 2023-01-03 ENCOUNTER — LAB (OUTPATIENT)
Dept: LAB | Facility: CLINIC | Age: 38
End: 2023-01-03
Payer: MEDICAID

## 2023-01-03 ENCOUNTER — APPOINTMENT (OUTPATIENT)
Dept: LAB | Facility: CLINIC | Age: 38
End: 2023-01-03
Payer: MEDICAID

## 2023-01-03 ENCOUNTER — PRE VISIT (OUTPATIENT)
Dept: SURGERY | Facility: CLINIC | Age: 38
End: 2023-01-03

## 2023-01-03 ENCOUNTER — OFFICE VISIT (OUTPATIENT)
Dept: SURGERY | Facility: CLINIC | Age: 38
End: 2023-01-03
Payer: MEDICAID

## 2023-01-03 VITALS
SYSTOLIC BLOOD PRESSURE: 104 MMHG | RESPIRATION RATE: 16 BRPM | BODY MASS INDEX: 26.84 KG/M2 | HEART RATE: 81 BPM | DIASTOLIC BLOOD PRESSURE: 69 MMHG | TEMPERATURE: 98.2 F | WEIGHT: 171 LBS | OXYGEN SATURATION: 96 % | HEIGHT: 67 IN

## 2023-01-03 DIAGNOSIS — Z20.822 ENCOUNTER FOR LABORATORY TESTING FOR COVID-19 VIRUS: ICD-10-CM

## 2023-01-03 DIAGNOSIS — Z01.818 PRE-OP EVALUATION: Primary | ICD-10-CM

## 2023-01-03 DIAGNOSIS — Z01.818 PRE-OP EVALUATION: ICD-10-CM

## 2023-01-03 LAB — SARS-COV-2 RNA RESP QL NAA+PROBE: NEGATIVE

## 2023-01-03 PROCEDURE — 86850 RBC ANTIBODY SCREEN: CPT | Mod: 90 | Performed by: PATHOLOGY

## 2023-01-03 PROCEDURE — U0003 INFECTIOUS AGENT DETECTION BY NUCLEIC ACID (DNA OR RNA); SEVERE ACUTE RESPIRATORY SYNDROME CORONAVIRUS 2 (SARS-COV-2) (CORONAVIRUS DISEASE [COVID-19]), AMPLIFIED PROBE TECHNIQUE, MAKING USE OF HIGH THROUGHPUT TECHNOLOGIES AS DESCRIBED BY CMS-2020-01-R: HCPCS | Mod: 90 | Performed by: PATHOLOGY

## 2023-01-03 PROCEDURE — 86900 BLOOD TYPING SEROLOGIC ABO: CPT | Mod: 90 | Performed by: PATHOLOGY

## 2023-01-03 PROCEDURE — 99000 SPECIMEN HANDLING OFFICE-LAB: CPT | Performed by: PATHOLOGY

## 2023-01-03 PROCEDURE — 36415 COLL VENOUS BLD VENIPUNCTURE: CPT | Performed by: PATHOLOGY

## 2023-01-03 PROCEDURE — U0005 INFEC AGEN DETEC AMPLI PROBE: HCPCS | Mod: 90 | Performed by: PATHOLOGY

## 2023-01-03 PROCEDURE — 99213 OFFICE O/P EST LOW 20 MIN: CPT | Performed by: PHYSICIAN ASSISTANT

## 2023-01-03 PROCEDURE — 86901 BLOOD TYPING SEROLOGIC RH(D): CPT | Mod: 90 | Performed by: PATHOLOGY

## 2023-01-03 RX ORDER — ACETAMINOPHEN 325 MG/1
975 TABLET ORAL ONCE
Status: CANCELLED | OUTPATIENT
Start: 2023-01-03 | End: 2023-01-03

## 2023-01-03 ASSESSMENT — PAIN SCALES - GENERAL: PAINLEVEL: MODERATE PAIN (5)

## 2023-01-03 ASSESSMENT — LIFESTYLE VARIABLES
TOBACCO_USE: 0
TOBACCO_USE: 1

## 2023-01-03 ASSESSMENT — ENCOUNTER SYMPTOMS
SEIZURES: 0
DYSRHYTHMIAS: 0

## 2023-01-03 NOTE — PATIENT INSTRUCTIONS
Preparing for Your Surgery      Name:  Vicki Shell   MRN:  [id number]   :  1985   Today's Date:  1/3/2023       Arriving for surgery:  Surgery date:  23  Arrival time:  5:30 am     Surgeries and procedures: Adult patients can have 2 visitors all through the surgery process.     Visiting hours: 8 a.m. to 8:30 p.m.     Hospital: Adult patients and children under age 18 can have 4 visitor at a time     No visitors under the age of 5 are allowed for hospital patients.  Double occupancy rooms: Patients can have only two visitors at a time.     Patients with disabilities: Can have a support person with them (family member, service provider     Or someone well informed about their needs) plus the allowed number of visitors     Patients confirmed or suspected to have symptoms of COVID 19 or flu:     No visitors allowed for adult patients.   Children (under age 18) can have 1 named visitor.     People who are sick or showing symptoms of COVID 19 or flu:    Are not allowed to visit patients--we can only make exceptions in special situations.       Please follow these guidelines for your visit:   Arrive wearing a mask over your mouth and nose; we will give you a medical mask to wear    If you arrive wearing a cloth mask.   Keep it on during your entire visit, even when in patient's room.   If you don't wear a mask we'll ask you to leave.     Clean your hands with alcohol hand . Do this when you arrive at and leave the building and patient room,    And again after you touch your mask or anything in the room.     You can t visit if you have a fever, cough, shortness of breath, muscle aches, headaches, sore throat    Or diarrhea      Stay 6 feet away from others during your visit and between visits     Go directly to and from the room you are visiting.     Stay in the patient s room during your visit. Limit going to other places in the hospital as much as possible     Leave bags and jackets at home or in  the car.     For everyone s health, please don t come and go during your visit. That includes for smoking   during your visit.     Please come to:     [facility] [address]  [address] [place]  [address]    - ? parking is available in front of the hospital      -   Parking is available in the Patient Visitor Ramp on [address] and [facility].     -   When entering the hospital you will be asked COVID screening questions, you will then be directed to Registration.  Registration will direct you to the 3rd floor Surgery waiting room.     -   Please ask if you need an escort or a wheelchair to the Surgery Waiting Room.  Preop number- [phone]      -    Please proceed to [address] on the 3rd floor. [phone]?     - ?If you are in need of directions, wheelchair or escort please stop at the Information Desk in the lobby.  Inform the information person that you are here for surgery; a wheelchair and escort to [address] will be provided.?     What can I eat or drink?  -  You may eat and drink normally up to 8 hours prior to arrival time. (Until 9:30 pm on 1/3/23 - the night before surgery)  -  You may have clear liquids until 2 hours prior to arrival time. (Until 3:30 am on 1/4/23 - the morning of surgery)    Examples of clear liquids:  Water  Clear broth  Juices (apple, white grape, white cranberry  and cider) without pulp  Noncarbonated, powder based beverages  (lemonade and Maurice-Aid)  Sodas (Sprite, 7-Up, ginger ale and seltzer)  Coffee or tea (without milk or cream)  Gatorade    -  No Alcohol for at least 24 hours before surgery.     Which medicines can I take?    Hold Aspirin for 7 days before surgery.   Hold Multivitamins for 7 days before surgery.  Hold Supplements for 7 days before surgery.  Hold Ibuprofen (Advil, Motrin) for 1 day before surgery--unless otherwise directed by surgeon.  Hold Naproxen (Aleve) for 4 days before surgery.    Hold  all NSAIDS for 7 days before spine surgery. (Ibuprofen, Naproxen, Celebrex, Indocin, Diclofenac.)    -  DO NOT take these medications the day of surgery:  Hydroxyzine (Atarax)  Multivitamin     -  PLEASE TAKE these medications the day of surgery:  Tylenol if needed   Hydrocodone-acetaminophen if needed  MS Contin  Sertraline (Zoloft) to be taken at your usual time    How do I prepare myself?  - Please take 2 showers before surgery using Scrubcare or Hibiclens soap.    Use this soap only from the neck to your toes.     Leave the soap on your skin for one minute--then rinse thoroughly.      You may use your own shampoo and conditioner. No other hair products.   - Please remove all jewelry and body piercings.  - No lotions, deodorants or fragrance.  - No makeup or fingernail polish.   - Bring your ID and insurance card.    Covid testing policy as of 12/06/2022  Your surgeon will notify and schedule you for a COVID test if one is needed before surgery--please direct any questions or COVID symptoms to your surgeon      Questions or Concerns:    - For any questions regarding the day of surgery or your hospital stay, please contact the Pre Admission Nursing Office at [phone].       - If you have health changes between today and your surgery, please call your surgeon.       - For questions after surgery, please call your surgeons office.

## 2023-01-03 NOTE — H&P
Pre-Operative H & P     CC:  Preoperative exam to assess for increased cardiopulmonary risk while undergoing surgery and anesthesia.    Date of Encounter: 1/3/2023  Primary Care Physician:  Karen Hernandez     Reason for visit:   Encounter Diagnosis   Name Primary?     Pre-op evaluation Yes       HPI  Vicki Shell is a 37 year old female who presents for pre-operative H & P in preparation for  Procedure Information     Case: [id number] Date/Time: 01/04/23 0730    Procedures:       CYSTOSCOPY, WITH URETERAL STENT INSERTION (Bilateral: Urethra)      exploratory laparotomy, redo colorectal anastomosis (Abdomen)    Anesthesia type: General    Diagnosis:       Colon stricture (H) [K56.699]      Colostomy status (H) [Z93.3]    Pre-op diagnosis:       Colon stricture (H) [K56.699]      Colostomy status (H) [Z93.3]    [facility]    Providers: Ian Su MD; Tad Richardson MD          Patient is being evaluated for comorbid conditions of colostomy status, ovarian cyst    Ms. Shell has a history of missed rectal injury s/p ovarian cystectomy requiring emerging ex lap with end colostomy. She is now s/p colostomy takedown and diverting loop ileostomy.  This is complicated by anastomotic stricture. She follows with Dr. Richardson and is now scheduled for the above procedure.     History is obtained from the patient and chart review    Hx of abnormal bleeding or anti-platelet use: denies    Menstrual history: No LMP recorded (lmp unknown). Patient has had a hysterectomy.     Past Medical History  Past Medical History:   Diagnosis Date     Colostomy in place (H)      Cyst of left ovary        Past Surgical History  Past Surgical History:   Procedure Laterality Date     COMBINED CYSTOSCOPY, INSERT STENT URETER(S) Bilateral 4/28/2022    Procedure: CYSTOSCOPY, WITH URETERAL bilateral STENT INSERTION;  Surgeon: Inocencio Washburn MD;  [facility]     ENDOSCOPIC ULTRASOUND LOWER  GASTROINTESTIONAL TRACT (GI) N/A 9/13/2022    Procedure: ULTRASOUND, LOWER GI TRACT, ENDOSCOPIC;  Surgeon: James Prieto MD;  [facility]     IR SKIN SUBQ/SEROMA ABSCESS DRAIN  5/6/2022     LAPAROSCOPIC ASSISTED COLOSTOMY TAKEDOWN N/A 4/28/2022    Procedure: CLOSURE, COLOSTOMY, LAPAROSCOPIC, converted to open colostomy reversal with loop ileostomy creation;  Surgeon: Tad Richardson MD;  [facility]     LAPAROSCOPIC ILEOSTOMY N/A 4/28/2022    Procedure: CREATION, ILEOSTOMY, LAPAROSCOPIC converted to open;  Surgeon: Tad Richardson MD;  [facility]     LAPAROSCOPY DIAGNOSTIC (GYN)      for ovarian cyst     LOW ANTERIOR BOWEL RESECTION       SIGMOIDOSCOPY FLEXIBLE N/A 4/28/2022    Procedure: Sigmoidoscopy flexible;  Surgeon: Tad Richardson MD;  [facility]     SIGMOIDOSCOPY FLEXIBLE N/A 8/3/2022    Procedure: SIGMOIDOSCOPY, FLEXIBLE WITH CONTRAST;  Surgeon: James Prieto MD;  [facility]     SIGMOIDOSCOPY FLEXIBLE N/A 9/13/2022    Procedure: esophagastroduodenoscopy,SIGMOIDOSCOPY, FLEXIBLE,possible stent placement, and necoratization;  Surgeon: James Prieto MD;  [facility]       Prior to Admission Medications  Current Outpatient Medications   Medication Sig Dispense Refill     acetaminophen (TYLENOL) 500 MG tablet Take 500-1,000 mg by mouth every 6 hours as needed for mild pain       HYDROcodone-acetaminophen (NORCO) 5-325 MG tablet Take 1 tablet by mouth every 6 hours as needed       hydrOXYzine (ATARAX) 25 MG tablet Take 1 tablet (25 mg) by mouth every 8 hours as needed for itching or anxiety 15 tablet 0     morphine (MS CONTIN) 30 MG CR tablet Take 30 mg by mouth every 12 hours       multivitamin w/minerals (THERA-VIT-M) tablet Take 1 tablet by mouth daily Takes when she remembers       sertraline (ZOLOFT) 50 MG tablet Take 50 mg by mouth every evening       NONFORMULARY Pro Immune supplement (Selenium)  Takes 1 tablet daily (when she remembers) (Patient  not taking: Reported on 1/2/2023)         Allergies  No Known Allergies    Social History  Social History     Socioeconomic History     Marital status: Single     Spouse name: Not on file     Number of children: Not on file     Years of education: Not on file     Highest education level: Not on file   Occupational History     Not on file   Tobacco Use     Smoking status: Former     Types: Cigarettes     Smokeless tobacco: Never     Tobacco comments:     5-7 cigarettes per day   Vaping Use     Vaping Use: Never used   Substance and Sexual Activity     Alcohol use: Not Currently     Drug use: Never     Sexual activity: Yes     Partners: Male   Other Topics Concern     Not on file   Social History Narrative     Not on file     Social Determinants of Health     Financial Resource Strain: Not on file   Food Insecurity: Not on file   Transportation Needs: Not on file   Physical Activity: Not on file   Stress: Not on file   Social Connections: Not on file   Intimate Partner Violence: Not on file   Housing Stability: Not on file       Family History  Family History   Problem Relation Age of Onset     Deep Vein Thrombosis (DVT) Father      Anesthesia Reaction No family hx of        Review of Systems  The complete review of systems is negative other than noted in the HPI or here.   Anesthesia Evaluation   Pt has had prior anesthetic.     No history of anesthetic complications       ROS/MED HX  ENT/Pulmonary:  - neg pulmonary ROS  (-) tobacco use   Neurologic:  - neg neurologic ROS  (-) no seizures and no CVA   Cardiovascular:     (+) -----No previous cardiac testing  (-) taking anticoagulants/antiplatelets   METS/Exercise Tolerance: >4 METS Comment: Raising 4 kids, running around house, housework, [place]   Hematologic:  - neg hematologic  ROS  (-) history of blood clots and history of blood transfusion   Musculoskeletal:  - neg musculoskeletal ROS     GI/Hepatic: Comment: Ostomy present   (-) GERD  "  Renal/Genitourinary: Comment: H/o ovarian cyst      Endo:  - neg endo ROS  (-) chronic steroid usage   Psychiatric/Substance Use:     (+) psychiatric history     Infectious Disease:  - neg infectious disease ROS     Malignancy:       Other:            /69 (BP Location: Right arm, Patient Position: Sitting, Cuff Size: Adult Regular)   Pulse 81   Temp 98.2  F (36.8  C) (Oral)   Resp 16   Ht 1.702 m (5' 7\")   Wt 77.6 kg (171 lb)   LMP  (LMP Unknown)   SpO2 96%   Breastfeeding No   BMI 26.78 kg/m      Physical Exam   Constitutional: Awake, alert, cooperative, no apparent distress, and appears stated age.  Eyes: Pupils equal, round and reactive to light, extra ocular muscles intact, sclera clear, conjunctiva normal.  HENT: Normocephalic, oral pharynx with moist mucus membranes, good dentition  Respiratory: Clear to auscultation bilaterally, no crackles or wheezing.  Cardiovascular: Regular rate and rhythm, normal S1 and S2, and no murmur noted.  Carotids no bruits. No edema. Palpable pulses to radial arteries.   GI: Normal bowel sounds, soft, non-distended, ostomy present  Genitourinary:  deferred  Skin: Warm and dry.    Musculoskeletal: Full ROM of neck. There is no redness, warmth, or swelling of the exposed joints. Gross motor strength is normal.    Neurologic: Awake, alert, oriented to name, place and time. Cranial nerves II-XII are grossly intact. Gait is normal.   Neuropsychiatric: Calm, cooperative. Normal affect.     Prior Labs/Diagnostic Studies   All labs and imaging personally reviewed     EKG/ stress test - if available please see in ROS above   No results found.  No flowsheet data found.      The patient's records and results personally reviewed by this provider.     Outside records reviewed from: Care Everywhere    LAB/DIAGNOSTIC STUDIES TODAY:  T&S    Assessment      Vicki Shell is a 37 year old female seen as a PAC referral for risk assessment and optimization for " "anesthesia.    Plan/Recommendations  Pt will be optimized for the proposed procedure.  See below for details on the assessment, risk, and preoperative recommendations    NEUROLOGY  - No history of TIA, CVA or seizure  -Post Op delirium risk factors:  No risk identified    ENT  - No current airway concerns.  Will need to be reassessed day of surgery.  Mallampati: I  TM: > 3    CARDIAC  - No history of CAD, Hypertension and Afib   -denies cardiac history or symptoms   - METS (Metabolic Equivalents)  Patient performs 4 or more METS exercise without symptoms            Total Score: 0      RCRI-Very low risk: Class 1 0.4% complication rate            Total Score: 0        PULMONARY  JOSE Low Risk            Total Score: 0      - Denies asthma or inhaler use  - Tobacco History      History   Smoking Status     Former     Types: Cigarettes   Smokeless Tobacco     Never       GI  -h/o loop ileostomy complicated by anastomotic stricture. Above procedure planned   PONV High Risk  Total Score: 3           1 AN PONV: Pt is Female    1 AN PONV: Patient is not a current smoker    1 AN PONV: Intended Post Op Opioids        /RENAL  - Baseline Creatinine  WNL    ENDOCRINE    - BMI: Estimated body mass index is 26.78 kg/m  as calculated from the following:    Height as of this encounter: 1.702 m (5' 7\").    Weight as of this encounter: 77.6 kg (171 lb).  Overweight (BMI 25.0-29.9)  - No history of Diabetes Mellitus    HEME  VTE Low Risk 0.26%            Total Score: 0      - No history of abnormal bleeding or antiplatelet use.  -will update T&S today    Different anesthesia methods/types have been discussed with the patient, but they are aware that the final plan will be decided by the assigned anesthesia provider on the date of service.      The patient is optimized for their procedure. AVS with information on surgery time/arrival time, meds and NPO status given by nursing staff. No further diagnostic testing indicated.      On the " day of service:     Prep time: 10 minutes  Visit time: 8 minutes  Documentation time: 6 minutes  ------------------------------------------  Total time: 24 minutes      Meagan Robles PA-C  Preoperative Assessment Center  [facility]  Clinic and Surgery Center  Phone: [phone]  Fax: [phone]

## 2023-01-04 ENCOUNTER — ANESTHESIA (OUTPATIENT)
Dept: SURGERY | Facility: CLINIC | Age: 38
DRG: 331 | End: 2023-01-04
Payer: MEDICAID

## 2023-01-04 ENCOUNTER — HOSPITAL ENCOUNTER (INPATIENT)
Facility: CLINIC | Age: 38
LOS: 7 days | Discharge: HOME | DRG: 331 | End: 2023-01-11
Attending: UROLOGY | Admitting: SURGERY
Payer: MEDICAID

## 2023-01-04 DIAGNOSIS — G89.18 ACUTE POST-OPERATIVE PAIN: Primary | ICD-10-CM

## 2023-01-04 PROBLEM — K91.30 ANASTOMOTIC STRICTURE OF COLORECTAL REGION: Status: ACTIVE | Noted: 2023-01-04

## 2023-01-04 LAB
CREAT SERPL-MCNC: 0.56 MG/DL (ref 0.51–0.95)
GFR SERPL CREATININE-BSD FRML MDRD: >90 ML/MIN/1.73M2
GLUCOSE BLDC GLUCOMTR-MCNC: 106 MG/DL (ref 70–99)
PLATELET # BLD AUTO: 238 10E3/UL (ref 150–450)

## 2023-01-04 PROCEDURE — 0DTJ0ZZ RESECTION OF APPENDIX, OPEN APPROACH: ICD-10-PCS | Performed by: SURGERY

## 2023-01-04 PROCEDURE — 710N000010 HC RECOVERY PHASE 1, LEVEL 2, PER MIN: Performed by: UROLOGY

## 2023-01-04 PROCEDURE — 88304 TISSUE EXAM BY PATHOLOGIST: CPT | Mod: 26 | Performed by: PATHOLOGY

## 2023-01-04 PROCEDURE — 250N000009 HC RX 250: Performed by: STUDENT IN AN ORGANIZED HEALTH CARE EDUCATION/TRAINING PROGRAM

## 2023-01-04 PROCEDURE — 0DBG0ZZ EXCISION OF LEFT LARGE INTESTINE, OPEN APPROACH: ICD-10-PCS | Performed by: SURGERY

## 2023-01-04 PROCEDURE — 88302 TISSUE EXAM BY PATHOLOGIST: CPT | Mod: 26 | Performed by: PATHOLOGY

## 2023-01-04 PROCEDURE — C9290 INJ, BUPIVACAINE LIPOSOME: HCPCS | Performed by: STUDENT IN AN ORGANIZED HEALTH CARE EDUCATION/TRAINING PROGRAM

## 2023-01-04 PROCEDURE — 250N000011 HC RX IP 250 OP 636: Performed by: ANESTHESIOLOGY

## 2023-01-04 PROCEDURE — 36415 COLL VENOUS BLD VENIPUNCTURE: CPT | Performed by: SURGERY

## 2023-01-04 PROCEDURE — C1769 GUIDE WIRE: HCPCS | Performed by: UROLOGY

## 2023-01-04 PROCEDURE — 44147 PARTIAL REMOVAL OF COLON: CPT | Mod: 22 | Performed by: SURGERY

## 2023-01-04 PROCEDURE — 258N000003 HC RX IP 258 OP 636: Performed by: STUDENT IN AN ORGANIZED HEALTH CARE EDUCATION/TRAINING PROGRAM

## 2023-01-04 PROCEDURE — 250N000011 HC RX IP 250 OP 636: Performed by: STUDENT IN AN ORGANIZED HEALTH CARE EDUCATION/TRAINING PROGRAM

## 2023-01-04 PROCEDURE — 0DJD8ZZ INSPECTION OF LOWER INTESTINAL TRACT, VIA NATURAL OR ARTIFICIAL OPENING ENDOSCOPIC: ICD-10-PCS | Performed by: SURGERY

## 2023-01-04 PROCEDURE — 0DNW0ZZ RELEASE PERITONEUM, OPEN APPROACH: ICD-10-PCS | Performed by: SURGERY

## 2023-01-04 PROCEDURE — 250N000011 HC RX IP 250 OP 636: Performed by: SURGERY

## 2023-01-04 PROCEDURE — 272N000002 HC OR SUPPLY OTHER OPNP: Performed by: UROLOGY

## 2023-01-04 PROCEDURE — 360N000083 HC SURGERY LEVEL 3 W/ FLUORO, PER MIN: Performed by: UROLOGY

## 2023-01-04 PROCEDURE — 250N000013 HC RX MED GY IP 250 OP 250 PS 637: Performed by: ANESTHESIOLOGY

## 2023-01-04 PROCEDURE — 120N000002 HC R&B MED SURG/OB UMMC

## 2023-01-04 PROCEDURE — 52005 CYSTO W/URTRL CATHJ: CPT | Mod: GC | Performed by: UROLOGY

## 2023-01-04 PROCEDURE — 272N000001 HC OR GENERAL SUPPLY STERILE: Performed by: UROLOGY

## 2023-01-04 PROCEDURE — 88304 TISSUE EXAM BY PATHOLOGIST: CPT | Mod: TC | Performed by: SURGERY

## 2023-01-04 PROCEDURE — 250N000013 HC RX MED GY IP 250 OP 250 PS 637: Performed by: SURGERY

## 2023-01-04 PROCEDURE — 85049 AUTOMATED PLATELET COUNT: CPT | Performed by: SURGERY

## 2023-01-04 PROCEDURE — 250N000025 HC SEVOFLURANE, PER MIN: Performed by: UROLOGY

## 2023-01-04 PROCEDURE — 258N000003 HC RX IP 258 OP 636: Performed by: SURGERY

## 2023-01-04 PROCEDURE — C1765 ADHESION BARRIER: HCPCS | Performed by: UROLOGY

## 2023-01-04 PROCEDURE — 999N000141 HC STATISTIC PRE-PROCEDURE NURSING ASSESSMENT: Performed by: UROLOGY

## 2023-01-04 PROCEDURE — 82565 ASSAY OF CREATININE: CPT | Performed by: SURGERY

## 2023-01-04 PROCEDURE — 370N000017 HC ANESTHESIA TECHNICAL FEE, PER MIN: Performed by: UROLOGY

## 2023-01-04 RX ORDER — EPHEDRINE SULFATE 50 MG/ML
INJECTION, SOLUTION INTRAMUSCULAR; INTRAVENOUS; SUBCUTANEOUS PRN
Status: DISCONTINUED | OUTPATIENT
Start: 2023-01-04 | End: 2023-01-04

## 2023-01-04 RX ORDER — HYDROMORPHONE HYDROCHLORIDE 1 MG/ML
0.2 INJECTION, SOLUTION INTRAMUSCULAR; INTRAVENOUS; SUBCUTANEOUS EVERY 5 MIN PRN
Status: DISCONTINUED | OUTPATIENT
Start: 2023-01-04 | End: 2023-01-04 | Stop reason: HOSPADM

## 2023-01-04 RX ORDER — ONDANSETRON 2 MG/ML
4 INJECTION INTRAMUSCULAR; INTRAVENOUS EVERY 30 MIN PRN
Status: DISCONTINUED | OUTPATIENT
Start: 2023-01-04 | End: 2023-01-04

## 2023-01-04 RX ORDER — LIDOCAINE 40 MG/G
CREAM TOPICAL
Status: DISCONTINUED | OUTPATIENT
Start: 2023-01-04 | End: 2023-01-11 | Stop reason: HOSPADM

## 2023-01-04 RX ORDER — METHOCARBAMOL 750 MG/1
750 TABLET, FILM COATED ORAL 4 TIMES DAILY
Status: DISCONTINUED | OUTPATIENT
Start: 2023-01-04 | End: 2023-01-09

## 2023-01-04 RX ORDER — FENTANYL CITRATE 50 UG/ML
50 INJECTION, SOLUTION INTRAMUSCULAR; INTRAVENOUS EVERY 5 MIN PRN
Status: DISCONTINUED | OUTPATIENT
Start: 2023-01-04 | End: 2023-01-04 | Stop reason: HOSPADM

## 2023-01-04 RX ORDER — BUPIVACAINE HYDROCHLORIDE 2.5 MG/ML
INJECTION, SOLUTION EPIDURAL; INFILTRATION; INTRACAUDAL
Status: COMPLETED | OUTPATIENT
Start: 2023-01-04 | End: 2023-01-04

## 2023-01-04 RX ORDER — FENTANYL CITRATE 50 UG/ML
25-50 INJECTION, SOLUTION INTRAMUSCULAR; INTRAVENOUS
Status: DISCONTINUED | OUTPATIENT
Start: 2023-01-04 | End: 2023-01-04

## 2023-01-04 RX ORDER — SODIUM CHLORIDE, SODIUM LACTATE, POTASSIUM CHLORIDE, CALCIUM CHLORIDE 600; 310; 30; 20 MG/100ML; MG/100ML; MG/100ML; MG/100ML
INJECTION, SOLUTION INTRAVENOUS CONTINUOUS
Status: DISCONTINUED | OUTPATIENT
Start: 2023-01-04 | End: 2023-01-05

## 2023-01-04 RX ORDER — NALOXONE HYDROCHLORIDE 0.4 MG/ML
0.4 INJECTION, SOLUTION INTRAMUSCULAR; INTRAVENOUS; SUBCUTANEOUS
Status: DISCONTINUED | OUTPATIENT
Start: 2023-01-04 | End: 2023-01-11 | Stop reason: HOSPADM

## 2023-01-04 RX ORDER — ENOXAPARIN SODIUM 100 MG/ML
40 INJECTION SUBCUTANEOUS
Status: COMPLETED | OUTPATIENT
Start: 2023-01-04 | End: 2023-01-04

## 2023-01-04 RX ORDER — ONDANSETRON 2 MG/ML
4 INJECTION INTRAMUSCULAR; INTRAVENOUS ONCE
Status: DISCONTINUED | OUTPATIENT
Start: 2023-01-04 | End: 2023-01-04

## 2023-01-04 RX ORDER — GLYCOPYRROLATE 0.2 MG/ML
INJECTION, SOLUTION INTRAMUSCULAR; INTRAVENOUS PRN
Status: DISCONTINUED | OUTPATIENT
Start: 2023-01-04 | End: 2023-01-04

## 2023-01-04 RX ORDER — ONDANSETRON 4 MG/1
4 TABLET, ORALLY DISINTEGRATING ORAL EVERY 6 HOURS PRN
Status: DISCONTINUED | OUTPATIENT
Start: 2023-01-04 | End: 2023-01-11 | Stop reason: HOSPADM

## 2023-01-04 RX ORDER — ACETAMINOPHEN 325 MG/1
975 TABLET ORAL ONCE
Status: COMPLETED | OUTPATIENT
Start: 2023-01-04 | End: 2023-01-04

## 2023-01-04 RX ORDER — FLUMAZENIL 0.1 MG/ML
0.2 INJECTION, SOLUTION INTRAVENOUS
Status: DISCONTINUED | OUTPATIENT
Start: 2023-01-04 | End: 2023-01-04

## 2023-01-04 RX ORDER — HYDROXYZINE HYDROCHLORIDE 25 MG/1
25 TABLET, FILM COATED ORAL EVERY 8 HOURS PRN
Status: DISCONTINUED | OUTPATIENT
Start: 2023-01-04 | End: 2023-01-11 | Stop reason: HOSPADM

## 2023-01-04 RX ORDER — SODIUM CHLORIDE, SODIUM LACTATE, POTASSIUM CHLORIDE, CALCIUM CHLORIDE 600; 310; 30; 20 MG/100ML; MG/100ML; MG/100ML; MG/100ML
INJECTION, SOLUTION INTRAVENOUS CONTINUOUS
Status: DISCONTINUED | OUTPATIENT
Start: 2023-01-04 | End: 2023-01-04

## 2023-01-04 RX ORDER — FLUMAZENIL 0.1 MG/ML
0.2 INJECTION, SOLUTION INTRAVENOUS
Status: DISCONTINUED | OUTPATIENT
Start: 2023-01-04 | End: 2023-01-11 | Stop reason: HOSPADM

## 2023-01-04 RX ORDER — ENOXAPARIN SODIUM 100 MG/ML
40 INJECTION SUBCUTANEOUS EVERY 24 HOURS
Status: DISCONTINUED | OUTPATIENT
Start: 2023-01-05 | End: 2023-01-05

## 2023-01-04 RX ORDER — PROPOFOL 10 MG/ML
INJECTION, EMULSION INTRAVENOUS PRN
Status: DISCONTINUED | OUTPATIENT
Start: 2023-01-04 | End: 2023-01-04

## 2023-01-04 RX ORDER — ONDANSETRON 2 MG/ML
4 INJECTION INTRAMUSCULAR; INTRAVENOUS EVERY 6 HOURS PRN
Status: DISCONTINUED | OUTPATIENT
Start: 2023-01-04 | End: 2023-01-11 | Stop reason: HOSPADM

## 2023-01-04 RX ORDER — GABAPENTIN 100 MG/1
100 CAPSULE ORAL 3 TIMES DAILY
Status: DISCONTINUED | OUTPATIENT
Start: 2023-01-04 | End: 2023-01-05

## 2023-01-04 RX ORDER — ACETAMINOPHEN 10 MG/ML
1000 INJECTION, SOLUTION INTRAVENOUS ONCE
Status: COMPLETED | OUTPATIENT
Start: 2023-01-04 | End: 2023-01-04

## 2023-01-04 RX ORDER — ONDANSETRON 4 MG/1
4 TABLET, ORALLY DISINTEGRATING ORAL EVERY 30 MIN PRN
Status: DISCONTINUED | OUTPATIENT
Start: 2023-01-04 | End: 2023-01-04

## 2023-01-04 RX ORDER — SODIUM CHLORIDE, SODIUM LACTATE, POTASSIUM CHLORIDE, CALCIUM CHLORIDE 600; 310; 30; 20 MG/100ML; MG/100ML; MG/100ML; MG/100ML
INJECTION, SOLUTION INTRAVENOUS CONTINUOUS PRN
Status: DISCONTINUED | OUTPATIENT
Start: 2023-01-04 | End: 2023-01-04

## 2023-01-04 RX ORDER — NALOXONE HYDROCHLORIDE 0.4 MG/ML
0.2 INJECTION, SOLUTION INTRAMUSCULAR; INTRAVENOUS; SUBCUTANEOUS
Status: DISCONTINUED | OUTPATIENT
Start: 2023-01-04 | End: 2023-01-04

## 2023-01-04 RX ORDER — KETAMINE HYDROCHLORIDE 10 MG/ML
INJECTION INTRAMUSCULAR; INTRAVENOUS PRN
Status: DISCONTINUED | OUTPATIENT
Start: 2023-01-04 | End: 2023-01-04

## 2023-01-04 RX ORDER — CELECOXIB 200 MG/1
400 CAPSULE ORAL ONCE
Status: COMPLETED | OUTPATIENT
Start: 2023-01-04 | End: 2023-01-04

## 2023-01-04 RX ORDER — FENTANYL CITRATE 50 UG/ML
25 INJECTION, SOLUTION INTRAMUSCULAR; INTRAVENOUS EVERY 5 MIN PRN
Status: DISCONTINUED | OUTPATIENT
Start: 2023-01-04 | End: 2023-01-04 | Stop reason: HOSPADM

## 2023-01-04 RX ORDER — ACETAMINOPHEN 500 MG
1000 TABLET ORAL EVERY 6 HOURS
Status: DISCONTINUED | OUTPATIENT
Start: 2023-01-04 | End: 2023-01-11 | Stop reason: HOSPADM

## 2023-01-04 RX ORDER — FENTANYL CITRATE 50 UG/ML
INJECTION, SOLUTION INTRAMUSCULAR; INTRAVENOUS PRN
Status: DISCONTINUED | OUTPATIENT
Start: 2023-01-04 | End: 2023-01-04

## 2023-01-04 RX ORDER — HYDROMORPHONE HYDROCHLORIDE 1 MG/ML
0.4 INJECTION, SOLUTION INTRAMUSCULAR; INTRAVENOUS; SUBCUTANEOUS EVERY 5 MIN PRN
Status: DISCONTINUED | OUTPATIENT
Start: 2023-01-04 | End: 2023-01-04 | Stop reason: HOSPADM

## 2023-01-04 RX ORDER — NALOXONE HYDROCHLORIDE 0.4 MG/ML
0.2 INJECTION, SOLUTION INTRAMUSCULAR; INTRAVENOUS; SUBCUTANEOUS
Status: DISCONTINUED | OUTPATIENT
Start: 2023-01-04 | End: 2023-01-11 | Stop reason: HOSPADM

## 2023-01-04 RX ORDER — LIDOCAINE HYDROCHLORIDE 20 MG/ML
INJECTION, SOLUTION INFILTRATION; PERINEURAL PRN
Status: DISCONTINUED | OUTPATIENT
Start: 2023-01-04 | End: 2023-01-04

## 2023-01-04 RX ORDER — GABAPENTIN 300 MG/1
600 CAPSULE ORAL
Status: COMPLETED | OUTPATIENT
Start: 2023-01-04 | End: 2023-01-04

## 2023-01-04 RX ORDER — NALOXONE HYDROCHLORIDE 0.4 MG/ML
0.4 INJECTION, SOLUTION INTRAMUSCULAR; INTRAVENOUS; SUBCUTANEOUS
Status: DISCONTINUED | OUTPATIENT
Start: 2023-01-04 | End: 2023-01-04

## 2023-01-04 RX ORDER — ERTAPENEM 1 G/1
1 INJECTION, POWDER, LYOPHILIZED, FOR SOLUTION INTRAMUSCULAR; INTRAVENOUS
Status: COMPLETED | OUTPATIENT
Start: 2023-01-04 | End: 2023-01-04

## 2023-01-04 RX ORDER — ONDANSETRON 2 MG/ML
INJECTION INTRAMUSCULAR; INTRAVENOUS PRN
Status: DISCONTINUED | OUTPATIENT
Start: 2023-01-04 | End: 2023-01-04

## 2023-01-04 RX ORDER — DEXAMETHASONE SODIUM PHOSPHATE 4 MG/ML
INJECTION, SOLUTION INTRA-ARTICULAR; INTRALESIONAL; INTRAMUSCULAR; INTRAVENOUS; SOFT TISSUE PRN
Status: DISCONTINUED | OUTPATIENT
Start: 2023-01-04 | End: 2023-01-04

## 2023-01-04 RX ORDER — MORPHINE SULFATE 15 MG/1
30 TABLET, FILM COATED, EXTENDED RELEASE ORAL EVERY 12 HOURS
Status: DISCONTINUED | OUTPATIENT
Start: 2023-01-04 | End: 2023-01-11 | Stop reason: HOSPADM

## 2023-01-04 RX ORDER — PHENYLEPHRINE HCL IN 0.9% NACL 50MG/250ML
.5-1.25 PLASTIC BAG, INJECTION (ML) INTRAVENOUS CONTINUOUS
Status: DISCONTINUED | OUTPATIENT
Start: 2023-01-04 | End: 2023-01-04 | Stop reason: HOSPADM

## 2023-01-04 RX ADMIN — Medication 10 MG: at 13:59

## 2023-01-04 RX ADMIN — MIDAZOLAM 1 MG: 1 INJECTION INTRAMUSCULAR; INTRAVENOUS at 07:48

## 2023-01-04 RX ADMIN — LIDOCAINE HYDROCHLORIDE 60 MG: 20 INJECTION, SOLUTION INFILTRATION; PERINEURAL at 07:57

## 2023-01-04 RX ADMIN — PHENYLEPHRINE HYDROCHLORIDE 100 MCG: 10 INJECTION INTRAVENOUS at 10:01

## 2023-01-04 RX ADMIN — Medication 40 MG: at 08:30

## 2023-01-04 RX ADMIN — DEXAMETHASONE SODIUM PHOSPHATE 4 MG: 4 INJECTION, SOLUTION INTRA-ARTICULAR; INTRALESIONAL; INTRAMUSCULAR; INTRAVENOUS; SOFT TISSUE at 07:57

## 2023-01-04 RX ADMIN — ENOXAPARIN SODIUM 40 MG: 40 INJECTION SUBCUTANEOUS at 07:20

## 2023-01-04 RX ADMIN — ACETAMINOPHEN 1000 MG: 10 INJECTION, SOLUTION INTRAVENOUS at 13:43

## 2023-01-04 RX ADMIN — PHENYLEPHRINE HYDROCHLORIDE 100 MCG: 10 INJECTION INTRAVENOUS at 10:17

## 2023-01-04 RX ADMIN — AMISULPRIDE 5 MG: 2.5 INJECTION, SOLUTION INTRAVENOUS at 18:16

## 2023-01-04 RX ADMIN — SUGAMMADEX 200 MG: 100 INJECTION, SOLUTION INTRAVENOUS at 14:58

## 2023-01-04 RX ADMIN — PROCHLORPERAZINE EDISYLATE 5 MG: 5 INJECTION INTRAMUSCULAR; INTRAVENOUS at 16:29

## 2023-01-04 RX ADMIN — SERTRALINE HYDROCHLORIDE 50 MG: 50 TABLET, FILM COATED ORAL at 20:19

## 2023-01-04 RX ADMIN — GABAPENTIN 600 MG: 300 CAPSULE ORAL at 06:38

## 2023-01-04 RX ADMIN — PHENYLEPHRINE HYDROCHLORIDE 100 MCG: 10 INJECTION INTRAVENOUS at 09:42

## 2023-01-04 RX ADMIN — KETAMINE HYDROCHLORIDE 0.1 MG/KG/HR: 100 INJECTION, SOLUTION, CONCENTRATE INTRAMUSCULAR; INTRAVENOUS at 08:32

## 2023-01-04 RX ADMIN — SODIUM CHLORIDE, POTASSIUM CHLORIDE, SODIUM LACTATE AND CALCIUM CHLORIDE: 600; 310; 30; 20 INJECTION, SOLUTION INTRAVENOUS at 07:50

## 2023-01-04 RX ADMIN — ACETAMINOPHEN 1000 MG: 325 TABLET, FILM COATED ORAL at 20:19

## 2023-01-04 RX ADMIN — Medication 10 MG: at 12:34

## 2023-01-04 RX ADMIN — Medication: at 16:01

## 2023-01-04 RX ADMIN — Medication 20 MG: at 13:03

## 2023-01-04 RX ADMIN — PHENYLEPHRINE HYDROCHLORIDE 100 MCG: 10 INJECTION INTRAVENOUS at 09:28

## 2023-01-04 RX ADMIN — FENTANYL CITRATE 50 MCG: 50 INJECTION, SOLUTION INTRAMUSCULAR; INTRAVENOUS at 07:12

## 2023-01-04 RX ADMIN — HYDROMORPHONE HYDROCHLORIDE 1 MG: 1 INJECTION, SOLUTION INTRAMUSCULAR; INTRAVENOUS; SUBCUTANEOUS at 13:49

## 2023-01-04 RX ADMIN — Medication 5 MG: at 10:17

## 2023-01-04 RX ADMIN — ONDANSETRON 4 MG: 2 INJECTION INTRAMUSCULAR; INTRAVENOUS at 14:46

## 2023-01-04 RX ADMIN — CELECOXIB 400 MG: 200 CAPSULE ORAL at 06:37

## 2023-01-04 RX ADMIN — ERTAPENEM SODIUM 1 G: 1 INJECTION, POWDER, LYOPHILIZED, FOR SOLUTION INTRAMUSCULAR; INTRAVENOUS at 06:39

## 2023-01-04 RX ADMIN — PHENYLEPHRINE HYDROCHLORIDE 100 MCG: 10 INJECTION INTRAVENOUS at 08:25

## 2023-01-04 RX ADMIN — GLYCOPYRROLATE 0.2 MG: 0.2 INJECTION, SOLUTION INTRAMUSCULAR; INTRAVENOUS at 08:51

## 2023-01-04 RX ADMIN — GABAPENTIN 100 MG: 100 CAPSULE ORAL at 20:19

## 2023-01-04 RX ADMIN — Medication 50 MG: at 07:57

## 2023-01-04 RX ADMIN — FENTANYL CITRATE 100 MCG: 50 INJECTION, SOLUTION INTRAMUSCULAR; INTRAVENOUS at 07:57

## 2023-01-04 RX ADMIN — FENTANYL CITRATE 100 MCG: 50 INJECTION, SOLUTION INTRAMUSCULAR; INTRAVENOUS at 13:03

## 2023-01-04 RX ADMIN — MORPHINE SULFATE 30 MG: 15 TABLET, EXTENDED RELEASE ORAL at 21:06

## 2023-01-04 RX ADMIN — PROPOFOL 200 MG: 10 INJECTION, EMULSION INTRAVENOUS at 07:57

## 2023-01-04 RX ADMIN — Medication 20 MG: at 09:08

## 2023-01-04 RX ADMIN — BUPIVACAINE HYDROCHLORIDE 20 ML: 2.5 INJECTION, SOLUTION EPIDURAL; INFILTRATION; INTRACAUDAL; PERINEURAL at 07:05

## 2023-01-04 RX ADMIN — MIDAZOLAM HYDROCHLORIDE 2 MG: 1 INJECTION, SOLUTION INTRAMUSCULAR; INTRAVENOUS at 07:12

## 2023-01-04 RX ADMIN — METHOCARBAMOL 750 MG: 750 TABLET, FILM COATED ORAL at 20:19

## 2023-01-04 RX ADMIN — PHENYLEPHRINE HYDROCHLORIDE 0.3 MCG/KG/MIN: 10 INJECTION INTRAVENOUS at 10:30

## 2023-01-04 RX ADMIN — SODIUM CHLORIDE, POTASSIUM CHLORIDE, SODIUM LACTATE AND CALCIUM CHLORIDE: 600; 310; 30; 20 INJECTION, SOLUTION INTRAVENOUS at 20:12

## 2023-01-04 RX ADMIN — PHENYLEPHRINE HYDROCHLORIDE 100 MCG: 10 INJECTION INTRAVENOUS at 08:36

## 2023-01-04 RX ADMIN — KETAMINE HYDROCHLORIDE 5 MG/HR: 100 INJECTION, SOLUTION, CONCENTRATE INTRAMUSCULAR; INTRAVENOUS at 16:51

## 2023-01-04 RX ADMIN — ONDANSETRON HYDROCHLORIDE 4 MG: 2 INJECTION, SOLUTION INTRAMUSCULAR; INTRAVENOUS at 15:48

## 2023-01-04 RX ADMIN — SODIUM CHLORIDE, POTASSIUM CHLORIDE, SODIUM LACTATE AND CALCIUM CHLORIDE: 600; 310; 30; 20 INJECTION, SOLUTION INTRAVENOUS at 08:06

## 2023-01-04 RX ADMIN — ACETAMINOPHEN 975 MG: 325 TABLET, FILM COATED ORAL at 06:36

## 2023-01-04 RX ADMIN — Medication 5 MG: at 08:44

## 2023-01-04 RX ADMIN — BUPIVACAINE 20 ML: 13.3 INJECTION, SUSPENSION, LIPOSOMAL INFILTRATION at 07:05

## 2023-01-04 RX ADMIN — Medication 20 MG: at 10:38

## 2023-01-04 RX ADMIN — SUGAMMADEX 200 MG: 100 INJECTION, SOLUTION INTRAVENOUS at 15:02

## 2023-01-04 RX ADMIN — DEXAMETHASONE SODIUM PHOSPHATE 6 MG: 4 INJECTION, SOLUTION INTRA-ARTICULAR; INTRALESIONAL; INTRAMUSCULAR; INTRAVENOUS; SOFT TISSUE at 08:47

## 2023-01-04 RX ADMIN — Medication 10 MG: at 08:52

## 2023-01-04 RX ADMIN — Medication 10 MG: at 09:59

## 2023-01-04 RX ADMIN — Medication 5 MG: at 08:37

## 2023-01-04 ASSESSMENT — ACTIVITIES OF DAILY LIVING (ADL)
ADLS_ACUITY_SCORE: 22

## 2023-01-04 NOTE — OR NURSING
Patient was prepped in pre-op unit according to facility policy and MD orders. Patient's questions were answered and pt was able to verbalize an understanding of the information provided. Patient denies any outstanding questions and or concerns at this time.   Nursing will continue to support as indicated. Patient was released to OR staff by writer.

## 2023-01-04 NOTE — BRIEF OP NOTE
[facility]    Brief Operative Note    Pre-operative diagnosis: Colon stricture (H) [K56.699]  Post-operative diagnosis Colorectal anastomotic stricture    Procedure:   1. Cystoscopy with stents  2. Exploratory laparotomy  3. Lysis of adhesions, > 3 hours  4. Low anterior resection of colorectal anastomosis  5. Flexible sigmoidoscopy  6. Appendectomy  7. Complex abdominal wall closure  8. Modifier 22    Surgeon: Surgeon(s) and Role:  Panel 1:     * Ian Su MD - Primary  Panel 2:     * Tad Richardson MD - Primary     * Azalia Sen MD - Resident - Assisting     * Hue Banegas MD - Fellow - Assisting     * Harry Murray MD - Fellow - Observing  Anesthesia: General   Estimated Blood Loss: 200 mL  IVF: 4.5 L crystalloid  UOP: 545 mL    Drains:  None  Specimens:   ID Type Source Tests Collected by Time Destination   1 : Appendix Tissue Appendix SURGICAL PATHOLOGY EXAM Tad Richardson MD 1/4/2023 10:10 AM    2 : colorectal anastomosis Tissue Anastomosis SURGICAL PATHOLOGY EXAM Tad Richardson MD 1/4/2023 12:20 PM      Findings:   1. Dense abdominal adhesions requiring >3 hours of lysis; 2. 5 mm serosal tear, repaired primarily; 3. Dense pelvic adhesions, bilateral ureters identified and protected, along with vaginal cuff, bilateral fallopian tubes and ovaries; 4. Negative rectal stump and anastomotic pneumatic leak tests. Anastomosis widely patent and healthy with intact donuts. .  Complications: None.  Implants:  None

## 2023-01-04 NOTE — ANESTHESIA PROCEDURE NOTES
Airway       Patient location during procedure: OR       Procedure Start/Stop Times: 1/4/2023 8:00 AM  Staff -        Anesthesiologist:  Zoraida Ambrose MD       Resident/Fellow: Allison Muñiz MD       Performed By: resident  Consent for Airway        Urgency: elective  Indications and Patient Condition       Indications for airway management: perfecto-procedural       Induction type:intravenous       Mask difficulty assessment: 1 - vent by mask    Final Airway Details       Final airway type: endotracheal airway       Successful airway: ETT - single  Endotracheal Airway Details        ETT size (mm): 7.5       Cuffed: yes       Successful intubation technique: direct laryngoscopy       DL Blade Type: MAC 3       Grade View of Cords: 1       Adjucts: stylet       Position: Right       Measured from: gums/teeth       Secured at (cm): 21       Bite block used: Soft    Post intubation assessment        Placement verified by: capnometry, equal breath sounds and chest rise        Number of attempts at approach: 1       Number of other approaches attempted: 0       Secured with: pink tape       Ease of procedure: easy       Dentition: Intact and Unchanged    Medication(s) Administered   Medication Administration Time: 1/4/2023 8:00 AM

## 2023-01-04 NOTE — ANESTHESIA POSTPROCEDURE EVALUATION
Patient: Vicki Shell    Procedure: Procedure(s):  CYSTOSCOPY, WITH URETERAL STENT INSERTION  exploratory laparotomy, lyses of adhesions 3 hours, low anterior resection, flexible sigmoidoscopy, appendectomy, complex abdominal wall closure  Sigmoidoscopy flexible       Anesthesia Type:  General    Note:  Disposition: Outpatient   Postop Pain Control: Uneventful            Sign Out: Well controlled pain   PONV: No   Neuro/Psych: Uneventful            Sign Out: Acceptable/Baseline neuro status   Airway/Respiratory: Uneventful            Sign Out: Acceptable/Baseline resp. status   CV/Hemodynamics: Uneventful            Sign Out: Acceptable CV status; No obvious hypovolemia; No obvious fluid overload   Other NRE: NONE   DID A NON-ROUTINE EVENT OCCUR? No           Last vitals:  Vitals Value Taken Time   /69 01/04/23 1700   Temp 37.1  C (98.8  F) 01/04/23 1513   Pulse 75 01/04/23 1713   Resp 18 01/04/23 1713   SpO2 100 % 01/04/23 1713   Vitals shown include unvalidated device data.    Electronically Signed By: Jh Thomson MD  January 4, 2023  5:14 PM

## 2023-01-04 NOTE — OP NOTE
OPERATIVE REPORT    PREOPERATIVE DIAGNOSIS: colorectal anastamosis    POSTOPERATIVE DIAGNOSIS:  Same    PROCEDURES PERFORMED:   1. Cystourethroscopy with Placement of bilateral ureteral stents    STAFF SURGEON: Ian Su MD    RESIDENT: Serafin Holland MD    ANESTHESIA: General    ESTIMATED BLOOD LOSS: 0 mL.     DRAINS:  5Fr open-ended catheters - bilateral ureters      OPERATIVE INDICATIONS:   Vicki Shell is a 37 year old female who presented for planned redo colorectal anastamosis. The colorectal team requested bilateral stents for assistance identifying the ureters intra-operatively. The patient was counseled on the alternatives, risks, and benefits and elected to proceed with the above stated procedure.    DESCRIPTION OF PROCEDURE:    After informed consent was obtained, the patient was taken to the operating room, and moved to the operating table.  After adequate anesthesia was induced, the patient was repositioned in dorsal lithotomy position and prepped and draped in the usual sterile fashion. A timeout was taken to confirm correct patient, procedure and laterality.     A 22-Turkmen cystoscope was inserted into a well lubricated urethra. The urethra was unremarkable.  The bladder was free of tumors, stones or diverticuli.  The media was clear.  Bilateral ureteral orifices were orthotopic.  A Sensor guidewire was advanced into the right renal pelvis with the aid of a 5-Turkmen open ended ureteral catheter.  The catheter was passed 25cm into the ureter, and the scope removed. We then turned our attention to the left side, where an identical procedure was performed. The stents passed up easily with no appreciable resistance.  A 16fr onofre catheter was placed and 10 mL instilled into the balloon port. The stents were internalized into the catheter and each secured individually with a silk tie. This concluded our portion of the procedure, the colorectal surgery team took over at this point.       PLAN:   -  Left stent is cut on the distal end at an angle for identification.  - Can remove stents at any time, onofre per primary  - If concern for nondraining kidney or low UOP after stent removal, recommend renal ultrasound to evaluate for hydro  - Urology will follow peripherally    Serafin Holland MD  Urology Resident

## 2023-01-04 NOTE — PROGRESS NOTES
SPIRITUAL HEALTH SERVICES  [facility] ([place]) 3C   PRE-SURGERY VISIT    Had pre-surgery visit with pt.  Provided spiritual support, prayer.   Other concerns on pt's heart this morning were her 4 children who were stressed that they wouldn't see there mother for about a week.    Rev. Mariel Vargas MDiv, [facility]  Staff    Pager [phone]

## 2023-01-04 NOTE — OP NOTE
[facility] Colorectal Surgery Operative Report  January 4, 2023    PREOPERATIVE DIAGNOSIS:  1. Colorectal anastomotic stricture  2. Chronic pain  3. History of pelvic endometriosis  4. History of pelvic sepsis, s/p Merlin procedure  5. Depression  6. History of tobacco use    POSTOPERATIVE DIAGNOSIS:   1. Colorectal anastomotic stricture  2. Chronic pain  3. History of pelvic endometriosis  4. History of pelvic sepsis, s/p Merlin procedure  5. Depression  6. History of tobacco use    PROCEDURE:  1. Cystoscopy with stents  2. Exploratory laparotomy  3. Lysis of adhesions, > 3 hours  4. Low anterior resection of colorectal anastomosis  5. Flexible sigmoidoscopy  6. Appendectomy  7. Complex abdominal wall closure  8. Modifier 22 given complexity of anatomy and dense scarring and adhesions.    ANESTHESIA: General endotracheal anesthesia plus local anesthesia.    SURGEON:  Tad Richardson M.D.    ASSISTANT(S): Hue Mitchell, CRS Fellow; Azalia Sen, PGY3    INDICATIONS FOR PROCEDURE  Vicki Shell is a 37 year old female who previously underwent a hysterectomy, and a ovarian cystectomy. This was complicated by a missed rectal injury with pelvic sepsis requiring an exploratory laparotomy with creation of an end colostomy. She subsequently recovered, and saw me for a colostomy reversal. I reversed her colostomy, created an end to side colorectal anastomosis with a diverting loop ileostomy. Her anastomosis was complicated by a stricture not amenable to endoscopic dilation. Thus, I recommended anastomotic revision and recreation. I thoroughly discussed the risks, benefits, and alternatives of operative treatment with the patient and she agreed to proceed.    General risks related to abdominal surgery were reviewed with the patient. These include, but are not limited to, death, myocardial infarction, pneumonia, urinary tract infection, deep venous thrombosis with or without pulmonary embolus, abdominal infection from bowel  "injury or abscess, fistula, anastomotic leak that may require reoperation and a stoma, ureteral injury, bowel obstruction, wound infection, and bleeding.    OPERATIVE PROCEDURE:  After obtaining informed consent, the patient was brought to the operating room and placed in the lithotomy position. Appropriate preoperative mechanical and chemical deep venous thrombosis prophylaxis, as well as preoperative prophylactic parenteral antibiotics were given. General endotracheal anesthesia was gently induced. Bilateral lower extremity pneumatic compression devices were applied and all pressure points were cushioned. The perineum was prepped and the urology team performed cystoscopy with placement of ureteral stents and a onofre catheter. Upon completion, the abdomen and perineum was then prepped and draped in the standard sterile fashion. After a \"time-out\" was performed, the peritoneal cavity was entered through a vertical midline incision at the site of an old laparotomy scar. Multiple adhesions were encountered, requiring an additional 3 hours for lysis to enter the pelvis. Bilateral ureteral stents were palpated, and the ureters were protected for the entire duration of the case. Once the small bowel was freed from adhesions, the Brian was placed and the Speedtrack set up for retraction. The small bowel was packed out of the pelvis. The descending and sigmoid colon were mobilized. This was tedious given extensive scarring to the left lateral and lower anterior abdominal walls. While carefully monitoring the ureters and vaginal cuff, the colon was able to be freed adequately to identify the colorectal anastomosis. Anteriorly, the anastomosis was densely adherent to the left ovary. However, the left fallopian tube and ovary was able to be preserved safely and dissected away from the anastomosis. The end of the colon was also densely adhered to the right lateral pelvic sidewall as well as right fallopian tube and ovary. In " order to identify the end of the colon, the colon just proximal to the anastomosis was transected. A window was made at the mesenteric border, and the colon was stapled with a Contour stapler, green load. With this as a handle, the end of the colon became readily apparent. A 15 blade was then used to sharply dissect the colon away from the sidewall, with the staple line completely intact ensuring that no colonic tissue was retained. Once the anastomosis was completely mobilized, we then entered the TME plan posteriorly to mobilize the rectum enough to enable stapling below the anastomosis. Circumferentially, we dissected inferiorly protecting again the bilateral ureters and the vaginal cuff, which was firmly adherent to the low-mid rectum anteriorly. With enough rectum mobilized, a mesenteric window was made at the upper rectum, and the rectum was stapled with a TA 30 green load. The staple line was complete and intact. The corresponding mesentery of the anastomosis was then resected with the impact ligasure, and the specimen was passed off the field.     The rectal stump was then air leak tested by filling the pelvis with water, and the assistant performing a flexible sigmoidoscopy, which was negative and completely intact. Given the proximity of the cecum and appendix to the pelvis and the right ovary and fallopian tube, and the young age of the patient, an appendectomy was performed. A window was made at the base of the appendix and the appendix was stapled with a JAM 75 mm blue load stapler, with the corresponding mesentery ligated with the impact ligasure. The appendix was passed off the field. Next, the colon conduit was prepared for the end to end anastomosis. The staple line was resected with evidence of excellent bleeding at the colon edge. A purse-string suture was then sown in place with 2-0 prolene, and the anvil was then secured in place. The assistant once again went below, and passed sizers up the  anorectum up to the rectal stump. The 29 mm EEA stapler was then passed up to the rectal stump. This took several attempts given the thickness of the rectum to get to the end of the stump. The pin was then opened and came out at the right corner of the rectal staple line. The anvil was then connected and the two ends slowly brought together carefully avoiding any surrounding structures including the vaginal cuff. The bowel ends appeared to be well vascularized, with no evidence of tension or torsion. After a short pause, the stapler was fired creating an end-to-end colorectal anastomosis. The stapler was then carefully removed without difficulty. On the back-table, the assistant surgeon assured adequate integrity of the stapler tissue-rings. A repeat flexible sigmoidoscopy was performed with no evidence of ongoing bleeding or air leak, and the anastomosis was completely intact and healthy appearing.     Peritoneal lavage was performed using 2 L of water. The abdomen was inspected for hemostasis and was confirmed. The fascia, which was attenuated, was cleared free to created flaps bilaterally back to healthy fascia. The fascia was closed using 1 looped PDS with intermittent internal retention sutures of 0 vicryl at 2 cm intervals. and the subcutaneous tissue was irrigated with waster.  The skin was closed with skin staples and a Prevena device, and an ostomy appliance was applied at the loop ileostomy.    The patient tolerated the procedure well. All sponge and needle counts were correct x 2 at the end of the procedure. The ureteral stents were removed at the end of the case, with urine cherry colored without gross hematuria.     COMPLICATIONS: none.    ESTIMATED BLOOD LOSS: 200 mL.    REPLACEMENT:     - Crystalloid: 4500 mL.     - Colloid: 0 mL.     - Blood products:  0.    URINE OUTPUT: 545 mL    SPECIMEN(S): colorectal anastomosis, appendix    OPERATIVE COUNT: Complete.    OPERATIVE FINDINGS:   1. Dense abdominal  adhesions requiring >3 hours of lysis;  2. 5 mm serosal tear, repaired primarily;   3. Dense pelvic adhesions, bilateral ureters identified and protected, along with vaginal cuff, bilateral fallopian tubes and ovaries;   4. Negative rectal stump and anastomotic pneumatic leak tests. Anastomosis widely patent and healthy with intact donuts.    Tad Richardson MD  Division of Colon and Rectal Surgery  [facility]  [phone]

## 2023-01-04 NOTE — ANESTHESIA PROCEDURE NOTES
TAP (rectus sheath, bilateral) Procedure Note    Pre-Procedure   Staff -        Anesthesiologist:  Ramírez Lugo MD       Resident/Fellow: Allison Muñiz MD       Performed By: resident       Location: pre-op       Procedure Start/Stop Times: 1/4/2023 7:05 AM and 1/4/2023 7:20 AM       Pre-Anesthestic Checklist: patient identified, IV checked, site marked, risks and benefits discussed, informed consent, monitors and equipment checked, pre-op evaluation, at physician/surgeon's request and post-op pain management  Timeout:       Correct Patient: Yes        Correct Procedure: Yes        Correct Site: Yes        Correct Position: Yes        Correct Laterality: Yes        Site Marked: Yes  Procedure Documentation  Procedure: TAP (rectus sheath, bilateral)       Diagnosis: POST-OP ANALGESIA       Laterality: bilateral       Patient Position: supine       Patient Prep/Sterile Barriers: sterile gloves, mask       Skin prep: Chloraprep       Needle Type: short bevel       Needle Gauge: 21.        Needle Length (millimeters): 110        Ultrasound guided       1. Ultrasound was used to identify targeted nerve, plexus, vascular marker, or fascial plane and place a needle adjacent to it in real-time.       2. Ultrasound was used to visualize the spread of anesthetic in close proximity to the above referenced structure.       3. A permanent image is entered into the patient's record.       4. The visualized anatomic structures appeared normal.       5. There were no apparent abnormal pathologic findings.    Assessment/Narrative         The placement was negative for: blood aspirated, painful injection and site bleeding       Paresthesias: No.       Bolus given via needle. no blood aspirated via catheter.        Secured via.        Insertion/Infusion Method: Single Shot       Complications: none    Medication(s) Administered   Bupivacaine 0.25% PF (Infiltration) - Infiltration   20 mL - 1/4/2023 7:05:00 AM  Bupivacaine  "liposome (Exparel) 1.3% LA inj susp (Infiltration) - Infiltration   20 mL - 1/4/2023 7:05:00 AM  Medication Administration Time: 1/4/2023 7:05 AM      FOR [facility] ONLY:   Pain Team Contact information: please page the Pain Team Via Appointedd. Search \"Pain\". During daytime hours, please page the attending first. At night please page the resident first.    "

## 2023-01-04 NOTE — ANESTHESIA CARE TRANSFER NOTE
Patient: Vicki Shell    Procedure: Procedure(s):  CYSTOSCOPY, WITH URETERAL STENT INSERTION  exploratory laparotomy, lyses of adhesions 3 hours, low anterior resection, flexible sigmoidoscopy, appendectomy, complex abdominal wall closure  Sigmoidoscopy flexible       Diagnosis: Colon stricture (H) [K56.699]  Colostomy status (H) [Z93.3]  Diagnosis Additional Information: No value filed.    Anesthesia Type:   General     Note:    Oropharynx: oropharynx clear of all foreign objects and spontaneously breathing  Level of Consciousness: drowsy and awake  Oxygen Supplementation: face mask  Level of Supplemental Oxygen (L/min / FiO2): 6  Independent Airway: airway patency satisfactory and stable  Dentition: dentition unchanged  Vital Signs Stable: post-procedure vital signs reviewed and stable  Report to RN Given: handoff report given  Patient transferred to: PACU    Handoff Report: Identifed the Patient, Identified the Reponsible Provider, Reviewed the pertinent medical history, Discussed the surgical course, Reviewed Intra-OP anesthesia mangement and issues during anesthesia, Set expectations for post-procedure period and Allowed opportunity for questions and acknowledgement of understanding      Vitals:  Vitals Value Taken Time   /69 01/04/23 1515   Temp     Pulse 82 01/04/23 1521   Resp 12 01/04/23 1521   SpO2 100 % 01/04/23 1521   Vitals shown include unvalidated device data.    Electronically Signed By: Allison Muñiz MD  January 4, 2023  3:21 PM

## 2023-01-05 ENCOUNTER — APPOINTMENT (OUTPATIENT)
Dept: OCCUPATIONAL THERAPY | Facility: CLINIC | Age: 38
DRG: 331 | End: 2023-01-05
Attending: SURGERY
Payer: MEDICAID

## 2023-01-05 LAB
ANION GAP SERPL CALCULATED.3IONS-SCNC: 11 MMOL/L (ref 7–15)
BUN SERPL-MCNC: 9.3 MG/DL (ref 6–20)
CALCIUM SERPL-MCNC: 8.2 MG/DL (ref 8.6–10)
CHLORIDE SERPL-SCNC: 106 MMOL/L (ref 98–107)
CREAT SERPL-MCNC: 0.58 MG/DL (ref 0.51–0.95)
DEPRECATED HCO3 PLAS-SCNC: 20 MMOL/L (ref 22–29)
ERYTHROCYTE [DISTWIDTH] IN BLOOD BY AUTOMATED COUNT: 14.2 % (ref 10–15)
GFR SERPL CREATININE-BSD FRML MDRD: >90 ML/MIN/1.73M2
GLUCOSE BLDC GLUCOMTR-MCNC: 122 MG/DL (ref 70–99)
GLUCOSE BLDC GLUCOMTR-MCNC: 128 MG/DL (ref 70–99)
GLUCOSE BLDC GLUCOMTR-MCNC: 145 MG/DL (ref 70–99)
GLUCOSE SERPL-MCNC: 121 MG/DL (ref 70–99)
HCT VFR BLD AUTO: 37 % (ref 35–47)
HGB BLD-MCNC: 11.9 G/DL (ref 11.7–15.7)
MAGNESIUM SERPL-MCNC: 1.6 MG/DL (ref 1.7–2.3)
MCH RBC QN AUTO: 26.6 PG (ref 26.5–33)
MCHC RBC AUTO-ENTMCNC: 32.2 G/DL (ref 31.5–36.5)
MCV RBC AUTO: 83 FL (ref 78–100)
PLATELET # BLD AUTO: 244 10E3/UL (ref 150–450)
POTASSIUM SERPL-SCNC: 3.9 MMOL/L (ref 3.4–5.3)
RBC # BLD AUTO: 4.47 10E6/UL (ref 3.8–5.2)
SODIUM SERPL-SCNC: 137 MMOL/L (ref 136–145)
WBC # BLD AUTO: 15.2 10E3/UL (ref 4–11)

## 2023-01-05 PROCEDURE — 258N000003 HC RX IP 258 OP 636: Performed by: STUDENT IN AN ORGANIZED HEALTH CARE EDUCATION/TRAINING PROGRAM

## 2023-01-05 PROCEDURE — 999N000128 HC STATISTIC PERIPHERAL IV START W/O US GUIDANCE

## 2023-01-05 PROCEDURE — 250N000011 HC RX IP 250 OP 636: Performed by: SURGERY

## 2023-01-05 PROCEDURE — 258N000003 HC RX IP 258 OP 636: Performed by: SURGERY

## 2023-01-05 PROCEDURE — 250N000013 HC RX MED GY IP 250 OP 250 PS 637: Performed by: SURGERY

## 2023-01-05 PROCEDURE — 999N000147 HC STATISTIC PT IP EVAL DEFER

## 2023-01-05 PROCEDURE — 80048 BASIC METABOLIC PNL TOTAL CA: CPT | Performed by: SURGERY

## 2023-01-05 PROCEDURE — 250N000011 HC RX IP 250 OP 636: Performed by: PHYSICIAN ASSISTANT

## 2023-01-05 PROCEDURE — 97166 OT EVAL MOD COMPLEX 45 MIN: CPT | Mod: GO

## 2023-01-05 PROCEDURE — 250N000009 HC RX 250: Performed by: STUDENT IN AN ORGANIZED HEALTH CARE EDUCATION/TRAINING PROGRAM

## 2023-01-05 PROCEDURE — 97530 THERAPEUTIC ACTIVITIES: CPT | Mod: GO

## 2023-01-05 PROCEDURE — 36415 COLL VENOUS BLD VENIPUNCTURE: CPT | Performed by: SURGERY

## 2023-01-05 PROCEDURE — 97535 SELF CARE MNGMENT TRAINING: CPT | Mod: GO

## 2023-01-05 PROCEDURE — 83735 ASSAY OF MAGNESIUM: CPT | Performed by: SURGERY

## 2023-01-05 PROCEDURE — 85027 COMPLETE CBC AUTOMATED: CPT | Performed by: SURGERY

## 2023-01-05 PROCEDURE — 999N000197 HC STATISTIC WOC PT EDUCATION, 0-15 MIN

## 2023-01-05 PROCEDURE — 120N000002 HC R&B MED SURG/OB UMMC

## 2023-01-05 RX ORDER — ENOXAPARIN SODIUM 100 MG/ML
40 INJECTION SUBCUTANEOUS EVERY 24 HOURS
Status: DISCONTINUED | OUTPATIENT
Start: 2023-01-05 | End: 2023-01-11 | Stop reason: HOSPADM

## 2023-01-05 RX ORDER — DEXTROSE MONOHYDRATE, SODIUM CHLORIDE, AND POTASSIUM CHLORIDE 50; 1.49; 4.5 G/1000ML; G/1000ML; G/1000ML
INJECTION, SOLUTION INTRAVENOUS CONTINUOUS
Status: DISCONTINUED | OUTPATIENT
Start: 2023-01-05 | End: 2023-01-07

## 2023-01-05 RX ORDER — GABAPENTIN 100 MG/1
200 CAPSULE ORAL 3 TIMES DAILY
Status: DISCONTINUED | OUTPATIENT
Start: 2023-01-06 | End: 2023-01-08

## 2023-01-05 RX ORDER — MAGNESIUM SULFATE HEPTAHYDRATE 40 MG/ML
4 INJECTION, SOLUTION INTRAVENOUS ONCE
Status: COMPLETED | OUTPATIENT
Start: 2023-01-05 | End: 2023-01-05

## 2023-01-05 RX ADMIN — KETAMINE HYDROCHLORIDE 5 MG/HR: 100 INJECTION, SOLUTION, CONCENTRATE INTRAMUSCULAR; INTRAVENOUS at 14:58

## 2023-01-05 RX ADMIN — SERTRALINE HYDROCHLORIDE 50 MG: 50 TABLET, FILM COATED ORAL at 20:43

## 2023-01-05 RX ADMIN — ACETAMINOPHEN 1000 MG: 325 TABLET, FILM COATED ORAL at 18:52

## 2023-01-05 RX ADMIN — METHOCARBAMOL 750 MG: 750 TABLET, FILM COATED ORAL at 20:43

## 2023-01-05 RX ADMIN — POTASSIUM CHLORIDE, DEXTROSE MONOHYDRATE AND SODIUM CHLORIDE: 150; 5; 450 INJECTION, SOLUTION INTRAVENOUS at 20:51

## 2023-01-05 RX ADMIN — METHOCARBAMOL 750 MG: 750 TABLET, FILM COATED ORAL at 12:35

## 2023-01-05 RX ADMIN — ACETAMINOPHEN 1000 MG: 325 TABLET, FILM COATED ORAL at 06:17

## 2023-01-05 RX ADMIN — SODIUM CHLORIDE, POTASSIUM CHLORIDE, SODIUM LACTATE AND CALCIUM CHLORIDE: 600; 310; 30; 20 INJECTION, SOLUTION INTRAVENOUS at 01:11

## 2023-01-05 RX ADMIN — ACETAMINOPHEN 1000 MG: 325 TABLET, FILM COATED ORAL at 12:41

## 2023-01-05 RX ADMIN — GABAPENTIN 100 MG: 100 CAPSULE ORAL at 20:43

## 2023-01-05 RX ADMIN — GABAPENTIN 100 MG: 100 CAPSULE ORAL at 08:50

## 2023-01-05 RX ADMIN — MAGNESIUM SULFATE IN WATER 4 G: 40 INJECTION, SOLUTION INTRAVENOUS at 21:40

## 2023-01-05 RX ADMIN — METHOCARBAMOL 750 MG: 750 TABLET, FILM COATED ORAL at 16:57

## 2023-01-05 RX ADMIN — GABAPENTIN 100 MG: 100 CAPSULE ORAL at 14:59

## 2023-01-05 RX ADMIN — POTASSIUM CHLORIDE, DEXTROSE MONOHYDRATE AND SODIUM CHLORIDE: 150; 5; 450 INJECTION, SOLUTION INTRAVENOUS at 09:22

## 2023-01-05 RX ADMIN — MORPHINE SULFATE 30 MG: 15 TABLET, EXTENDED RELEASE ORAL at 08:56

## 2023-01-05 RX ADMIN — Medication: at 19:19

## 2023-01-05 RX ADMIN — ENOXAPARIN SODIUM 40 MG: 40 INJECTION SUBCUTANEOUS at 08:50

## 2023-01-05 RX ADMIN — MORPHINE SULFATE 30 MG: 15 TABLET, EXTENDED RELEASE ORAL at 20:43

## 2023-01-05 RX ADMIN — METHOCARBAMOL 750 MG: 750 TABLET, FILM COATED ORAL at 08:50

## 2023-01-05 RX ADMIN — ACETAMINOPHEN 1000 MG: 325 TABLET, FILM COATED ORAL at 01:12

## 2023-01-05 ASSESSMENT — ACTIVITIES OF DAILY LIVING (ADL)
ADLS_ACUITY_SCORE: 22
PREVIOUS_RESPONSIBILITIES: MEAL PREP;HOUSEKEEPING;LAUNDRY;SHOPPING;YARDWORK;MEDICATION MANAGEMENT;FINANCES;DRIVING;CHILD CARE;WORK
ADLS_ACUITY_SCORE: 22
ADLS_ACUITY_SCORE: 22

## 2023-01-05 NOTE — PROGRESS NOTES
Colorectal Surgery Progress Note  [facility]  POD#1      Subjective:  Doing ok.  Diffuse abdominal pain maybe 6/10.  Had nausea last night but none since.  A little bloated.  Has gotten up to walk and did ok.  Tolerating some water.  Some green liquid in ileostomy.      Vitals:  Vitals:    01/04/23 2125 01/04/23 2327 01/05/23 0403 01/05/23 0617   BP: 105/54 96/55 95/45 107/62   BP Location: Left arm Left arm Left arm Left arm   Pulse: 80 90 82    Resp:  14 15    Temp:  98.5  F (36.9  C) 96.9  F (36.1  C)    TempSrc:  Oral Axillary    SpO2: 96% 94% 97%    Weight:       Height:         I/O:  I/O last 3 completed shifts:  In: 4762.51 [I.V.:4762.51]  Out: 2260 [Urine:2060; Blood:200]    Physical Exam:  Gen: AAOx3, NAD  Pulm: Non-labored breathing  Abd: Soft, mildly distended, appropriately tender, no guarding/rebound   Incision C/D/I with rossi in place   Stoma pink and viable with green liquid in bag   Sommer in place - clear yellow urine  Ext:  Warm and well-perfused    BMP  Recent Labs   Lab 01/05/23 0559 01/05/23 0214 01/04/23 1937 01/04/23 0628     --   --   --    POTASSIUM 3.9  --   --   --    CHLORIDE 106  --   --   --    CO2 20*  --   --   --    BUN 9.3  --   --   --    CR 0.58  --  0.56  --    * 145*  --  106*   MAG 1.6*  --   --   --      CBC  Recent Labs   Lab 01/05/23 0559 01/04/23 1937   WBC 15.2*  --    HGB 11.9  --    HCT 37.0  --     238         ASSESSMENT: This is a 37 year old female PMH endometriosis, chronic pain on opiates, hysterectomy and ovarian cystectomy 8/2021 c/b rectal injury identified 2 days after index operation with pelvic sepsis requiring ex lap & end colostomy, subsequently underwent colostomy reversal w/ colorectal anastomosis & diverting loop ileostomy in 4/2022 c/b colorectal anastomotic stricture not amenable to endoscopic dilation.  Now s/p ex lap, ESTELITA >3 hours, lower anterior resection of prior colorectal anastomosis,  redo colorectal anastomosis, appendectomy, flex sig, complex abdominal closure on 1/4.    Neuro/Pain: ketamine, robaxin, gabapentin, tylenol, dilaudid PCA.  Continue home MS contin.   - home sertraline  CV: no acute concerns at this time  PULM: encourage IS.  GI/FEN:   - CLD  - IVF @ 75  - magnesium replacement  - WOCN  - keep pravena in place until discharge  : OK to remove onofre today  Heme: Hgb 11.9  ID: no acute concerns  Endocrine: no acute concerns    Activity: as tolerated.  Ppx: lovenox  ppx  Dispo: possibly 2-3 days?  Will remove pravena on discharge.  No lovenox ppx injections needed at time of discharge.        Katia Workman PA-C ..................1/5/2023   7:29 AM  Colon and Rectal Surgery    Patient was seen and discussed with Dr. Rosas    The above plan of care was performed and communicated to me by Dr. Richardson    I spent 15 minute face-to-face or coordinating care of Vicki Shell. Over 50% of our time on the unit was spent counseling the patient and/or coordinating care as documented in the assessment and plan.     [id number] post op hospital visit

## 2023-01-05 NOTE — PROGRESS NOTES
"General Surgery Crosscover Postop Check     Subjective:   Pt is a 38 y/o female now POD0 from ex lap, ESTELITA, LAR, flex sig, appendectomy, loop ileostomy creation, and complex abdominal wall closure. Resting comfortably in bed, endorses moderate 8/10 abdominal pain, improving with scheduled and prn pain medications. Denies CP, SOB. Denies nausea or vomiting. Tolerating sips of clears.      Objective:   Vital signs:  Temp: 97.1  F (36.2  C) Temp src: Oral BP: 120/66 Pulse: 97   Resp: 14 SpO2: 97 % O2 Device: None (Room air) Oxygen Delivery: 2 LPM Height: 170.2 cm (5' 7\") Weight: 78.6 kg (173 lb 4.5 oz)  Estimated body mass index is 27.14 kg/m  as calculated from the following:    Height as of this encounter: 1.702 m (5' 7\").    Weight as of this encounter: 78.6 kg (173 lb 4.5 oz).    Physical exam:   General: Resting comfortably in bed, NAD. Conversing easily.   CV: RRR. Normal S1, S2, no murmurs rubs or gallops.   Pulmonary: Nonlabored breathing on room air. No crackles or wheezes.   Abdomen: Soft, appropriately tender to palpation. Nondistended. Wound vac in place holding suction. Loop ileostomy in LLQ pink and healthy without gas or stool in bag, minimal serosanguinous output in bag.  Neuro: Alert and oriented x3. CN II-XII grossly intact. Moves all extremities.   Extremities: WWP. No pitting edema in bilateral lower extremities. No rashes or ecchymoses.     Pertinent labs:   Postop Cr 0.56, platelets 238 @ 1937.     A&P:  Pt is a 38 y/o female who is now POD0 s/p ex lap, ESTELITA, LAR, flex sig, appendectomy, loop ileostomy creation, and complex abdominal wall closure. Progressing appropriately postop, with adequate UOP, progressively improving pain control, and w/o nausea or vomiting.    - Multimodal pain control: jean-claude tylenol, dilaudid pca, ketamine   - Diet: NPO,  ml/hr   - Lovenox to start POD1  - Rest of cares per primary team postop orders  - Please page with further questions or concerns.     Niveditha " Sofy JAMA PGY1  General Surgery Resident

## 2023-01-05 NOTE — PLAN OF CARE
Physical Therapy: Orders received. Chart reviewed and discussed with care team.? Physical Therapy not indicated due to per OT screen pt is up [facility], and is appropriate to be followed by one rehab discipline during inpatient stay for instruction of abdominal precautions, improving activity tolerance, and functional mobility.? Defer discharge recommendations to OT.? Will complete orders.

## 2023-01-05 NOTE — PROGRESS NOTES
"   01/05/23 1105   Appointment Info   Signing Clinician's Name / Credentials (OT) Laura Lambert OTR/L   Rehab Comments (OT) abdominal precautions   Living Environment   People in Home spouse;child(santosh), dependent  (4 children - oldest is 13 yo)   Current Living Arrangements house   Home Accessibility stairs to enter home;stairs within home   Number of Stairs, Main Entrance 3   Stair Railings, Main Entrance railings safe and in good condition   Number of Stairs, Within Home, Primary   (one flight to basement (does not use))   Stair Railings, Within Home, Primary railings safe and in good condition   Transportation Anticipated car, drives self;family or friend will provide   Living Environment Comments Pt [place]. 2 story house, 3 ILIANA with hand rails. Has one flight of stairs to basement (does not use). All needs can be met on main floor. Tub/shower. No DME in bathroom. No AD for mobility.   Self-Care   Usual Activity Tolerance good   Current Activity Tolerance moderate   Equipment Currently Used at Home none   Fall history within last six months no   Activity/Exercise/Self-Care Comment Pt reports IND in ADLs prior to admission.   Instrumental Activities of Daily Living (IADL)   Previous Responsibilities meal prep;housekeeping;laundry;shopping;yardwork;medication management;finances;driving;;work   IADL Comments Pt reports IND in IADLs prior to admission. 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 Reports she will have support from spouse with IADLs at home.   General Information   Onset of Illness/Injury or Date of Surgery 01/04/23   Referring Physician Tad Richardson MD   Patient/Family Therapy Goal Statement (OT) Return to PLOF and \"be able to complete daily tasks\"   Additional Occupational Profile Info/Pertinent History of Current Problem Per EMR, \"37 year old female PMH endometriosis, chronic pain on opiates, hysterectomy " "and ovarian cystectomy 8/2021 c/b rectal injury identified 2 days after index operation with pelvic sepsis requiring ex lap & end colostomy, subsequently underwent colostomy reversal w/ colorectal anastomosis & diverting loop ileostomy in 4/2022 c/b colorectal anastomotic stricture not amenable to endoscopic dilation.  Now s/p ex lap, ESTELITA >3 hours, lower anterior resection of prior colorectal anastomosis, redo colorectal anastomosis, appendectomy, flex sig, complex abdominal closure on 1/4.\"   Existing Precautions/Restrictions abdominal;fall   Left Upper Extremity (Weight-bearing Status) partial weight-bearing (PWB)  (10 lbs)   Right Upper Extremity (Weight-bearing Status) partial weight-bearing (PWB)  (10 lbs)   Left Lower Extremity (Weight-bearing Status) full weight-bearing (FWB)   Right Lower Extremity (Weight-bearing Status) full weight-bearing (FWB)   General Observations and Info Activity: ambulate with assist   Cognitive Status Examination   Orientation Status orientation to person, place and time   Visual Perception   Visual Impairment/Limitations WFL;corrective lenses full-time   Sensory   Sensory Quick Adds sensation intact   Pain Assessment   Patient Currently in Pain Yes, see Vital Sign flowsheet   Range of Motion Comprehensive   General Range of Motion no range of motion deficits identified   Strength Comprehensive (MMT)   General Manual Muscle Testing (MMT) Assessment other (see comments)  (Deferred d/t abdominal precautions)   Coordination   Upper Extremity Coordination No deficits were identified   Bed Mobility   Bed Mobility rolling left   Rolling Left [place] (Bed Mobility) modified independence   Comment (Bed Mobility) Cues for log roll   Transfers   Transfers sit-stand transfer;toilet transfer   Sit-Stand Transfer   Sit-Stand [place] (Transfers) supervision   Toilet Transfer   Type (Toilet Transfer) sit-stand;stand-sit   [place] Level (Toilet Transfer) supervision   Toilet " Transfer Comments Cues for body mechanics for adherence to abd precautions   Balance   Balance Assessment no deficits were identified   Activities of Daily Living   BADL Assessment/Intervention bathing;lower body dressing;toileting   Bathing Assessment/Intervention   [place] Level (Bathing) minimum assist (75% patient effort)   Comment, (Bathing) per clinical judgement   Lower Body Dressing Assessment/Training   Position (Lower Body Dressing) edge of bed sitting;unsupported sitting   Comment, (Lower Body Dressing) Attempted figure four positioning, unable to complete this date. Therapist providing assist donning socks, pt demos doffing with supervision   [place] Level (Lower Body Dressing) maximum assist (25% patient effort)   Toileting   Comment, (Toileting) Currently using onofre   [place] Level (Toileting) dependent (less than 25% patient effort)   Clinical Impression   Criteria for Skilled Therapeutic Interventions Met (OT) Yes, treatment indicated   OT Diagnosis Dec IND in I/ADLs   OT Problem List-Impairments impacting ADL problems related to;activity tolerance impaired;pain;post-surgical precautions   Assessment of Occupational Performance 3-5 Performance Deficits   Identified Performance Deficits bathing, dressing, home management tasks, functional mobility/transfers   Planned Therapy Interventions (OT) ADL retraining;IADL retraining;transfer training;home program guidelines;progressive activity/exercise   Clinical Decision Making Complexity (OT) moderate complexity   Risk & Benefits of therapy have been explained evaluation/treatment results reviewed;care plan/treatment goals reviewed;risks/benefits reviewed;current/potential barriers reviewed;participants voiced agreement with care plan;participants included;patient   Clinical Impression Comments Pt will benefit from continued OT services to increase independence and safety in I/ADLs and functional mobility.   OT Total Evaluation Time   OT  Gabby, Moderate Complexity Minutes ([id number]) 10

## 2023-01-05 NOTE — PHARMACY-ADMISSION MEDICATION HISTORY
Admission Medication History Completed by Pharmacy    See [facility] Admission Navigator for allergy information, preferred outpatient pharmacy, prior to admission medications and immunization status.     Medication History Sources:     See prior medication history by Marleny LarsenD on 1/2/2023    Timing of last doses documented by operating room staff    Prior to Admission medications    Medication Sig Last Dose Taking? Auth Provider Long Term End Date   acetaminophen (TYLENOL) 500 MG tablet Take 500-1,000 mg by mouth every 6 hours as needed for mild pain 1/4/2023 Yes Unknown, Entered By History     HYDROcodone-acetaminophen (NORCO) 5-325 MG tablet Take 1 tablet by mouth every 6 hours as needed 1/4/2023 Yes Reported, Patient     hydrOXYzine (ATARAX) 25 MG tablet Take 1 tablet (25 mg) by mouth every 8 hours as needed for itching or anxiety 1/3/2023 Yes Katia Workman PA-C     morphine (MS CONTIN) 30 MG CR tablet Take 30 mg by mouth every 12 hours 1/3/2023 Yes Reported, Patient     multivitamin w/minerals (THERA-VIT-M) tablet Take 1 tablet by mouth daily Takes when she remembers Past Week Yes Unknown, Entered By History     sertraline (ZOLOFT) 50 MG tablet Take 50 mg by mouth every evening 1/3/2023 Yes Unknown, Entered By History No    NONFORMULARY Pro Immune supplement (Selenium)  Takes 1 tablet daily (when she remembers)  Patient not taking: Reported on 1/2/2023   Unknown, Entered By History         Date completed: 01/04/23    Medication history completed by: Annemarie Yoo PharmD, BCPS

## 2023-01-05 NOTE — CONSULTS
WOC Ostomy Assessment    Dx related to ostomy:missed rectal injury s/p ovarian cystectomy requiring emerging ex lap with end colostomy.  Type: Colostomy  Stoma: assessed through pouch, no concerns from patient   Mucutaneous junction: established   Peristomal skin: no concerns per pt   Output: watery     Ostomy Supplies:  Ostomy Pouch Care Supplies Pouch: [place] 70 FECAL ([id number])  Ostomy Flange/Barrier/Wafer Care Supplies: Flange/Barrier/Wafer: [place] 57mm FLAT ([id number])  Ostomy Care Accessories: Accessories: Powder ([id number]) and No Sting ([id number])    Plan: sign off   Face to face time = 5 mins

## 2023-01-05 NOTE — CARE PLAN
Time: 8277-4371  Activity: assist x2  Pain: abdominal and back pain, managed with PCA Dilaudid, PCA Ketamine and morphine MS contin.   Neuro: Pt is drowsy, arouses to voice. Denies N/T.  Cardiac: Denies cardiac chest pain.  Respiratory: Denies SOB, on RA sating above 94%.  GI/: No BM this shift, faint BS, AUOP. Urine color dark radha.  Diet: NPO  Lines: R PIV running Ketamine 5mg/hr and  and  L PIV running running 0.3mg PCA dilaudid and LR at 100 ml/hr.   Incisions/Drains/Skin: W  Lab: Reviewed   New changes this shift: No significant changes this shift, Continue POC

## 2023-01-06 ENCOUNTER — APPOINTMENT (OUTPATIENT)
Dept: OCCUPATIONAL THERAPY | Facility: CLINIC | Age: 38
DRG: 331 | End: 2023-01-06
Attending: UROLOGY
Payer: MEDICAID

## 2023-01-06 LAB
GLUCOSE BLDC GLUCOMTR-MCNC: 104 MG/DL (ref 70–99)
MAGNESIUM SERPL-MCNC: 2.1 MG/DL (ref 1.7–2.3)

## 2023-01-06 PROCEDURE — 97110 THERAPEUTIC EXERCISES: CPT | Mod: GO

## 2023-01-06 PROCEDURE — 120N000002 HC R&B MED SURG/OB UMMC

## 2023-01-06 PROCEDURE — 83735 ASSAY OF MAGNESIUM: CPT | Performed by: SURGERY

## 2023-01-06 PROCEDURE — 250N000009 HC RX 250: Performed by: STUDENT IN AN ORGANIZED HEALTH CARE EDUCATION/TRAINING PROGRAM

## 2023-01-06 PROCEDURE — 97535 SELF CARE MNGMENT TRAINING: CPT | Mod: GO

## 2023-01-06 PROCEDURE — 258N000003 HC RX IP 258 OP 636: Performed by: STUDENT IN AN ORGANIZED HEALTH CARE EDUCATION/TRAINING PROGRAM

## 2023-01-06 PROCEDURE — 250N000011 HC RX IP 250 OP 636: Performed by: SURGERY

## 2023-01-06 PROCEDURE — 99254 IP/OBS CNSLTJ NEW/EST MOD 60: CPT | Performed by: PHYSICIAN ASSISTANT

## 2023-01-06 PROCEDURE — 250N000013 HC RX MED GY IP 250 OP 250 PS 637: Performed by: SURGERY

## 2023-01-06 PROCEDURE — 36415 COLL VENOUS BLD VENIPUNCTURE: CPT | Performed by: SURGERY

## 2023-01-06 PROCEDURE — 250N000011 HC RX IP 250 OP 636: Performed by: PHYSICIAN ASSISTANT

## 2023-01-06 RX ADMIN — MORPHINE SULFATE 30 MG: 15 TABLET, EXTENDED RELEASE ORAL at 09:39

## 2023-01-06 RX ADMIN — ACETAMINOPHEN 1000 MG: 325 TABLET, FILM COATED ORAL at 20:43

## 2023-01-06 RX ADMIN — GABAPENTIN 200 MG: 100 CAPSULE ORAL at 09:39

## 2023-01-06 RX ADMIN — METHOCARBAMOL 750 MG: 750 TABLET, FILM COATED ORAL at 20:43

## 2023-01-06 RX ADMIN — HYDROXYZINE HYDROCHLORIDE 25 MG: 25 TABLET, FILM COATED ORAL at 04:21

## 2023-01-06 RX ADMIN — MORPHINE SULFATE 30 MG: 15 TABLET, EXTENDED RELEASE ORAL at 20:43

## 2023-01-06 RX ADMIN — ENOXAPARIN SODIUM 40 MG: 40 INJECTION SUBCUTANEOUS at 09:39

## 2023-01-06 RX ADMIN — ACETAMINOPHEN 1000 MG: 325 TABLET, FILM COATED ORAL at 14:36

## 2023-01-06 RX ADMIN — GABAPENTIN 200 MG: 100 CAPSULE ORAL at 20:43

## 2023-01-06 RX ADMIN — METHOCARBAMOL 750 MG: 750 TABLET, FILM COATED ORAL at 09:39

## 2023-01-06 RX ADMIN — GABAPENTIN 200 MG: 100 CAPSULE ORAL at 14:36

## 2023-01-06 RX ADMIN — SERTRALINE HYDROCHLORIDE 50 MG: 50 TABLET, FILM COATED ORAL at 20:43

## 2023-01-06 RX ADMIN — KETAMINE HYDROCHLORIDE 5 MG/HR: 100 INJECTION, SOLUTION, CONCENTRATE INTRAMUSCULAR; INTRAVENOUS at 09:50

## 2023-01-06 RX ADMIN — ACETAMINOPHEN 1000 MG: 325 TABLET, FILM COATED ORAL at 00:56

## 2023-01-06 RX ADMIN — METHOCARBAMOL 750 MG: 750 TABLET, FILM COATED ORAL at 17:44

## 2023-01-06 RX ADMIN — Medication: at 16:47

## 2023-01-06 RX ADMIN — ACETAMINOPHEN 1000 MG: 325 TABLET, FILM COATED ORAL at 06:32

## 2023-01-06 RX ADMIN — METHOCARBAMOL 750 MG: 750 TABLET, FILM COATED ORAL at 14:36

## 2023-01-06 ASSESSMENT — ACTIVITIES OF DAILY LIVING (ADL)
ADLS_ACUITY_SCORE: 22

## 2023-01-06 NOTE — PLAN OF CARE
"Goal Outcome Evaluation:  Vitals: BP 94/52 (BP Location: Left arm)   Pulse 80   Temp 97.5  F (36.4  C) (Oral)   Resp 12   Ht 1.702 m (5' 7\")   Wt 78.6 kg (173 lb 4.5 oz)   LMP  (LMP Unknown)   SpO2 96%   BMI 27.14 kg/m      Neuros: A/O X4.   IV: PIV x2 Infusing   Labs/Electrolytes: Mag 1.6, no replacement required. Recheck in AM   Resp/trach: RA denies SOB, Continues pulse ox. Maintaining sat >95. Denies SOB, chest pain.   Diet: Clear liquid diet   Bowel status: no bm. Not passing flatus yet   : onofre was pulled in AM, voiding adequately without difficulty   Skin: midline incision Intact. Ileostomy patent draining green watery output.   Pain: managed with PCA dilaudid 0.3, 10, 1.8 1hr limit. Ketamine    Activity: independent, walked in the [employer] x4   Plan: Continue with POC.         Plan of Care Reviewed With: patient    Overall Patient Progress: no changeOverall Patient Progress: no change           "

## 2023-01-06 NOTE — PROVIDER NOTIFICATION
7B 222  Vicki Shell  pt is on PCA dilaudid and Ketamine and is due for morphine. per policy we have to have an order stating its ok to administer morphine while on PCA written in the morphine orders. thank you  Karishma Kauffman MD  Time 5287

## 2023-01-06 NOTE — CONSULTS
Pain Service Consultation Note  [facility]      Patient Name: Vicki Shell  MRN: [id number]   Age: 37 year old  Sex: female  Date: January 6, 2023                                      Reviewed: Yes    Referring Provider:  Jamari Montoya MD  Referring Service:  Colorectal surgery  Reason for Consultation: Post op pain, previous opiate user, please help wean pain medicines    Assessment/Recommendations:  Vicki Shell is a 37 year old female who has PMH of endometriosis, chronic pain on opiates, hysterectomy and ovarian cystectomy 8/2021 c/b rectal injury identified 2 days after index operation with pelvic sepsis requiring ex lap & end colostomy, subsequently underwent colostomy reversal w/ colorectal anastomosis & diverting loop ileostomy in 4/2022 c/b colorectal anastomotic stricture not amenable to endoscopic dilation.  Now s/p ex lap, ESTELITA >3 hours, lower anterior resection of prior colorectal anastomosis, redo colorectal anastomosis, appendectomy, flex sig, complex abdominal closure on 1/4/23.    Pain service consulted to assist with pain management.    PTA dose of MS Contin 30mg Q 12 hours and Vicodin 5/325mg up to 4x/day for chronic pain.    Vicki states pain is in the lower abdomen. Pain achy and sharp at times.  Pain level  is 8/10.  She feels that PCA Dilaudid 0.3mg provides adequate relief.  Reports each dose lasts about 45-55 minutes.  She likes having the PCA Dilaudid but understands need to taper off IV to progress toward discharge.  She states that last surgery, the switch from PCA to PO opioid occurred to quickly, causing pain causing vomiting and had to return the use of PCA shortly thereafter.      We discussed this time, it's different because of her increased long acting MS contin, and addition of ketamine infusion.    We reviewed pain plan below and Vicki was agreeable.  Risks and benefits of opioids reviewed.      Plan:   1. Continue PTA dose of MS Contin 30mg PO Q 12  "hours.  2. Continue ketamine 5mg/hour .  (if needed and not sedated, can increase to 7.5-10mg/hour for more pain relief).  3. Discontinue PCA Dilaudid.  Overnight averaged 1mg/hour and this morning about 0.73mg/hour.  4. Start Dilaudid 4-6mg PO Q 3 hours PRN.   As pain improves, please taper down to Q 4 hours PRN x 1-3 days then to Q 6 hours PRN x 1-3 days then to Q 8 hours PRN x 1-3 days then return to  baseline of vicodin 5/325mg up to 4x/day PRN as prescribed by PCP.  5. Start Dilaudid 0.3mg IV Q 2 hours PRN.  6. Continue acetaminophen 1000mg Q 6 hours  7. Consider menthol patches.  8. Continue Robaxin 750mg PO 4x/day  9. Continue gabapentin 200mg PO TID.   -if needed for further pain relief, can increase to 300mg PO TID.  10. Bowel regimen per primary service.    Thank you for the opportunity to participate in the care of Vicki Shell  Pain Service will continue to follow.    Discussed with attending anesthesiologist, CURTIS CEJA and CLS resident.  Primary Service Contacted with Recommendations? Yes    Please Page the Pain Team Via [facility]: \"PAIN MANAGEMENT ACUTE INPATIENT/ [facility]\"    Dyan Griffiths PA-C  1/6/2023      Chief Complaint:  Lower abdominal pain      History of Present Illness:  Vicki Shell is a 37 year old old female with h/o severe chronic pain on chronic opioids who is now s/ pex lap, ESTELITA >3 hours, lower anterior resection of prior colorectal anastomosis, redo colorectal anastomosis, appendectomy, flex sig, complex abdominal closure on 1/4/23.     Vicki states pain is in the lower abdomen. Pain achy and sharp at times.  Pain level  is 8/10.  She feels that PCA Dilaudid 0.3mg provides adequate relief.  Reports each dose lasts about 45-55 minutes.  She likes having the PCA Dilaudid but understands need to taper off IV to progress toward discharge.          Per [place]  review pulled from system on 01/06/23.     12/19/2022  1   12/17/2022  Hydrocodone-Acetamin 5-325 MG  60.00  15 Sh Sei   " [id number]   Her ([id number])   0/0  20.00 MME  Medicaid   [place]   12/15/2022  1   12/15/2022  Morphine Sulf Er 30 MG Tablet  60.00  30 Sh Sei   [id number]   Her ([id number])   0/0  60.00 MME  Private Pay   [place]   12/14/2022  1   12/14/2022  Hydrocodone-Acetamin 7.5-325  4.00  1 Mi Has   [id number]   St ([id number])   0/0  30.00 MME  Private Pay   [place]   12/09/2022  1   12/09/2022  Hydrocodone-Acetamin 5-325 MG  28.00  7 Sh Sei   [id number]   Her ([id number])   0/0  20.00 MME  Medicaid   [place]   11/08/2022  1   11/08/2022  Morphine Sulf Er 30 MG Tablet  60.00  30 Sh Sei   [id number]   Her (9761)   0/0  60.00 MME  Private Pay   [place]   10/10/2022  1   10/10/2022  Morphine Sulf Er 30 MG Tablet  60.00  30 Sh Sei   [id number]   Her (9761)   0/0  60.00 MME  Private Pay   [place]   09/09/2022  1   09/08/2022  Morphine Sulf Er 30 MG Tablet  60.00  30 Sh Sei   [id number]   Her (9761)   0/0  60.00 MME  Private Pay   [place]   08/16/2022  1   08/16/2022  Morphine Sulf Er 30 MG Tablet  60.00  30 Sh Sei   [id number]   Her ([id number])   0/0  60.00 MME  Private Pay   [place]         Past Medical History:  Past Medical History:   Diagnosis Date     Colostomy in place (H)      Cyst of left ovary          Family History:    Family History   Problem Relation Age of Onset     Deep Vein Thrombosis (DVT) Father      Anesthesia Reaction No family hx of        Social History:  Social History     Tobacco Use     Smoking status: Former     Types: Cigarettes     Smokeless tobacco: Never     Tobacco comments:     5-7 cigarettes per day   Substance Use Topics     Alcohol use: Not Currently               Review of Systems:  Complete ROS reviewed. Unless otherwise noted, all other systems found to be negative.        Laboratory Results:  Recent Labs   Lab Test 01/05/23  0559 05/07/22  0907 05/06/22  0715 04/26/22  1017 02/16/22  0952   INR  --   --  1.09  --  1.02      < >  --    < > 200   PTT  --   --   --   --  30   BUN 9.3  --   --    < > 10    < > = values in this interval not displayed.         Allergies:  No Known  "Allergies      Pain Medications:  Pain Medications/Prescriber: Pain provider near [facility] [place]    Current Pain Related Medications:  Medications related to Pain Management (From now, onward)    Start     Dose/Rate Route Frequency Ordered Stop    01/06/23 0800  gabapentin (NEURONTIN) capsule 200 mg         200 mg Oral 3 TIMES DAILY 01/05/23 2100      01/04/23 2000  methocarbamol (ROBAXIN) tablet 750 mg         750 mg Oral 4 TIMES DAILY 01/04/23 1806 01/04/23 1900  morphine (MS CONTIN) 12 hr tablet 30 mg        Note to Pharmacy: PTA Sig:Take 30 mg by mouth every 12 hours      30 mg Oral EVERY 12 HOURS 01/04/23 1806      01/04/23 1830  acetaminophen (TYLENOL) tablet 1,000 mg         1,000 mg Oral EVERY 6 HOURS 01/04/23 1805 01/04/23 1806  hydrOXYzine (ATARAX) tablet 25 mg        Note to Pharmacy: PTA Sig:Take 1 tablet (25 mg) by mouth every 8 hours as needed for itching or anxiety      25 mg Oral EVERY 8 HOURS PRN 01/04/23 1806      01/04/23 1806  lidocaine 1 % 0.1-1 mL         0.1-1 mL Other EVERY 1 HOUR PRN 01/04/23 1806      01/04/23 1806  lidocaine (LMX4) cream          Topical EVERY 1 HOUR PRN 01/04/23 1806 01/04/23 1805  bupivacaine liposome (EXPAREL) LA inj was given in the infiltration site to produce post-op analgesia. Duration of action is up to 72 hours. Other \"liz\" meds should not be given for 96 hours except for lidocaine 4% patch. This is for INFORMATION ONLY.          Does not apply CONTINUOUS PRN 01/04/23 1805 01/08/23 1804 01/04/23 1500  HYDROmorphone (DILAUDID) PCA 0.2 mg/mL OPIOID NAIVE (age less than 65 years)          Intravenous CONTINUOUS 01/04/23 1441      01/04/23 1430  ketamine (KETALAR) 2 mg/mL in sodium chloride 0.9 % 50 mL ANALGESIA infusion         5 mg/hr  2.5 mL/hr  Intravenous CONTINUOUS 01/04/23 1422      01/04/23 0700  ketamine bolus from infusion pump 30.8 mg         0.5 mg/kg × 61.6 kg (Ideal) Intravenous ONCE 01/04/23 0644      01/04/23 0556  lidocaine 1 % 0.1-1 " "mL         0.1-1 mL Other EVERY 1 HOUR PRN 01/04/23 0556      01/04/23 0556  lidocaine (LMX4) cream          Topical EVERY 1 HOUR PRN 01/04/23 0556              Physical Exam:  Vitals: /59 (BP Location: Right arm)   Pulse 73   Temp 96.8  F (36  C) (Oral)   Resp 16   Ht 1.702 m (5' 7\")   Wt 78.6 kg (173 lb 4.5 oz)   LMP  (LMP Unknown)   SpO2 97%   BMI 27.14 kg/m      Physical Exam:     CONSTITUTIONAL/GENERAL APPEARANCE:  NAD  EYES: EOMI, sclera anicteric, PERRLA  ENT/NECK: atraumatic, lips and oral mucous membranes dry  RESPIRATORY: non-labored breathing. No cough, wheeze  CV: RRR, no audible rubs, gallops, or murmurs  ABDOMEN: round, distended, ostomy-present.  MUSCULOSKELETAL/BACK/SPINE/EXTREMITIES: Moves all extremities purposefully.  No edema or obvious joint deformities   NEURO: Alert and Oriented x3. Answers questions appropriately  SKIN/VASCULAR EXAM:  No jaundice, no visible rashes or lesions      70 MINUTES SPENT BY ME on the date of service doing chart review, history, exam, documentation & further activities per the note.      "

## 2023-01-06 NOTE — PLAN OF CARE
Goal Outcome Evaluation:      Plan of Care Reviewed With: patient    Overall Patient Progress: no change     Time: 1930-0730  Neuros: A&Ox4, calls appropriately.   Cardiac: WNL, denies cardiac chest pain   Respiratory: Sats >95% on RA, denies SOB  Pain: c/o abdominal and back pain managed with scheduled meds, dilaudid PCA, Ketamine drip and ice packs. Atarax prn x1 for anxiety, restless overnight.   Nausea: Denies N/V  Diet: CLD, advanced to low fiber diet this am.   GI/: Voiding adequate amount Bladder scanned 46 ml. Ileostomy with watery output.   Skin/incisions: Midline incision with wound vac and ileostomy pouch intact, no other skin deficits noted.   Lines: L PIV x3. MIVF decreased from 60 ml to 10 ml/hr.   Labs: Mag 1.6, replaced, recheck in am.   Activity: Up ambulating in room and bathroom with assist x1.   New Changes this Shift: None   Plan: Continue POC

## 2023-01-06 NOTE — PROGRESS NOTES
Admitted/transferred from: PACU   2 RN skin assessment completed by Randall Mo, RN and Sid Obrien, RN.  Skin assessment finding: Midline incision with wound vac and ileostomy pouch intact, no other skin deficits noted.   Interventions/actions: Activity encouraged out of bed, frequent repositioning, and adequate hydration maintained.     Bedside Emergency Equipment Present:  Suction Regulator:YES  Suction Canister: YES  Tubing between Regulator and Canister: YES  O2 Regulator with Tree: YES  Ambu Bag: YES

## 2023-01-06 NOTE — PLAN OF CARE
Occupational Therapy Discharge Summary    Reason for therapy discharge:    All goals and outcomes met, no further needs identified.    Progress towards therapy goal(s). See goals on Care Plan in [facility] electronic health record for goal details.  Goals met    Therapy recommendation(s):    Continue walking in hallways at least 3x per day and completing ADLs.

## 2023-01-06 NOTE — PROGRESS NOTES
Colorectal Surgery Progress Note  01/06/2023       Subjective:  NAEON. Reports a little bloated, mild pain, OOB, having stoma output     Objective:  Temp:  [96  F (35.6  C)-97.5  F (36.4  C)] 96.8  F (36  C)  Pulse:  [73-87] 73  Resp:  [12-16] 16  BP: ()/(52-61) 103/59  SpO2:  [96 %-98 %] 97 %    I/O last 3 completed shifts:  In: 1429.25 [P.O.:480; I.V.:949.25]  Out: 1450 [Urine:1450]      Gen: Awake, alert, NAD  CVS: RRR  Resp: NLB on RA  Abd: soft, nondistended, nontender, stoma healthy appearing with stools in bag  Incision: c/d/I  Ext: WWP, no edema     Labs:  Recent Labs   Lab 01/05/23  0559 01/04/23  1937   WBC 15.2*  --    HGB 11.9  --     238       Recent Labs   Lab 01/05/23  2306 01/05/23  1624 01/05/23  0559 01/05/23  0214 01/04/23  1937   NA  --   --  137  --   --    POTASSIUM  --   --  3.9  --   --    CHLORIDE  --   --  106  --   --    CO2  --   --  20*  --   --    BUN  --   --  9.3  --   --    CR  --   --  0.58  --  0.56   * 122* 121*   < >  --    MANNY  --   --  8.2*  --   --    MAG  --   --  1.6*  --   --     < > = values in this interval not displayed.       Imaging:  No results found for this or any previous visit (from the past 24 hour(s)).       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  Now s/p ex lap, ESTELITA >3 hours, lower anterior resection of prior colorectal anastomosis, redo colorectal anastomosis, appendectomy, flex sig, complex abdominal closure on 1/4.     Neuro/Pain: ketamine, robaxin, gabapentin, tylenol, dilaudid PCA.  Continue home MS contin.  Will consult pain team for management and weaning  of IV pain medicine  - home sertraline  CV: no acute concerns at this time  PULM: encourage IS.  GI/FEN:   - Advance to LRD  - WOCN  - keep prevena in place until discharge  : Replete electrolytes prn  Heme: No acute bleed  ID: afebrile, without s/s of infection  Endocrine: euglycemic     Activity: as tolerated.  Ppx: lovenox  ppx  Dispo: Soon. Will remove pravena on discharge.  No lovenox ppx injections needed at time of discharge.         Seen, examined, and discussed with fellow, who will discuss with staff.      Jaamri Montoya MD  PGY-1 Urology  [email or web address]

## 2023-01-07 LAB
GLUCOSE BLDC GLUCOMTR-MCNC: 138 MG/DL (ref 70–99)
HOLD SPECIMEN: NORMAL
PLATELET # BLD AUTO: 198 10E3/UL (ref 150–450)

## 2023-01-07 PROCEDURE — 120N000002 HC R&B MED SURG/OB UMMC

## 2023-01-07 PROCEDURE — 36415 COLL VENOUS BLD VENIPUNCTURE: CPT | Performed by: SURGERY

## 2023-01-07 PROCEDURE — 250N000011 HC RX IP 250 OP 636

## 2023-01-07 PROCEDURE — 250N000013 HC RX MED GY IP 250 OP 250 PS 637: Performed by: SURGERY

## 2023-01-07 PROCEDURE — 99231 SBSQ HOSP IP/OBS SF/LOW 25: CPT | Performed by: ANESTHESIOLOGY

## 2023-01-07 PROCEDURE — 250N000011 HC RX IP 250 OP 636: Performed by: PHYSICIAN ASSISTANT

## 2023-01-07 PROCEDURE — 250N000013 HC RX MED GY IP 250 OP 250 PS 637

## 2023-01-07 PROCEDURE — 999N000248 HC STATISTIC IV INSERT WITH US BY RN

## 2023-01-07 PROCEDURE — 250N000009 HC RX 250: Performed by: STUDENT IN AN ORGANIZED HEALTH CARE EDUCATION/TRAINING PROGRAM

## 2023-01-07 PROCEDURE — 258N000003 HC RX IP 258 OP 636: Performed by: STUDENT IN AN ORGANIZED HEALTH CARE EDUCATION/TRAINING PROGRAM

## 2023-01-07 PROCEDURE — 85049 AUTOMATED PLATELET COUNT: CPT | Performed by: SURGERY

## 2023-01-07 RX ORDER — HYDROMORPHONE HYDROCHLORIDE 4 MG/1
4 TABLET ORAL
Status: DISCONTINUED | OUTPATIENT
Start: 2023-01-07 | End: 2023-01-07

## 2023-01-07 RX ORDER — HYDROMORPHONE HYDROCHLORIDE 1 MG/ML
0.3 INJECTION, SOLUTION INTRAMUSCULAR; INTRAVENOUS; SUBCUTANEOUS
Status: DISCONTINUED | OUTPATIENT
Start: 2023-01-07 | End: 2023-01-09

## 2023-01-07 RX ORDER — DIAPER,BRIEF,INFANT-TODD,DISP
EACH MISCELLANEOUS 2 TIMES DAILY
Status: DISCONTINUED | OUTPATIENT
Start: 2023-01-07 | End: 2023-01-11 | Stop reason: HOSPADM

## 2023-01-07 RX ORDER — HYDROMORPHONE HYDROCHLORIDE 2 MG/1
4-6 TABLET ORAL
Status: DISCONTINUED | OUTPATIENT
Start: 2023-01-07 | End: 2023-01-11

## 2023-01-07 RX ADMIN — ACETAMINOPHEN 1000 MG: 325 TABLET, FILM COATED ORAL at 02:12

## 2023-01-07 RX ADMIN — METHOCARBAMOL 750 MG: 750 TABLET, FILM COATED ORAL at 16:10

## 2023-01-07 RX ADMIN — SERTRALINE HYDROCHLORIDE 50 MG: 50 TABLET, FILM COATED ORAL at 19:33

## 2023-01-07 RX ADMIN — METHOCARBAMOL 750 MG: 750 TABLET, FILM COATED ORAL at 11:26

## 2023-01-07 RX ADMIN — HYDROMORPHONE HYDROCHLORIDE 0.3 MG: 1 INJECTION, SOLUTION INTRAMUSCULAR; INTRAVENOUS; SUBCUTANEOUS at 13:21

## 2023-01-07 RX ADMIN — ACETAMINOPHEN 1000 MG: 325 TABLET, FILM COATED ORAL at 20:34

## 2023-01-07 RX ADMIN — HYDROMORPHONE HYDROCHLORIDE 0.3 MG: 1 INJECTION, SOLUTION INTRAMUSCULAR; INTRAVENOUS; SUBCUTANEOUS at 21:26

## 2023-01-07 RX ADMIN — HYDROMORPHONE HYDROCHLORIDE 6 MG: 2 TABLET ORAL at 14:31

## 2023-01-07 RX ADMIN — MORPHINE SULFATE 30 MG: 15 TABLET, EXTENDED RELEASE ORAL at 20:34

## 2023-01-07 RX ADMIN — HYDROMORPHONE HYDROCHLORIDE 0.3 MG: 1 INJECTION, SOLUTION INTRAMUSCULAR; INTRAVENOUS; SUBCUTANEOUS at 19:33

## 2023-01-07 RX ADMIN — HYDROMORPHONE HYDROCHLORIDE 0.3 MG: 1 INJECTION, SOLUTION INTRAMUSCULAR; INTRAVENOUS; SUBCUTANEOUS at 16:10

## 2023-01-07 RX ADMIN — ACETAMINOPHEN 1000 MG: 325 TABLET, FILM COATED ORAL at 09:39

## 2023-01-07 RX ADMIN — GABAPENTIN 200 MG: 100 CAPSULE ORAL at 09:39

## 2023-01-07 RX ADMIN — HYDROCORTISONE: 0.5 CREAM TOPICAL at 19:34

## 2023-01-07 RX ADMIN — ENOXAPARIN SODIUM 40 MG: 40 INJECTION SUBCUTANEOUS at 09:39

## 2023-01-07 RX ADMIN — HYDROMORPHONE HYDROCHLORIDE 6 MG: 2 TABLET ORAL at 17:32

## 2023-01-07 RX ADMIN — GABAPENTIN 200 MG: 100 CAPSULE ORAL at 19:33

## 2023-01-07 RX ADMIN — METHOCARBAMOL 750 MG: 750 TABLET, FILM COATED ORAL at 19:33

## 2023-01-07 RX ADMIN — GABAPENTIN 200 MG: 100 CAPSULE ORAL at 14:31

## 2023-01-07 RX ADMIN — ACETAMINOPHEN 1000 MG: 325 TABLET, FILM COATED ORAL at 14:30

## 2023-01-07 RX ADMIN — MORPHINE SULFATE 30 MG: 15 TABLET, EXTENDED RELEASE ORAL at 09:40

## 2023-01-07 RX ADMIN — KETAMINE HYDROCHLORIDE 5 MG/HR: 100 INJECTION, SOLUTION, CONCENTRATE INTRAMUSCULAR; INTRAVENOUS at 06:00

## 2023-01-07 RX ADMIN — METHOCARBAMOL 750 MG: 750 TABLET, FILM COATED ORAL at 09:39

## 2023-01-07 RX ADMIN — HYDROMORPHONE HYDROCHLORIDE 0.3 MG: 1 INJECTION, SOLUTION INTRAMUSCULAR; INTRAVENOUS; SUBCUTANEOUS at 11:26

## 2023-01-07 ASSESSMENT — ACTIVITIES OF DAILY LIVING (ADL)
ADLS_ACUITY_SCORE: 22

## 2023-01-07 NOTE — PLAN OF CARE
Goal Outcome Evaluation:      Plan of Care Reviewed With: patient    Overall Patient Progress: improving    Time: 1930-0730  Neuros: A&Ox4, calls appropriately.   Cardiac: WNL, denies cardiac chest pain   Respiratory: Sats >95% on RA, denies SOB  Pain: c/o abdominal and back pain managed with scheduled meds, dilaudid PCA, Ketamine drip and ice packs.  Nausea: Denies N/V  Diet: Tolerating low fiber diet, fair appetite.   GI/: Voiding adequate amount. Ileostomy with watery output, +gas.   Skin/incisions: Midline incision with wound vac and ileostomy pouch, no other skin deficits noted.   Lines: L PIV x2 infusing, MIVF @10 ml/hr.   Labs: Reviewed  Activity: Up ambulating independently.  New Changes this Shift: None   Plan: Continue POC

## 2023-01-07 NOTE — PROGRESS NOTES
"Pain Service Progress Note  [facility]  Date: 01/07/2023       Patient Name: Vicki Shell  MRN: [id number]  Age: 37 year old  Sex: female      Assessment:  37 year old female who has PMH of endometriosis, chronic pain on opiates, s/p ex lap + lower anterior resection of prior colorectal anastomosis, redo colorectal anastomosis, appendectomy, flex sig, complex abdominal closure on 1/4/23.    Plan/Recommendations:  1. Continue PTA dose of MS Contin 30mg PO Q 12 hours.  2. Continue ketamine 5mg/hour .  (if needed and not sedated, can increase to 7.5-10mg/hour for more pain relief).  3. Discontinue HM PCA.    4. Start Hydromorphone 4-6mg PO Q 3 hours PRN.              As pain improves, please taper down to Q 4 hours PRN x 1-3 days then to Q 6 hours PRN x 1-3 days then to Q 8 hours PRN x 1-3 days then return to baseline of vicodin 5/325mg up to 4x/day PRN as prescribed by PCP.  5. Start Dilaudid 0.3mg IV Q 3 hours PRN.  6. Continue acetaminophen 1000mg Q 6 hours  7. Consider menthol patches.  8. Continue Robaxin 750mg PO 4x/day  9. Increase gabapentin to 300 mg PO TID.  10. Bowel regimen per primary service.  Pain Service will continue to follow.    Please Page the Pain Team Via [facility]: \"PAIN MANAGEMENT ACUTE INPATIENT/ [facility]\"    Howard Butler MD  01/07/2023     Overnight Events: None.    Subjective:  I was scared to come off the PCA yesterday.   Nausea: No  Vomiting: No  Pruritus: No    Pain Location:  Abdomen    Pain Intensity:    Pain at Rest: 7/10   Pain with Activity: 8/10  Comfort Goal: SAWYER  Baseline Pain: SAWYER  Satisfied with your level of pain control: Yes    Diet: Low Fiber Diet  Diet    Relevant Labs:  Recent Labs   Lab Test 01/07/23  0757 01/05/23  0559 05/07/22  0907 05/06/22  0715 04/26/22  1017 02/16/22  0952   INR  --   --   --  1.09  --  1.02    244   < >  --    < > 200   PTT  --   --   --   --   --  30   BUN  --  9.3  --   --    < > 10    < > = values in this " "interval not displayed.       Physical Exam:  Vitals: /62 (BP Location: Right arm)   Pulse 76   Temp (!) 96.2  F (35.7  C) (Oral)   Resp 16   Ht 1.702 m (5' 7\")   Wt 78.6 kg (173 lb 4.5 oz)   LMP  (LMP Unknown)   SpO2 98%   BMI 27.14 kg/m      Physical Exam:   Orientation:  Alert, oriented, and in no acute distress: Yes  Sedation: No    Relevant Medications:  Current Pain Medications:  Medications related to Pain Management (From now, onward)    Start     Dose/Rate Route Frequency Ordered Stop    01/06/23 1700  HYDROmorphone (DILAUDID) PCA 0.2 mg/mL OPIOID NAIVE (age less than 65 years)          Intravenous CONTINUOUS 01/06/23 1654      01/06/23 0800  gabapentin (NEURONTIN) capsule 200 mg         200 mg Oral 3 TIMES DAILY 01/05/23 2100      01/04/23 2000  methocarbamol (ROBAXIN) tablet 750 mg         750 mg Oral 4 TIMES DAILY 01/04/23 1806 01/04/23 1900  morphine (MS CONTIN) 12 hr tablet 30 mg        Note to Pharmacy: PTA Sig:Take 30 mg by mouth every 12 hours      30 mg Oral EVERY 12 HOURS 01/04/23 1806 01/04/23 1830  acetaminophen (TYLENOL) tablet 1,000 mg         1,000 mg Oral EVERY 6 HOURS 01/04/23 1805 01/04/23 1806  hydrOXYzine (ATARAX) tablet 25 mg        Note to Pharmacy: PTA Sig:Take 1 tablet (25 mg) by mouth every 8 hours as needed for itching or anxiety      25 mg Oral EVERY 8 HOURS PRN 01/04/23 1806 01/04/23 1806  lidocaine 1 % 0.1-1 mL         0.1-1 mL Other EVERY 1 HOUR PRN 01/04/23 1806 01/04/23 1806  lidocaine (LMX4) cream          Topical EVERY 1 HOUR PRN 01/04/23 1806 01/04/23 1805  bupivacaine liposome (EXPAREL) LA inj was given in the infiltration site to produce post-op analgesia. Duration of action is up to 72 hours. Other \"liz\" meds should not be given for 96 hours except for lidocaine 4% patch. This is for INFORMATION ONLY.          Does not apply CONTINUOUS PRN 01/04/23 1805 01/08/23 1804    01/04/23 1430  ketamine (KETALAR) 2 mg/mL in sodium " chloride 0.9 % 50 mL ANALGESIA infusion         5 mg/hr  2.5 mL/hr  Intravenous CONTINUOUS 01/04/23 1422      01/04/23 0700  ketamine bolus from infusion pump 30.8 mg         0.5 mg/kg × 61.6 kg (Ideal) Intravenous ONCE 01/04/23 0644      01/04/23 0556  lidocaine 1 % 0.1-1 mL         0.1-1 mL Other EVERY 1 HOUR PRN 01/04/23 0556      01/04/23 0556  lidocaine (LMX4) cream          Topical EVERY 1 HOUR PRN 01/04/23 0556            Primary Service Contacted with Recommendations? Yes      28 MINUTES SPENT BY ME on the date of service doing chart review, history, exam, documentation & further activities per the note.

## 2023-01-07 NOTE — PLAN OF CARE
"Neuro: A/Ox4 calls appropriately.  Respiratory: on room air denies SOB.   Cardiac: WDL denies cardiac chest pain.  Diet: low fiber tolerating with fair appetite  GI/: +BS, air noted in ostomy bag. Ileostomy with green output., patient changed bag Voiding with AUOP.  Incision/Drains: midline incision, patient removed wound vac dressing states there was some stool underneath the dressing. MD made aware advice to apply dressing, gauze and abd applied. Ileostomy.   IV Access: PIV x1 to left forearm infusing Ketamine at 5ml/hr   Pain: reports pain to incision site managed with PRN dilaudid IV/PO and ketamine, ice and cold pack.  Labs: reviewed  VS: /62 (BP Location: Right arm)   Pulse 69   Temp 96.9  F (36.1  C) (Oral)   Resp 18   Ht 1.702 m (5' 7\")   Wt 78.6 kg (173 lb 4.5 oz)   LMP  (LMP Unknown)   SpO2 100%   BMI 27.14 kg/m     Activity: up ad el to bathroom and hallway.  Plan: manage pain. Continue POC.       "

## 2023-01-07 NOTE — PLAN OF CARE
"Neuro: A/Ox4 calls appropriately.  Respiratory: on room air denies SOB.   Cardiac: WDL denies cardiac chest pain.  Diet: low fiber tolerating with fair appetite  GI/: +BS, air noted in ostomy bag. Ileostomy with green output. Voiding with AUOP  Incision/Drains: midline incision with wound vac. Ileostomy.   IV Access: PIV x2 to left forearm infusing PCA dilaudid +D5 0.45% NaCl 20meq/L KCl 10ml/hr and Ketamine infusing at 5ml/hr   Pain: reports pain to incision site managed with PRN  Labs: reviewed  VS: /64 (BP Location: Right arm)   Pulse 77   Temp 97.4  F (36.3  C) (Oral)   Resp 14   Ht 1.702 m (5' 7\")   Wt 78.6 kg (173 lb 4.5 oz)   LMP  (LMP Unknown)   SpO2 97%   BMI 27.14 kg/m     Activity: up ad el to bathroom and hallway.  Plan: manage pain. Continue POC.       "

## 2023-01-07 NOTE — PROGRESS NOTES
Colorectal Surgery Progress Note  01/07/2023       Subjective:  DEVON. Reports mild to moderate soreness in her abd, OOB, having stoma output     Objective:  Temp:  [96.2  F (35.7  C)-98.4  F (36.9  C)] 96.2  F (35.7  C)  Pulse:  [76-82] 76  Resp:  [14-18] 16  BP: (114-124)/(62-72) 121/62  SpO2:  [97 %-100 %] 98 %    I/O last 3 completed shifts:  In: 1812.83 [P.O.:960; I.V.:852.83]  Out: 3850 [Urine:2950; Stool:900]      Gen: Awake, alert, NAD  CVS: RRR  Resp: NLB on RA  Abd: soft, nondistended, appropriately tender, stoma healthy appearing with stools in bag  Incision: c/d/I  Ext: WWP, no edema     Labs:  Recent Labs   Lab 01/07/23  0757 01/05/23  0559 01/04/23 1937   WBC  --  15.2*  --    HGB  --  11.9  --     244 238       Recent Labs   Lab 01/07/23  0855 01/06/23  1311 01/06/23  0735 01/05/23  2306 01/05/23  1624 01/05/23  0559 01/05/23  0214 01/04/23 1937   NA  --   --   --   --   --  137  --   --    POTASSIUM  --   --   --   --   --  3.9  --   --    CHLORIDE  --   --   --   --   --  106  --   --    CO2  --   --   --   --   --  20*  --   --    BUN  --   --   --   --   --  9.3  --   --    CR  --   --   --   --   --  0.58  --  0.56   * 104*  --  128*   < > 121*   < >  --    MANNY  --   --   --   --   --  8.2*  --   --    MAG  --   --  2.1  --   --  1.6*  --   --     < > = values in this interval not displayed.       Imaging:  No results found for this or any previous visit (from the past 24 hour(s)).       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  Now s/p ex lap, ESTELITA >3 hours, lower anterior resection of prior colorectal  anastomosis, redo colorectal anastomosis, appendectomy, flex sig, complex abdominal closure on 1/4.     Neuro/Pain: Continue ketamine, robaxin, gabapentin, tylenol.  Continue home MS contin. DC dilaudid pca, add PO and IV dilaudid. pain team consulted for management and weaning of IV pain medicine  - home sertraline  CV: HD appropriate  PULM: encourage IS.  GI/FEN:   - LRD  - WOCN  - keep prevena in place until discharge  : Replete electrolytes prn  Heme: No acute bleed  ID: afebrile, without s/s of infection  Endocrine: euglycemic     Activity: as tolerated.  Ppx: lovenox  ppx  Dispo: Soon. Will remove pravena on discharge.  No lovenox ppx injections needed at time of discharge.         Seen, examined, and discussed with fellow, who will discuss with staff.      Jamari Montoya MD  PGY-1 Urology  [email or web address]

## 2023-01-08 PROCEDURE — 250N000013 HC RX MED GY IP 250 OP 250 PS 637

## 2023-01-08 PROCEDURE — 250N000013 HC RX MED GY IP 250 OP 250 PS 637: Performed by: STUDENT IN AN ORGANIZED HEALTH CARE EDUCATION/TRAINING PROGRAM

## 2023-01-08 PROCEDURE — 99233 SBSQ HOSP IP/OBS HIGH 50: CPT | Performed by: ANESTHESIOLOGY

## 2023-01-08 PROCEDURE — 120N000002 HC R&B MED SURG/OB UMMC

## 2023-01-08 PROCEDURE — 258N000003 HC RX IP 258 OP 636: Performed by: STUDENT IN AN ORGANIZED HEALTH CARE EDUCATION/TRAINING PROGRAM

## 2023-01-08 PROCEDURE — 250N000013 HC RX MED GY IP 250 OP 250 PS 637: Performed by: SURGERY

## 2023-01-08 PROCEDURE — 250N000011 HC RX IP 250 OP 636

## 2023-01-08 PROCEDURE — 250N000011 HC RX IP 250 OP 636: Performed by: PHYSICIAN ASSISTANT

## 2023-01-08 PROCEDURE — 250N000009 HC RX 250: Performed by: STUDENT IN AN ORGANIZED HEALTH CARE EDUCATION/TRAINING PROGRAM

## 2023-01-08 RX ORDER — AMOXICILLIN 250 MG
1 CAPSULE ORAL 2 TIMES DAILY PRN
Status: DISCONTINUED | OUTPATIENT
Start: 2023-01-08 | End: 2023-01-11 | Stop reason: HOSPADM

## 2023-01-08 RX ORDER — POLYETHYLENE GLYCOL 3350 17 G/17G
17 POWDER, FOR SOLUTION ORAL DAILY PRN
Status: DISCONTINUED | OUTPATIENT
Start: 2023-01-08 | End: 2023-01-11 | Stop reason: HOSPADM

## 2023-01-08 RX ORDER — GABAPENTIN 300 MG/1
300 CAPSULE ORAL 3 TIMES DAILY
Status: DISCONTINUED | OUTPATIENT
Start: 2023-01-08 | End: 2023-01-11 | Stop reason: HOSPADM

## 2023-01-08 RX ADMIN — HYDROMORPHONE HYDROCHLORIDE 0.3 MG: 1 INJECTION, SOLUTION INTRAMUSCULAR; INTRAVENOUS; SUBCUTANEOUS at 06:57

## 2023-01-08 RX ADMIN — HYDROMORPHONE HYDROCHLORIDE 0.3 MG: 1 INJECTION, SOLUTION INTRAMUSCULAR; INTRAVENOUS; SUBCUTANEOUS at 12:10

## 2023-01-08 RX ADMIN — HYDROMORPHONE HYDROCHLORIDE 6 MG: 2 TABLET ORAL at 04:28

## 2023-01-08 RX ADMIN — METHOCARBAMOL 750 MG: 750 TABLET, FILM COATED ORAL at 16:17

## 2023-01-08 RX ADMIN — ACETAMINOPHEN 1000 MG: 325 TABLET, FILM COATED ORAL at 20:54

## 2023-01-08 RX ADMIN — HYDROMORPHONE HYDROCHLORIDE 6 MG: 2 TABLET ORAL at 19:58

## 2023-01-08 RX ADMIN — KETAMINE HYDROCHLORIDE 7.5 MG/HR: 100 INJECTION, SOLUTION, CONCENTRATE INTRAMUSCULAR; INTRAVENOUS at 16:59

## 2023-01-08 RX ADMIN — MORPHINE SULFATE 30 MG: 15 TABLET, EXTENDED RELEASE ORAL at 21:37

## 2023-01-08 RX ADMIN — HYDROMORPHONE HYDROCHLORIDE 0.3 MG: 1 INJECTION, SOLUTION INTRAMUSCULAR; INTRAVENOUS; SUBCUTANEOUS at 20:54

## 2023-01-08 RX ADMIN — HYDROMORPHONE HYDROCHLORIDE 0.3 MG: 1 INJECTION, SOLUTION INTRAMUSCULAR; INTRAVENOUS; SUBCUTANEOUS at 14:11

## 2023-01-08 RX ADMIN — HYDROMORPHONE HYDROCHLORIDE 0.3 MG: 1 INJECTION, SOLUTION INTRAMUSCULAR; INTRAVENOUS; SUBCUTANEOUS at 16:17

## 2023-01-08 RX ADMIN — HYDROCORTISONE: 0.5 CREAM TOPICAL at 20:02

## 2023-01-08 RX ADMIN — METHOCARBAMOL 750 MG: 750 TABLET, FILM COATED ORAL at 12:10

## 2023-01-08 RX ADMIN — GABAPENTIN 300 MG: 300 CAPSULE ORAL at 19:58

## 2023-01-08 RX ADMIN — HYDROMORPHONE HYDROCHLORIDE 6 MG: 2 TABLET ORAL at 16:53

## 2023-01-08 RX ADMIN — HYDROMORPHONE HYDROCHLORIDE 0.3 MG: 1 INJECTION, SOLUTION INTRAMUSCULAR; INTRAVENOUS; SUBCUTANEOUS at 18:27

## 2023-01-08 RX ADMIN — ACETAMINOPHEN 1000 MG: 325 TABLET, FILM COATED ORAL at 08:45

## 2023-01-08 RX ADMIN — ACETAMINOPHEN 1000 MG: 325 TABLET, FILM COATED ORAL at 14:11

## 2023-01-08 RX ADMIN — METHOCARBAMOL 750 MG: 750 TABLET, FILM COATED ORAL at 19:58

## 2023-01-08 RX ADMIN — POLYETHYLENE GLYCOL 3350 17 G: 17 POWDER, FOR SOLUTION ORAL at 16:53

## 2023-01-08 RX ADMIN — KETAMINE HYDROCHLORIDE 5 MG/HR: 100 INJECTION, SOLUTION, CONCENTRATE INTRAMUSCULAR; INTRAVENOUS at 02:53

## 2023-01-08 RX ADMIN — HYDROMORPHONE HYDROCHLORIDE 0.3 MG: 1 INJECTION, SOLUTION INTRAMUSCULAR; INTRAVENOUS; SUBCUTANEOUS at 23:23

## 2023-01-08 RX ADMIN — SENNOSIDES AND DOCUSATE SODIUM 1 TABLET: 50; 8.6 TABLET ORAL at 16:53

## 2023-01-08 RX ADMIN — ACETAMINOPHEN 1000 MG: 325 TABLET, FILM COATED ORAL at 02:45

## 2023-01-08 RX ADMIN — METHOCARBAMOL 750 MG: 750 TABLET, FILM COATED ORAL at 08:42

## 2023-01-08 RX ADMIN — MORPHINE SULFATE 30 MG: 15 TABLET, EXTENDED RELEASE ORAL at 08:41

## 2023-01-08 RX ADMIN — HYDROMORPHONE HYDROCHLORIDE 6 MG: 2 TABLET ORAL at 13:06

## 2023-01-08 RX ADMIN — HYDROMORPHONE HYDROCHLORIDE 0.3 MG: 1 INJECTION, SOLUTION INTRAMUSCULAR; INTRAVENOUS; SUBCUTANEOUS at 09:28

## 2023-01-08 RX ADMIN — HYDROMORPHONE HYDROCHLORIDE 6 MG: 2 TABLET ORAL at 08:41

## 2023-01-08 RX ADMIN — GABAPENTIN 300 MG: 300 CAPSULE ORAL at 14:11

## 2023-01-08 RX ADMIN — SERTRALINE HYDROCHLORIDE 50 MG: 50 TABLET, FILM COATED ORAL at 19:58

## 2023-01-08 RX ADMIN — HYDROMORPHONE HYDROCHLORIDE 0.3 MG: 1 INJECTION, SOLUTION INTRAMUSCULAR; INTRAVENOUS; SUBCUTANEOUS at 00:11

## 2023-01-08 RX ADMIN — HYDROMORPHONE HYDROCHLORIDE 0.3 MG: 1 INJECTION, SOLUTION INTRAMUSCULAR; INTRAVENOUS; SUBCUTANEOUS at 02:45

## 2023-01-08 RX ADMIN — HYDROMORPHONE HYDROCHLORIDE 6 MG: 2 TABLET ORAL at 00:55

## 2023-01-08 RX ADMIN — GABAPENTIN 200 MG: 100 CAPSULE ORAL at 08:42

## 2023-01-08 RX ADMIN — ENOXAPARIN SODIUM 40 MG: 40 INJECTION SUBCUTANEOUS at 08:42

## 2023-01-08 ASSESSMENT — ACTIVITIES OF DAILY LIVING (ADL)
ADLS_ACUITY_SCORE: 22

## 2023-01-08 NOTE — PROVIDER NOTIFICATION
7B Vicki Shell [id number]  Pt is asking for Miralax, output in ostomy is getting harder. Could she get this PRN? Thanks!    Lori meek *[id number]

## 2023-01-08 NOTE — PLAN OF CARE
Goal Outcome Evaluation:      Plan of Care Reviewed With: patient    Overall Patient Progress: no changeOverall Patient Progress: no change    Cardiac: denies cardiac chest pain   Resp: RA, sating >90%, denies SOB   Neuro: A&Ox4, calm & cooperative   GI/: voiding spontaneously-not saving, ilesotomy   Diet: Low fiber diet   Incisions/Drains: midline abdominal incision-pt pulled out wound vac out during day shift after reporting stool got on it-team aware, now covered w/ dry gauze and ABDs, ileostomy giving good output    IV access: 2x R PIV, one infusing ketamine, other SL   Labs: Reviewed   Nausea: denies   Activity: Independent   Pain: 7-9/10 managed w/ PRN PO & IV dilaudid, ketamine infusion 5 mg/hr, scheduled meds     New changes this shift: no new changes, continue POC

## 2023-01-08 NOTE — PROGRESS NOTES
"Pain Service Progress Note  [facility]  Date: 01/08/2023       Patient Name: Vicki Shell  MRN: [id number]  Age: 37 year old  Sex: female      Assessment:  37 year old female who has PMH of endometriosis, chronic pain on opiates, s/p ex lap + lower anterior resection of prior colorectal anastomosis, redo colorectal anastomosis, appendectomy, flex sig, complex abdominal closure on 1/4/23.    Plan/Recommendations:  1. Continue PTA dose of MS Contin 30mg PO Q 12 hours.  2. Increase ketamine to 7.5 mg/hr.  (if needed and not sedated, can increase to 10mg/hour for more pain relief overnight).  3. Start Hydromorphone 4-6mg PO Q 3 hours PRN.              As pain improves, please taper down to Q 4 hours PRN x 1-3 days then to Q 6 hours PRN x 1-3 days then to Q 8 hours PRN x 1-3 days then return to baseline of vicodin 5/325mg up to 4x/day PRN as prescribed by PCP.  5. Tomorrow, begin weaning frequency of Dilaudid 0.3mg IV to Q4H/PRN  6. Continue acetaminophen 1000mg Q 6 hours  7. Consider menthol patches.  8. Continue Robaxin 750mg PO 4x/day  9. Increase gabapentin to 300 mg PO TID.  10. Bowel regimen per primary service.  11.   Pain Service will continue to follow.    Please Page the Pain Team [facility]: \"PAIN MANAGEMENT ACUTE INPATIENT/ [facility]\"    Howard Butler MD  01/08/2023     Overnight Events: None.    Subjective:  It wasn't a perfect transition but I am alright.   Nausea: No  Vomiting: No  Pruritus: No    Pain Location:  Abdomen    Pain Intensity:    Pain at Rest: 7/10   Pain with Activity: 8/10  Comfort Goal: 4/10  Baseline Pain: 4/10  Satisfied with your level of pain control: Yes    Diet: Low Fiber Diet  Diet    Relevant Labs:  Recent Labs   Lab Test 01/07/23  0757 01/05/23  0559 05/07/22  0907 05/06/22  0715 04/26/22  1017 02/16/22  0952   INR  --   --   --  1.09  --  1.02    244   < >  --    < > 200   PTT  --   --   --   --   --  30   BUN  --  9.3  --   --    < > 10    " "< > = values in this interval not displayed.       Physical Exam:  Vitals: /61 (BP Location: Right arm)   Pulse 67   Temp (!) 96.7  F (35.9  C) (Oral)   Resp 18   Ht 1.702 m (5' 7\")   Wt 78.6 kg (173 lb 4.5 oz)   LMP  (LMP Unknown)   SpO2 97%   BMI 27.14 kg/m      Physical Exam:   Orientation:  Alert, oriented, and in no acute distress: Yes  Sedation: No    Relevant Medications:  Current Pain Medications:  Medications related to Pain Management (From now, onward)    Start     Dose/Rate Route Frequency Ordered Stop    01/06/23 1700  HYDROmorphone (DILAUDID) PCA 0.2 mg/mL OPIOID NAIVE (age less than 65 years)          Intravenous CONTINUOUS 01/06/23 1654      01/06/23 0800  gabapentin (NEURONTIN) capsule 200 mg         200 mg Oral 3 TIMES DAILY 01/05/23 2100      01/04/23 2000  methocarbamol (ROBAXIN) tablet 750 mg         750 mg Oral 4 TIMES DAILY 01/04/23 1806 01/04/23 1900  morphine (MS CONTIN) 12 hr tablet 30 mg        Note to Pharmacy: PTA Sig:Take 30 mg by mouth every 12 hours      30 mg Oral EVERY 12 HOURS 01/04/23 1806 01/04/23 1830  acetaminophen (TYLENOL) tablet 1,000 mg         1,000 mg Oral EVERY 6 HOURS 01/04/23 1805 01/04/23 1806  hydrOXYzine (ATARAX) tablet 25 mg        Note to Pharmacy: PTA Sig:Take 1 tablet (25 mg) by mouth every 8 hours as needed for itching or anxiety      25 mg Oral EVERY 8 HOURS PRN 01/04/23 1806 01/04/23 1806  lidocaine 1 % 0.1-1 mL         0.1-1 mL Other EVERY 1 HOUR PRN 01/04/23 1806 01/04/23 1806  lidocaine (LMX4) cream          Topical EVERY 1 HOUR PRN 01/04/23 1806 01/04/23 1805  bupivacaine liposome (EXPAREL) LA inj was given in the infiltration site to produce post-op analgesia. Duration of action is up to 72 hours. Other \"liz\" meds should not be given for 96 hours except for lidocaine 4% patch. This is for INFORMATION ONLY.          Does not apply CONTINUOUS PRN 01/04/23 1805 01/08/23 1804    01/04/23 1430  ketamine (KETALAR) " 2 mg/mL in sodium chloride 0.9 % 50 mL ANALGESIA infusion         5 mg/hr  2.5 mL/hr  Intravenous CONTINUOUS 01/04/23 1422      01/04/23 0700  ketamine bolus from infusion pump 30.8 mg         0.5 mg/kg × 61.6 kg (Ideal) Intravenous ONCE 01/04/23 0644      01/04/23 0556  lidocaine 1 % 0.1-1 mL         0.1-1 mL Other EVERY 1 HOUR PRN 01/04/23 0556      01/04/23 0556  lidocaine (LMX4) cream          Topical EVERY 1 HOUR PRN 01/04/23 0556            Primary Service Contacted with Recommendations? Yes      Please see A&P for additional details of medical decision making.  Medical complexity over the past 24 hours:  - Parenteral (IV) CONTROLLED SUBSTANCES ordered

## 2023-01-08 NOTE — PROGRESS NOTES
Colorectal Surgery Progress Note  01/08/2023       Subjective:  DEVON. Reports mild to moderate soreness in her abd, OOB, having stoma output. She spoke with the pain team who had discussed increasing her gabapentin and ketamine and plans to wean dilaudid tomorrow so she would like this plan enacted.      Objective:  Temp:  [96.7  F (35.9  C)-96.9  F (36.1  C)] 96.7  F (35.9  C)  Pulse:  [67-74] 67  Resp:  [18] 18  BP: (109-120)/(61-62) 118/61  SpO2:  [97 %-100 %] 97 %    I/O last 3 completed shifts:  In: 12 [I.V.:12]  Out: 900 [Urine:400; Stool:500]      Gen: Awake, alert, NAD  CVS: RRR  Resp: NLB on RA  Abd: soft, nondistended, appropriately tender, stoma healthy appearing with robust output in bag  Incision: c/d/I  Ext: WWP, no edema     Labs:  Recent Labs   Lab 01/07/23  0757 01/05/23  0559 01/04/23 1937   WBC  --  15.2*  --    HGB  --  11.9  --     244 238       Recent Labs   Lab 01/07/23  0855 01/06/23  1311 01/06/23  0735 01/05/23  2306 01/05/23  1624 01/05/23  0559 01/05/23  0214 01/04/23 1937   NA  --   --   --   --   --  137  --   --    POTASSIUM  --   --   --   --   --  3.9  --   --    CHLORIDE  --   --   --   --   --  106  --   --    CO2  --   --   --   --   --  20*  --   --    BUN  --   --   --   --   --  9.3  --   --    CR  --   --   --   --   --  0.58  --  0.56   * 104*  --  128*   < > 121*   < >  --    MANNY  --   --   --   --   --  8.2*  --   --    MAG  --   --  2.1  --   --  1.6*  --   --     < > = values in this interval not displayed.       Imaging:  No results found for this or any previous visit (from the past 24 hour(s)).       Assessment/Plan:   This is a 37 year old female PMH endometriosis, chronic pain on opiates, hysterectomy and ovarian cystectomy 8/2021 c/b rectal injury identified 2 days after index operation with pelvic sepsis requiring ex lap & end colostomy, subsequently underwent colostomy reversal w/ colorectal anastomosis & diverting loop ileostomy in 4/2022 c/b  colorectal anastomotic stricture not amenable to endoscopic dilation.  Now s/p ex lap, ESTELITA >3 hours, lower anterior resection of prior colorectal anastomosis, redo colorectal anastomosis, appendectomy, flex sig, complex abdominal closure on 1/4.     Neuro/Pain: Continue ketamine (increase), robaxin, gabapentin (increase), tylenol.  Continue home MS contin.  PO and IV dilaudid. pain team consulted for management and weaning of IV pain medicine. Will plan on decreasing IV dilaudid tomorrow   - home sertraline  CV: HD appropriate  PULM: encourage IS.  GI/FEN:   - LRD  - WOCN  - took prevena off due to itch. 4x4 over wounds change daily and PRN    : Replete electrolytes prn  Heme: No acute bleed  ID: afebrile, without s/s of infection  Endocrine: euglycemic  Activity: as tolerated.  Ppx: lovenox  ppx  Dispo: Pending pain control. Otherwise medically ready.  No lovenox ppx injections needed at time of discharge.         Discussed with staff.     Azalia Sen MD  General Surgery, PGY-3

## 2023-01-08 NOTE — PLAN OF CARE
Time: 9837-3339  Temp: 96.8  F (36  C) Temp src: Oral BP: 120/62 Pulse: 74   Resp: 18 SpO2: 100 % O2 Device: None (Room air)       Activity: Independent.   Diet: Low fiber.  Pain: Patient complaining of pain 7-9. Ketamine infusion at 5mg/hr. Given IV dilaudid last at 0700 and oral dilaudid last at 0430.   Neuro: Drowsy. Oriented x4. CMS intact, except dependnet edema mild.   Cardiac: WDL.   Respiratory: Lung sounds clear. Room air. Denies SOB.  Skin: Midline incision covered, no drainage. Stoma pink and intact.   GI/: Voiding spontaneously. Ileostomy, empties independently.   Lines/inf: R PIV infusing TKO and ketamine infusion.

## 2023-01-09 LAB
PATH REPORT.COMMENTS IMP SPEC: NORMAL
PATH REPORT.COMMENTS IMP SPEC: NORMAL
PATH REPORT.FINAL DX SPEC: NORMAL
PATH REPORT.GROSS SPEC: NORMAL
PATH REPORT.MICROSCOPIC SPEC OTHER STN: NORMAL
PATH REPORT.RELEVANT HX SPEC: NORMAL
PHOTO IMAGE: NORMAL

## 2023-01-09 PROCEDURE — 250N000009 HC RX 250: Performed by: STUDENT IN AN ORGANIZED HEALTH CARE EDUCATION/TRAINING PROGRAM

## 2023-01-09 PROCEDURE — 120N000002 HC R&B MED SURG/OB UMMC

## 2023-01-09 PROCEDURE — 250N000011 HC RX IP 250 OP 636: Performed by: PHYSICIAN ASSISTANT

## 2023-01-09 PROCEDURE — 250N000013 HC RX MED GY IP 250 OP 250 PS 637: Performed by: STUDENT IN AN ORGANIZED HEALTH CARE EDUCATION/TRAINING PROGRAM

## 2023-01-09 PROCEDURE — 250N000013 HC RX MED GY IP 250 OP 250 PS 637

## 2023-01-09 PROCEDURE — 99233 SBSQ HOSP IP/OBS HIGH 50: CPT | Performed by: NURSE PRACTITIONER

## 2023-01-09 PROCEDURE — 250N000011 HC RX IP 250 OP 636

## 2023-01-09 PROCEDURE — 258N000003 HC RX IP 258 OP 636: Performed by: STUDENT IN AN ORGANIZED HEALTH CARE EDUCATION/TRAINING PROGRAM

## 2023-01-09 PROCEDURE — 250N000013 HC RX MED GY IP 250 OP 250 PS 637: Performed by: SURGERY

## 2023-01-09 PROCEDURE — 999N000248 HC STATISTIC IV INSERT WITH US BY RN

## 2023-01-09 RX ORDER — METHOCARBAMOL 500 MG/1
1000 TABLET, FILM COATED ORAL 4 TIMES DAILY
Status: DISCONTINUED | OUTPATIENT
Start: 2023-01-09 | End: 2023-01-11 | Stop reason: HOSPADM

## 2023-01-09 RX ORDER — HYDROMORPHONE HYDROCHLORIDE 1 MG/ML
0.3 INJECTION, SOLUTION INTRAMUSCULAR; INTRAVENOUS; SUBCUTANEOUS EVERY 4 HOURS PRN
Status: DISCONTINUED | OUTPATIENT
Start: 2023-01-09 | End: 2023-01-10

## 2023-01-09 RX ADMIN — HYDROMORPHONE HYDROCHLORIDE 0.3 MG: 1 INJECTION, SOLUTION INTRAMUSCULAR; INTRAVENOUS; SUBCUTANEOUS at 11:43

## 2023-01-09 RX ADMIN — HYDROMORPHONE HYDROCHLORIDE 2 MG: 2 TABLET ORAL at 10:40

## 2023-01-09 RX ADMIN — ENOXAPARIN SODIUM 40 MG: 40 INJECTION SUBCUTANEOUS at 08:09

## 2023-01-09 RX ADMIN — GABAPENTIN 300 MG: 300 CAPSULE ORAL at 08:01

## 2023-01-09 RX ADMIN — GABAPENTIN 300 MG: 300 CAPSULE ORAL at 15:20

## 2023-01-09 RX ADMIN — HYDROMORPHONE HYDROCHLORIDE 6 MG: 2 TABLET ORAL at 02:31

## 2023-01-09 RX ADMIN — ACETAMINOPHEN 975 MG: 325 TABLET, FILM COATED ORAL at 15:18

## 2023-01-09 RX ADMIN — SERTRALINE HYDROCHLORIDE 50 MG: 50 TABLET, FILM COATED ORAL at 20:24

## 2023-01-09 RX ADMIN — HYDROMORPHONE HYDROCHLORIDE 0.3 MG: 1 INJECTION, SOLUTION INTRAMUSCULAR; INTRAVENOUS; SUBCUTANEOUS at 06:41

## 2023-01-09 RX ADMIN — ACETAMINOPHEN 1000 MG: 325 TABLET, FILM COATED ORAL at 02:31

## 2023-01-09 RX ADMIN — METHOCARBAMOL 1000 MG: 500 TABLET ORAL at 11:43

## 2023-01-09 RX ADMIN — METHOCARBAMOL 750 MG: 750 TABLET, FILM COATED ORAL at 08:01

## 2023-01-09 RX ADMIN — HYDROMORPHONE HYDROCHLORIDE 6 MG: 2 TABLET ORAL at 21:12

## 2023-01-09 RX ADMIN — HYDROCORTISONE: 0.5 CREAM TOPICAL at 09:05

## 2023-01-09 RX ADMIN — HYDROMORPHONE HYDROCHLORIDE 0.3 MG: 1 INJECTION, SOLUTION INTRAMUSCULAR; INTRAVENOUS; SUBCUTANEOUS at 20:27

## 2023-01-09 RX ADMIN — KETAMINE HYDROCHLORIDE 7.5 MG/HR: 100 INJECTION, SOLUTION, CONCENTRATE INTRAMUSCULAR; INTRAVENOUS at 06:47

## 2023-01-09 RX ADMIN — GABAPENTIN 300 MG: 300 CAPSULE ORAL at 20:24

## 2023-01-09 RX ADMIN — HYDROMORPHONE HYDROCHLORIDE 0.3 MG: 1 INJECTION, SOLUTION INTRAMUSCULAR; INTRAVENOUS; SUBCUTANEOUS at 23:57

## 2023-01-09 RX ADMIN — METHOCARBAMOL 1000 MG: 500 TABLET ORAL at 15:54

## 2023-01-09 RX ADMIN — MORPHINE SULFATE 30 MG: 15 TABLET, EXTENDED RELEASE ORAL at 20:24

## 2023-01-09 RX ADMIN — POLYETHYLENE GLYCOL 3350 17 G: 17 POWDER, FOR SOLUTION ORAL at 20:40

## 2023-01-09 RX ADMIN — MORPHINE SULFATE 30 MG: 15 TABLET, EXTENDED RELEASE ORAL at 08:01

## 2023-01-09 RX ADMIN — METHOCARBAMOL 1000 MG: 500 TABLET ORAL at 20:24

## 2023-01-09 RX ADMIN — HYDROMORPHONE HYDROCHLORIDE 6 MG: 2 TABLET ORAL at 14:07

## 2023-01-09 RX ADMIN — HYDROMORPHONE HYDROCHLORIDE 6 MG: 2 TABLET ORAL at 17:15

## 2023-01-09 RX ADMIN — HYDROMORPHONE HYDROCHLORIDE 0.3 MG: 1 INJECTION, SOLUTION INTRAMUSCULAR; INTRAVENOUS; SUBCUTANEOUS at 15:53

## 2023-01-09 RX ADMIN — HYDROMORPHONE HYDROCHLORIDE 0.3 MG: 1 INJECTION, SOLUTION INTRAMUSCULAR; INTRAVENOUS; SUBCUTANEOUS at 03:19

## 2023-01-09 RX ADMIN — HYDROMORPHONE HYDROCHLORIDE 4 MG: 2 TABLET ORAL at 10:35

## 2023-01-09 RX ADMIN — HYDROMORPHONE HYDROCHLORIDE 0.3 MG: 1 INJECTION, SOLUTION INTRAMUSCULAR; INTRAVENOUS; SUBCUTANEOUS at 09:05

## 2023-01-09 RX ADMIN — ACETAMINOPHEN 1000 MG: 325 TABLET, FILM COATED ORAL at 09:05

## 2023-01-09 ASSESSMENT — ACTIVITIES OF DAILY LIVING (ADL)
ADLS_ACUITY_SCORE: 22

## 2023-01-09 NOTE — PROGRESS NOTES
Colorectal Surgery Progress Note  01/09/2023       Subjective:  NAEON. Up and walking to ice machine when we saw her. Reports feels like pain is worse today. Deep pain in her abdomen. Nothing makes it better. IV medications make it transiently better. Eating and drinking. Good ostomy output.      Objective:  Temp:  [96.4  F (35.8  C)-96.7  F (35.9  C)] 96.4  F (35.8  C)  Pulse:  [67-74] 74  Resp:  [18] 18  BP: (111-118)/(61-63) 111/63  SpO2:  [97 %-100 %] 100 %    I/O last 3 completed shifts:  In: -   Out: 150 [Stool:150]      Gen: Awake, alert, NAD  CVS: RRR  Resp: NLB on RA  Abd: soft, nondistended, appropriately tender, stoma healthy appearing with robust output in bag  Incision: c/d/I staples in place   Ext: WWP, no edema     Labs:  Recent Labs   Lab 01/07/23  0757 01/05/23  0559 01/04/23 1937   WBC  --  15.2*  --    HGB  --  11.9  --     244 238       Recent Labs   Lab 01/07/23  0855 01/06/23  1311 01/06/23  0735 01/05/23  2306 01/05/23  1624 01/05/23  0559 01/05/23  0214 01/04/23 1937   NA  --   --   --   --   --  137  --   --    POTASSIUM  --   --   --   --   --  3.9  --   --    CHLORIDE  --   --   --   --   --  106  --   --    CO2  --   --   --   --   --  20*  --   --    BUN  --   --   --   --   --  9.3  --   --    CR  --   --   --   --   --  0.58  --  0.56   * 104*  --  128*   < > 121*   < >  --    MANNY  --   --   --   --   --  8.2*  --   --    MAG  --   --  2.1  --   --  1.6*  --   --     < > = values in this interval not displayed.       Imaging:  No results found for this or any previous visit (from the past 24 hour(s)).       Assessment/Plan:   This is a 37 year old female PMH endometriosis, chronic pain on opiates, hysterectomy and ovarian cystectomy 8/2021 c/b rectal injury identified 2 days after index operation with pelvic sepsis requiring ex lap & end colostomy, subsequently underwent colostomy reversal w/ colorectal anastomosis & diverting loop ileostomy in 4/2022 c/b colorectal  anastomotic stricture not amenable to endoscopic dilation.  Now s/p ex lap, ESTELITA >3 hours, lower anterior resection of prior colorectal anastomosis, redo colorectal anastomosis, appendectomy, flex sig, complex abdominal closure on 1/4.     Neuro/Pain: Continue ketamine, robaxin, gabapentin, tylenol.  Continue home MS contin.  PO and IV dilaudid. pain team consulted for management and weaning of IV pain medicine. Patient needs to wean today.   - home sertraline  CV: HD appropriate  PULM: encourage IS.  GI/FEN:   - LRD  - WOCN  - 4x4 over wounds change daily and PRN    : Replete electrolytes prn  Heme: No acute bleed  ID: afebrile, without s/s of infection  Endocrine: euglycemic  Activity: as tolerated.  Ppx: lovenox  ppx  Dispo: Pending pain control. Otherwise medically ready for discharge.  No lovenox ppx injections needed at time of discharge.         Seen with fellow and discussed with staff.     Azalia Sen MD  General Surgery, PGY-3

## 2023-01-09 NOTE — PLAN OF CARE
Time: 5084-0275  Temp: (!) 96.4  F (35.8  C) Temp src: Oral BP: 111/63 Pulse: 74   Resp: 18 SpO2: 100 % O2 Device: None (Room air)       Activity: Independent.   Diet: Low fiber.  Pain: Patient complaining of pain 7-9. Ketamine infusion at 7.5mg/hr. Given IV dilaudid last at 0640 , given oral dilaudid last 0230.  Neuro: Alert, oriented x4. CMS intact, except dependent edema mild.   Cardiac: WDL.   Respiratory: Lung sounds clear. Room air. Denies SOB.  Skin: Midline incision covered, no drainage. Stoma pink and intact.   GI/: Voiding spontaneously. Ileostomy, empties independently.   Lines/inf: R PIV infusing TKO and ketamine infusion.   Plan: Decrease IV dilaudid frequency tomorrow, 1/9.

## 2023-01-09 NOTE — PROGRESS NOTES
"Pain Service Progress Note  [facility]  Date: 01/09/2023       Patient Name: Vicki Shell  MRN: [id number]  Age: 37 year old  Sex: female      Assessment:  37 year old female who has PMH of endometriosis, chronic pain on opiates, s/p ex lap + lower anterior resection of prior colorectal anastomosis, redo colorectal anastomosis, appendectomy, flex sig, complex abdominal closure on 1/4/23.    Acute postop pain    Chronic pain/opioid tolerance: PTA prescribed by PCP: MS Contin 30 mg BID and Hydrocodone/acetaminophen 5-325 mg up to 4 tabs per day.        Plan/Recommendations:  1. Change Robaxin to 1,000 mg PO Q 6 hrs, change to PRN at discharge    2. Decrease ketamine drip from 7.5 mg to 5 mg/hr today, then off tomorrow AM per primary service request    3. Decrease Dilaudid 0.3 mg IV Q 4 hr PRN pain today, them space out doses to Q 8 hr tomorrow, then off. Oral is the preferred route so IV use should be reserved only for pain uncontrolled with oral analgesia.     4. Continue Dilaudid 4-6 mg PO Q 3 hr PRN pain. As postop pain improves, taper by spacing out doses every 1-3 days. Taper to Q 4 hr PRN at discharge, then Q 6 hr PRN, then Q 8 hr PRN, then return to baseline hydrocodone/acetaminophen 5-325 mg up to 4 times per day PRN as prescribed by her PCP    5. Continue MS Contin 30 mg PO Q 12 hrs. She has supply at home so it should not be ordered at discharge    6. Continue Neurontin 300 mg PO TID for a few weeks after discharge, on week three she should taper one dose 2-3 days until off.    7. Continue Tylenol 1,000 mg PO Q 6 hrs    8. Continue topical lidocaine and menthol patches PRN    9. Bowel regimen per primary service      Pain Service will follow up tomorrow, then likely sign off    Discussed with attending anesthesiologist    Please Page the Pain Team Via Amcom: \"PAIN MANAGEMENT ACUTE INPATIENT/ [facility]\"    Mari Ruiz, CHANCE CNP  01/09/2023     Overnight Events: " "None    Tubes/Drains: No    Subjective:  My pain got a little worse.  Reports 6-7/10 intermittent sharp abdominal pain, and constant burning pain deep inside right greater than left abdomen. Has ambulated in hallway. Tolerates most walks, but notices increased pain when she walks further. Using abdominal binder when OOB. Passing stool through ileostomy, consistency from liquid to paste, and this morning more like applesauce consistency.  Voiding without difficulty. Tolerating low fiber diet and drinking liquids.     We discussed current multimodal analgesic nonopioid and opioid therapy plan with goal to taper postop with healing until she is back to taking baseline pain medications. Vicki states she has enough opioid analgesic medications to take at home once she tapers off meds for postop pain, and will follow up with her PCP for further prescribing..    Diet: Low Fiber Diet  Diet    Relevant Labs:  Recent Labs   Lab Test 01/07/23  0757 01/05/23  0559 05/07/22  0907 05/06/22  0715 04/26/22  1017 02/16/22  0952   INR  --   --   --  1.09  --  1.02    244   < >  --    < > 200   PTT  --   --   --   --   --  30   BUN  --  9.3  --   --    < > 10    < > = values in this interval not displayed.       Physical Exam:  Vitals: /60 (BP Location: Left arm)   Pulse 77   Temp 98.2  F (36.8  C) (Oral)   Resp 18   Ht 1.702 m (5' 7\")   Wt 78.6 kg (173 lb 4.5 oz)   LMP  (LMP Unknown)   SpO2 98%   BMI 27.14 kg/m      Physical Exam:   CONSTITUTIONAL/GENERAL APPEARANCE:  Awake, NAD, well groomed  EYES: EOMI, sclera anicteric  ENT/NECK: atraumatic, lips and oral mucous membranes dry  RESPIRATORY: non-labored breathing on room air. No cough, wheeze  ABDOMEN: soft, slightly distended, ABD pad over midline incision. Stoma pink, brown liquid/soft stool output in bag  MUSCULOSKELETAL/BACK/SPINE/EXTREMITIES: Moves all extremities purposefully.  No edema or obvious joint deformities  NEURO:  Alert oriented x 4. Answers " questions appropriately  PSYCHIATRIC/BEHAVIORAL/OBSERVATIONS:  No objective signs of pain observed during our interview.  Pleasant, cooperative. Judgement and insight intact.      Relevant Medications:  Current Pain Medications:  Medications related to Pain Management (From now, onward)    Start     Dose/Rate Route Frequency Ordered Stop    01/08/23 1637  polyethylene glycol (MIRALAX) Packet 17 g         17 g Oral DAILY PRN 01/08/23 1637      01/08/23 1637  senna-docusate (SENOKOT-S/PERICOLACE) 8.6-50 MG per tablet 1 tablet         1 tablet Oral 2 TIMES DAILY PRN 01/08/23 1637      01/08/23 1400  gabapentin (NEURONTIN) capsule 300 mg         300 mg Oral 3 TIMES DAILY 01/08/23 0946      01/07/23 0936  HYDROmorphone (DILAUDID) tablet 4-6 mg         4-6 mg Oral EVERY 3 HOURS PRN 01/07/23 0938      01/07/23 0858  HYDROmorphone (PF) (DILAUDID) injection 0.3 mg         0.3 mg Intravenous EVERY 2 HOURS PRN 01/07/23 0901      01/04/23 2000  methocarbamol (ROBAXIN) tablet 750 mg         750 mg Oral 4 TIMES DAILY 01/04/23 1806      01/04/23 1900  morphine (MS CONTIN) 12 hr tablet 30 mg        Note to Pharmacy: PTA Sig:Take 30 mg by mouth every 12 hours      30 mg Oral EVERY 12 HOURS 01/04/23 1806      01/04/23 1830  acetaminophen (TYLENOL) tablet 1,000 mg         1,000 mg Oral EVERY 6 HOURS 01/04/23 1805      01/04/23 1806  hydrOXYzine (ATARAX) tablet 25 mg        Note to Pharmacy: PTA Sig:Take 1 tablet (25 mg) by mouth every 8 hours as needed for itching or anxiety      25 mg Oral EVERY 8 HOURS PRN 01/04/23 1806      01/04/23 1806  lidocaine 1 % 0.1-1 mL         0.1-1 mL Other EVERY 1 HOUR PRN 01/04/23 1806      01/04/23 1806  lidocaine (LMX4) cream          Topical EVERY 1 HOUR PRN 01/04/23 1806      01/04/23 1430  ketamine (KETALAR) 2 mg/mL in sodium chloride 0.9 % 50 mL ANALGESIA infusion         7.5 mg/hr  3.8 mL/hr  Intravenous CONTINUOUS 01/04/23 1422      01/04/23 0556  lidocaine 1 % 0.1-1 mL         0.1-1 mL Other  EVERY 1 HOUR PRN 01/04/23 0556      01/04/23 0556  lidocaine (LMX4) cream          Topical EVERY 1 HOUR PRN 01/04/23 0556            Primary Service Contacted with Recommendations? Yes      Please see A&P for additional details of medical decision making.

## 2023-01-09 NOTE — PROGRESS NOTES
Vitals: Temp: 98.2  F (36.8  C) Temp src: Oral BP: 113/60 Pulse: 77   Resp: 18 SpO2: 98 % O2 Device: None (Room air)    Neuro: Alert & oriented x4, calls appropriately.   Cardiac:WDL, denies cardiac chest pain.  Respiratory: on RA, satting >95. Denies SOB.  GI/: Voiding spontaneously. Adequate output to ileostomy.   Activity: Independent.   Diet/Nausea: Low fiber, tolerating. Denies nausea.  Pain: Constant abdomen pain managed with Ketamin gtt @ 5mg/hr. PRN po dilaudid q3h, and PRN IV dilaudid q4h, scheduled Robaxin, Tylenol and gapabentin.   Skin: Midline incision, stapled, dressing changed.   LDA: PIV infusing TKO with Ketamine.   Plan: Continue POC

## 2023-01-09 NOTE — PLAN OF CARE
"Goal Outcome Evaluation:    Plan of Care Reviewed With: patient  /63 (BP Location: Right arm)   Pulse 74   Temp (!) 96.4  F (35.8  C) (Oral)   Resp 18   Ht 1.702 m (5' 7\")   Wt 78.6 kg (173 lb 4.5 oz)   LMP  (LMP Unknown)   SpO2 100%   BMI 27.14 kg/m       NEURO: A&O x4. Calls frequently for meds.   RESPIRATORY: WDL, sating adequately on RA. Denies SOB.   CARDIAC: WDL, denies chest pain.   GI/: Voiding spontaneously without difficulty, not saving. +BS, adequate output via ileostomy.  DIET: Low fiber diet, tolerating.   PAIN: Ketamine gtt @ 7.5mg/hr. PRN po dilaudid q3h and PRN IV dilaudid q2h.   SKIN: No new deficits noted.   INCISION/DRAINS/IV ACCESS: L PIV x2, one SL, one infusing tko and ketamine gtt @7.5mg/hr. Midline incision, dressing CDI. Ileostomy in place with adequate output.   ACTIVITY: Independent.   PLAN: Continue POC.     "

## 2023-01-10 LAB
HOLD SPECIMEN: NORMAL
PLATELET # BLD AUTO: 206 10E3/UL (ref 150–450)

## 2023-01-10 PROCEDURE — 120N000002 HC R&B MED SURG/OB UMMC

## 2023-01-10 PROCEDURE — 99233 SBSQ HOSP IP/OBS HIGH 50: CPT | Performed by: PHYSICIAN ASSISTANT

## 2023-01-10 PROCEDURE — 250N000011 HC RX IP 250 OP 636

## 2023-01-10 PROCEDURE — 36415 COLL VENOUS BLD VENIPUNCTURE: CPT | Performed by: SURGERY

## 2023-01-10 PROCEDURE — 250N000011 HC RX IP 250 OP 636: Performed by: PHYSICIAN ASSISTANT

## 2023-01-10 PROCEDURE — 250N000013 HC RX MED GY IP 250 OP 250 PS 637: Performed by: SURGERY

## 2023-01-10 PROCEDURE — 85049 AUTOMATED PLATELET COUNT: CPT | Performed by: SURGERY

## 2023-01-10 PROCEDURE — 250N000009 HC RX 250

## 2023-01-10 PROCEDURE — 250N000013 HC RX MED GY IP 250 OP 250 PS 637: Performed by: STUDENT IN AN ORGANIZED HEALTH CARE EDUCATION/TRAINING PROGRAM

## 2023-01-10 PROCEDURE — 250N000013 HC RX MED GY IP 250 OP 250 PS 637

## 2023-01-10 PROCEDURE — 258N000003 HC RX IP 258 OP 636

## 2023-01-10 RX ORDER — HYDROMORPHONE HYDROCHLORIDE 1 MG/ML
0.3 INJECTION, SOLUTION INTRAMUSCULAR; INTRAVENOUS; SUBCUTANEOUS
Status: COMPLETED | OUTPATIENT
Start: 2023-01-10 | End: 2023-01-10

## 2023-01-10 RX ORDER — HYDROMORPHONE HYDROCHLORIDE 2 MG/1
2-4 TABLET ORAL EVERY 6 HOURS PRN
Qty: 20 TABLET | Refills: 0 | Status: SHIPPED | OUTPATIENT
Start: 2023-01-15 | End: 2023-01-11

## 2023-01-10 RX ORDER — POLYETHYLENE GLYCOL 3350 17 G/17G
17 POWDER, FOR SOLUTION ORAL DAILY
Qty: 510 G | Refills: 0 | Status: ON HOLD | OUTPATIENT
Start: 2023-01-10 | End: 2023-03-10

## 2023-01-10 RX ORDER — AMOXICILLIN 250 MG
1 CAPSULE ORAL 2 TIMES DAILY PRN
Qty: 60 TABLET | Refills: 0 | Status: ON HOLD | OUTPATIENT
Start: 2023-01-10 | End: 2023-03-10

## 2023-01-10 RX ORDER — HYDROCODONE BITARTRATE AND ACETAMINOPHEN 5; 325 MG/1; MG/1
1 TABLET ORAL EVERY 6 HOURS PRN
Refills: 0 | Status: ON HOLD | COMMUNITY
Start: 2023-01-16 | End: 2023-03-24

## 2023-01-10 RX ORDER — METHOCARBAMOL 1000 MG/1
1000 TABLET, FILM COATED ORAL 4 TIMES DAILY
Qty: 50 TABLET | Refills: 0 | Status: ON HOLD | OUTPATIENT
Start: 2023-01-10 | End: 2023-03-10

## 2023-01-10 RX ORDER — ACETAMINOPHEN 500 MG
1000 TABLET ORAL EVERY 6 HOURS
Qty: 80 TABLET | Refills: 0 | Status: ON HOLD | OUTPATIENT
Start: 2023-01-10 | End: 2023-03-24

## 2023-01-10 RX ORDER — HYDROMORPHONE HYDROCHLORIDE 1 MG/ML
0.3 INJECTION, SOLUTION INTRAMUSCULAR; INTRAVENOUS; SUBCUTANEOUS EVERY 4 HOURS PRN
Status: DISCONTINUED | OUTPATIENT
Start: 2023-01-10 | End: 2023-01-10

## 2023-01-10 RX ORDER — HYDROMORPHONE HYDROCHLORIDE 4 MG/1
4-6 TABLET ORAL EVERY 4 HOURS PRN
Qty: 30 TABLET | Refills: 0 | Status: SHIPPED | OUTPATIENT
Start: 2023-01-10 | End: 2023-01-11

## 2023-01-10 RX ORDER — GABAPENTIN 300 MG/1
300 CAPSULE ORAL 3 TIMES DAILY
Qty: 40 CAPSULE | Refills: 0 | Status: ON HOLD | OUTPATIENT
Start: 2023-01-10 | End: 2023-03-10

## 2023-01-10 RX ADMIN — HYDROMORPHONE HYDROCHLORIDE 6 MG: 2 TABLET ORAL at 21:12

## 2023-01-10 RX ADMIN — HYDROMORPHONE HYDROCHLORIDE 0.3 MG: 1 INJECTION, SOLUTION INTRAMUSCULAR; INTRAVENOUS; SUBCUTANEOUS at 23:19

## 2023-01-10 RX ADMIN — ACETAMINOPHEN 975 MG: 325 TABLET, FILM COATED ORAL at 08:48

## 2023-01-10 RX ADMIN — HYDROMORPHONE HYDROCHLORIDE 0.3 MG: 1 INJECTION, SOLUTION INTRAMUSCULAR; INTRAVENOUS; SUBCUTANEOUS at 03:19

## 2023-01-10 RX ADMIN — GABAPENTIN 300 MG: 300 CAPSULE ORAL at 19:52

## 2023-01-10 RX ADMIN — HYDROMORPHONE HYDROCHLORIDE 6 MG: 2 TABLET ORAL at 04:29

## 2023-01-10 RX ADMIN — ACETAMINOPHEN 1000 MG: 325 TABLET, FILM COATED ORAL at 17:00

## 2023-01-10 RX ADMIN — MORPHINE SULFATE 30 MG: 15 TABLET, EXTENDED RELEASE ORAL at 08:47

## 2023-01-10 RX ADMIN — HYDROMORPHONE HYDROCHLORIDE 6 MG: 2 TABLET ORAL at 14:40

## 2023-01-10 RX ADMIN — METHOCARBAMOL 1000 MG: 500 TABLET ORAL at 08:46

## 2023-01-10 RX ADMIN — SERTRALINE HYDROCHLORIDE 50 MG: 50 TABLET, FILM COATED ORAL at 19:52

## 2023-01-10 RX ADMIN — GABAPENTIN 300 MG: 300 CAPSULE ORAL at 08:47

## 2023-01-10 RX ADMIN — HYDROCORTISONE: 0.5 CREAM TOPICAL at 08:47

## 2023-01-10 RX ADMIN — HYDROMORPHONE HYDROCHLORIDE 0.3 MG: 1 INJECTION, SOLUTION INTRAMUSCULAR; INTRAVENOUS; SUBCUTANEOUS at 07:50

## 2023-01-10 RX ADMIN — HYDROMORPHONE HYDROCHLORIDE 6 MG: 2 TABLET ORAL at 17:50

## 2023-01-10 RX ADMIN — MORPHINE SULFATE 30 MG: 15 TABLET, EXTENDED RELEASE ORAL at 19:51

## 2023-01-10 RX ADMIN — ACETAMINOPHEN 1000 MG: 325 TABLET, FILM COATED ORAL at 03:18

## 2023-01-10 RX ADMIN — METHOCARBAMOL 1000 MG: 500 TABLET ORAL at 19:52

## 2023-01-10 RX ADMIN — KETAMINE HYDROCHLORIDE 5 MG/HR: 100 INJECTION, SOLUTION, CONCENTRATE INTRAMUSCULAR; INTRAVENOUS at 02:17

## 2023-01-10 RX ADMIN — ENOXAPARIN SODIUM 40 MG: 40 INJECTION SUBCUTANEOUS at 08:47

## 2023-01-10 RX ADMIN — METHOCARBAMOL 1000 MG: 500 TABLET ORAL at 13:28

## 2023-01-10 RX ADMIN — METHOCARBAMOL 1000 MG: 500 TABLET ORAL at 17:00

## 2023-01-10 RX ADMIN — HYDROMORPHONE HYDROCHLORIDE 6 MG: 2 TABLET ORAL at 10:54

## 2023-01-10 RX ADMIN — HYDROMORPHONE HYDROCHLORIDE 6 MG: 2 TABLET ORAL at 00:26

## 2023-01-10 RX ADMIN — HYDROCORTISONE: 0.5 CREAM TOPICAL at 19:54

## 2023-01-10 RX ADMIN — GABAPENTIN 300 MG: 300 CAPSULE ORAL at 13:29

## 2023-01-10 ASSESSMENT — ACTIVITIES OF DAILY LIVING (ADL)
ADLS_ACUITY_SCORE: 22

## 2023-01-10 NOTE — PROGRESS NOTES
Colorectal Surgery Progress Note  01/10/2023       Subjective:  No acute changes.  Had some pain overnight.  +ostomy output.  Ambulating independently.  Will discuss with pain team.     Objective:  Temp:  [96.2  F (35.7  C)-98.2  F (36.8  C)] 96.2  F (35.7  C)  Pulse:  [60-77] 60  Resp:  [16-18] 18  BP: (105-123)/(58-74) 105/58  SpO2:  [93 %-100 %] 93 %    I/O last 3 completed shifts:  In: 240 [P.O.:240]  Out: -       Gen: Awake, alert, NAD  CVS: RRR  Resp: NLB on RA  Abd: soft, nondistended, appropriately tender, stoma healthy appearing with robust output in bag  Incision: c/d/I staples in place   Ext: WWP, no edema     Labs:  Recent Labs   Lab 01/10/23  0632 01/07/23  0757 01/05/23  0559   WBC  --   --  15.2*   HGB  --   --  11.9    198 244       Recent Labs   Lab 01/07/23  0855 01/06/23  1311 01/06/23  0735 01/05/23  2306 01/05/23  1624 01/05/23  0559 01/05/23  0214 01/04/23  1937   NA  --   --   --   --   --  137  --   --    POTASSIUM  --   --   --   --   --  3.9  --   --    CHLORIDE  --   --   --   --   --  106  --   --    CO2  --   --   --   --   --  20*  --   --    BUN  --   --   --   --   --  9.3  --   --    CR  --   --   --   --   --  0.58  --  0.56   * 104*  --  128*   < > 121*   < >  --    MANNY  --   --   --   --   --  8.2*  --   --    MAG  --   --  2.1  --   --  1.6*  --   --     < > = values in this interval not displayed.       Imaging:  No results found for this or any previous visit (from the past 24 hour(s)).       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  Now s/p ex lap, ESTELITA >3 hours, lower anterior resection  of prior colorectal anastomosis, redo colorectal anastomosis, appendectomy, flex sig, complex abdominal closure on 1/4.     Neuro/Pain: Continue ketamine, robaxin, gabapentin, tylenol.  Continue home MS contin.  PO and IV dilaudid.   - pain team consulted for management and weaning of IV pain medicine. Per pain - ketamine stop and stop IV dilaudid today.   - home sertraline  CV: HD appropriate  PULM: encourage IS.  GI/FEN:   - LRD  - WOCN  - 4x4 over wounds change daily and PRN    : Replete electrolytes prn  Heme: No acute bleed  ID: afebrile, without s/s of infection  Endocrine: euglycemic  Activity: as tolerated.  Ppx: lovenox  ppx  Dispo: Discharge tomorrow with .  No lovenox ppx injections needed at time of discharge.         Seen with fellow and discussed with staff.     Katia Workman PA-C  Colon and Rectal Surgery

## 2023-01-10 NOTE — PROGRESS NOTES
"Pain Service Progress Note  [facility]  Date: 01/10/2023       Patient Name: Vicki Shell  MRN: [id number]  Age: 37 year old  Sex: female      Assessment:  37 year old female who has PMH of endometriosis, chronic pain on opiates, s/p ex lap + lower anterior resection of prior colorectal anastomosis, redo colorectal anastomosis, appendectomy, flex sig, complex abdominal closure on 1/4/23.     Acute postop pain     Chronic pain/opioid tolerance: PTA prescribed by PCP: MS Contin 30 mg BID and Hydrocodone/acetaminophen 5-325 mg up to 4 tabs per day.     Interval history: Vicki states pain is still at the abdomen. It is \"okay.\" Pain overnight due to not getting timely pain medications, but improved when when pain meds around the clock.  She states she wants to go home if pain plan is in place and is cleared to go home.      Discussed needed to discontinue IV pain medications and transitioning completely to oral regimen prior to discharge which she is agreeable.  Pain plan discussed below.    Plan/Recommendations:  1. Stop ketamine infusion at 5mg/hour.  2. Allow IV Dilaudid 0.3mg x 1 dose for remaining of day and then stop.  3. Continue Dilaudid 4-6 mg PO Q 3 hours PRN.  (this is at least 3x her dose at home).   Upon discharge, would recommend sending home on 4-6mg PO Q 4 hours PRN x 1-2 days, then to 4mg Q 4 hours PRN x 1-2 days then to 4mg Q 6 hours  PRN x 1-2 days then to 2mg Q 6 hours PRN x 1-2 days at this point can return to baseline of PTA dose of Vicodin 5/325mg 4x/day PRN.   4. Continue  PTA dose of MS Contin 30mg PO Q 12 hours.  5. Continue Robaxin 1000mg  4x/day.  6. Lidocaine patches  7. Menthol patches.  8. Bowel regimen    Pain Service will sign off.    Discussed with attending anesthesiologist    Please Page the Pain Team Via Amcom: \"PAIN MANAGEMENT ACUTE INPATIENT/ [facility]\"    Dyan Griffiths PA-C  01/10/2023           Diet: Low Fiber Diet  Diet    Relevant " "Labs:  Recent Labs   Lab Test 01/10/23  0632 01/07/23  0757 01/05/23  0559 05/07/22  0907 05/06/22  0715 04/26/22  1017 02/16/22  0952   INR  --   --   --   --  1.09  --  1.02      < > 244   < >  --    < > 200   PTT  --   --   --   --   --   --  30   BUN  --   --  9.3  --   --    < > 10    < > = values in this interval not displayed.       Physical Exam:  Vitals: /58 (BP Location: Right arm)   Pulse 60   Temp (!) 96.2  F (35.7  C) (Oral)   Resp 18   Ht 1.702 m (5' 7\")   Wt 78.6 kg (173 lb 4.5 oz)   LMP  (LMP Unknown)   SpO2 93%   BMI 27.14 kg/m      Physical Exam:   CONSTITUTIONAL/GENERAL APPEARANCE: Conversant.  EYES: EOMI, sclerae clear  ENT/NECK: neck is supple  RESPIRATORY:non labored breathing, on room air  CARDIOVASCULAR: HR within normal limits  GI:soft, mildly tender, ostomy bag intact with yellow output, midline incision-c,d,i.  MUSCULOSKELETAL/BACK/SPINE/EXTREMITIES: Moving UE and LE independently.     NEURO:  AAOx3.   SKIN/VASCULAR EXAM:  Dry and warm.  PSYCHIATRIC/BEHAVIORAL/OBSERVATIONS:  No objective signs of pain observed during our interview.   Judgment/Insight -fair   Orientation - x3   Memory -fair   Mood and affect - calm, pleasant, cooperative          Relevant Medications:  Current Pain Medications:  Medications related to Pain Management (From now, onward)    Start     Dose/Rate Route Frequency Ordered Stop    01/10/23 0948  HYDROmorphone (PF) (DILAUDID) injection 0.3 mg         0.3 mg Intravenous EVERY 4 HOURS PRN 01/10/23 0948      01/09/23 1200  methocarbamol (ROBAXIN) tablet 1,000 mg         1,000 mg Oral 4 TIMES DAILY 01/09/23 1050      01/08/23 1637  polyethylene glycol (MIRALAX) Packet 17 g         17 g Oral DAILY PRN 01/08/23 1637      01/08/23 1637  senna-docusate (SENOKOT-S/PERICOLACE) 8.6-50 MG per tablet 1 tablet         1 tablet Oral 2 TIMES DAILY PRN 01/08/23 1637      01/08/23 1400  gabapentin (NEURONTIN) capsule 300 mg         300 mg Oral 3 TIMES DAILY " 01/08/23 0946      01/07/23 0936  HYDROmorphone (DILAUDID) tablet 4-6 mg         4-6 mg Oral EVERY 3 HOURS PRN 01/07/23 0938      01/04/23 1900  morphine (MS CONTIN) 12 hr tablet 30 mg        Note to Pharmacy: PTA Sig:Take 30 mg by mouth every 12 hours      30 mg Oral EVERY 12 HOURS 01/04/23 1806      01/04/23 1830  acetaminophen (TYLENOL) tablet 1,000 mg         1,000 mg Oral EVERY 6 HOURS 01/04/23 1805      01/04/23 1806  hydrOXYzine (ATARAX) tablet 25 mg        Note to Pharmacy: PTA Sig:Take 1 tablet (25 mg) by mouth every 8 hours as needed for itching or anxiety      25 mg Oral EVERY 8 HOURS PRN 01/04/23 1806      01/04/23 1806  lidocaine 1 % 0.1-1 mL         0.1-1 mL Other EVERY 1 HOUR PRN 01/04/23 1806      01/04/23 1806  lidocaine (LMX4) cream          Topical EVERY 1 HOUR PRN 01/04/23 1806      01/04/23 0556  lidocaine 1 % 0.1-1 mL         0.1-1 mL Other EVERY 1 HOUR PRN 01/04/23 0556      01/04/23 0556  lidocaine (LMX4) cream          Topical EVERY 1 HOUR PRN 01/04/23 0556            Primary Service Contacted with Recommendations? Yes      Reviewed pain medication usage, de-esclating of IV pain meds, coordinating care with primary team, discharge pain medication recommendations

## 2023-01-10 NOTE — PROGRESS NOTES
Vitals: Temp: 98.1  F (36.7  C) Temp src: Oral BP: 108/61 Pulse: 78   Resp: 16 SpO2: 98 % O2 Device: None (Room air)    Neuro: Alert & oriented x4, calls appropriately.   Cardiac:WDL, denies cardiac chest pain.  Respiratory: on RA, satting >95. Denies SOB.  GI/: Voiding spontaneously. Adequate output to ileostomy.   Activity: Independent.   Diet/Nausea: Low fiber, tolerating. Denies nausea.  Pain: managed po dilaudid q3h, and PRN IV dilaudid q4h, scheduled Ms contin, Robaxin, Tylenol, and gapabentin.   Skin: Midline incision, stapled, TAMMI.   LDA: PIV SL  Plan: Continue with POC

## 2023-01-10 NOTE — PROGRESS NOTES
"/74 (BP Location: Right arm)   Pulse 72   Temp 97.2  F (36.2  C) (Oral)   Resp 18   Ht 1.702 m (5' 7\")   Wt 78.6 kg (173 lb 4.5 oz)   LMP  (LMP Unknown)   SpO2 99%   BMI 27.14 kg/m      NEURO: A&O x4. Able to make needs known  RESP WDL Sats >92% on RA. Denies SOB.   CARDIAC: WDL, denies chest pain.   GI/: Voiding spontaneously without difficulty, not saving. +BS, adequate output via ileostomy.  DIET: Low fiber diet, tolerating.   PAIN: Ketamine gtt @ 5mg/hr. PRN po dilaudid q3h and PRN IV dilaudid q4h.   SKIN: No new deficits noted.   INCISION/DRAINS/IV ACCESS: R PIV  infusing tko and ketamine gtt @5mg/hr. Midline incision, dressing CDI. Ileostomy in place with adequate output.   ACTIVITY: Independent.   PLAN: monitor health status and report and changes or concerns to MD. Continue current POC.   "

## 2023-01-10 NOTE — DISCHARGE SUMMARY
[facility]  Discharge Summary  Colon and Rectal Surgery     Vicki Shell MRN# [id number]   YOB: 1985 Age: 37 year old     Date of Admission:  1/4/2023  Date of Discharge::  1/11/2023  Admitting Physician:  Tad Richardson MD  Discharge Physician:  Tad Richardson MD  Primary Care Physician:        Karen Hernandez          Admission Diagnoses:   Colon stricture (H) [K56.699]  Colostomy status (H) [Z93.3]  Anastomotic stricture of colorectal region [K91.30]  Chronic pain on chronic opiates  S/p prior emergent Hartmanns procedure  History of tobacco use          Discharge Diagnosis:   Colon stricture (H) [K56.699]  Colostomy status (H) [Z93.3]  Anastomotic stricture of colorectal region [K91.30]  Chronic pain on chronic opiates  S/p prior emergent Hartmanns procedure  History of tobacco use         Procedures:   1/4/2022:   PROCEDURE:  1. Cystoscopy with stents  2. Exploratory laparotomy  3. Lysis of adhesions, > 3 hours  4. Low anterior resection of colorectal anastomosis  5. Flexible sigmoidoscopy  6. Appendectomy  7. Complex abdominal wall closure  8. Modifier 22 given complexity of anatomy and dense scarring and adhesions.     ANESTHESIA: General endotracheal anesthesia plus local anesthesia.         Consultations:   OCCUPATIONAL THERAPY ADULT IP CONSULT  PHYSICAL THERAPY ADULT IP CONSULT  NURSING TO CONSULT FOR VASCULAR ACCESS CARE IP CONSULT  PAIN MANAGEMENT ADULT IP CONSULT  NURSING TO CONSULT FOR VASCULAR ACCESS CARE IP CONSULT  NURSING TO CONSULT FOR VASCULAR ACCESS CARE IP CONSULT         Imaging Studies:     Results for orders placed or performed during the hospital encounter of 01/04/23   POC US Guidance Needle Placement    Narrative    Bilateral rectus sheath nerve block     Impression    Bilateral rectus sheath block             Medications Prior to Admission:     Medications Prior to Admission   Medication Sig Dispense Refill Last Dose      hydrOXYzine (ATARAX) 25 MG tablet Take 1 tablet (25 mg) by mouth every 8 hours as needed for itching or anxiety 15 tablet 0 1/3/2023     morphine (MS CONTIN) 30 MG CR tablet Take 30 mg by mouth every 12 hours   1/3/2023     multivitamin w/minerals (THERA-VIT-M) tablet Take 1 tablet by mouth daily Takes when she remembers   Past Week     sertraline (ZOLOFT) 50 MG tablet Take 50 mg by mouth every evening   1/3/2023     NONFORMULARY Pro Immune supplement (Selenium)  Takes 1 tablet daily (when she remembers) (Patient not taking: Reported on 1/2/2023)        [DISCONTINUED] acetaminophen (TYLENOL) 500 MG tablet Take 500-1,000 mg by mouth every 6 hours as needed for mild pain   1/4/2023     [DISCONTINUED] HYDROcodone-acetaminophen (NORCO) 5-325 MG tablet Take 1 tablet by mouth every 6 hours as needed   1/4/2023            Discharge Medications:     Current Discharge Medication List      START taking these medications    Details   gabapentin (NEURONTIN) 300 MG capsule Take 1 capsule (300 mg) by mouth 3 times daily  Qty: 40 capsule, Refills: 0    Associated Diagnoses: Acute post-operative pain      !! HYDROmorphone (DILAUDID) 2 MG tablet Take 1-2 tablets (2-4 mg) by mouth every 6 hours as needed for pain or moderate to severe pain Dilaudid 2-4mg every 6 hr prn x1-2 days, then to dilaudid 2mg every 6hr prn x1-2 days then back to home pain meds.  Qty: 20 tablet, Refills: 0    Associated Diagnoses: Acute post-operative pain      !! HYDROmorphone (DILAUDID) 4 MG tablet Take 1-1.5 tablets (4-6 mg) by mouth every 4 hours as needed for moderate to severe pain Dilaudid 4-6mg q4hrs prn 1-2 days, then 4mg q4hr prn 1-2 days, then 4mg q6hr prn x1-2 days, then 2mg q6hr prn 1-2 days, then back to home regimen.  Qty: 30 tablet, Refills: 0    Associated Diagnoses: Acute post-operative pain      menthol (ICY HOT) 5 % PTCH Apply 1 patch topically every 8 hours as needed for muscle soreness  Qty: 5 patch, Refills: 0    Associated  Diagnoses: Acute post-operative pain      methocarbamol 1000 MG TABS Take 1,000 mg by mouth 4 times daily  Qty: 50 tablet, Refills: 0    Associated Diagnoses: Acute post-operative pain      polyethylene glycol (MIRALAX) 17 GM/Dose powder Take 17 g by mouth daily  Qty: 510 g, Refills: 0    Associated Diagnoses: Acute post-operative pain      senna-docusate (SENOKOT-S/PERICOLACE) 8.6-50 MG tablet Take 1 tablet by mouth 2 times daily as needed for constipation  Qty: 60 tablet, Refills: 0    Associated Diagnoses: Acute post-operative pain       !! - Potential duplicate medications found. Please discuss with provider.      CONTINUE these medications which have CHANGED    Details   acetaminophen (TYLENOL) 500 MG tablet Take 2 tablets (1,000 mg) by mouth every 6 hours  Qty: 80 tablet, Refills: 0    Associated Diagnoses: Acute post-operative pain      HYDROcodone-acetaminophen (NORCO) 5-325 MG tablet Take 1 tablet by mouth every 6 hours as needed Resume when you have completed your post-operative course of pain medications  Refills: 0         CONTINUE these medications which have NOT CHANGED    Details   hydrOXYzine (ATARAX) 25 MG tablet Take 1 tablet (25 mg) by mouth every 8 hours as needed for itching or anxiety  Qty: 15 tablet, Refills: 0    Associated Diagnoses: Acute post-operative pain      morphine (MS CONTIN) 30 MG CR tablet Take 30 mg by mouth every 12 hours      multivitamin w/minerals (THERA-VIT-M) tablet Take 1 tablet by mouth daily Takes when she remembers      sertraline (ZOLOFT) 50 MG tablet Take 50 mg by mouth every evening      NONFORMULARY Pro Immune supplement (Selenium)  Takes 1 tablet daily (when she remembers)                   Brief History of Illness:   This is a 37 year old female PMH endometriosis, chronic pain on opiates, hysterectomy and ovarian cystectomy 8/2021 c/b rectal injury identified 2 days after index operation with pelvic sepsis requiring ex lap & end colostomy, subsequently underwent  "colostomy reversal w/ colorectal anastomosis & diverting loop ileostomy in 4/2022 complicated by colorectal anastomotic stricture not amenable to endoscopic dilation.  Now s/p Exploratory laparotomy, Lysis of Adhesions >3 hours, lower anterior resection of prior colorectal anastomosis, redo colorectal anastomosis, appendectomy, flexible sigmoidoscopy, complex abdominal closure on 1/4/2023.           Hospital Course:   Post-operatively pt was gently fluid resuscitated.  Sommer was removed and pt was able to void.  Pt had eventual return of bowel function and was able to tolerate small amounts of a low fiber diet.     Post-operative pain was difficult to control.  Pain team was consulted.  Pain was initially managed with a Ketamine drip and dilaudid PCA and MS Contin along with scheduled tylenol, gabapentin and robaxin.  Ketamine drip was slowly weaned off along with dilaudid PCA.  Pt was transitioned to higher oral dilaudid dose for acute post-operative pain and continued adjunctives listed above.      Pt was seen by WOCN and taught how to manage her new ostomy. Pt was seen by PT/OT and deemed appropriate for discharge home.   Post-operative pain was controlled with scheduled tylenol, gabapentin, robaxin and prn dilaudid as per the Pain team recs.  Pt has been given an oral dilaudid taper.  Once pt completes the acute post-operative oral dilaudid taper, pt can return to her usual home chronic pain regimen.  Post-operative staples were removed prior to discharge.     Patient is to follow up in the Colon and Rectal Surgery Clinic in 2-3 week with Chantal Leahy NP or Sharlene Valencia PA-C and then with Dr. Richardson in 3-4 weeks after.          Day of Discharge Physical Exam:   Blood pressure 108/61, pulse 78, temperature 98.1  F (36.7  C), temperature source Oral, resp. rate 16, height 1.702 m (5' 7\"), weight 78.6 kg (173 lb 4.5 oz), SpO2 98 %, not currently breastfeeding.    Gen: AAOx3, NAD  Pulm: Non-labored " breathing  Abd: Soft, appropriately tender, no guarding/rebound   Incision C/D/I    Stoma pink and viable with stool and gas in bag  Ext:  Warm and well-perfused         Final Pathology Result:   Pending at time of discharge           Discharge Instructions and Follow-Up:     Discharge Procedure Orders   Reason for your hospital stay   Order Comments: 1/4/2022:   PROCEDURE:  1. Cystoscopy with stents  2. Exploratory laparotomy  3. Lysis of adhesions, > 3 hours  4. Low anterior resection of colorectal anastomosis  5. Flexible sigmoidoscopy  6. Appendectomy  7. Complex abdominal wall closure  8. Modifier 22 given complexity of anatomy and dense scarring and adhesions.     ANESTHESIA: General endotracheal anesthesia plus local anesthesia.     Activity   Order Comments: Your activity upon discharge:   ACTIVITY  -No lifting, pushing, pulling greater than 10 lbs and no strenuous exercise for 6 weeks   -Do not insert anything into your anus or rectum for 6 weeks  -No driving while on narcotic analgesics (i.e. Percocet, oxycodone, Vicodin)  -No driving until you are able to fully twist to both sides or slam on brakes quickly and without any pain  -We encourage walking at least 4-5 times per day     Order Specific Question Answer Comments   Is discharge order? Yes      Adult [facility] Follow-up and recommended labs and tests   Order Comments: WOUND CARE  -Inspect your wounds daily for signs of infection (increased redness, drainage, pain)  -Keep your wound clean and dry  -You may shower, but do not soak in tub or pool    NOTIFY  Please contact Anai Durand RN or Deann Lee RN at [phone] for problems after discharge such as:  -Temperature > 101F, chills, rigors, dizziness  -Redness around or purulent drainage from wound  -Inability to tolerate diet, nausea or vomiting  -You stop passing gas, develop significant bloating, abdominal pain  -Have blood in stools/vomit  -Have severe diarrhea/constipation  -Any other  questions or concerns.  - At nights (after 4:30pm), on weekends, or if urgent, call [phone] and ask the  to speak with the on-call Colorectal Surgery resident or fellow      Medication Instructions  Some of your medications may have changed. Please take only prescribed and resumed medications     FOLLOW-UP  1.  You will need to follow-up with Sharlene Valencia PA-C in the Colon and Rectal Surgery clinic on Jan 20th and then with CRS Staff: Dr. Tad Richardson on 3/9/2023.  Please contact our Nurses (phone # [phone]) if you have not heard from our clinic in 3 business days afer discharge to schedule a follow-up appointment.    2.  Follow up with your primary care provider in 1-2 weeks after discharge from the hospital to review this hospitalization.     3.  Per Pain team post-operative pain medications recommendations:   -- dilaudid 4-6mg every 4 hours as needed for 1-2 days, then decrease to   -- dilaudid 4mg every 4 hours as needed for 1-2 days, then decrease to  -- dilaudid 4mg every 6 hours as needed for 1-2 days, then decrease to  -- dilaudid 2mg every 6 hours as needed for 1-2 days, then return back to prior to admission home pain medication regimen.      Reminder that the maximum amount of tylenol/acetaminophen is 4 gram or 4000mg in a 24 hour period.  Do not exceed this.  Stop taking scheduled post-operative tylenol when resume usual home Vicodin - or ensure that you do not exceed daily maximum tylenol when resuming home Vicodin.         Appointments on [place] and/or [facility] (with [facility] or [facility] provider or service). Call [phone] if you haven't heard regarding these appointments within 7 days of discharge.     Diet   Order Comments: Follow this diet upon discharge:   DIET  -Low Residue Diet for at least 4-6 weeks unless cleared by Colorectal surgery.  No raw vegetables, fruit skins, fibrous foods that require a lot of chewing, nuts, seeds, corn, popcorn.   -We recommend eating  slowly, chewing thoroughly, eating small frequent meals throughout the day  -Stay well hydrated.     Order Specific Question Answer Comments   Is discharge order? Yes             Home Health Care:     Not needed           Discharge Disposition:     Discharged to home      Condition at discharge: Good      Katia Workman PA-C ..................1/11/2023   4:23 PM  Colon and Rectal Surgery     Pt was seen and discussed with Dr. Banegas on 1/11/2023    The above plan of care was performed and communicated to me by CRS Staff: Dr. Tad Richardson     I spent 35 minute face-to-face or coordinating care of Vicki Shell. Over 50% of our time on the unit was spent counseling the patient and/or coordinating care as documented in the assessment and plan.

## 2023-01-10 NOTE — PHARMACY
Ketamine 2mg/mL in Sodium Chloride wasted: 27.5 mL  Witness: Minna Peña RN   Reason: med discontinued  Record of waste slip signed and placed in pharmacy bin.

## 2023-01-11 VITALS
HEART RATE: 78 BPM | DIASTOLIC BLOOD PRESSURE: 56 MMHG | TEMPERATURE: 96.3 F | RESPIRATION RATE: 16 BRPM | BODY MASS INDEX: 27.2 KG/M2 | WEIGHT: 173.28 LBS | HEIGHT: 67 IN | SYSTOLIC BLOOD PRESSURE: 106 MMHG | OXYGEN SATURATION: 94 %

## 2023-01-11 PROCEDURE — 250N000013 HC RX MED GY IP 250 OP 250 PS 637: Performed by: SURGERY

## 2023-01-11 PROCEDURE — 250N000013 HC RX MED GY IP 250 OP 250 PS 637: Performed by: PHYSICIAN ASSISTANT

## 2023-01-11 PROCEDURE — 250N000013 HC RX MED GY IP 250 OP 250 PS 637: Performed by: STUDENT IN AN ORGANIZED HEALTH CARE EDUCATION/TRAINING PROGRAM

## 2023-01-11 PROCEDURE — 250N000013 HC RX MED GY IP 250 OP 250 PS 637

## 2023-01-11 PROCEDURE — 250N000011 HC RX IP 250 OP 636: Performed by: PHYSICIAN ASSISTANT

## 2023-01-11 RX ORDER — HYDROMORPHONE HYDROCHLORIDE 4 MG/1
4-6 TABLET ORAL EVERY 4 HOURS PRN
Qty: 30 TABLET | Refills: 0 | Status: SHIPPED | OUTPATIENT
Start: 2023-01-11 | End: 2023-03-06

## 2023-01-11 RX ORDER — HYDROMORPHONE HYDROCHLORIDE 4 MG/1
4 TABLET ORAL EVERY 4 HOURS PRN
Status: DISCONTINUED | OUTPATIENT
Start: 2023-01-11 | End: 2023-01-11

## 2023-01-11 RX ORDER — HYDROMORPHONE HYDROCHLORIDE 2 MG/1
2-4 TABLET ORAL EVERY 6 HOURS PRN
Qty: 20 TABLET | Refills: 0 | Status: SHIPPED | OUTPATIENT
Start: 2023-01-15 | End: 2023-03-06

## 2023-01-11 RX ORDER — HYDROMORPHONE HYDROCHLORIDE 4 MG/1
4 TABLET ORAL
Status: DISCONTINUED | OUTPATIENT
Start: 2023-01-11 | End: 2023-01-11 | Stop reason: HOSPADM

## 2023-01-11 RX ADMIN — HYDROCORTISONE: 0.5 CREAM TOPICAL at 08:36

## 2023-01-11 RX ADMIN — HYDROMORPHONE HYDROCHLORIDE 6 MG: 2 TABLET ORAL at 00:44

## 2023-01-11 RX ADMIN — ACETAMINOPHEN 1000 MG: 325 TABLET, FILM COATED ORAL at 08:37

## 2023-01-11 RX ADMIN — MORPHINE SULFATE 30 MG: 15 TABLET, EXTENDED RELEASE ORAL at 08:37

## 2023-01-11 RX ADMIN — ACETAMINOPHEN 1000 MG: 325 TABLET, FILM COATED ORAL at 03:23

## 2023-01-11 RX ADMIN — METHOCARBAMOL 1000 MG: 500 TABLET ORAL at 12:47

## 2023-01-11 RX ADMIN — GABAPENTIN 300 MG: 300 CAPSULE ORAL at 08:37

## 2023-01-11 RX ADMIN — GABAPENTIN 300 MG: 300 CAPSULE ORAL at 13:18

## 2023-01-11 RX ADMIN — HYDROMORPHONE HYDROCHLORIDE 6 MG: 2 TABLET ORAL at 06:45

## 2023-01-11 RX ADMIN — HYDROMORPHONE HYDROCHLORIDE 6 MG: 4 TABLET ORAL at 13:17

## 2023-01-11 RX ADMIN — ENOXAPARIN SODIUM 40 MG: 40 INJECTION SUBCUTANEOUS at 08:38

## 2023-01-11 RX ADMIN — HYDROMORPHONE HYDROCHLORIDE 6 MG: 4 TABLET ORAL at 16:31

## 2023-01-11 RX ADMIN — HYDROMORPHONE HYDROCHLORIDE 6 MG: 4 TABLET ORAL at 10:11

## 2023-01-11 RX ADMIN — ACETAMINOPHEN 1000 MG: 325 TABLET, FILM COATED ORAL at 16:32

## 2023-01-11 RX ADMIN — HYDROMORPHONE HYDROCHLORIDE 6 MG: 2 TABLET ORAL at 03:23

## 2023-01-11 RX ADMIN — METHOCARBAMOL 1000 MG: 500 TABLET ORAL at 08:37

## 2023-01-11 RX ADMIN — METHOCARBAMOL 1000 MG: 500 TABLET ORAL at 16:32

## 2023-01-11 ASSESSMENT — ACTIVITIES OF DAILY LIVING (ADL)
ADLS_ACUITY_SCORE: 22

## 2023-01-11 NOTE — PLAN OF CARE
"Goal Outcome Evaluation:      Plan of Care Reviewed With: patient      /58 (BP Location: Right arm)   Pulse 67   Temp (!) 96  F (35.6  C) (Oral)   Resp 16   Ht 1.702 m (5' 7\")   Wt 78.6 kg (173 lb 4.5 oz)   LMP  (LMP Unknown)   SpO2 98%   BMI 27.14 kg/m          Neuro: Alert & oriented x4, calls appropriately.   Cardiac:WDL, denies cardiac chest pain.  Respiratory: on RA, satting >95. Denies SOB.  GI/: Voiding spontaneously. Adequate output to ileostomy.   Activity: Independent.   Diet/Nausea: Low fiber, tolerating. Denies nausea.  Pain: managed po dilaudid q3h, scheduled Ms contin, Robaxin, Tylenol, and gapabentin.   Skin: Midline incision, stapled, TAMMI.   LDA: PIV SL  Plan: Continue with POC            "

## 2023-01-11 NOTE — PROGRESS NOTES
Colorectal Surgery Progress Note  01/11/2023       Subjective:  No acute changes.  Baseline pain.  +ostomy output.  Ambulating independently.  Voiding     Objective:  Temp:  [96  F (35.6  C)-99  F (37.2  C)] 96.3  F (35.7  C)  Pulse:  [64-78] 78  Resp:  [16] 16  BP: (103-106)/(56-84) 106/56  SpO2:  [94 %-98 %] 94 %    I/O last 3 completed shifts:  In: 1040 [P.O.:1040]  Out: 350 [Urine:350]      Gen: Awake, alert, NAD  CVS: RRR  Resp: NLB on RA  Abd: soft, nondistended, appropriately tender, stoma healthy appearing with robust output in bag  Incision: c/d/I staples in place   Ext: WWP, no edema     Labs:  Recent Labs   Lab 01/10/23  0632 01/07/23  0757 01/05/23  0559   WBC  --   --  15.2*   HGB  --   --  11.9    198 244       Recent Labs   Lab 01/07/23  0855 01/06/23  1311 01/06/23  0735 01/05/23  2306 01/05/23  1624 01/05/23  0559 01/05/23  0214 01/04/23  1937   NA  --   --   --   --   --  137  --   --    POTASSIUM  --   --   --   --   --  3.9  --   --    CHLORIDE  --   --   --   --   --  106  --   --    CO2  --   --   --   --   --  20*  --   --    BUN  --   --   --   --   --  9.3  --   --    CR  --   --   --   --   --  0.58  --  0.56   * 104*  --  128*   < > 121*   < >  --    MANNY  --   --   --   --   --  8.2*  --   --    MAG  --   --  2.1  --   --  1.6*  --   --     < > = values in this interval not displayed.       Imaging:  No results found for this or any previous visit (from the past 24 hour(s)).       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  Now s/p ex lap, ESTELITA >3 hours, lower anterior resection of prior colorectal  anastomosis, redo colorectal anastomosis, appendectomy, flex sig, complex abdominal closure on 1/4.     Neuro/Pain: Robaxin, gabapentin, tylenol.  Continue home MS contin.  PO dilaudid.   - home sertraline  CV: HD appropriate  PULM: encourage IS  GI/FEN:   - LRD  - WOCN  - 4x4 over wounds change daily and PRN    : Replete electrolytes prn  Heme: No acute bleed  ID: afebrile, without s/s of infection  Endocrine: euglycemic  Activity: as tolerated.  Ppx: lovenox  ppx  Dispo: Discharge today with .  No lovenox ppx injections needed at time of discharge.         Seen with fellow and discussed with staff.     Azalia Sen MD  General Surgery, PGY-3

## 2023-01-11 NOTE — PROGRESS NOTES
"/84 (BP Location: Right arm, Patient Position: Semi-Obrien's, Cuff Size: Adult Regular)   Pulse 64   Temp 99  F (37.2  C) (Oral)   Resp 16   Ht 1.702 m (5' 7\")   Wt 78.6 kg (173 lb 4.5 oz)   LMP  (LMP Unknown)   SpO2 98%   BMI 27.14 kg/m      NEURO: A&O x4. Able to make needs known  RESP WDL Sats >92% on RA. Denies SOB.   CARDIAC: WDL, denies chest pain.   GI/: Voiding spontaneously without difficulty, not saving. +BS, adequate output via ileostomy. Manages Ileostomy independently  DIET: Low fiber diet, tolerating.  PAIN:  PRN po dilaudid q3h and PRN IV dilaudid x1 overnight   SKIN: No new deficits noted.   INCISION/DRAINS/IV ACCESS: R PIV SL. Midline incision TAMMI. Ileostomy in place with adequate output.   ACTIVITY: Independent.   PLAN: monitor health status and report and changes or concerns to MD. Continue current POC.           "

## 2023-01-12 NOTE — PROGRESS NOTES
[facility] DISCHARGE NOTE    Patient discharged home at 6:03 PM via ambulation. Accompanied by staff to get picked up by partner. Discharge instructions reviewed with patient, opportunity offered to ask questions. Prescriptions picked up by patient at discharge pharmacy prior to discharge. All belongings sent with patient.    Beth Woodard RN

## 2023-01-13 ENCOUNTER — PATIENT OUTREACH (OUTPATIENT)
Dept: SURGERY | Facility: CLINIC | Age: 38
End: 2023-01-13

## 2023-01-13 NOTE — PROGRESS NOTES
Colon and Rectal Surgery Postoperative Clinic Note     Referring provider:  No referring provider defined for this encounter.       RE: Vicki Shell  : 1985  NORMA: 2023      Follow up after surgery on 23, s/p exlap redo colorectal anastomosis. Flexible sigmoidoscopy.    Interval history: Doing well, no issues 4 weeks s/p surgery.      Assessment/Plan:  37 year old female 38 yo F 4 weeks s/p redo LAR with colorectal anastomosis for anastomotic stricture, now doing well.  GGE in 2 weeks, followed by ileostomy with flexible sigmoidoscopy.    PLEASE SEE NOTE BELOW FOR PHYSICAL EXAMINATION, REVIEW OF SYSTEMS, AND OTHER HISTORY.    Tad Richardson MD  Division of Colon and Rectal Surgery   [facility]  [id number]    -------------------------------------------------------------------------------------------------------------------    Medical history:  Past Medical History:   Diagnosis Date     Colostomy in place (H)      Cyst of left ovary        Surgical history:  Past Surgical History:   Procedure Laterality Date     COMBINED CYSTOSCOPY, INSERT STENT URETER(S) Bilateral 2022    Procedure: CYSTOSCOPY, WITH URETERAL bilateral STENT INSERTION;  Surgeon: Inocencio Washburn MD;  [facility]     COMBINED CYSTOSCOPY, INSERT STENT URETER(S) Bilateral 2023    Procedure: CYSTOSCOPY, WITH URETERAL STENT INSERTION;  Surgeon: Ian Su MD;  [facility]     ENDOSCOPIC ULTRASOUND LOWER GASTROINTESTIONAL TRACT (GI) N/A 2022    Procedure: ULTRASOUND, LOWER GI TRACT, ENDOSCOPIC;  Surgeon: James Prieto MD;  [facility]     IR SKIN SUBQ/SEROMA ABSCESS DRAIN  2022     LAPAROSCOPIC ASSISTED COLOSTOMY TAKEDOWN N/A 2022    Procedure: CLOSURE, COLOSTOMY, LAPAROSCOPIC, converted to open colostomy reversal with loop ileostomy creation;  Surgeon: Tad Richardson MD;  [facility]     LAPAROSCOPIC ILEOSTOMY N/A 2022    Procedure: CREATION,  ILEOSTOMY, LAPAROSCOPIC converted to open;  Surgeon: Tad Richardson MD;  [facility]     LAPAROSCOPY DIAGNOSTIC (GYN)      for ovarian cyst     LAPAROTOMY EXPLORATORY N/A 2023    Procedure: exploratory laparotomy, lyses of adhesions 3 hours, low anterior resection, flexible sigmoidoscopy, appendectomy, complex abdominal wall closure;  Surgeon: Tad Richardson MD;  [facility]     LOW ANTERIOR BOWEL RESECTION       SIGMOIDOSCOPY FLEXIBLE N/A 2022    Procedure: Sigmoidoscopy flexible;  Surgeon: Tad Richardson MD;  [facility]     SIGMOIDOSCOPY FLEXIBLE N/A 8/3/2022    Procedure: SIGMOIDOSCOPY, FLEXIBLE WITH CONTRAST;  Surgeon: James Prieto MD;  [facility]     SIGMOIDOSCOPY FLEXIBLE N/A 2022    Procedure: esophagastroduodenoscopy,SIGMOIDOSCOPY, FLEXIBLE,possible stent placement, and necoratization;  Surgeon: James Prieto MD;  [facility]     SIGMOIDOSCOPY FLEXIBLE N/A 2023    Procedure: Sigmoidoscopy flexible;  Surgeon: Tad Richardson MD;  [facility]       Problem list:  Patient Active Problem List    Diagnosis Date Noted     Anastomotic stricture of colorectal region 2023     Priority: Medium     Colostomy status (H) 2022     Priority: Medium     Cyst of left ovary 2022     Priority: Medium     Colostomy care (H) 2022     Priority: Medium     Status post emergency  hysterectomy 2022     Priority: Medium       Medications:  Current Outpatient Medications   Medication Sig Dispense Refill     acetaminophen (TYLENOL) 500 MG tablet Take 2 tablets (1,000 mg) by mouth every 6 hours 80 tablet 0     gabapentin (NEURONTIN) 300 MG capsule Take 1 capsule (300 mg) by mouth 3 times daily 40 capsule 0     HYDROcodone-acetaminophen (NORCO) 5-325 MG tablet Take 1 tablet by mouth every 6 hours as needed Resume when you have completed your post-operative course of pain medications  0     HYDROmorphone (DILAUDID) 2 MG tablet  Take 1-2 tablets (2-4 mg) by mouth every 6 hours as needed for pain or moderate to severe pain Dilaudid 2-4mg every 6 hr prn x1-2 days, then to dilaudid 2mg every 6hr prn x1-2 days then back to home pain meds. 20 tablet 0     HYDROmorphone (DILAUDID) 4 MG tablet Take 1-1.5 tablets (4-6 mg) by mouth every 4 hours as needed for moderate to severe pain Dilaudid 4-6mg q4hrs prn 1-2 days, then 4mg q4hr prn 1-2 days, then 4mg q6hr prn x1-2 days, then 2mg q6hr prn 1-2 days, then back to home regimen. 30 tablet 0     hydrOXYzine (ATARAX) 25 MG tablet Take 1 tablet (25 mg) by mouth every 8 hours as needed for itching or anxiety 15 tablet 0     menthol (ICY HOT) 5 % PTCH Apply 1 patch topically every 8 hours as needed for muscle soreness 5 patch 0     methocarbamol 1000 MG TABS Take 1,000 mg by mouth 4 times daily 50 tablet 0     morphine (MS CONTIN) 30 MG CR tablet Take 30 mg by mouth every 12 hours       multivitamin w/minerals (THERA-VIT-M) tablet Take 1 tablet by mouth daily Takes when she remembers       NONFORMULARY Pro Immune supplement (Selenium)  Takes 1 tablet daily (when she remembers) (Patient not taking: Reported on 1/2/2023)       polyethylene glycol (MIRALAX) 17 GM/Dose powder Take 17 g by mouth daily 510 g 0     senna-docusate (SENOKOT-S/PERICOLACE) 8.6-50 MG tablet Take 1 tablet by mouth 2 times daily as needed for constipation 60 tablet 0     sertraline (ZOLOFT) 50 MG tablet Take 50 mg by mouth every evening         Allergies:  No Known Allergies    Family history:  Family History   Problem Relation Age of Onset     Deep Vein Thrombosis (DVT) Father      Anesthesia Reaction No family hx of        Social history:  Social History     Socioeconomic History     Marital status: Single     Spouse name: Not on file     Number of children: Not on file     Years of education: Not on file     Highest education level: Not on file   Occupational History     Not on file   Tobacco Use     Smoking status: Former     Types:  "Cigarettes     Smokeless tobacco: Never     Tobacco comments:     5-7 cigarettes per day   Vaping Use     Vaping Use: Never used   Substance and Sexual Activity     Alcohol use: Not Currently     Drug use: Never     Sexual activity: Yes     Partners: Male   Other Topics Concern     Not on file   Social History Narrative     Not on file     Social Determinants of Health     Financial Resource Strain: Not on file   Food Insecurity: Not on file   Transportation Needs: Not on file   Physical Activity: Not on file   Stress: Not on file   Social Connections: Not on file   Intimate Partner Violence: Not on file   Housing Stability: Not on file       Physical Examination:  /80 (BP Location: Left arm, Patient Position: Sitting, Cuff Size: Adult Regular)   Pulse 90   Ht 1.702 m (5' 7\")   Wt 80.3 kg (177 lb)   LMP  (LMP Unknown)   SpO2 100%   BMI 27.72 kg/m    General: NAD  Abdomen: soft, NTND, ostomy pink and healthy.  Extremities: wwp    Digital rectal examination: Was performed.   Sphincter tone: Good.  Palpable lesions: No.  Prostate: Not assessed.  Other: None..      Procedures:  Prior to the start of the procedure and with procedural staff participation, I verbally confirmed the patient s identity using two indicators, relevant allergies, that the procedure was appropriate and matched the consent or emergent situation, and that the correct equipment/implants were available. Immediately prior to starting the procedure I conducted the Time Out with the procedural staff and re-confirmed the patient s name, procedure, and site/side. (The Joint Commission universal protocol was followed.)  Yes    Sedation (Moderate or Deep): None    Flexible sigmoidoscopy was performed to 25cm. Anastomosis was identified at 12 cm without obvious abnormality, patent and allowed passage of scope. Pictures taken, uploaded into epic.      "

## 2023-01-16 DIAGNOSIS — K91.30 ANASTOMOTIC STRICTURE OF COLORECTAL REGION: Primary | ICD-10-CM

## 2023-01-24 ENCOUNTER — MYC MEDICAL ADVICE (OUTPATIENT)
Dept: SURGERY | Facility: CLINIC | Age: 38
End: 2023-01-24
Payer: MEDICAID

## 2023-02-02 ENCOUNTER — OFFICE VISIT (OUTPATIENT)
Dept: SURGERY | Facility: CLINIC | Age: 38
End: 2023-02-02
Payer: MEDICAID

## 2023-02-02 VITALS
BODY MASS INDEX: 27.78 KG/M2 | OXYGEN SATURATION: 100 % | HEART RATE: 90 BPM | DIASTOLIC BLOOD PRESSURE: 80 MMHG | HEIGHT: 67 IN | SYSTOLIC BLOOD PRESSURE: 117 MMHG | WEIGHT: 177 LBS

## 2023-02-02 DIAGNOSIS — Z93.2 S/P ILEOSTOMY (H): Primary | ICD-10-CM

## 2023-02-02 DIAGNOSIS — K91.30 ANASTOMOTIC STRICTURE OF COLORECTAL REGION: ICD-10-CM

## 2023-02-02 PROCEDURE — 99024 POSTOP FOLLOW-UP VISIT: CPT | Performed by: SURGERY

## 2023-02-02 ASSESSMENT — PAIN SCALES - GENERAL: PAINLEVEL: SEVERE PAIN (7)

## 2023-02-02 NOTE — LETTER
2023       RE: Vicki Shell  [address] [facility] [address]     Dear Colleague,    Thank you for referring your patient, Vicki Shell, to the [facility] [place] at [facility]. Please see a copy of my visit note below.    Colon and Rectal Surgery Postoperative Clinic Note     Referring provider:  No referring provider defined for this encounter.       RE: Vicki Shell  : 1985  NORMA: 2023      Follow up after surgery on 23, s/p exlap redo colorectal anastomosis. Flexible sigmoidoscopy.    Interval history: Doing well, no issues 4 weeks s/p surgery.      Assessment/Plan:  37 year old female 38 yo F 4 weeks s/p redo LAR with colorectal anastomosis for anastomotic stricture, now doing well.  GGE in 2 weeks, followed by ileostomy with flexible sigmoidoscopy.    PLEASE SEE NOTE BELOW FOR PHYSICAL EXAMINATION, REVIEW OF SYSTEMS, AND OTHER HISTORY.    Tad Richardson MD  Division of Colon and Rectal Surgery   [facility]  [id number]    -------------------------------------------------------------------------------------------------------------------    Medical history:  Past Medical History:   Diagnosis Date     Colostomy in place (H)      Cyst of left ovary        Surgical history:  Past Surgical History:   Procedure Laterality Date     COMBINED CYSTOSCOPY, INSERT STENT URETER(S) Bilateral 2022    Procedure: CYSTOSCOPY, WITH URETERAL bilateral STENT INSERTION;  Surgeon: Inocencio Washburn MD;  [facility]     COMBINED CYSTOSCOPY, INSERT STENT URETER(S) Bilateral 2023    Procedure: CYSTOSCOPY, WITH URETERAL STENT INSERTION;  Surgeon: Ian Su MD;  [facility]     ENDOSCOPIC ULTRASOUND LOWER GASTROINTESTIONAL TRACT (GI) N/A 2022    Procedure: ULTRASOUND, LOWER GI TRACT, ENDOSCOPIC;  Surgeon: James Prieto MD;  [facility]     IR SKIN  SUBQ/SEROMA ABSCESS DRAIN  2022     LAPAROSCOPIC ASSISTED COLOSTOMY TAKEDOWN N/A 2022    Procedure: CLOSURE, COLOSTOMY, LAPAROSCOPIC, converted to open colostomy reversal with loop ileostomy creation;  Surgeon: Tad Richardson MD;  [facility]     LAPAROSCOPIC ILEOSTOMY N/A 2022    Procedure: CREATION, ILEOSTOMY, LAPAROSCOPIC converted to open;  Surgeon: Tad Richardson MD;  [facility]     LAPAROSCOPY DIAGNOSTIC (GYN)      for ovarian cyst     LAPAROTOMY EXPLORATORY N/A 2023    Procedure: exploratory laparotomy, lyses of adhesions 3 hours, low anterior resection, flexible sigmoidoscopy, appendectomy, complex abdominal wall closure;  Surgeon: Tad Richardson MD;  [facility]     LOW ANTERIOR BOWEL RESECTION       SIGMOIDOSCOPY FLEXIBLE N/A 2022    Procedure: Sigmoidoscopy flexible;  Surgeon: Tad Richardson MD;  [facility]     SIGMOIDOSCOPY FLEXIBLE N/A 8/3/2022    Procedure: SIGMOIDOSCOPY, FLEXIBLE WITH CONTRAST;  Surgeon: James Prieto MD;  [facility]     SIGMOIDOSCOPY FLEXIBLE N/A 2022    Procedure: esophagastroduodenoscopy,SIGMOIDOSCOPY, FLEXIBLE,possible stent placement, and necoratization;  Surgeon: James Prieto MD;  [facility]     SIGMOIDOSCOPY FLEXIBLE N/A 2023    Procedure: Sigmoidoscopy flexible;  Surgeon: Tad Richardson MD;  [facility]       Problem list:  Patient Active Problem List    Diagnosis Date Noted     Anastomotic stricture of colorectal region 2023     Priority: Medium     Colostomy status (H) 2022     Priority: Medium     Cyst of left ovary 2022     Priority: Medium     Colostomy care (H) 2022     Priority: Medium     Status post emergency  hysterectomy 2022     Priority: Medium       Medications:  Current Outpatient Medications   Medication Sig Dispense Refill     acetaminophen (TYLENOL) 500 MG tablet Take 2 tablets (1,000 mg) by mouth every 6 hours 80 tablet 0      gabapentin (NEURONTIN) 300 MG capsule Take 1 capsule (300 mg) by mouth 3 times daily 40 capsule 0     HYDROcodone-acetaminophen (NORCO) 5-325 MG tablet Take 1 tablet by mouth every 6 hours as needed Resume when you have completed your post-operative course of pain medications  0     HYDROmorphone (DILAUDID) 2 MG tablet Take 1-2 tablets (2-4 mg) by mouth every 6 hours as needed for pain or moderate to severe pain Dilaudid 2-4mg every 6 hr prn x1-2 days, then to dilaudid 2mg every 6hr prn x1-2 days then back to home pain meds. 20 tablet 0     HYDROmorphone (DILAUDID) 4 MG tablet Take 1-1.5 tablets (4-6 mg) by mouth every 4 hours as needed for moderate to severe pain Dilaudid 4-6mg q4hrs prn 1-2 days, then 4mg q4hr prn 1-2 days, then 4mg q6hr prn x1-2 days, then 2mg q6hr prn 1-2 days, then back to home regimen. 30 tablet 0     hydrOXYzine (ATARAX) 25 MG tablet Take 1 tablet (25 mg) by mouth every 8 hours as needed for itching or anxiety 15 tablet 0     menthol (ICY HOT) 5 % PTCH Apply 1 patch topically every 8 hours as needed for muscle soreness 5 patch 0     methocarbamol 1000 MG TABS Take 1,000 mg by mouth 4 times daily 50 tablet 0     morphine (MS CONTIN) 30 MG CR tablet Take 30 mg by mouth every 12 hours       multivitamin w/minerals (THERA-VIT-M) tablet Take 1 tablet by mouth daily Takes when she remembers       NONFORMULARY Pro Immune supplement (Selenium)  Takes 1 tablet daily (when she remembers) (Patient not taking: Reported on 1/2/2023)       polyethylene glycol (MIRALAX) 17 GM/Dose powder Take 17 g by mouth daily 510 g 0     senna-docusate (SENOKOT-S/PERICOLACE) 8.6-50 MG tablet Take 1 tablet by mouth 2 times daily as needed for constipation 60 tablet 0     sertraline (ZOLOFT) 50 MG tablet Take 50 mg by mouth every evening         Allergies:  No Known Allergies    Family history:  Family History   Problem Relation Age of Onset     Deep Vein Thrombosis (DVT) Father      Anesthesia Reaction No family hx of  "       Social history:  Social History     Socioeconomic History     Marital status: Single     Spouse name: Not on file     Number of children: Not on file     Years of education: Not on file     Highest education level: Not on file   Occupational History     Not on file   Tobacco Use     Smoking status: Former     Types: Cigarettes     Smokeless tobacco: Never     Tobacco comments:     5-7 cigarettes per day   Vaping Use     Vaping Use: Never used   Substance and Sexual Activity     Alcohol use: Not Currently     Drug use: Never     Sexual activity: Yes     Partners: Male   Other Topics Concern     Not on file   Social History Narrative     Not on file     Social Determinants of Health     Financial Resource Strain: Not on file   Food Insecurity: Not on file   Transportation Needs: Not on file   Physical Activity: Not on file   Stress: Not on file   Social Connections: Not on file   Intimate Partner Violence: Not on file   Housing Stability: Not on file       Physical Examination:  /80 (BP Location: Left arm, Patient Position: Sitting, Cuff Size: Adult Regular)   Pulse 90   Ht 1.702 m (5' 7\")   Wt 80.3 kg (177 lb)   LMP  (LMP Unknown)   SpO2 100%   BMI 27.72 kg/m    General: NAD  Abdomen: soft, NTND, ostomy pink and healthy.  Extremities: wwp    Digital rectal examination: Was performed.   Sphincter tone: Good.  Palpable lesions: No.  Prostate: Not assessed.  Other: None..      Procedures:  Prior to the start of the procedure and with procedural staff participation, I verbally confirmed the patient s identity using two indicators, relevant allergies, that the procedure was appropriate and matched the consent or emergent situation, and that the correct equipment/implants were available. Immediately prior to starting the procedure I conducted the Time Out with the procedural staff and re-confirmed the patient s name, procedure, and site/side. (The Joint Commission universal protocol was followed.)  " Yes    Sedation (Moderate or Deep): None    Flexible sigmoidoscopy was performed to 25cm. Anastomosis was identified at 12 cm without obvious abnormality, patent and allowed passage of scope. Pictures taken, uploaded into epic.      Sincerely,  Tad Richardson MD, MD

## 2023-02-02 NOTE — PATIENT INSTRUCTIONS
Please call with any questions or concerns regarding your clinic visit today.    It is a pleasure being involved in your health care.    Contacts post-consultation depending on your need:    Radiology Appointments [phone]    Schedule Clinic Appointments [phone] # 1   M-F 7:30 - 5 pm    [facility]ac[facility] [phone]    Clinic Fax Number [phone]    Surgery Scheduling [phone]    My Chart is available 24 hours a day and is a secure way to access your records and communicate with your care team.  I strongly recommend signing up if you haven't already done so, if you are comfortable with computers.  If you would like to inquire about this or are having problems with My Chart access, you may call [phone] or go online at [email or web address].  Please allow at least 24 hours for a response and extra time on weekends and Holidays.

## 2023-02-02 NOTE — NURSING NOTE
"Chief Complaint   Patient presents with     Post-op Visit     Flexible Sigmoidoscopy       Vitals:    02/02/23 0808   BP: 117/80   BP Location: Left arm   Patient Position: Sitting   Cuff Size: Adult Regular   Pulse: 90   SpO2: 100%   Weight: 177 lb   Height: 5' 7\"       Body mass index is 27.72 kg/m .    Rama Swift CMA    "

## 2023-02-11 ENCOUNTER — HEALTH MAINTENANCE LETTER (OUTPATIENT)
Age: 38
End: 2023-02-11

## 2023-02-17 ENCOUNTER — TELEPHONE (OUTPATIENT)
Dept: SURGERY | Facility: CLINIC | Age: 38
End: 2023-02-17
Payer: MEDICAID

## 2023-02-17 ENCOUNTER — TEAM CONFERENCE (OUTPATIENT)
Dept: SURGERY | Facility: CLINIC | Age: 38
End: 2023-02-17
Payer: MEDICAID

## 2023-02-17 NOTE — TELEPHONE ENCOUNTER
Per Dr. Richardson's instruction, moved patient's surgery date from 3/3/2023 to 3/8/2023. Updated Surgery Packet sent via [employer], which patient read.    Shavon Salomon  Kat-op Coordinator  [place]  Direct Phone: [phone]

## 2023-02-17 NOTE — CONFIDENTIAL NOTE
COLON AND RECTAL SURGERY HUDDLE:    Patient was reviewed in preporation for their surgery the following was reviewed and has been completed:    Surgeon: Dr. Tad Richardson    Surgery & Date: 3.3.23 ileostomy closure  Last MD Note: reviewed    Anesthesia Type: General    Other Providers: No    PAC: Yes    WOC: No    Labs: Yes    Bowel Prep: No    Packet: Yes    Imaging: Yes    Post-Op Appointments: Yes    COVID: No    Pre op soap: Yes Questions about shower: no    Is patient on TPN?: No   If yes, I contacted the TPN pharmacist by paging the  pharmacy at [phone] or calling [phone]. I also contacted Rajwinder CONNELLY with inpatient colon and rectal team.     Pre op call complete: Yes

## 2023-02-22 ENCOUNTER — TRANSFERRED RECORDS (OUTPATIENT)
Dept: MULTI SPECIALTY CLINIC | Facility: CLINIC | Age: 38
End: 2023-02-22

## 2023-02-22 LAB
ALT SERPL-CCNC: 13 U/L (ref 7–43)
AST SERPL-CCNC: 16 U/L (ref 13–39)
CREATININE (EXTERNAL): 0.71 MG/DL (ref 0.6–1.1)
GFR ESTIMATED (EXTERNAL): >60 ML/MIN
GLUCOSE (EXTERNAL): 101 MG/DL (ref 75–99)
INR (EXTERNAL): 1 (ref 0.86–1.06)
POTASSIUM (EXTERNAL): 3.8 MEQ/L (ref 3.5–5.1)

## 2023-02-24 ENCOUNTER — TEAM CONFERENCE (OUTPATIENT)
Dept: SURGERY | Facility: CLINIC | Age: 38
End: 2023-02-24
Payer: MEDICAID

## 2023-02-24 NOTE — CONFIDENTIAL NOTE
COLON AND RECTAL SURGERY HUDDLE:    Patient was reviewed in preporation for their surgery the following was reviewed and has been completed:    Surgeon: Dr. Tad Richardson    Surgery & Date: 3/8 ileostomy closure     Last MD Note: reviewed    Anesthesia Type: General    Other Providers: No    PAC: Yes    WOC: No    Labs: Yes    Bowel Prep: N/A No Prep    Packet: Yes    Imaging: Yes, gge completed.  Post-Op Appointments: Yes    COVID: N/A    Pre op soap: Yes Questions about shower: no    Is patient on TPN?: No   If yes, I contacted the TPN pharmacist by paging the  pharmacy at [phone] or calling [phone]. I also contacted Rajwinder CONNELLY with inpatient colon and rectal team.     Pre op call complete: Yes      Mirvaso Counseling: Mirvaso is a topical medication which can decrease superficial blood flow where applied. Side effects are uncommon and include stinging, redness and allergic reactions.

## 2023-03-06 ENCOUNTER — ANESTHESIA EVENT (OUTPATIENT)
Dept: SURGERY | Facility: CLINIC | Age: 38
DRG: 330 | End: 2023-03-06
Payer: MEDICAID

## 2023-03-08 ENCOUNTER — HOSPITAL ENCOUNTER (INPATIENT)
Facility: CLINIC | Age: 38
LOS: 16 days | Discharge: HOME | DRG: 330 | End: 2023-03-24
Attending: SURGERY | Admitting: SURGERY
Payer: MEDICAID

## 2023-03-08 ENCOUNTER — ANESTHESIA (OUTPATIENT)
Dept: SURGERY | Facility: CLINIC | Age: 38
DRG: 330 | End: 2023-03-08
Payer: MEDICAID

## 2023-03-08 DIAGNOSIS — R11.0 NAUSEA: ICD-10-CM

## 2023-03-08 DIAGNOSIS — Z93.2 ILEOSTOMY STATUS (H): Primary | ICD-10-CM

## 2023-03-08 DIAGNOSIS — G89.18 ACUTE POST-OPERATIVE PAIN: ICD-10-CM

## 2023-03-08 LAB
CREAT SERPL-MCNC: 0.58 MG/DL (ref 0.51–0.95)
GFR SERPL CREATININE-BSD FRML MDRD: >90 ML/MIN/1.73M2
SARS-COV-2 RNA RESP QL NAA+PROBE: NEGATIVE

## 2023-03-08 PROCEDURE — 250N000011 HC RX IP 250 OP 636: Performed by: STUDENT IN AN ORGANIZED HEALTH CARE EDUCATION/TRAINING PROGRAM

## 2023-03-08 PROCEDURE — C9290 INJ, BUPIVACAINE LIPOSOME: HCPCS

## 2023-03-08 PROCEDURE — 250N000011 HC RX IP 250 OP 636: Performed by: SURGERY

## 2023-03-08 PROCEDURE — 0DJD8ZZ INSPECTION OF LOWER INTESTINAL TRACT, VIA NATURAL OR ARTIFICIAL OPENING ENDOSCOPIC: ICD-10-PCS | Performed by: SURGERY

## 2023-03-08 PROCEDURE — 0DQB0ZZ REPAIR ILEUM, OPEN APPROACH: ICD-10-PCS | Performed by: SURGERY

## 2023-03-08 PROCEDURE — 0DBB0ZZ EXCISION OF ILEUM, OPEN APPROACH: ICD-10-PCS | Performed by: SURGERY

## 2023-03-08 PROCEDURE — 36415 COLL VENOUS BLD VENIPUNCTURE: CPT | Performed by: SURGERY

## 2023-03-08 PROCEDURE — 258N000003 HC RX IP 258 OP 636: Performed by: ANESTHESIOLOGY

## 2023-03-08 PROCEDURE — 250N000011 HC RX IP 250 OP 636

## 2023-03-08 PROCEDURE — 82565 ASSAY OF CREATININE: CPT | Performed by: SURGERY

## 2023-03-08 PROCEDURE — 258N000003 HC RX IP 258 OP 636: Performed by: SURGERY

## 2023-03-08 PROCEDURE — 120N000002 HC R&B MED SURG/OB UMMC

## 2023-03-08 PROCEDURE — 250N000009 HC RX 250: Performed by: NURSE ANESTHETIST, CERTIFIED REGISTERED

## 2023-03-08 PROCEDURE — 272N000001 HC OR GENERAL SUPPLY STERILE: Performed by: SURGERY

## 2023-03-08 PROCEDURE — 250N000011 HC RX IP 250 OP 636: Performed by: ANESTHESIOLOGY

## 2023-03-08 PROCEDURE — 250N000025 HC SEVOFLURANE, PER MIN: Performed by: SURGERY

## 2023-03-08 PROCEDURE — 250N000011 HC RX IP 250 OP 636: Performed by: NURSE ANESTHETIST, CERTIFIED REGISTERED

## 2023-03-08 PROCEDURE — U0003 INFECTIOUS AGENT DETECTION BY NUCLEIC ACID (DNA OR RNA); SEVERE ACUTE RESPIRATORY SYNDROME CORONAVIRUS 2 (SARS-COV-2) (CORONAVIRUS DISEASE [COVID-19]), AMPLIFIED PROBE TECHNIQUE, MAKING USE OF HIGH THROUGHPUT TECHNOLOGIES AS DESCRIBED BY CMS-2020-01-R: HCPCS | Performed by: SURGERY

## 2023-03-08 PROCEDURE — 370N000017 HC ANESTHESIA TECHNICAL FEE, PER MIN: Performed by: SURGERY

## 2023-03-08 PROCEDURE — 999N000141 HC STATISTIC PRE-PROCEDURE NURSING ASSESSMENT: Performed by: SURGERY

## 2023-03-08 PROCEDURE — 250N000013 HC RX MED GY IP 250 OP 250 PS 637: Performed by: SURGERY

## 2023-03-08 PROCEDURE — 88304 TISSUE EXAM BY PATHOLOGIST: CPT | Mod: 26 | Performed by: PATHOLOGY

## 2023-03-08 PROCEDURE — 44625 REPAIR BOWEL OPENING: CPT | Mod: 58 | Performed by: SURGERY

## 2023-03-08 PROCEDURE — 258N000003 HC RX IP 258 OP 636: Performed by: NURSE ANESTHETIST, CERTIFIED REGISTERED

## 2023-03-08 PROCEDURE — 88304 TISSUE EXAM BY PATHOLOGIST: CPT | Mod: TC | Performed by: SURGERY

## 2023-03-08 PROCEDURE — 710N000010 HC RECOVERY PHASE 1, LEVEL 2, PER MIN: Performed by: SURGERY

## 2023-03-08 PROCEDURE — 360N000077 HC SURGERY LEVEL 4, PER MIN: Performed by: SURGERY

## 2023-03-08 RX ORDER — FENTANYL CITRATE 50 UG/ML
INJECTION, SOLUTION INTRAMUSCULAR; INTRAVENOUS PRN
Status: DISCONTINUED | OUTPATIENT
Start: 2023-03-08 | End: 2023-03-08

## 2023-03-08 RX ORDER — NALOXONE HYDROCHLORIDE 0.4 MG/ML
0.4 INJECTION, SOLUTION INTRAMUSCULAR; INTRAVENOUS; SUBCUTANEOUS
Status: DISCONTINUED | OUTPATIENT
Start: 2023-03-08 | End: 2023-03-24 | Stop reason: HOSPADM

## 2023-03-08 RX ORDER — HYDROXYZINE HYDROCHLORIDE 25 MG/1
25 TABLET, FILM COATED ORAL EVERY 8 HOURS PRN
Status: DISCONTINUED | OUTPATIENT
Start: 2023-03-08 | End: 2023-03-14

## 2023-03-08 RX ORDER — ONDANSETRON 4 MG/1
4 TABLET, ORALLY DISINTEGRATING ORAL EVERY 6 HOURS PRN
Status: DISCONTINUED | OUTPATIENT
Start: 2023-03-08 | End: 2023-03-09

## 2023-03-08 RX ORDER — SODIUM CHLORIDE, SODIUM LACTATE, POTASSIUM CHLORIDE, CALCIUM CHLORIDE 600; 310; 30; 20 MG/100ML; MG/100ML; MG/100ML; MG/100ML
INJECTION, SOLUTION INTRAVENOUS CONTINUOUS
Status: DISCONTINUED | OUTPATIENT
Start: 2023-03-08 | End: 2023-03-08 | Stop reason: HOSPADM

## 2023-03-08 RX ORDER — FENTANYL 25 UG/1
1 PATCH TRANSDERMAL
COMMUNITY
Start: 2023-02-22

## 2023-03-08 RX ORDER — MULTIPLE VITAMINS W/ MINERALS TAB 9MG-400MCG
1 TAB ORAL DAILY
Status: DISCONTINUED | OUTPATIENT
Start: 2023-03-09 | End: 2023-03-12

## 2023-03-08 RX ORDER — FLUMAZENIL 0.1 MG/ML
0.2 INJECTION, SOLUTION INTRAVENOUS
Status: DISCONTINUED | OUTPATIENT
Start: 2023-03-08 | End: 2023-03-08 | Stop reason: HOSPADM

## 2023-03-08 RX ORDER — HYDROMORPHONE HCL IN WATER/PF 6 MG/30 ML
0.2 PATIENT CONTROLLED ANALGESIA SYRINGE INTRAVENOUS EVERY 5 MIN PRN
Status: DISCONTINUED | OUTPATIENT
Start: 2023-03-08 | End: 2023-03-08 | Stop reason: HOSPADM

## 2023-03-08 RX ORDER — FENTANYL CITRATE 50 UG/ML
50 INJECTION, SOLUTION INTRAMUSCULAR; INTRAVENOUS EVERY 5 MIN PRN
Status: DISCONTINUED | OUTPATIENT
Start: 2023-03-08 | End: 2023-03-08 | Stop reason: HOSPADM

## 2023-03-08 RX ORDER — FENTANYL CITRATE 50 UG/ML
25 INJECTION, SOLUTION INTRAMUSCULAR; INTRAVENOUS EVERY 5 MIN PRN
Status: DISCONTINUED | OUTPATIENT
Start: 2023-03-08 | End: 2023-03-08 | Stop reason: HOSPADM

## 2023-03-08 RX ORDER — ENOXAPARIN SODIUM 100 MG/ML
40 INJECTION SUBCUTANEOUS EVERY 24 HOURS
Status: DISCONTINUED | OUTPATIENT
Start: 2023-03-09 | End: 2023-03-12

## 2023-03-08 RX ORDER — HYDROMORPHONE HCL IN WATER/PF 6 MG/30 ML
0.4 PATIENT CONTROLLED ANALGESIA SYRINGE INTRAVENOUS EVERY 5 MIN PRN
Status: DISCONTINUED | OUTPATIENT
Start: 2023-03-08 | End: 2023-03-08 | Stop reason: HOSPADM

## 2023-03-08 RX ORDER — NALOXONE HYDROCHLORIDE 0.4 MG/ML
0.2 INJECTION, SOLUTION INTRAMUSCULAR; INTRAVENOUS; SUBCUTANEOUS
Status: DISCONTINUED | OUTPATIENT
Start: 2023-03-08 | End: 2023-03-08 | Stop reason: HOSPADM

## 2023-03-08 RX ORDER — HYDRALAZINE HYDROCHLORIDE 20 MG/ML
2.5-5 INJECTION INTRAMUSCULAR; INTRAVENOUS EVERY 10 MIN PRN
Status: DISCONTINUED | OUTPATIENT
Start: 2023-03-08 | End: 2023-03-08 | Stop reason: HOSPADM

## 2023-03-08 RX ORDER — DIPHENHYDRAMINE HYDROCHLORIDE 50 MG/ML
25 INJECTION INTRAMUSCULAR; INTRAVENOUS EVERY 6 HOURS PRN
Status: DISCONTINUED | OUTPATIENT
Start: 2023-03-08 | End: 2023-03-08 | Stop reason: HOSPADM

## 2023-03-08 RX ORDER — ONDANSETRON 2 MG/ML
4 INJECTION INTRAMUSCULAR; INTRAVENOUS ONCE
Status: COMPLETED | OUTPATIENT
Start: 2023-03-08 | End: 2023-03-08

## 2023-03-08 RX ORDER — METHOCARBAMOL 500 MG/1
1000 TABLET, FILM COATED ORAL 4 TIMES DAILY
Status: DISCONTINUED | OUTPATIENT
Start: 2023-03-08 | End: 2023-03-09

## 2023-03-08 RX ORDER — MORPHINE SULFATE 30 MG/1
30 TABLET, FILM COATED, EXTENDED RELEASE ORAL EVERY 12 HOURS
Status: DISCONTINUED | OUTPATIENT
Start: 2023-03-08 | End: 2023-03-10

## 2023-03-08 RX ORDER — SODIUM CHLORIDE, SODIUM LACTATE, POTASSIUM CHLORIDE, CALCIUM CHLORIDE 600; 310; 30; 20 MG/100ML; MG/100ML; MG/100ML; MG/100ML
INJECTION, SOLUTION INTRAVENOUS CONTINUOUS
Status: DISCONTINUED | OUTPATIENT
Start: 2023-03-08 | End: 2023-03-09

## 2023-03-08 RX ORDER — DIAZEPAM 10 MG/2ML
2.5 INJECTION, SOLUTION INTRAMUSCULAR; INTRAVENOUS
Status: DISCONTINUED | OUTPATIENT
Start: 2023-03-08 | End: 2023-03-08 | Stop reason: HOSPADM

## 2023-03-08 RX ORDER — GABAPENTIN 300 MG/1
600 CAPSULE ORAL
Status: COMPLETED | OUTPATIENT
Start: 2023-03-08 | End: 2023-03-08

## 2023-03-08 RX ORDER — DIPHENHYDRAMINE HCL 25 MG
25 CAPSULE ORAL EVERY 6 HOURS PRN
Status: DISCONTINUED | OUTPATIENT
Start: 2023-03-08 | End: 2023-03-08 | Stop reason: HOSPADM

## 2023-03-08 RX ORDER — HALOPERIDOL 5 MG/ML
1 INJECTION INTRAMUSCULAR
Status: DISCONTINUED | OUTPATIENT
Start: 2023-03-08 | End: 2023-03-08 | Stop reason: HOSPADM

## 2023-03-08 RX ORDER — ONDANSETRON 2 MG/ML
4 INJECTION INTRAMUSCULAR; INTRAVENOUS EVERY 30 MIN PRN
Status: DISCONTINUED | OUTPATIENT
Start: 2023-03-08 | End: 2023-03-08 | Stop reason: HOSPADM

## 2023-03-08 RX ORDER — NALOXONE HYDROCHLORIDE 0.4 MG/ML
0.2 INJECTION, SOLUTION INTRAMUSCULAR; INTRAVENOUS; SUBCUTANEOUS
Status: DISCONTINUED | OUTPATIENT
Start: 2023-03-08 | End: 2023-03-24 | Stop reason: HOSPADM

## 2023-03-08 RX ORDER — FENTANYL CITRATE 50 UG/ML
25-50 INJECTION, SOLUTION INTRAMUSCULAR; INTRAVENOUS
Status: DISCONTINUED | OUTPATIENT
Start: 2023-03-08 | End: 2023-03-08 | Stop reason: HOSPADM

## 2023-03-08 RX ORDER — ACETAMINOPHEN 325 MG/1
975 TABLET ORAL ONCE
Status: COMPLETED | OUTPATIENT
Start: 2023-03-08 | End: 2023-03-08

## 2023-03-08 RX ORDER — LABETALOL HYDROCHLORIDE 5 MG/ML
10 INJECTION, SOLUTION INTRAVENOUS
Status: DISCONTINUED | OUTPATIENT
Start: 2023-03-08 | End: 2023-03-08 | Stop reason: HOSPADM

## 2023-03-08 RX ORDER — PROPOFOL 10 MG/ML
INJECTION, EMULSION INTRAVENOUS PRN
Status: DISCONTINUED | OUTPATIENT
Start: 2023-03-08 | End: 2023-03-08

## 2023-03-08 RX ORDER — BUPIVACAINE HYDROCHLORIDE 2.5 MG/ML
INJECTION, SOLUTION EPIDURAL; INFILTRATION; INTRACAUDAL
Status: COMPLETED | OUTPATIENT
Start: 2023-03-08 | End: 2023-03-08

## 2023-03-08 RX ORDER — LIDOCAINE 40 MG/G
CREAM TOPICAL
Status: DISCONTINUED | OUTPATIENT
Start: 2023-03-08 | End: 2023-03-08 | Stop reason: HOSPADM

## 2023-03-08 RX ORDER — CEFAZOLIN SODIUM/WATER 2 G/20 ML
2 SYRINGE (ML) INTRAVENOUS SEE ADMIN INSTRUCTIONS
Status: DISCONTINUED | OUTPATIENT
Start: 2023-03-08 | End: 2023-03-08 | Stop reason: HOSPADM

## 2023-03-08 RX ORDER — ACETAMINOPHEN 500 MG
1000 TABLET ORAL EVERY 6 HOURS
Status: COMPLETED | OUTPATIENT
Start: 2023-03-08 | End: 2023-03-11

## 2023-03-08 RX ORDER — ONDANSETRON 2 MG/ML
INJECTION INTRAMUSCULAR; INTRAVENOUS PRN
Status: DISCONTINUED | OUTPATIENT
Start: 2023-03-08 | End: 2023-03-08

## 2023-03-08 RX ORDER — NALOXONE HYDROCHLORIDE 0.4 MG/ML
0.4 INJECTION, SOLUTION INTRAMUSCULAR; INTRAVENOUS; SUBCUTANEOUS
Status: DISCONTINUED | OUTPATIENT
Start: 2023-03-08 | End: 2023-03-08 | Stop reason: HOSPADM

## 2023-03-08 RX ORDER — GABAPENTIN 300 MG/1
300 CAPSULE ORAL 3 TIMES DAILY
Status: DISCONTINUED | OUTPATIENT
Start: 2023-03-08 | End: 2023-03-24 | Stop reason: HOSPADM

## 2023-03-08 RX ORDER — LIDOCAINE HYDROCHLORIDE 20 MG/ML
INJECTION, SOLUTION INFILTRATION; PERINEURAL PRN
Status: DISCONTINUED | OUTPATIENT
Start: 2023-03-08 | End: 2023-03-08

## 2023-03-08 RX ORDER — ONDANSETRON 4 MG/1
4 TABLET, FILM COATED ORAL ONCE
Status: DISCONTINUED | OUTPATIENT
Start: 2023-03-08 | End: 2023-03-08

## 2023-03-08 RX ORDER — CEFAZOLIN SODIUM/WATER 2 G/20 ML
2 SYRINGE (ML) INTRAVENOUS
Status: COMPLETED | OUTPATIENT
Start: 2023-03-08 | End: 2023-03-08

## 2023-03-08 RX ORDER — ONDANSETRON 4 MG/1
4 TABLET, ORALLY DISINTEGRATING ORAL EVERY 30 MIN PRN
Status: DISCONTINUED | OUTPATIENT
Start: 2023-03-08 | End: 2023-03-08 | Stop reason: HOSPADM

## 2023-03-08 RX ORDER — ONDANSETRON 2 MG/ML
4 INJECTION INTRAMUSCULAR; INTRAVENOUS ONCE
Status: DISCONTINUED | OUTPATIENT
Start: 2023-03-08 | End: 2023-03-08 | Stop reason: HOSPADM

## 2023-03-08 RX ORDER — METRONIDAZOLE 500 MG/100ML
500 INJECTION, SOLUTION INTRAVENOUS
Status: COMPLETED | OUTPATIENT
Start: 2023-03-08 | End: 2023-03-08

## 2023-03-08 RX ORDER — ENOXAPARIN SODIUM 100 MG/ML
40 INJECTION SUBCUTANEOUS
Status: COMPLETED | OUTPATIENT
Start: 2023-03-08 | End: 2023-03-08

## 2023-03-08 RX ORDER — MEPERIDINE HYDROCHLORIDE 25 MG/ML
12.5 INJECTION INTRAMUSCULAR; INTRAVENOUS; SUBCUTANEOUS EVERY 5 MIN PRN
Status: DISCONTINUED | OUTPATIENT
Start: 2023-03-08 | End: 2023-03-08 | Stop reason: HOSPADM

## 2023-03-08 RX ORDER — ONDANSETRON 2 MG/ML
4 INJECTION INTRAMUSCULAR; INTRAVENOUS EVERY 6 HOURS PRN
Status: DISCONTINUED | OUTPATIENT
Start: 2023-03-08 | End: 2023-03-09

## 2023-03-08 RX ORDER — LIDOCAINE 40 MG/G
CREAM TOPICAL
Status: DISCONTINUED | OUTPATIENT
Start: 2023-03-08 | End: 2023-03-24 | Stop reason: HOSPADM

## 2023-03-08 RX ADMIN — PROCHLORPERAZINE EDISYLATE 5 MG: 5 INJECTION INTRAMUSCULAR; INTRAVENOUS at 16:06

## 2023-03-08 RX ADMIN — FENTANYL CITRATE 25 MCG: 50 INJECTION, SOLUTION INTRAMUSCULAR; INTRAVENOUS at 16:11

## 2023-03-08 RX ADMIN — Medication 50 MG: at 12:51

## 2023-03-08 RX ADMIN — SODIUM CHLORIDE, POTASSIUM CHLORIDE, SODIUM LACTATE AND CALCIUM CHLORIDE: 600; 310; 30; 20 INJECTION, SOLUTION INTRAVENOUS at 14:01

## 2023-03-08 RX ADMIN — SUGAMMADEX 200 MG: 100 INJECTION, SOLUTION INTRAVENOUS at 15:16

## 2023-03-08 RX ADMIN — SODIUM CHLORIDE, POTASSIUM CHLORIDE, SODIUM LACTATE AND CALCIUM CHLORIDE: 600; 310; 30; 20 INJECTION, SOLUTION INTRAVENOUS at 20:26

## 2023-03-08 RX ADMIN — ONDANSETRON HYDROCHLORIDE 4 MG: 2 INJECTION, SOLUTION INTRAMUSCULAR; INTRAVENOUS at 21:25

## 2023-03-08 RX ADMIN — FENTANYL CITRATE 50 MCG: 50 INJECTION, SOLUTION INTRAMUSCULAR; INTRAVENOUS at 13:47

## 2023-03-08 RX ADMIN — LIDOCAINE HYDROCHLORIDE 100 MG: 20 INJECTION, SOLUTION INFILTRATION; PERINEURAL at 12:48

## 2023-03-08 RX ADMIN — FENTANYL CITRATE 100 MCG: 50 INJECTION, SOLUTION INTRAMUSCULAR; INTRAVENOUS at 13:51

## 2023-03-08 RX ADMIN — HYDROMORPHONE HYDROCHLORIDE 0.5 MG: 1 INJECTION, SOLUTION INTRAMUSCULAR; INTRAVENOUS; SUBCUTANEOUS at 15:06

## 2023-03-08 RX ADMIN — FENTANYL CITRATE 25 MCG: 50 INJECTION, SOLUTION INTRAMUSCULAR; INTRAVENOUS at 16:31

## 2023-03-08 RX ADMIN — HYDROMORPHONE HYDROCHLORIDE 0.5 MG: 1 INJECTION, SOLUTION INTRAMUSCULAR; INTRAVENOUS; SUBCUTANEOUS at 14:58

## 2023-03-08 RX ADMIN — MIDAZOLAM HYDROCHLORIDE 1 MG: 1 INJECTION, SOLUTION INTRAMUSCULAR; INTRAVENOUS at 12:20

## 2023-03-08 RX ADMIN — Medication 20 MG: at 13:51

## 2023-03-08 RX ADMIN — MIDAZOLAM 2 MG: 1 INJECTION INTRAMUSCULAR; INTRAVENOUS at 12:36

## 2023-03-08 RX ADMIN — Medication: at 17:03

## 2023-03-08 RX ADMIN — Medication 10 MG: at 14:52

## 2023-03-08 RX ADMIN — FENTANYL CITRATE 25 MCG: 50 INJECTION, SOLUTION INTRAMUSCULAR; INTRAVENOUS at 16:42

## 2023-03-08 RX ADMIN — GABAPENTIN 600 MG: 300 CAPSULE ORAL at 11:25

## 2023-03-08 RX ADMIN — BUPIVACAINE 20 ML: 13.3 INJECTION, SUSPENSION, LIPOSOMAL INFILTRATION at 12:16

## 2023-03-08 RX ADMIN — FENTANYL CITRATE 50 MCG: 50 INJECTION INTRAMUSCULAR; INTRAVENOUS at 12:20

## 2023-03-08 RX ADMIN — METRONIDAZOLE 500 MG: 500 INJECTION, SOLUTION INTRAVENOUS at 12:03

## 2023-03-08 RX ADMIN — PHENYLEPHRINE HYDROCHLORIDE 200 MCG: 10 INJECTION INTRAVENOUS at 13:06

## 2023-03-08 RX ADMIN — BUPIVACAINE HYDROCHLORIDE 20 ML: 2.5 INJECTION, SOLUTION EPIDURAL; INFILTRATION; INTRACAUDAL; PERINEURAL at 12:16

## 2023-03-08 RX ADMIN — SODIUM CHLORIDE, POTASSIUM CHLORIDE, SODIUM LACTATE AND CALCIUM CHLORIDE: 600; 310; 30; 20 INJECTION, SOLUTION INTRAVENOUS at 16:08

## 2023-03-08 RX ADMIN — ENOXAPARIN SODIUM 40 MG: 40 INJECTION SUBCUTANEOUS at 12:30

## 2023-03-08 RX ADMIN — FENTANYL CITRATE 100 MCG: 50 INJECTION, SOLUTION INTRAMUSCULAR; INTRAVENOUS at 12:48

## 2023-03-08 RX ADMIN — ONDANSETRON 4 MG: 2 INJECTION INTRAMUSCULAR; INTRAVENOUS at 14:58

## 2023-03-08 RX ADMIN — ACETAMINOPHEN 975 MG: 325 TABLET ORAL at 11:24

## 2023-03-08 RX ADMIN — ONDANSETRON 4 MG: 2 INJECTION INTRAMUSCULAR; INTRAVENOUS at 15:42

## 2023-03-08 RX ADMIN — Medication 2 G: at 12:54

## 2023-03-08 RX ADMIN — PROPOFOL 120 MG: 10 INJECTION, EMULSION INTRAVENOUS at 12:50

## 2023-03-08 RX ADMIN — SODIUM CHLORIDE, POTASSIUM CHLORIDE, SODIUM LACTATE AND CALCIUM CHLORIDE: 600; 310; 30; 20 INJECTION, SOLUTION INTRAVENOUS at 12:36

## 2023-03-08 ASSESSMENT — LIFESTYLE VARIABLES: TOBACCO_USE: 1

## 2023-03-08 ASSESSMENT — ACTIVITIES OF DAILY LIVING (ADL)
ADLS_ACUITY_SCORE: 20
ADLS_ACUITY_SCORE: 22

## 2023-03-08 ASSESSMENT — ENCOUNTER SYMPTOMS: DYSRHYTHMIAS: 0

## 2023-03-08 NOTE — INTERVAL H&P NOTE
"I have reviewed the surgical (or preoperative) H&P that is linked to this encounter, and examined the patient. There are no significant changes    Clinical Conditions Present on Arrival:  Clinically Significant Risk Factors Present on Admission                    # Overweight: Estimated body mass index is 27.76 kg/m  as calculated from the following:    Height as of this encounter: 1.702 m (5' 7\").    Weight as of this encounter: 80.4 kg (177 lb 4 oz).       "

## 2023-03-08 NOTE — ANESTHESIA PROCEDURE NOTES
TAP Procedure Note    Pre-Procedure   Staff -        Anesthesiologist:  Jh Thomson MD       Resident/Fellow: Jd Marsh MD       Performed By: resident       Location: pre-op       Procedure Start/Stop Times: 3/8/2023 12:16 PM and 3/8/2023 12:26 PM       Pre-Anesthestic Checklist: patient identified, IV checked, site marked, risks and benefits discussed, informed consent, monitors and equipment checked, pre-op evaluation, at physician/surgeon's request and post-op pain management  Timeout:       Correct Patient: Yes        Correct Procedure: Yes        Correct Site: Yes        Correct Position: Yes        Correct Laterality: N/A        Site Marked: N/A  Procedure Documentation  Procedure: TAP       Diagnosis: POST OP ANALGESIA       Laterality: bilateral       Patient Position: supine       Patient Prep/Sterile Barriers: sterile gloves, mask       Skin prep: Chloraprep       Needle Type: short bevel       Needle Length (millimeters): 110        Ultrasound guided       1. Ultrasound was used to identify targeted nerve, plexus, vascular marker, or fascial plane and place a needle adjacent to it in real-time.       2. Ultrasound was used to visualize the spread of anesthetic in close proximity to the above referenced structure.       3. A permanent image is entered into the patient's record.    Assessment/Narrative         The placement was negative for: blood aspirated, painful injection and site bleeding       Paresthesias: No.       Bolus given via needle..        Secured via.        Insertion/Infusion Method: Single Shot       Complications: none       Injection made incrementally with aspirations every 5 mL.    Medication(s) Administered   Bupivacaine 0.25% PF (Infiltration) - Infiltration   20 mL - 3/8/2023 12:16:00 PM  Bupivacaine liposome (Exparel) 1.3% LA inj susp (Infiltration) - Infiltration   20 mL - 3/8/2023 12:16:00 PM  Medication Administration Time: 3/8/2023 12:16 PM      FOR [facility]Bank) ONLY:   Pain Team Contact information: please page the Pain Team Via [facility]. Search \"Pain\". During daytime hours, please page the attending first. At night please page the resident first.    "

## 2023-03-08 NOTE — PROGRESS NOTES
Locked up 4 bags of belongings along with patient's purse, $30.00 and a couple credit cards. Offered to lock up cash and credit cards with the security office, patient declined and left her purse in her [employer] bank.

## 2023-03-08 NOTE — ANESTHESIA CARE TRANSFER NOTE
Patient: Vicki Shell    Procedure: Procedure(s):  Diverting loop ileostomy closure  SIGMOIDOSCOPY, FLEXIBLE       Diagnosis: S/P ileostomy (H) [Z93.2]  Anastomotic stricture of colorectal region [K91.30]  Diagnosis Additional Information: No value filed.    Anesthesia Type:   General     Note:    Oropharynx: oropharynx clear of all foreign objects and spontaneously breathing  Level of Consciousness: drowsy  Oxygen Supplementation: face mask  Level of Supplemental Oxygen (L/min / FiO2): 8  Independent Airway: airway patency satisfactory and stable    Vital Signs Stable: post-procedure vital signs reviewed and stable  Report to RN Given: handoff report given  Patient transferred to: PACU  Comments: Comfortable. VSS tolerated well  Handoff Report: Identifed the Patient, Identified the Reponsible Provider, Reviewed the pertinent medical history, Discussed the surgical course, Reviewed Intra-OP anesthesia mangement and issues during anesthesia, Set expectations for post-procedure period and Allowed opportunity for questions and acknowledgement of understanding      Vitals:  Vitals Value Taken Time   /83 03/08/23 1530   Temp     Pulse 86 03/08/23 1532   Resp 18 03/08/23 1532   SpO2 100 % 03/08/23 1532   Vitals shown include unvalidated device data.    Electronically Signed By: CHANCE Luque CRNA  March 8, 2023  3:34 PM

## 2023-03-08 NOTE — OP NOTE
[facility] Colorectal Surgery Operative Report  March 8, 2023    PREOPERATIVE DIAGNOSIS:  1. Loop ileostomy status.  2. Chronic pain  3. History of pelvic endometriosis  4. History of pelvic sepsis, s/p Merlin procedure  5. Depression  6. History of tobacco use    POSTOPERATIVE DIAGNOSIS:   1. Loop ileostomy status.  2. Chronic pain  3. History of pelvic endometriosis  4. History of pelvic sepsis, s/p Merlin procedure  5. Depression  6. History of tobacco use    PROCEDURE:  1. Loop ileostomy takedown.  2.  Small bowel, stapled, side-to-side anastomosis.  3.  Repair of abdominal wall at ileostomy site.  4. Flexible sigmoidoscopy    ANESTHESIA: General endotracheal anesthesia plus bilateral TAP blocks local anesthesia.    SURGEON:  Tad Richardson M.D.    ASSISTANT(S): Tyler Caro, CRS Fellow; Shani Anglin, PGY3    INDICATIONS FOR PROCEDURE  Vicki Shell is a 37 year old female with a complex surgical history. She had a missed rectal injury at the time of an ovarian cystectomy which resulted in pelvic sepsis and a colostomy. I reversed her colostomy with a DLI, but this was complicated by an anastomotic stricture. I performed a redo anastomosis with a widely patent and healthy anastomosis. She now presents for ileostomy reversal. I thoroughly discussed the risks, benefits, and alternatives of operative treatment with the patient and she agreed to proceed.    General risks related to abdominal surgery were reviewed with the patient. These include, but are not limited to, death, myocardial infarction, pneumonia, urinary tract infection, deep venous thrombosis with or without pulmonary embolus, abdominal infection from bowel injury or abscess, fistula, anastomotic leak that may require reoperation and stoma, bowel obstruction, wound infection, and bleeding.    OPERATIVE PROCEDURE: After obtaining informed consent, the patient was brought to the operating room and placed in the lithotomy position. Appropriate  "preoperative mechanical and chemical deep venous thrombosis prophylaxis, as well as preoperative prophylactic parenteral antibiotics were given. General endotracheal anesthesia was gently induced. Bilateral lower extremity pneumatic compression devices were applied and all pressure points were cushioned. A Sommer cathter was inserted. After a \"time-out\" was performed, a flexible sigmoidoscopy was performed which confirmed a widely patent and healthy colorectal anastomosis. The abdomen was then preped and draped in the standard sterile fashion. A circumferential incision was made at the mucocutaneous junction of the ileostomy. A lone-star surgical retractor was placed for adequate exposure. The subcutaneous tissue was carefully dissected off the terminal ileum down to the level of the fascia. The terminal ileum was then  from the abdominal wall fascia and muscle using sharp dissection. The peritoneal cavity was then entered and any additional adhesions from the ileum to the parietal peritoneum were carefully taken down. After obtaining sufficient bowel length to perform an anastomosis, the ileostomy edges were inverted with sharp dissection. Both segments of terminal ileum were aligned, making sure to not incorporate mesentery or adjacent tissues, and a stapled side-to-side functional end-to-end ileo-ileal anastomosis was made with a JAM 75 surgical stapler (blue load). The anastomosis was evaluated through the common enterotomy and no bleeding was visualized. After this, a TA  90 surgical stapler (blue load) was used to transect the remaining colon and terminal ileum, making sure the JAM staple lines were not aligned. The specimen, including the loop ileostomy edges was sent to pathology. However, on inspection of the TA line, there was a serosal injury at the middle of the staple line. As there was a widely patent common enterotomy, I repeated the staple line just below this with another new staple line of " the TA 90, which was healthy and intact. The anastomosis appeared to be well vascularized, widely patent, and without tension or torsion. The TA staple line was reinforced with interrupted 3-0 PDS at sites of bleeding. The crotch of the JAM staple line was reinforced with 2 separate 3-0 PDS sutures. The entire TA staple line was reinforced with 3-0 PDS in interrupted Lembert fashion for additional reinforcement.    The bowel was then returned to the peritoneal cavity. The peritoneal cavity was irrigated with water and suctioned out. There was no evidence of bleeding or bowel injury. Instrument, sponge, and needle counts were all correct, as reported to me. The left lower quadrant abdominal wall defect was re approximated with multiple 0 PDS figure-of-eight sutures. The wound was irrigated and dried, and hemostasis was corroborated. The dermis was loosely re-approximated in a circular fashion with a running 2-0 Vicryl pursestring suture. The wound was packed with betadine soaked kerlix. A sterile dressing was applied. The patient tolerated the procedure well.    COMPLICATIONS: none, immediately.    ESTIMATED BLOOD LOSS: 30 mL.    REPLACEMENT:     - Crystalloid: 1800 mL.     - Colloid: 0 mL.     - Blood products: 0.    URINE OUTPUT: 125 mL    SPECIMEN(S): Ileostomy.    OPERATIVE COUNT: Complete.    DRAINS/TUBES: Sommer catheter was removed at the end of the case.    OPERATIVE FINDINGS:   1. Side to side functional end to end anastomosis, accommodates donut test and patent.   2. TA 90 refired below initial staple line due to mechanical defect in anastomosis. Repeat staple line healthy, oversowed in Lembert fashion along entire staple line..      Tad Richardson MD  Division of Colon and Rectal Surgery  [facility]  [phone]      CC:  [facility] Surgery billing.

## 2023-03-08 NOTE — ANESTHESIA PROCEDURE NOTES
Airway       Patient location during procedure: OR       Procedure Start/Stop Times: 3/8/2023 12:52 PM  Staff -        Anesthesiologist:  Sarath Berry MD       CRNA: Emilia Yang APRN CRNA       Performed By: CRNA  Consent for Airway        Urgency: elective  Indications and Patient Condition       Indications for airway management: perfecto-procedural       Induction type:intravenous       Mask difficulty assessment: 1 - vent by mask    Final Airway Details       Final airway type: endotracheal airway       Successful airway: ETT - single  Endotracheal Airway Details        ETT size (mm): 7.0       Successful intubation technique: direct laryngoscopy       DL Blade Type: Lerma 2       Grade View of Cords: 1       Position: Right       Measured from: lips       Secured at (cm): 22       Bite block used: None    Post intubation assessment        Placement verified by: capnometry and equal breath sounds        Number of attempts at approach: 1       Number of other approaches attempted: 0       Secured with: pink tape       Ease of procedure: easy       Dentition: Intact and Unchanged    Medication(s) Administered   Medication Administration Time: 3/8/2023 12:52 PM

## 2023-03-08 NOTE — BRIEF OP NOTE
[facility]    Brief Operative Note    Pre-operative diagnosis: S/P ileostomy (H) [Z93.2]  Anastomotic stricture of colorectal region [K91.30]  Post-operative diagnosis Ileostomy status    Procedure: Procedure(s):  Diverting loop ileostomy closure  SIGMOIDOSCOPY, FLEXIBLE  Surgeon: Surgeon(s) and Role:     * Tad Richardson MD - Primary     * Shani Anglin MD - Resident - Assisting     * Tyler Caro MD - Fellow - Assisting  Anesthesia: General with Block   Estimated Blood Loss: 30 mL  IVF 1800 mL   mL    Drains: None  Specimens:   ID Type Source Tests Collected by Time Destination   1 : Ileostomy Tissue Other SURGICAL PATHOLOGY EXAM Tad Richardson MD 3/8/2023  2:10 PM      Findings:   1. Patent and healthy anastomosis. Side to side functional end to end anastomosis, accommodates donut test and patent. TA 90 refired below initial staple line due to mechanical defect in anastomosis. Repeat staple line healthy, oversowed in Lembert fashion along entire staple line..  Complications: None.  Implants:  None    Tad Richardson MD  Division of Colon and Rectal Surgery  [facility]  [phone]  +

## 2023-03-08 NOTE — ANESTHESIA PREPROCEDURE EVALUATION
Anesthesia Pre-Procedure Evaluation    Patient: Vicki Shell   MRN: [id number] : 1985        Procedure : Procedure(s):  CLOSURE, ILEOSTOMY  SIGMOIDOSCOPY, FLEXIBLE          Past Medical History:   Diagnosis Date     Colostomy in place (H)      Cyst of left ovary       Past Surgical History:   Procedure Laterality Date     COMBINED CYSTOSCOPY, INSERT STENT URETER(S) Bilateral 2022    Procedure: CYSTOSCOPY, WITH URETERAL bilateral STENT INSERTION;  Surgeon: Inocencio Washburn MD;  Location: [facility] OR     COMBINED CYSTOSCOPY, INSERT STENT URETER(S) Bilateral 2023    Procedure: CYSTOSCOPY, WITH URETERAL STENT INSERTION;  Surgeon: Ian Su MD;  Location: [facility] OR     ENDOSCOPIC ULTRASOUND LOWER GASTROINTESTIONAL TRACT (GI) N/A 2022    Procedure: ULTRASOUND, LOWER GI TRACT, ENDOSCOPIC;  Surgeon: James Prieto MD;  Location: [facility] OR     IR SKIN SUBQ/SEROMA ABSCESS DRAIN  2022     LAPAROSCOPIC ASSISTED COLOSTOMY TAKEDOWN N/A 2022    Procedure: CLOSURE, COLOSTOMY, LAPAROSCOPIC, converted to open colostomy reversal with loop ileostomy creation;  Surgeon: Tad Richardson MD;  Location: [facility] OR     LAPAROSCOPIC ILEOSTOMY N/A 2022    Procedure: CREATION, ILEOSTOMY, LAPAROSCOPIC converted to open;  Surgeon: Tad Richardson MD;  Location: [facility] OR     LAPAROSCOPY DIAGNOSTIC (GYN)      for ovarian cyst     LAPAROTOMY EXPLORATORY N/A 2023    Procedure: exploratory laparotomy, lyses of adhesions 3 hours, low anterior resection, flexible sigmoidoscopy, appendectomy, complex abdominal wall closure;  Surgeon: Tad Richardson MD;  Location: [facility] OR     LOW ANTERIOR BOWEL RESECTION       SIGMOIDOSCOPY FLEXIBLE N/A 2022    Procedure: Sigmoidoscopy flexible;  Surgeon: Tad Richardson MD;  Location: [facility]     SIGMOIDOSCOPY FLEXIBLE N/A 8/3/2022    Procedure: SIGMOIDOSCOPY, FLEXIBLE WITH CONTRAST;  Surgeon: James Prieto MD;  Location: [facility]     SIGMOIDOSCOPY FLEXIBLE  N/A 9/13/2022    Procedure: esophagastroduodenoscopy,SIGMOIDOSCOPY, FLEXIBLE,possible stent placement, and necoratization;  Surgeon: James Prieto MD;  [facility]: [facility]     SIGMOIDOSCOPY FLEXIBLE N/A 1/4/2023    Procedure: Sigmoidoscopy flexible;  Surgeon: Tad Richardson MD;  Location: [facility] OR      No Known Allergies   Social History     Tobacco Use     Smoking status: Former     Types: Cigarettes     Smokeless tobacco: Never     Tobacco comments:     5-7 cigarettes per day   Substance Use Topics     Alcohol use: Not Currently      Wt Readings from Last 1 Encounters:   02/02/23 80.3 kg (177 lb)        Anesthesia Evaluation   Pt has had prior anesthetic. Type: General and Regional.    No history of anesthetic complications       ROS/MED HX  ENT/Pulmonary:     (+) tobacco use, Past use, recent URI, resolved,     Neurologic:  - neg neurologic ROS     Cardiovascular:     (+) -----No previous cardiac testing  (-) hypertension, CAD and arrhythmias   METS/Exercise Tolerance: >4 METS    Hematologic:     (+) anemia,     Musculoskeletal:       GI/Hepatic: Comment: Rectal perforation s/p ovarian cystectomy with resection and anastomosis c/b anastomotic stricture   (-) GERD and esophageal disease   Renal/Genitourinary:  - neg Renal ROS     Endo:  - neg endo ROS     Psychiatric/Substance Use:     (+) psychiatric history anxiety H/O chronic opiod use .     Infectious Disease:  - neg infectious disease ROS     Malignancy:  - neg malignancy ROS     Other:  - neg other ROS    (+) , H/O Chronic Pain,        Physical Exam    Airway        Mallampati: II   TM distance: > 3 FB   Neck ROM: full   Mouth opening: > 3 cm    Respiratory Devices and Support         Dental       (+) Minor Abnormalities - some fillings, tiny chips    B=Bridge, C=Chipped, L=Loose, M=Missing    Cardiovascular   cardiovascular exam normal          Pulmonary   pulmonary exam normal                OUTSIDE LABS:  CBC:   Lab Results   Component Value Date     WBC 15.2 (H) 01/05/2023    WBC 6.1 05/03/2022    HGB 11.9 01/05/2023    HGB 9.7 (L) 05/03/2022    HCT 37.0 01/05/2023    HCT 30.1 (L) 05/03/2022     01/10/2023     01/07/2023     BMP:   Lab Results   Component Value Date     01/05/2023     05/03/2022    POTASSIUM 3.9 01/05/2023    POTASSIUM 4.3 05/07/2022    CHLORIDE 106 01/05/2023    CHLORIDE 108 05/03/2022    CO2 20 (L) 01/05/2023    CO2 26 05/03/2022    BUN 9.3 01/05/2023    BUN 5 (L) 05/03/2022    CR 0.58 01/05/2023    CR 0.56 01/04/2023     (H) 01/07/2023     (H) 01/06/2023     COAGS:   Lab Results   Component Value Date    PTT 30 02/16/2022    INR 1.09 05/06/2022     POC: No results found for: BGM, HCG, HCGS  HEPATIC:   Lab Results   Component Value Date    ALBUMIN 3.7 02/16/2022    PROTTOTAL 7.3 02/16/2022    ALT 20 02/16/2022    AST 16 02/16/2022    ALKPHOS 66 02/16/2022    BILITOTAL 0.5 02/16/2022     OTHER:   Lab Results   Component Value Date    MANNY 8.2 (L) 01/05/2023    PHOS 2.6 05/01/2022    MAG 2.1 01/06/2023       Anesthesia Plan    ASA Status:  2   NPO Status:  NPO Appropriate    Anesthesia Type: General.     - Airway: ETT   Induction: Intravenous, Propofol.   Maintenance: Balanced.        Consents    Anesthesia Plan(s) and associated risks, benefits, and realistic alternatives discussed. Questions answered and patient/representative(s) expressed understanding.    - Discussed:     - Discussed with:  Patient      - Extended Intubation/Ventilatory Support Discussed: No.      - Patient is DNR/DNI Status: No    Use of blood products discussed: No .     Postoperative Care    Pain management: IV analgesics.   PONV prophylaxis: Ondansetron (or other 5HT-3), Dexamethasone or Solumedrol     Comments:                Sarath Berry MD

## 2023-03-09 LAB
ANION GAP SERPL CALCULATED.3IONS-SCNC: 11 MMOL/L (ref 7–15)
BUN SERPL-MCNC: 7.6 MG/DL (ref 6–20)
CALCIUM SERPL-MCNC: 8.3 MG/DL (ref 8.6–10)
CHLORIDE SERPL-SCNC: 104 MMOL/L (ref 98–107)
CREAT SERPL-MCNC: 0.56 MG/DL (ref 0.51–0.95)
DEPRECATED HCO3 PLAS-SCNC: 21 MMOL/L (ref 22–29)
ERYTHROCYTE [DISTWIDTH] IN BLOOD BY AUTOMATED COUNT: 13.5 % (ref 10–15)
GFR SERPL CREATININE-BSD FRML MDRD: >90 ML/MIN/1.73M2
GLUCOSE SERPL-MCNC: 154 MG/DL (ref 70–99)
HCT VFR BLD AUTO: 34.2 % (ref 35–47)
HGB BLD-MCNC: 10.9 G/DL (ref 11.7–15.7)
MAGNESIUM SERPL-MCNC: 1.6 MG/DL (ref 1.7–2.3)
MCH RBC QN AUTO: 26.5 PG (ref 26.5–33)
MCHC RBC AUTO-ENTMCNC: 31.9 G/DL (ref 31.5–36.5)
MCV RBC AUTO: 83 FL (ref 78–100)
PLATELET # BLD AUTO: 210 10E3/UL (ref 150–450)
POTASSIUM SERPL-SCNC: 3.7 MMOL/L (ref 3.4–5.3)
RBC # BLD AUTO: 4.12 10E6/UL (ref 3.8–5.2)
SODIUM SERPL-SCNC: 136 MMOL/L (ref 136–145)
WBC # BLD AUTO: 14.9 10E3/UL (ref 4–11)

## 2023-03-09 PROCEDURE — 250N000013 HC RX MED GY IP 250 OP 250 PS 637: Performed by: PHYSICIAN ASSISTANT

## 2023-03-09 PROCEDURE — 85027 COMPLETE CBC AUTOMATED: CPT | Performed by: SURGERY

## 2023-03-09 PROCEDURE — 250N000011 HC RX IP 250 OP 636: Performed by: SURGERY

## 2023-03-09 PROCEDURE — 258N000003 HC RX IP 258 OP 636: Performed by: PHYSICIAN ASSISTANT

## 2023-03-09 PROCEDURE — 999N000157 HC STATISTIC RCP TIME EA 10 MIN

## 2023-03-09 PROCEDURE — 250N000009 HC RX 250: Performed by: PHYSICIAN ASSISTANT

## 2023-03-09 PROCEDURE — 250N000011 HC RX IP 250 OP 636: Performed by: PHYSICIAN ASSISTANT

## 2023-03-09 PROCEDURE — 120N000002 HC R&B MED SURG/OB UMMC

## 2023-03-09 PROCEDURE — 36415 COLL VENOUS BLD VENIPUNCTURE: CPT | Performed by: SURGERY

## 2023-03-09 PROCEDURE — 250N000011 HC RX IP 250 OP 636: Performed by: STUDENT IN AN ORGANIZED HEALTH CARE EDUCATION/TRAINING PROGRAM

## 2023-03-09 PROCEDURE — 82310 ASSAY OF CALCIUM: CPT | Performed by: SURGERY

## 2023-03-09 PROCEDURE — 99254 IP/OBS CNSLTJ NEW/EST MOD 60: CPT | Performed by: ANESTHESIOLOGY

## 2023-03-09 PROCEDURE — 83735 ASSAY OF MAGNESIUM: CPT | Performed by: SURGERY

## 2023-03-09 RX ORDER — ONDANSETRON 4 MG/1
4-8 TABLET, ORALLY DISINTEGRATING ORAL EVERY 6 HOURS PRN
Status: DISCONTINUED | OUTPATIENT
Start: 2023-03-09 | End: 2023-03-14

## 2023-03-09 RX ORDER — HYDROMORPHONE HCL IN WATER/PF 6 MG/30 ML
0.2 PATIENT CONTROLLED ANALGESIA SYRINGE INTRAVENOUS
Status: DISCONTINUED | OUTPATIENT
Start: 2023-03-09 | End: 2023-03-10

## 2023-03-09 RX ORDER — ONDANSETRON 2 MG/ML
4-8 INJECTION INTRAMUSCULAR; INTRAVENOUS EVERY 6 HOURS PRN
Status: DISCONTINUED | OUTPATIENT
Start: 2023-03-09 | End: 2023-03-14

## 2023-03-09 RX ORDER — FENTANYL 25 UG/1
25 PATCH TRANSDERMAL
Status: DISCONTINUED | OUTPATIENT
Start: 2023-03-09 | End: 2023-03-24 | Stop reason: HOSPADM

## 2023-03-09 RX ORDER — METHOCARBAMOL 100 MG/ML
500 INJECTION, SOLUTION INTRAMUSCULAR; INTRAVENOUS EVERY 8 HOURS PRN
Status: DISPENSED | OUTPATIENT
Start: 2023-03-09 | End: 2023-03-12

## 2023-03-09 RX ORDER — MAGNESIUM SULFATE HEPTAHYDRATE 40 MG/ML
2 INJECTION, SOLUTION INTRAVENOUS ONCE
Status: COMPLETED | OUTPATIENT
Start: 2023-03-09 | End: 2023-03-09

## 2023-03-09 RX ORDER — SODIUM CHLORIDE, SODIUM LACTATE, POTASSIUM CHLORIDE, CALCIUM CHLORIDE 600; 310; 30; 20 MG/100ML; MG/100ML; MG/100ML; MG/100ML
INJECTION, SOLUTION INTRAVENOUS CONTINUOUS
Status: DISCONTINUED | OUTPATIENT
Start: 2023-03-09 | End: 2023-03-10

## 2023-03-09 RX ORDER — SCOLOPAMINE TRANSDERMAL SYSTEM 1 MG/1
1 PATCH, EXTENDED RELEASE TRANSDERMAL
Status: DISCONTINUED | OUTPATIENT
Start: 2023-03-09 | End: 2023-03-24 | Stop reason: HOSPADM

## 2023-03-09 RX ADMIN — ONDANSETRON 4 MG: 2 INJECTION INTRAMUSCULAR; INTRAVENOUS at 01:23

## 2023-03-09 RX ADMIN — PROCHLORPERAZINE EDISYLATE 10 MG: 5 INJECTION INTRAMUSCULAR; INTRAVENOUS at 16:19

## 2023-03-09 RX ADMIN — MAGNESIUM SULFATE IN WATER 2 G: 40 INJECTION, SOLUTION INTRAVENOUS at 16:23

## 2023-03-09 RX ADMIN — ONDANSETRON 4 MG: 2 INJECTION INTRAMUSCULAR; INTRAVENOUS at 07:39

## 2023-03-09 RX ADMIN — HYDROMORPHONE HYDROCHLORIDE 0.2 MG: 0.2 INJECTION, SOLUTION INTRAMUSCULAR; INTRAVENOUS; SUBCUTANEOUS at 23:19

## 2023-03-09 RX ADMIN — ENOXAPARIN SODIUM 40 MG: 40 INJECTION SUBCUTANEOUS at 10:14

## 2023-03-09 RX ADMIN — SCOPALAMINE 1 PATCH: 1 PATCH, EXTENDED RELEASE TRANSDERMAL at 08:45

## 2023-03-09 RX ADMIN — SODIUM CHLORIDE, POTASSIUM CHLORIDE, SODIUM LACTATE AND CALCIUM CHLORIDE 500 ML: 600; 310; 30; 20 INJECTION, SOLUTION INTRAVENOUS at 08:44

## 2023-03-09 RX ADMIN — PROCHLORPERAZINE EDISYLATE 10 MG: 5 INJECTION INTRAMUSCULAR; INTRAVENOUS at 03:06

## 2023-03-09 RX ADMIN — PROCHLORPERAZINE EDISYLATE 10 MG: 5 INJECTION INTRAMUSCULAR; INTRAVENOUS at 22:45

## 2023-03-09 RX ADMIN — PROCHLORPERAZINE EDISYLATE 10 MG: 5 INJECTION INTRAMUSCULAR; INTRAVENOUS at 10:10

## 2023-03-09 RX ADMIN — ONDANSETRON 8 MG: 2 INJECTION INTRAMUSCULAR; INTRAVENOUS at 14:05

## 2023-03-09 RX ADMIN — FENTANYL 1 PATCH: 25 PATCH TRANSDERMAL at 12:48

## 2023-03-09 RX ADMIN — SODIUM CHLORIDE, POTASSIUM CHLORIDE, SODIUM LACTATE AND CALCIUM CHLORIDE: 600; 310; 30; 20 INJECTION, SOLUTION INTRAVENOUS at 15:37

## 2023-03-09 RX ADMIN — ONDANSETRON 8 MG: 2 INJECTION INTRAMUSCULAR; INTRAVENOUS at 20:58

## 2023-03-09 ASSESSMENT — ACTIVITIES OF DAILY LIVING (ADL)
ADLS_ACUITY_SCORE: 20
ADLS_ACUITY_SCORE: 24
ADLS_ACUITY_SCORE: 22
ADLS_ACUITY_SCORE: 20
ADLS_ACUITY_SCORE: 22
ADLS_ACUITY_SCORE: 24

## 2023-03-09 NOTE — PROGRESS NOTES
"Post Op Check    03/09/2023    Vicki Shell is a 37 year old female with a complex surgical history now POD#0 s/p loop ileostomy take down and small bowel anastomosis.    Pt was quite sleepy when returning from the OR as well as nauseous. Denies SOB, chest pain, or dizziness. Has not gotten out of bed. Emesis x2. IV nausea meds ordered.     /77 (BP Location: Right arm)   Pulse 78   Temp (!) 96  F (35.6  C) (Temporal)   Resp 18   Ht 1.702 m (5' 7\")   Wt 80.4 kg (177 lb 4 oz)   LMP  (LMP Unknown)   SpO2 96%   BMI 27.76 kg/m      Gen: A&O x3, sleepy but arouses to voice   Chest: breathing non-labored  Abdomen: soft, moderately tender to palpation, non distended, ostomy takedown site with some strikethrough of dressing  Incision: clean, intact  Extremities: warm and well perfused    A/P: No acute post-op issues. Continue plan of care per primary team. Please call with any questions.  -PCA for pain  -IV nausea meds PRN    Albert Casillas MD  Surgery resident      "

## 2023-03-09 NOTE — PLAN OF CARE
Time: 7290-8169    Reason for Admission: POD #1 loop ileostomy take down and small bowel anastomosis.    Activity: SBA to help with cords.     Neuro: A&O x4. Pt lethargic throughout shift.     GI/: Pt voiding spontaneously without difficulty. Needs encouragement to get up and go. No Flatus or BM.     Diet: NPO except ice chips. Pt continuously nauseous. PRN zofran and compazine given. Scop patch in place behind right ear.     Incisions/Drains: Ileostomy takedown site covered, CDI.     IV Access: R PIV infusing LR at 75mL/hr with diluadid PCA.     Labs: B    Vitals: VSS on RA. Pt refusing CAPNO.    Pain: Pt reporting abdominal pain. Pain pump in place.     New changes this shift: 500 LR bolus given.     Plan: Continue with plan of care.

## 2023-03-09 NOTE — PROGRESS NOTES
Admitted/transferred from:   2 RN full   skin assessment completed by Radha Haynes RN and Rachael GODINEZ RN.  Skin assessment finding: PIV on right forearm, abd incision with packing  Interventions/actions: skin interventions encourage position changes     Will continue to monitor.

## 2023-03-09 NOTE — PROGRESS NOTES
"Colorectal Surgery Progress Note  [facility]  POD#1    03/09/2023    Subjective: Vicki Shell is a 37 year old female with a complex surgical history including pelvic sepsis s/p Merlin procedure. She is POD#1 s/p loop ileostomy take down and small bowel anastomosis.    Pt was lethargic and nauseous overnight. Has not gotten out of bed. Reports emesis up to 12 times overnight, charted totaling 400mL. IV compazine and zofran have not helped.     She reports tolerable discomfort in the abdomen which is managed with PCA. Does not think PCA is worsening nausea.     Hiccoughs during interview endorses burping overnight but does not feel distended.   No flatus or stool at this time.    Medical Hx Includes:  1. Loop ileostomy takedown with SBA (current hospitalization)  2. Chronic pain  3. History of pelvic endometriosis  4. History of pelvic sepsis, s/p Merlin procedure  5. Depression  6. Tobacco dependence    Objective:  /77 (BP Location: Right arm)   Pulse 78   Temp (!) 96  F (35.6  C) (Temporal)   Resp 18   Ht 1.702 m (5' 7\")   Wt 80.4 kg (177 lb 4 oz)   LMP  (LMP Unknown)   SpO2 96%   BMI 27.76 kg/m      Gen: A&O x3, sleepy but arouses to voice   Chest: breathing non-labored  Abdomen: soft, moderately tender to palpation, non distended, ostomy takedown. Saturated dressing changed, packing removed and replaced with 4x4 gauze.  Incision: clean, intact  Extremities: warm and well perfused    UOP was poor on POD#0 following surgery with 200 mL overnight. Bladder scan showed residual volume approx 112 mL.    The patient's vital signs are otherwise within normal limits. The surgical site is intact; no concerns for abnormal leakage or dehiscence. The patient's abdomen is not distended and tender to palpation.    Assessment:  No acute issues on POD#0. Vicki is however experiencing nausea and vomiting postoperatively. Her dressing was saturated with approximately 100 mL " serosanguinous fluid this AM but otherwise clean with no evidence of purulence, bleeding, or wound dehiscence or infection. She has a history of chronic pain managed with home opioids. UOP was poor overnight even with residual volume after void.    Plan:  - Monitor surgical site  - IV fluid    - bolus 500 mL LR   - Strict I/O, monitor UOP   - IV fluids increase to 75/hr  - Nausea/Vomiting:    - Continue IV zofran and compazine PRN   - Scopolamine patch ordered  - Pain:    - Continue PCA for pain management    - Consult pain   - Diet:   - NPO Ice chips until nausea resolves, then clears    Dieter Michael Victoria, MS3  March 9, 2023 6:02 AM      Addendum:  Pt was seen and examined independent of the medical student.     S: hiccuping.  Nauseated.  Reports about 12 episodes of emesis overnight. Only drinking water.  - gas/stool.      O:  Vitals, I&Os reviewed  NAD  Norm resp effort  abd soft,  Mildly distended.  Ostomy site wound - packing removed and covered.     A/P:  POD# 1 ileostomy take down with nausea and emesis.   - continue current pain regimen.  apprec Pain consult in setting of chronic opiate use.   - NPO   - IVF and bolus  - anti-emetics  - ambulate  - cover old ostomy site wound with gauze and change BID and prn  Ppx:  lovenox ppx daily  Dispo:  2-3 days pending ROBF and pain control.  Do not anticipate any specialized needs at time of discharge.     Katia Workman PA-C  Colon and Rectal Surgery     Seen and discussed with Dr. Caro    The above plan of care was performed and communicated to me by Dr. Richardson    I spent 35 minute face-to-face or coordinating care of Vicki Shell. Over 50% of our time on the unit was spent counseling the patient and/or coordinating care as documented in the assessment and plan.     [id number] post op hospital visit

## 2023-03-09 NOTE — PLAN OF CARE
"[id number]  /63 (BP Location: Right arm)   Pulse 77   Temp 98.8  F (37.1  C) (Temporal)   Resp 16   Ht 1.702 m (5' 7\")   Wt 80.4 kg (177 lb 4 oz)   LMP  (LMP Unknown)   SpO2 98%   BMI 27.76 kg/m      VSS on room air. SBA, not OOB on shift. Sleeping between cares. NPO ex ice chips. Voids spontaneously, did not void on shift. C/o constant LLQ pain, PCA in place. C/o constant nausea, aromatherapy utilized and IV Zofran given x1. (R) PIV infusing per MAR.     "

## 2023-03-09 NOTE — PROVIDER NOTIFICATION
Notified Resident at 0140 AM regarding Dressing saturated and leaking, Consistent nausea and vomiting, zofran is only antiemetic available.    Spoke with: Albert Cuellar    Orders were obtained.IV Comapzine prn ordered.     Comments: Dressing reinforced by RN. Per MD, okay to leave dressing as is overnight. Team will address in AM.     Jennifer Fernando RN on 3/9/2023 at 1:45 AM

## 2023-03-09 NOTE — PROVIDER NOTIFICATION
Notified resident at 0430 AM regarding pt voiding 200. Bladder scanned residual 112. She has fluids running at 50ml and is not taking much by mouth. Do you wanna an increase in maintenance fluids.     Spoke with:Albert Cuellar    Comments: Leave maintenance fluids running at current rate and bladder scan as scheduled.

## 2023-03-09 NOTE — ANESTHESIA POSTPROCEDURE EVALUATION
Patient: Vicki Shell    Procedure: Procedure(s):  Diverting loop ileostomy closure  SIGMOIDOSCOPY, FLEXIBLE       Anesthesia Type:  General    Note:  Disposition: Inpatient   Postop Pain Control: Uneventful            Sign Out: Well controlled pain   PONV: Yes            Sign Out: PONV/POV resolved with treatment   Neuro/Psych: Uneventful            Sign Out: Acceptable/Baseline neuro status   Airway/Respiratory: Uneventful            Sign Out: Acceptable/Baseline resp. status   CV/Hemodynamics: Uneventful            Sign Out: Acceptable CV status; No obvious hypovolemia; No obvious fluid overload   Other NRE: NONE   DID A NON-ROUTINE EVENT OCCUR? No           Last vitals:  Vitals Value Taken Time   /79 03/08/23 1800   Temp 37.6  C (99.7  F) 03/08/23 1800   Pulse 70 03/08/23 1814   Resp 16 03/08/23 1800   SpO2 100 % 03/08/23 1814   Vitals shown include unvalidated device data.    Electronically Signed By: Akbar Villalobos MD  March 8, 2023  6:15 PM

## 2023-03-09 NOTE — CONSULTS
"Pain Service Consultation Note  [facility]      Patient Name: Vicki Shell  MRN: [id number]   Age: 37 year old  Sex: female  Date: March 9, 2023                                      Reviewed: Yes    Referring Provider:  Tad Richardsno  Referring Service:  Colorectal  Reason for Consultation: Chronic abdominal pain    Assessment/Recommendations:  37 year old female with a complex surgical history s/p loop ileostomy take down and small bowel anastomosis (3/8/23)    Plan:   1. Given poor oral intake and constant nausea, would restart PTA Fentanyl Patch. Patient reports \"running out of morphine\" at home and has been off of it for bit. Also, unsure what is being effectively absorbed in the setting of constant nausea. Would consider stopping it at this time until nausea better controlled  2. Continue dPCA  3. Can consider addition of continuous ketamine infusion if pain escalates (start at 5 mg/hr)  4. Consider changing compazine to haldol (1 mg Q6H/PRN) for nausea  5. Consider changing oral APAP to rectal given nausea  6. Continue scopolamine patch    Thank you for the opportunity to participate in the care of Vicki Shell  Pain Service will continue to follow.    Primary Service Contacted with Recommendations? Yes    Howard Butler MD  3/9/2023      Chief Complaint:  Abdominal Pain      History of Present Illness:  Vicki Shell is a 37 year old female with a complex past medical history including chronic pelvic pain for which she underwent hysterectomy that resulted in rectal injury. This, in turn, was complicated by sepsis and required urgent Merlin procedure. She is currently POD#1 from loop ileostomy take down and small bowel anastomosis. Currently, her main concern is refractory nausea for which she is being agressively treated. At home, her average pain is 3/10 and reports it to be 6/10 today. She states that her pain is effectively being managed, but that she is having " continued difficulties with nausea.    Per MN  review pulled from system on 23. Last refill on 23, indicating the following analgesic usage: Fentanyl Patch 25 mcg/hr + Hydrocodone 5 mg Q8H + Morphine Sulfate ER 30 mg Q12H.     Past Medical History:  Past Medical History:   Diagnosis Date     Colostomy in place (H)      Cyst of left ovary          Family History:    Family History   Problem Relation Age of Onset     Deep Vein Thrombosis (DVT) Father      Anesthesia Reaction No family hx of        Social History:  Social History     Tobacco Use     Smoking status: Former     Types: Cigarettes     Quit date: 2022     Years since quittin.9     Smokeless tobacco: Never     Tobacco comments:     5-7 cigarettes per day   Substance Use Topics     Alcohol use: Not Currently         Tobacco:   Endorses  ETOH:  Not currently  H/O Substance Abuse:  Denies      Review of Systems:  Complete ROS reviewed. Unless otherwise noted, all other systems found to be negative.        Laboratory Results:  Recent Labs   Lab Test 23  0714 22  0907 22  0715 22  1017 22  0952   INR  --   --  1.09  --  1.02      < >  --    < > 200   PTT  --   --   --   --  30   BUN 7.6   < >  --    < > 10    < > = values in this interval not displayed.         Allergies:  No Known Allergies      Pain Medications:  Pain Medications/Prescriber: Karen Hernandez    Current Pain Related Medications:  Medications related to Pain Management (From now, onward)    Start     Dose/Rate Route Frequency Ordered Stop    23  gabapentin (NEURONTIN) capsule 300 mg        Note to Pharmacy: PTA Sig:Take 1 capsule (300 mg) by mouth 3 times daily      300 mg Oral 3 TIMES DAILY 23  methocarbamol (ROBAXIN) tablet 1,000 mg        Note to Pharmacy: PTA Sig:Take 1,000 mg by mouth 4 times daily      1,000 mg Oral 4 TIMES DAILY 23 [id number] 2000  morphine (MS CONTIN) 12 hr  "tablet 30 mg        Note to Pharmacy: PTA Sig:Take 30 mg by mouth every 12 hours      30 mg Oral EVERY 12 HOURS 03/08/23 1852 03/08/23 1900  acetaminophen (TYLENOL) tablet 1,000 mg         1,000 mg Oral EVERY 6 HOURS 03/08/23 1852 03/11/23 1859 03/08/23 1852  bupivacaine liposome (EXPAREL) LA inj was given in the infiltration site to produce post-op analgesia. Duration of action is up to 72 hours. Other \"liz\" meds should not be given for 96 hours except for lidocaine 4% patch. This is for INFORMATION ONLY.          Does not apply CONTINUOUS PRN 03/08/23 1852 03/12/23 1951 03/08/23 1852  hydrOXYzine (ATARAX) tablet 25 mg        Note to Pharmacy: PTA Sig:Take 1 tablet (25 mg) by mouth every 8 hours as needed for itching or anxiety      25 mg Oral EVERY 8 HOURS PRN 03/08/23 1852 03/08/23 1852  lidocaine 1 % 0.1-1 mL         0.1-1 mL Other EVERY 1 HOUR PRN 03/08/23 1852 03/08/23 1852  lidocaine (LMX4) cream          Topical EVERY 1 HOUR PRN 03/08/23 1852 03/08/23 1600  HYDROmorphone (DILAUDID) PCA 0.2 mg/mL OPIOID NAIVE (age less than 65 years)          Intravenous CONTINUOUS 03/08/23 1535              Physical Exam:  Vitals: /66 (BP Location: Right arm)   Pulse 74   Temp (!) 96.7  F (35.9  C) (Temporal)   Resp 16   Ht 1.702 m (5' 7\")   Wt 80.4 kg (177 lb 4 oz)   LMP  (LMP Unknown)   SpO2 96%   BMI 27.76 kg/m      Physical Exam:   CONSTITUTIONAL/GENERAL APPEARANCE:  NAD, somnolent but rousable  EYES: EOMI, sclera anicteric, PERRLA  ENT/NECK: atraumatic, lips and oral mucous membranes dry  RESPIRATORY: non-labored breathing. No cough, wheeze  CV: RRR, no audible rubs, gallops, or murmurs  ABDOMEN: Soft, non-tender, non-distended  MUSCULOSKELETAL/BACK/SPINE/EXTREMITIES: Moves all extremities purposefully.  No edema or obvious joint deformities   NEURO: Alert and Oriented x3. Answers questions appropriately  SKIN/VASCULAR EXAM:  No jaundice, no visible rashes or lesions      Please " "see A&P for additional details of medical decision making.  65 MINUTES SPENT BY ME on the date of service doing chart review, history, exam, documentation & further activities per the note.      Acute Inpatient Pain Service [facility]  Hours of pain coverage 24/7   Page via Amcom- Please Page the Pain Team [facility]: \"PAIN MANAGEMENT ACUTE INPATIENT/ [facility]\"            "

## 2023-03-09 NOTE — PLAN OF CARE
Goal Outcome Evaluation:     Very lethargic/drowsy. Will sometimes to respond to voice. VSS on RA. Patient has only voided about 10ml after onofre removal in PACU. Pt able to ambulate to bathroom with 1 assist. Reporting pain manageable with PCA. Nausea present pretty continuous this shift. One episode of small emesis, IV zofran given. PIV right arm, abd incision packed.

## 2023-03-09 NOTE — PLAN OF CARE
Goal Outcome Evaluation:      Plan of Care Reviewed With: patient    POD#1 loop ileostomy take down and small anastomosis    A/O x4, lethargic. VSS, on 1L NC. Continues nausea. 1 emesis on this shift. IV Zofran and Compazine given. Managed pain with PCA. PIV infusing. Abd Dressing saturated and leaking.  Dressing reinforced. Pt able to ambulate to bathroom with 1 assist Bladder scanned at 4 and paged team. Ordered to continue to bladder scan as ordered.Scrum dressing in place CDI. Cont POC.

## 2023-03-10 ENCOUNTER — APPOINTMENT (OUTPATIENT)
Dept: PHYSICAL THERAPY | Facility: CLINIC | Age: 38
DRG: 330 | End: 2023-03-10
Attending: SURGERY
Payer: MEDICAID

## 2023-03-10 PROCEDURE — 250N000013 HC RX MED GY IP 250 OP 250 PS 637: Performed by: STUDENT IN AN ORGANIZED HEALTH CARE EDUCATION/TRAINING PROGRAM

## 2023-03-10 PROCEDURE — 250N000013 HC RX MED GY IP 250 OP 250 PS 637: Performed by: SURGERY

## 2023-03-10 PROCEDURE — 250N000011 HC RX IP 250 OP 636: Performed by: STUDENT IN AN ORGANIZED HEALTH CARE EDUCATION/TRAINING PROGRAM

## 2023-03-10 PROCEDURE — 250N000011 HC RX IP 250 OP 636: Performed by: PHYSICIAN ASSISTANT

## 2023-03-10 PROCEDURE — 258N000003 HC RX IP 258 OP 636: Performed by: PHYSICIAN ASSISTANT

## 2023-03-10 PROCEDURE — 99233 SBSQ HOSP IP/OBS HIGH 50: CPT | Performed by: NURSE PRACTITIONER

## 2023-03-10 PROCEDURE — 97161 PT EVAL LOW COMPLEX 20 MIN: CPT | Mod: GP | Performed by: PHYSICAL THERAPIST

## 2023-03-10 PROCEDURE — 999N000111 HC STATISTIC OT IP EVAL DEFER

## 2023-03-10 PROCEDURE — 258N000003 HC RX IP 258 OP 636: Performed by: STUDENT IN AN ORGANIZED HEALTH CARE EDUCATION/TRAINING PROGRAM

## 2023-03-10 PROCEDURE — 120N000002 HC R&B MED SURG/OB UMMC

## 2023-03-10 PROCEDURE — 250N000011 HC RX IP 250 OP 636: Performed by: SURGERY

## 2023-03-10 PROCEDURE — 999N000248 HC STATISTIC IV INSERT WITH US BY RN

## 2023-03-10 PROCEDURE — 250N000009 HC RX 250: Performed by: STUDENT IN AN ORGANIZED HEALTH CARE EDUCATION/TRAINING PROGRAM

## 2023-03-10 RX ORDER — HALOPERIDOL 5 MG/ML
1 INJECTION INTRAMUSCULAR EVERY 6 HOURS PRN
Status: DISCONTINUED | OUTPATIENT
Start: 2023-03-10 | End: 2023-03-16

## 2023-03-10 RX ORDER — ONDANSETRON 4 MG/1
4 TABLET, ORALLY DISINTEGRATING ORAL EVERY 8 HOURS PRN
Status: ON HOLD | COMMUNITY
End: 2023-03-23

## 2023-03-10 RX ORDER — HYDROMORPHONE HYDROCHLORIDE 2 MG/1
2-4 TABLET ORAL
Status: DISCONTINUED | OUTPATIENT
Start: 2023-03-10 | End: 2023-03-14

## 2023-03-10 RX ORDER — HYDROMORPHONE HCL IN WATER/PF 6 MG/30 ML
.2-.4 PATIENT CONTROLLED ANALGESIA SYRINGE INTRAVENOUS
Status: DISCONTINUED | OUTPATIENT
Start: 2023-03-10 | End: 2023-03-13

## 2023-03-10 RX ADMIN — ACETAMINOPHEN 1000 MG: 500 TABLET ORAL at 09:29

## 2023-03-10 RX ADMIN — HYDROMORPHONE HYDROCHLORIDE 0.2 MG: 0.2 INJECTION, SOLUTION INTRAMUSCULAR; INTRAVENOUS; SUBCUTANEOUS at 13:57

## 2023-03-10 RX ADMIN — HALOPERIDOL LACTATE 1 MG: 5 INJECTION, SOLUTION INTRAMUSCULAR at 10:46

## 2023-03-10 RX ADMIN — HYDROMORPHONE HYDROCHLORIDE 0.2 MG: 0.2 INJECTION, SOLUTION INTRAMUSCULAR; INTRAVENOUS; SUBCUTANEOUS at 05:18

## 2023-03-10 RX ADMIN — GABAPENTIN 300 MG: 300 CAPSULE ORAL at 20:24

## 2023-03-10 RX ADMIN — GABAPENTIN 300 MG: 300 CAPSULE ORAL at 08:35

## 2023-03-10 RX ADMIN — SODIUM CHLORIDE, POTASSIUM CHLORIDE, SODIUM LACTATE AND CALCIUM CHLORIDE 500 ML: 600; 310; 30; 20 INJECTION, SOLUTION INTRAVENOUS at 08:29

## 2023-03-10 RX ADMIN — HYDROMORPHONE HYDROCHLORIDE 0.4 MG: 0.2 INJECTION, SOLUTION INTRAMUSCULAR; INTRAVENOUS; SUBCUTANEOUS at 23:07

## 2023-03-10 RX ADMIN — ONDANSETRON 4 MG: 2 INJECTION INTRAMUSCULAR; INTRAVENOUS at 19:17

## 2023-03-10 RX ADMIN — MORPHINE SULFATE 30 MG: 30 TABLET, EXTENDED RELEASE ORAL at 08:35

## 2023-03-10 RX ADMIN — HYDROMORPHONE HYDROCHLORIDE 0.2 MG: 0.2 INJECTION, SOLUTION INTRAMUSCULAR; INTRAVENOUS; SUBCUTANEOUS at 02:29

## 2023-03-10 RX ADMIN — SODIUM CHLORIDE, POTASSIUM CHLORIDE, SODIUM LACTATE AND CALCIUM CHLORIDE: 600; 310; 30; 20 INJECTION, SOLUTION INTRAVENOUS at 06:09

## 2023-03-10 RX ADMIN — HYDROMORPHONE HYDROCHLORIDE 0.2 MG: 0.2 INJECTION, SOLUTION INTRAMUSCULAR; INTRAVENOUS; SUBCUTANEOUS at 16:11

## 2023-03-10 RX ADMIN — HYDROMORPHONE HYDROCHLORIDE 0.4 MG: 0.2 INJECTION, SOLUTION INTRAMUSCULAR; INTRAVENOUS; SUBCUTANEOUS at 21:25

## 2023-03-10 RX ADMIN — ONDANSETRON 8 MG: 2 INJECTION INTRAMUSCULAR; INTRAVENOUS at 13:02

## 2023-03-10 RX ADMIN — LIDOCAINE HYDROCHLORIDE 30 ML: 20 SOLUTION ORAL; TOPICAL at 16:53

## 2023-03-10 RX ADMIN — ENOXAPARIN SODIUM 40 MG: 40 INJECTION SUBCUTANEOUS at 09:43

## 2023-03-10 RX ADMIN — METHOCARBAMOL 500 MG: 100 INJECTION, SOLUTION INTRAMUSCULAR; INTRAVENOUS at 13:09

## 2023-03-10 RX ADMIN — ACETAMINOPHEN 1000 MG: 500 TABLET ORAL at 01:21

## 2023-03-10 RX ADMIN — SERTRALINE HYDROCHLORIDE 50 MG: 50 TABLET ORAL at 22:28

## 2023-03-10 RX ADMIN — HYDROMORPHONE HYDROCHLORIDE 0.2 MG: 0.2 INJECTION, SOLUTION INTRAMUSCULAR; INTRAVENOUS; SUBCUTANEOUS at 08:40

## 2023-03-10 RX ADMIN — GABAPENTIN 300 MG: 300 CAPSULE ORAL at 13:57

## 2023-03-10 RX ADMIN — ACETAMINOPHEN 1000 MG: 500 TABLET ORAL at 20:24

## 2023-03-10 RX ADMIN — Medication 1 TABLET: at 09:29

## 2023-03-10 RX ADMIN — HYDROMORPHONE HYDROCHLORIDE 0.2 MG: 0.2 INJECTION, SOLUTION INTRAMUSCULAR; INTRAVENOUS; SUBCUTANEOUS at 18:35

## 2023-03-10 RX ADMIN — ONDANSETRON 4 MG: 2 INJECTION INTRAMUSCULAR; INTRAVENOUS at 05:19

## 2023-03-10 RX ADMIN — PROCHLORPERAZINE EDISYLATE 10 MG: 5 INJECTION INTRAMUSCULAR; INTRAVENOUS at 08:31

## 2023-03-10 ASSESSMENT — ACTIVITIES OF DAILY LIVING (ADL)
ADLS_ACUITY_SCORE: 22
ADLS_ACUITY_SCORE: 22
ADLS_ACUITY_SCORE: 23
ADLS_ACUITY_SCORE: 23
ADLS_ACUITY_SCORE: 22
ADLS_ACUITY_SCORE: 22
ADLS_ACUITY_SCORE: 23
ADLS_ACUITY_SCORE: 23
ADLS_ACUITY_SCORE: 22
ADLS_ACUITY_SCORE: 22
ADLS_ACUITY_SCORE: 23
DEPENDENT_IADLS:: INDEPENDENT
ADLS_ACUITY_SCORE: 22

## 2023-03-10 NOTE — PROVIDER NOTIFICATION
Notified resident about: Low urine output. Pt on narcotics and very sleepy.    Spoke to: Albert Casillas MD    Orders: Continue to monitor    Assessment: Pt continues pulse oximetry, RR 16, Pt falls back asleep right after answering any questions asked. LR running 75ml/hr.

## 2023-03-10 NOTE — PROGRESS NOTES
"   03/10/23 1402   Appointment Info   Signing Clinician's Name / Credentials (PT) Dawn Rowe, PT   Living Environment   People in Home child(santosh), dependent;spouse   Current Living Arrangements house   Home Accessibility stairs to enter home   Number of Stairs, Main Entrance 4   Stair Railings, Main Entrance railing on right side (ascending)   Transportation Anticipated car, drives self;family or friend will provide   Living Environment Comments Pt lives with her spouse & 4 children in 1 level home   Self-Care   Usual Activity Tolerance good   Current Activity Tolerance fair   Regular Exercise No   Equipment Currently Used at Home other (see comments)   Fall history within last six months no   General Information   Onset of Illness/Injury or Date of Surgery 03/08/23   Referring Physician Tad Richardson MD   Patient/Family Therapy Goals Statement (PT) Return home   Pertinent History of Current Problem (include personal factors and/or comorbidities that impact the POC) Per chart review, \"37 year old female with a complex surgical history including pelvic sepsis s/p Merlin procedure. S/p loop ileostomy take down and small bowel anastomosis.\" Other PMHx: chronic pain, depression, tobacco use, h/o pelvic endometriosis   Existing Precautions/Restrictions abdominal   General Observations Activity: up with assist   Cognition   Affect/Mental Status (Cognition) WNL   Orientation Status (Cognition) oriented x 4   Follows Commands (Cognition) WNL   Pain Assessment   Patient Currently in Pain Yes, see Vital Sign flowsheet   Posture    Posture Not impaired   Range of Motion (ROM)   ROM Comment biilateral L/Es WFL for mobility & ADL needs   Strength (Manual Muscle Testing)   Strength Comments bilateral L/E strength WFL for mobility needs   Bed Mobility   Comment, (Bed Mobility) independent supine to/from right sidelying to/from sit with HOB elevated (has adjustable bed at home)   Transfers   Comment, (Transfers) " independent sit to/from stand from EOB, armchair & toilet without device; per RN, pt completed shower earlier this date without difficulty   Gait/Stairs (Locomotion)   Comment, (Gait/Stairs) independent amb on level without device; tolerated 500 ft; exhibits good step symmetry & foot clearance bilaterally during swing phase; pt able to ascend/descend 4 steps with single rail using reciprocal pattern with supervision from PT   Balance   Balance no deficits were identified   Sensory Examination   Sensory Perception patient reports no sensory changes   Coordination   Coordination no deficits were identified   Clinical Impression   Criteria for Skilled Therapeutic Intervention Evaluation only   Influenced by the following impairments pain; abd precautions   Functional limitations due to impairments none at this time; pt able to demonstrate safe, independent mobility without device & maintaining abd precautions   Clinical Presentation (PT Evaluation Complexity) Stable/Uncomplicated   Clinical Presentation Rationale based upon subjective information provided, objective exam findings, medical history & clinical judgement   Clinical Decision Making (Complexity) low complexity   Risk & Benefits of therapy have been explained evaluation/treatment results reviewed;participants voiced agreement with care plan;participants included;patient   PT Total Evaluation Time   PT Eval, Low Complexity Minutes (38370) 12   PT Discharge Planning   PT Plan PT eval only; no skilled PT needs identified   PT Discharge Recommendation (DC Rec) home with assist   PT Rationale for DC Rec Pt demonstrates independent mobility without device on level & stairs; able to maintain abd precautions with all mobility; anticipate discharge to home with assist from spouse prn when medically appropriate for discharge   PT Brief overview of current status independent   Total Session Time   Total Session Time (sum of timed and untimed services) 12

## 2023-03-10 NOTE — PLAN OF CARE
OT: 7C: DEFER    Occupational Therapy: Orders received. Chart reviewed and discussed with care team.? Occupational Therapy not indicated due to no acute IP OT needs. Pt limited by pain, however up SBA and completing ADLs near IND baseline. Anticipate safe discharge home with assist as needed when medically appropriate.? Defer discharge recommendations to medical team.? Will complete orders.

## 2023-03-10 NOTE — PROGRESS NOTES
Pain Service Progress Note  [facility]  Date: 03/10/2023       Patient Name: Vicki Shell  MRN: [id number]  Age: 38 year old  Sex: female      Assessment:  37 year old female with a complex surgical history s/p loop ileostomy take down and small bowel anastomosis (3/8/23)  Consulted by Colorectal Service     Acute postoperative pain    History of chronic pain, opioid tolerance. Per MN  review pulled from system on 03/09/23. Last refill on 2/27/23, indicating the following analgesic usage: Fentanyl Patch 25 mcg/hr, change Q 72 hr + Hydrocodone-Acetaminophen 5mg-325 mg 4 times/day    Plan:   1. Continue Transdermal Fentanyl Patch 25 mcg/hr, change Q 72 hr  2. Discontinue MS Contin. Patient endorses that she was not taking this medication prior to admission, and due to likelihood of poor absorption she was switched to transdermal Fentanyl patch.   3. Start Dilaudid 2-4 mg PO Q 3 hr PRN pain  4. Continue Dilaudid IV as ordered. May increase with range of 0.2-0.4mg IV Q 2 hr PRN pain, space out doses daily and off 24 hours prior to discharge  5. Consider ketamine IV infusion if pain escalates (start at 5 mg/hr). Follow Ketamine Low Dose for Analgesia orderset  6. Continue acetaminophen 1,000 mg PO Q 6 hr and gabapentin 300 mg PO TID  7. Wait at least 72 hours after Exparel TAP block to start lidocaine patches  8. Okay to use heat alternating with cold packs PRN     Thank you for the opportunity to participate in the care of Vicki Shell  Pain Service will continue to follow.    Discussed with attending anesthesiologist    CHANCE Lock CNP  03/10/2023     Overnight Events: None    Tubes/Drains: No    Subjective:  I'm still having a lot of pain  Reports nausea and achy, throbbing abdominal pain with pain intensity 6/10 at rest and 8/10 with activity, sometimes down to 4/10 after meds. Average daily pain 3-4/10. She thinks TAP block with Exparel is effective for incisional  "pain      Diet: Clear Liquid Diet    Relevant Labs:  Recent Labs   Lab Test 03/09/23  0714 05/07/22  0907 05/06/22  0715 04/26/22  1017 02/16/22  0952   INR  --   --  1.09  --  1.02      < >  --    < > 200   PTT  --   --   --   --  30   BUN 7.6   < >  --    < > 10    < > = values in this interval not displayed.       Physical Exam:  Vitals: /75 (BP Location: Right arm)   Pulse 83   Temp 97.5  F (36.4  C) (Temporal)   Resp 16   Ht 1.702 m (5' 7\")   Wt 80.4 kg (177 lb 4 oz)   LMP  (LMP Unknown)   SpO2 100%   BMI 27.76 kg/m      Physical Exam:   CONSTITUTIONAL/GENERAL APPEARANCE: In bed, attempting to eat popsicle, awake, NAD  EYES: EOMI, sclera anicteric  ENT/NECK: atraumatic, lips and oral mucous membranes dry  RESPIRATORY: non-labored breathing. No cough, wheeze  NEURO:  Alert oriented x 4. Answers questions appropriately  SKIN/VASCULAR EXAM:  No jaundice, no suspicious rash or lesions to exposed skin  PSYCHIATRIC/BEHAVIORAL/OBSERVATIONS:  No objective signs of pain observed during our interview.  Judgement and insight intact, cooperative     Relevant Medications:  Current Pain Medications:  Medications related to Pain Management (From now, onward)    Start     Dose/Rate Route Frequency Ordered Stop    03/09/23 2156  HYDROmorphone (DILAUDID) injection 0.2 mg         0.2 mg Intravenous EVERY 2 HOURS PRN 03/09/23 2157      03/09/23 1130  fentaNYL (DURAGESIC) 25 mcg/hr 72 hr patch 1 patch        Note to Pharmacy: PTA Sig:Place 1 patch onto the skin every 72 hours remove old patch.     See Hyperspace for full Linked Orders Report.    25 mcg  over 72 Hours Transdermal EVERY 72 HOURS 03/09/23 1109      03/09/23 1112  methocarbamol (ROBAXIN) injection 500 mg         500 mg Intravenous EVERY 8 HOURS PRN 03/09/23 1112 03/12/23 1211    03/08/23 2000  gabapentin (NEURONTIN) capsule 300 mg        Note to Pharmacy: PTA Sig:Take 1 capsule (300 mg) by mouth 3 times daily      300 mg Oral 3 TIMES DAILY 03/08/23 " "1852 03/08/23 2000  morphine (MS CONTIN) 12 hr tablet 30 mg        Note to Pharmacy: PTA Sig:Take 30 mg by mouth every 12 hours      30 mg Oral EVERY 12 HOURS 03/08/23 1852 03/08/23 1900  acetaminophen (TYLENOL) tablet 1,000 mg         1,000 mg Oral EVERY 6 HOURS 03/08/23 1852 03/11/23 1859 03/08/23 1852  bupivacaine liposome (EXPAREL) LA inj was given in the infiltration site to produce post-op analgesia. Duration of action is up to 72 hours. Other \"liz\" meds should not be given for 96 hours except for lidocaine 4% patch. This is for INFORMATION ONLY.          Does not apply CONTINUOUS PRN 03/08/23 1852 03/12/23 1951 03/08/23 1852  hydrOXYzine (ATARAX) tablet 25 mg        Note to Pharmacy: PTA Sig:Take 1 tablet (25 mg) by mouth every 8 hours as needed for itching or anxiety      25 mg Oral EVERY 8 HOURS PRN 03/08/23 1852 03/08/23 1852  lidocaine 1 % 0.1-1 mL         0.1-1 mL Other EVERY 1 HOUR PRN 03/08/23 1852 03/08/23 1852  lidocaine (LMX4) cream          Topical EVERY 1 HOUR PRN 03/08/23 1852            Primary Service Contacted with Recommendations? Yes      Please see A&P for additional details of medical decision making.      Acute Inpatient Pain Service [facility]  Hours of pain coverage 24/7   Page via Amcom- Please Page the Pain Team Via Am[employer]: \"PAIN MANAGEMENT ACUTE INPATIENT/ [facility]\"             "

## 2023-03-10 NOTE — PROGRESS NOTES
"Colorectal Surgery Progress Note  [facility]  POD#2  Vicki Shell is a 37 year old female with a complex surgical history including pelvic sepsis s/p Merlin procedure. S/p loop ileostomy take down and small bowel anastomosis.    03/10/2023    Subjective:     Her main complaint is nausea. Has not gotten out of bed. Reports one episode of emesis this AM after drinking water. Scopolamine patch is helping.    She is off PCA and has fentanyl patch. Pain is tolerable with current regimen. Rates surgical pain at approximately 3/10.    Hiccoughs during interview. No flatus or stool at this time.    Voiding. Notes that urine appears dark.    Objective:  /75 (BP Location: Right arm)   Pulse 83   Temp 97.5  F (36.4  C) (Temporal)   Resp 16   Ht 1.702 m (5' 7\")   Wt 80.4 kg (177 lb 4 oz)   LMP  (LMP Unknown)   SpO2 100%   BMI 27.76 kg/m      Gen: A&O x3, somnolent with delayed responses but improved since yesterday, does not open eyes during exam but is interactive  Chest: breathing non-labored on RA  Abdomen: soft, moderately tender to palpation, non distended. Dressing over ostomy takedown site is clean and dry.  Extremities: warm and well perfused    UOP remains poor at approximately 0.36mL/kg/hr overnight.    Assessment:  No acute issues on POD#2. Vicki is still experiencing nausea and vomiting postoperatively. Ostomy takedown site is clean with no evidence of purulence, bleeding, or wound dehiscence or infection. She has a history of chronic pain managed with home opioids, pain is currently under control with PRN dilaudid and fentanyl patch. Some concern at this point that her persistent nausea may be related to opioid withdrawal. She is voiding spontaneously although UOP was poor overnight despite IV fluids and bolus yesterday.    Plan:  - Monitor surgical site  - IV fluid    - bolus 500 mL LR   - IV fluids at 75/hr   - Strict I/O, monitor UOP  - Nausea/Vomiting:    - " Continue IV zofran and transition compazine to haldol today   - Continue Scopolamine patch   - Trial GI cocktal  - Pain:    - Appreciate recs from Pain Management service   - Continue fentanyl patch and PRN dilauded   - Monitor  - Diet:   - Clear liquids, encourage PO intake as tolerated with nausea  - Ppx:   - SCDs, ambulate as able, lovenox  - Dressings:   - Change dressing daily and PRN, do not reinforce.    Holden Doyle MD  Intern, General Surgery  Colorectal Intern    Patient seen with Dr. Caro, who discussed with Dr. Lester

## 2023-03-10 NOTE — PLAN OF CARE
Goal Outcome Evaluation:  POD #2. A&Ox4.  RA. Low fiber diet. SBA. Voiding spontaneously. No bm. Passing gas. Pain tolerated w/ dilaudid, robaxin, fentanyl patch,  Tylenol, and MS contin. Pt has continuous nausea. No emesis. Nausea tolerated w/ haldol, zofran, scopolamine patch, GI cocktail. Take down site - packed, dressing intact, small drainage.  Mag 1.6 and Ca 8.3 - provider paged per note. Pt received 500 ml bolus. Pt showered and ambulated x1. Pt needs encouragement to ambulate.

## 2023-03-10 NOTE — PHARMACY-ADMISSION MEDICATION HISTORY
Admission Medication History Completed by Pharmacy    See [facility] Admission Navigator for allergy information, preferred outpatient pharmacy, prior to admission medications and immunization status.     Medication History Sources:     Patient, Dr. First MedHx    Changes made to PTA medication list (reason):    Added: Zofran prn, Vit D weekly    Deleted: Gabapentin 300 mg TID, Icy hot patch, methocarbomol 1000 mg QID, Morphine 30 mg BID, Selenium supplement, Miralax and senna-docusate prn -- patient reports she is no longer taking any of these medications     Changed: None    Additional Information:    None    Medications Prior to Admission   Medication Sig Dispense Refill Last Dose     acetaminophen (TYLENOL) 500 MG tablet Take 2 tablets (1,000 mg) by mouth every 6 hours 80 tablet 0 More than a month     fentaNYL (DURAGESIC) 25 mcg/hr 72 hr patch Place 1 patch onto the skin every 72 hours remove old patch.        HYDROcodone-acetaminophen (NORCO) 5-325 MG tablet Take 1 tablet by mouth every 6 hours as needed Resume when you have completed your post-operative course of pain medications  0 3/8/2023 at 0800     hydrOXYzine (ATARAX) 25 MG tablet Take 1 tablet (25 mg) by mouth every 8 hours as needed for itching or anxiety 15 tablet 0 3/8/2023 at 0800     multivitamin w/minerals (THERA-VIT-M) tablet Take 1 tablet by mouth daily Takes when she remembers   Past Week     ondansetron (ZOFRAN ODT) 4 MG ODT tab Take 4 mg by mouth every 8 hours as needed for nausea        sertraline (ZOLOFT) 50 MG tablet Take 50 mg by mouth every evening   3/7/2023 at 2300     vitamin D3 (CHOLECALCIFEROL) 1.25 MG (94058 UT) capsule Take 50,000 Units by mouth every 7 days Patient typically takes on Sunday            Date completed: 03/10/23    Medication history completed by: Serafin Goode, PharmD, BCCCP

## 2023-03-10 NOTE — PROGRESS NOTES
POD #1 loop ileostomy take down and small bowel anastomosis.  Only urinated once this shift. Notified MD on low urine output at 1800. PCA pump DCd this shift. Pt very lethargic, arouses to name when called loudly and answers question asked and falls right back asleep. Fentanyl patch on R shoulder, PRN IV pain medications in place of PCA. Taking PRN zofran and compazine around the clock to control nausea. Voiding spontaneously. ABD dressing small amount of drainage on it. SAWYER wound. Clear liquid diet but pt only wants sips of water. No flatus, no BM. R PIV infusing LR 75ml/hr. Pt on continuous pulse oximeter. Pt has only walked to bathroom once this shift, no other walking. Refused SCDs.   Plan: to encourage ambulation.

## 2023-03-10 NOTE — PROVIDER NOTIFICATION
0730  Ling LEDESMA [address]   [id number] Abiola.   Pt has not voided 200 ml/hr at 0030 and has not voided since. FYI labs - Ca+ 8.3 and Mag1.6. Thank you!    Bolus ordered  No interventions given regarding labs

## 2023-03-10 NOTE — CONSULTS
Care Management Initial Consult    General Information  Assessment completed with: Patient,    Type of CM/SW Visit: Initial Assessment    Primary Care Provider verified and updated as needed: Yes   Readmission within the last 30 days:        Reason for Consult: discharge planning  Advance Care Planning: Advance Care Planning Reviewed: no concerns identified, concerns discussed        Communication Assessment  Patient's communication style: spoken language (English or Bilingual)    Hearing Difficulty or Deaf: no   Wear Glasses or Blind: yes    Cognitive  Cognitive/Neuro/Behavioral: WDL  Level of Consciousness: alert  Arousal Level: arouses to voice  Orientation: oriented x 4  Mood/Behavior: calm, cooperative  Best Language: 0 - No aphasia  Speech: clear, spontaneous, logical    Living Environment:   People in home: child(santosh), dependent, spouse     Current living Arrangements: house      Able to return to prior arrangements: yes     Family/Social Support:  Care provided by: self, spouse/significant other, friend  Provides care for: child(santosh)  Marital Status:   , Other (specify)          Description of Support System: Supportive       Current Resources:   Patient receiving home care services: No     Community Resources: None  Equipment currently used at home: hospital bed  Supplies currently used at home: None    Employment/Financial:  Employment Status: unemployed        Financial Concerns: No concerns identified   Referral to Financial Worker: No     Lifestyle & Psychosocial Needs:  Social Determinants of Health     Tobacco Use: Medium Risk     Smoking Tobacco Use: Former     Smokeless Tobacco Use: Never     Passive Exposure: Not on file   Alcohol Use: Not on file   Financial Resource Strain: Not on file   Food Insecurity: Not on file   Transportation Needs: Not on file   Physical Activity: Not on file   Stress: Not on file   Social Connections: Not on file   Intimate Partner Violence: Not on file    Depression: Not at risk     PHQ-2 Score: 0   Housing Stability: Not on file       Functional Status:  Prior to admission patient needed assistance:   Dependent ADLs:: Independent  Dependent IADLs:: Independent  Assesssment of Functional Status: At functional baseline    Mental Health Status:  Mental Health Status: No Current Concerns   Chemical Dependency Status:  Chemical Dependency Status: No Current Concerns       Values/Beliefs:  Spiritual, Cultural Beliefs, Moravian Practices, Values that affect care: no  Description of Beliefs that Will Affect Care: Sikhism          Additional Information:  Met with pt at bedside for CM assessment.  Provide education on ACP and pt denies the need for HCP.    Assess for psychosocial concern and pt report no concern. No discharge need anticipated at this time. OT is recommending home with family support and pt report adequate family support.     Amanda Allen, 7C [facility]  P:   Pager: [phone]  F: [phone]  Weekend Pager: [phone]  Weekend Coverage: 7C; 7D; 5C

## 2023-03-11 ENCOUNTER — APPOINTMENT (OUTPATIENT)
Dept: GENERAL RADIOLOGY | Facility: CLINIC | Age: 38
DRG: 330 | End: 2023-03-11
Attending: SURGERY
Payer: MEDICAID

## 2023-03-11 LAB
ANION GAP SERPL CALCULATED.3IONS-SCNC: 10 MMOL/L (ref 7–15)
BUN SERPL-MCNC: 6.2 MG/DL (ref 6–20)
CALCIUM SERPL-MCNC: 8.3 MG/DL (ref 8.6–10)
CHLORIDE SERPL-SCNC: 97 MMOL/L (ref 98–107)
CREAT SERPL-MCNC: 0.51 MG/DL (ref 0.51–0.95)
DEPRECATED HCO3 PLAS-SCNC: 27 MMOL/L (ref 22–29)
ERYTHROCYTE [DISTWIDTH] IN BLOOD BY AUTOMATED COUNT: 13.5 % (ref 10–15)
GFR SERPL CREATININE-BSD FRML MDRD: >90 ML/MIN/1.73M2
GLUCOSE SERPL-MCNC: 119 MG/DL (ref 70–99)
HCT VFR BLD AUTO: 28 % (ref 35–47)
HGB BLD-MCNC: 8.9 G/DL (ref 11.7–15.7)
HOLD SPECIMEN: NORMAL
MCH RBC QN AUTO: 26.6 PG (ref 26.5–33)
MCHC RBC AUTO-ENTMCNC: 31.8 G/DL (ref 31.5–36.5)
MCV RBC AUTO: 84 FL (ref 78–100)
PLATELET # BLD AUTO: 169 10E3/UL (ref 150–450)
PLATELET # BLD AUTO: 190 10E3/UL (ref 150–450)
POTASSIUM SERPL-SCNC: 3.1 MMOL/L (ref 3.4–5.3)
RBC # BLD AUTO: 3.34 10E6/UL (ref 3.8–5.2)
SODIUM SERPL-SCNC: 134 MMOL/L (ref 136–145)
WBC # BLD AUTO: 9.2 10E3/UL (ref 4–11)

## 2023-03-11 PROCEDURE — 250N000011 HC RX IP 250 OP 636: Performed by: STUDENT IN AN ORGANIZED HEALTH CARE EDUCATION/TRAINING PROGRAM

## 2023-03-11 PROCEDURE — 250N000011 HC RX IP 250 OP 636: Performed by: SURGERY

## 2023-03-11 PROCEDURE — 250N000011 HC RX IP 250 OP 636: Performed by: PHYSICIAN ASSISTANT

## 2023-03-11 PROCEDURE — 36415 COLL VENOUS BLD VENIPUNCTURE: CPT

## 2023-03-11 PROCEDURE — 250N000013 HC RX MED GY IP 250 OP 250 PS 637: Performed by: SURGERY

## 2023-03-11 PROCEDURE — 99231 SBSQ HOSP IP/OBS SF/LOW 25: CPT | Mod: GC | Performed by: ANESTHESIOLOGY

## 2023-03-11 PROCEDURE — 36415 COLL VENOUS BLD VENIPUNCTURE: CPT | Performed by: SURGERY

## 2023-03-11 PROCEDURE — 120N000002 HC R&B MED SURG/OB UMMC

## 2023-03-11 PROCEDURE — 250N000013 HC RX MED GY IP 250 OP 250 PS 637: Performed by: STUDENT IN AN ORGANIZED HEALTH CARE EDUCATION/TRAINING PROGRAM

## 2023-03-11 PROCEDURE — 74018 RADEX ABDOMEN 1 VIEW: CPT | Mod: 26 | Performed by: RADIOLOGY

## 2023-03-11 PROCEDURE — 74018 RADEX ABDOMEN 1 VIEW: CPT

## 2023-03-11 PROCEDURE — 250N000011 HC RX IP 250 OP 636

## 2023-03-11 PROCEDURE — 85049 AUTOMATED PLATELET COUNT: CPT | Performed by: SURGERY

## 2023-03-11 PROCEDURE — 85049 AUTOMATED PLATELET COUNT: CPT

## 2023-03-11 PROCEDURE — 82310 ASSAY OF CALCIUM: CPT

## 2023-03-11 RX ORDER — CALCIUM CARBONATE 500 MG/1
500 TABLET, CHEWABLE ORAL 2 TIMES DAILY PRN
Status: DISCONTINUED | OUTPATIENT
Start: 2023-03-11 | End: 2023-03-23

## 2023-03-11 RX ORDER — MAGNESIUM SULFATE 1 G/100ML
1 INJECTION INTRAVENOUS ONCE
Status: COMPLETED | OUTPATIENT
Start: 2023-03-11 | End: 2023-03-11

## 2023-03-11 RX ORDER — POTASSIUM CHLORIDE 7.45 MG/ML
10 INJECTION INTRAVENOUS
Status: COMPLETED | OUTPATIENT
Start: 2023-03-11 | End: 2023-03-12

## 2023-03-11 RX ORDER — POTASSIUM CHLORIDE 7.45 MG/ML
10 INJECTION INTRAVENOUS
Status: DISCONTINUED | OUTPATIENT
Start: 2023-03-11 | End: 2023-03-11

## 2023-03-11 RX ORDER — POTASSIUM CHLORIDE 1.5 G/1.58G
40 POWDER, FOR SOLUTION ORAL ONCE
Status: COMPLETED | OUTPATIENT
Start: 2023-03-11 | End: 2023-03-11

## 2023-03-11 RX ORDER — POTASSIUM CHLORIDE 750 MG/1
40 TABLET, EXTENDED RELEASE ORAL 2 TIMES DAILY
Status: COMPLETED | OUTPATIENT
Start: 2023-03-11 | End: 2023-03-12

## 2023-03-11 RX ADMIN — POTASSIUM CHLORIDE 10 MEQ: 7.46 INJECTION, SOLUTION INTRAVENOUS at 20:51

## 2023-03-11 RX ADMIN — HYDROMORPHONE HYDROCHLORIDE 4 MG: 2 TABLET ORAL at 16:28

## 2023-03-11 RX ADMIN — HYDROMORPHONE HYDROCHLORIDE 0.2 MG: 0.2 INJECTION, SOLUTION INTRAMUSCULAR; INTRAVENOUS; SUBCUTANEOUS at 19:06

## 2023-03-11 RX ADMIN — POTASSIUM CHLORIDE 10 MEQ: 7.46 INJECTION, SOLUTION INTRAVENOUS at 22:23

## 2023-03-11 RX ADMIN — PROCHLORPERAZINE EDISYLATE 5 MG: 5 INJECTION INTRAMUSCULAR; INTRAVENOUS at 07:00

## 2023-03-11 RX ADMIN — GABAPENTIN 300 MG: 300 CAPSULE ORAL at 09:56

## 2023-03-11 RX ADMIN — MAGNESIUM SULFATE IN DEXTROSE 1 G: 10 INJECTION, SOLUTION INTRAVENOUS at 17:59

## 2023-03-11 RX ADMIN — HYDROMORPHONE HYDROCHLORIDE 0.4 MG: 0.2 INJECTION, SOLUTION INTRAMUSCULAR; INTRAVENOUS; SUBCUTANEOUS at 05:12

## 2023-03-11 RX ADMIN — GABAPENTIN 300 MG: 300 CAPSULE ORAL at 13:47

## 2023-03-11 RX ADMIN — POTASSIUM CHLORIDE 10 MEQ: 7.46 INJECTION, SOLUTION INTRAVENOUS at 19:11

## 2023-03-11 RX ADMIN — SERTRALINE HYDROCHLORIDE 50 MG: 50 TABLET ORAL at 21:47

## 2023-03-11 RX ADMIN — HYDROMORPHONE HYDROCHLORIDE 0.4 MG: 0.2 INJECTION, SOLUTION INTRAMUSCULAR; INTRAVENOUS; SUBCUTANEOUS at 07:20

## 2023-03-11 RX ADMIN — HALOPERIDOL LACTATE 1 MG: 5 INJECTION, SOLUTION INTRAMUSCULAR at 03:06

## 2023-03-11 RX ADMIN — ONDANSETRON 8 MG: 2 INJECTION INTRAMUSCULAR; INTRAVENOUS at 08:37

## 2023-03-11 RX ADMIN — GABAPENTIN 300 MG: 300 CAPSULE ORAL at 20:35

## 2023-03-11 RX ADMIN — ONDANSETRON 4 MG: 2 INJECTION INTRAMUSCULAR; INTRAVENOUS at 01:19

## 2023-03-11 RX ADMIN — ENOXAPARIN SODIUM 40 MG: 40 INJECTION SUBCUTANEOUS at 09:56

## 2023-03-11 RX ADMIN — HALOPERIDOL LACTATE 1 MG: 5 INJECTION, SOLUTION INTRAMUSCULAR at 11:05

## 2023-03-11 RX ADMIN — HYDROMORPHONE HYDROCHLORIDE 0.4 MG: 0.2 INJECTION, SOLUTION INTRAMUSCULAR; INTRAVENOUS; SUBCUTANEOUS at 01:05

## 2023-03-11 RX ADMIN — HYDROMORPHONE HYDROCHLORIDE 4 MG: 2 TABLET ORAL at 23:45

## 2023-03-11 RX ADMIN — CALCIUM CARBONATE (ANTACID) CHEW TAB 500 MG 500 MG: 500 CHEW TAB at 11:18

## 2023-03-11 RX ADMIN — HYDROMORPHONE HYDROCHLORIDE 0.4 MG: 0.2 INJECTION, SOLUTION INTRAMUSCULAR; INTRAVENOUS; SUBCUTANEOUS at 03:12

## 2023-03-11 RX ADMIN — HYDROMORPHONE HYDROCHLORIDE 0.4 MG: 0.2 INJECTION, SOLUTION INTRAMUSCULAR; INTRAVENOUS; SUBCUTANEOUS at 21:49

## 2023-03-11 RX ADMIN — HYDROMORPHONE HYDROCHLORIDE 4 MG: 2 TABLET ORAL at 20:35

## 2023-03-11 RX ADMIN — CALCIUM CARBONATE (ANTACID) CHEW TAB 500 MG 500 MG: 500 CHEW TAB at 03:38

## 2023-03-11 RX ADMIN — HYDROMORPHONE HYDROCHLORIDE 4 MG: 2 TABLET ORAL at 10:07

## 2023-03-11 RX ADMIN — ONDANSETRON 4 MG: 2 INJECTION INTRAMUSCULAR; INTRAVENOUS at 16:28

## 2023-03-11 RX ADMIN — HYDROMORPHONE HYDROCHLORIDE 0.2 MG: 0.2 INJECTION, SOLUTION INTRAMUSCULAR; INTRAVENOUS; SUBCUTANEOUS at 13:33

## 2023-03-11 RX ADMIN — HALOPERIDOL LACTATE 1 MG: 5 INJECTION, SOLUTION INTRAMUSCULAR at 20:36

## 2023-03-11 ASSESSMENT — ACTIVITIES OF DAILY LIVING (ADL)
ADLS_ACUITY_SCORE: 23

## 2023-03-11 NOTE — PLAN OF CARE
Pt is A/Ox4, VSS on RA. Continued pain and nausea throughout the night, somewhat controlled by Q2H Dilaudid PRN, Zofran PRN, and Haldol PRN, and 1x dose of compazine. Emesis x1, pt stated it was due to pain. Up independently. Voiding adequately in toilet, no BM. Dressing on abdominal incision site w/serosanguinous drainage. On a low fiber diet. L PIV SL.

## 2023-03-11 NOTE — PROGRESS NOTES
"Colorectal Surgery Progress Note  [facility]  POD#3  Vicki Shell is a 37 year old female with a complex surgical history including pelvic sepsis s/p Merlin procedure. S/p loop ileostomy take down and small bowel anastomosis.    03/11/2023    Subjective:     Still having nausea, emesis last night. Nausea now improved and pt wants to stay on low fiber diet so she has the option of eating, will self-regulate. Pain similar to yesterday. No ROBF yet.    Objective:  /75 (BP Location: Right arm)   Pulse 72   Temp 98  F (36.7  C) (Temporal)   Resp 18   Ht 1.702 m (5' 7\")   Wt 80.4 kg (177 lb 4 oz)   LMP  (LMP Unknown)   SpO2 97%   BMI 27.76 kg/m      Gen: A&O x3, somewhat somnolent but responsive  Chest: breathing non-labored on RA  Abdomen: soft, moderately tender to palpation, non distended. Dressing over ostomy takedown site changed, healing well  Extremities: warm and well perfused      Assessment:  POD3 s/p ileostomy takedown still awaiting ROBF, persistent nausea/emesis.     Plan:  - If persistent nausea and vomiting with no ROBF, consider abd XR and labs either today or tomorrow  - IV fluid dcd  - Nausea/Vomiting:    - Continue IV zofran and transition compazine to haldol    - Continue Scopolamine patch   - Added haldol   - Trial GI cocktal  - Pain:    - Appreciate recs from Pain Management service   - Continue fentanyl patch and PRN dilauded   - Monitor  - Diet:   - Low fiber, patient will self-regulate  - Ppx:   - SCDs, ambulate as able, lovenox  - Dressings:   - Change dressing daily and PRN, do not reinforce.    Discussed with Dr. Estela Banegas MD  Fellow in Colon and Rectal Surgery  [facility]   Pager [phone]    "

## 2023-03-11 NOTE — PROVIDER NOTIFICATION
[address]  L[address] LEDESMA [address]   7410[address] RAMONA Shell   FYI - Pt has been nauseous all day. IV Compazine, Zofran, and Haldol not working. frequently burping and hiccupping. Small emesis last night.

## 2023-03-11 NOTE — PROGRESS NOTES
Pain Service Progress Note  [facility]  Date: 03/11/2023       Patient Name: Vicki Shell  MRN: [id number]  Age: 38 year old  Sex: female      Assessment:  37 year old female with a complex surgical history s/p loop ileostomy take down and small bowel anastomosis (3/8/23)  Consulted by Colorectal Service     Acute postoperative pain    History of chronic pain, opioid tolerance. Per MN  review pulled from system on 03/09/23. Last refill on 2/27/23, indicating the following analgesic usage: Fentanyl Patch 25 mcg/hr, change Q 72 hr + Hydrocodone-Acetaminophen 5mg-325 mg 4 times/day    Plan:   1. Treat nausea aggressively to enable wean to PO medications  2. Continue Transdermal Fentanyl Patch 25 mcg/hr, change Q 72 hr  3. Start Dilaudid 2-4 mg PO Q 3 hr PRN   4. Continue Dilaudid IV as ordered. May increase with range of 0.2-0.4mg IV Q 2 hr PRN pain, space out doses daily and off 24 hours prior to discharge  5. Consider ketamine IV infusion if pain escalates (start at 5 mg/hr). Follow Ketamine Low Dose for Analgesia orderset  6. Continue acetaminophen 1,000 mg PO Q 6 hr and gabapentin 300 mg PO TID  7. Wait at least 72 hours after Exparel TAP block to start lidocaine patches  8. Okay to use heat alternating with cold packs PRN     Thank you for the opportunity to participate in the care of Vicki Shell  Pain Service will continue to follow.    Discussed with attending anesthesiologist    Ian Bradshaw PGY-3  03/11/2023     Overnight Events: None    Tubes/Drains: No    Subjective:  Nausea preventing me from taking oral medications  Reports nausea that limits her ability to take medications. Pain unchanged from yesterday with dilaudid.     Diet: Low Fiber Diet    Relevant Labs:  Recent Labs   Lab Test 03/09/23  0714 05/07/22  0907 05/06/22  0715 04/26/22  1017 02/16/22  0952   INR  --   --  1.09  --  1.02      < >  --    < > 200   PTT  --   --   --   --  30   BUN 7.6   < >  --  "   < > 10    < > = values in this interval not displayed.       Physical Exam:  Vitals: /75 (BP Location: Right arm)   Pulse 72   Temp 36.7  C (98  F) (Temporal)   Resp 18   Ht 1.702 m (5' 7\")   Wt 80.4 kg (177 lb 4 oz)   LMP  (LMP Unknown)   SpO2 97%   BMI 27.76 kg/m      Physical Exam:   CONSTITUTIONAL/GENERAL APPEARANCE: In bed, attempting to eat popsicle, awake, NAD  EYES: EOMI, sclera anicteric  ENT/NECK: atraumatic, lips and oral mucous membranes dry  RESPIRATORY: non-labored breathing. No cough, wheeze  NEURO:  Alert oriented x 4. Answers questions appropriately  SKIN/VASCULAR EXAM:  No jaundice, no suspicious rash or lesions to exposed skin  PSYCHIATRIC/BEHAVIORAL/OBSERVATIONS:  No objective signs of pain observed during our interview.  Judgement and insight intact, cooperative     Relevant Medications:  Current Pain Medications:  Medications related to Pain Management (From now, onward)    Start     Dose/Rate Route Frequency Ordered Stop    03/09/23 2156  HYDROmorphone (DILAUDID) injection 0.2 mg         0.2 mg Intravenous EVERY 2 HOURS PRN 03/09/23 2157      03/09/23 1130  fentaNYL (DURAGESIC) 25 mcg/hr 72 hr patch 1 patch        Note to Pharmacy: PTA Sig:Place 1 patch onto the skin every 72 hours remove old patch.     See Hyperspace for full Linked Orders Report.    25 mcg  over 72 Hours Transdermal EVERY 72 HOURS 03/09/23 1109      03/09/23 1112  methocarbamol (ROBAXIN) injection 500 mg         500 mg Intravenous EVERY 8 HOURS PRN 03/09/23 1112 03/12/23 1211    03/08/23 2000  gabapentin (NEURONTIN) capsule 300 mg        Note to Pharmacy: PTA Sig:Take 1 capsule (300 mg) by mouth 3 times daily      300 mg Oral 3 TIMES DAILY 03/08/23 1852 03/08/23 2000  morphine (MS CONTIN) 12 hr tablet 30 mg        Note to Pharmacy: PTA Sig:Take 30 mg by mouth every 12 hours      30 mg Oral EVERY 12 HOURS 03/08/23 1852 03/08/23 1900  acetaminophen (TYLENOL) tablet 1,000 mg         1,000 mg Oral " "EVERY 6 HOURS 03/08/23 1852 03/11/23 1859 03/08/23 1852  bupivacaine liposome (EXPAREL) LA inj was given in the infiltration site to produce post-op analgesia. Duration of action is up to 72 hours. Other \"liz\" meds should not be given for 96 hours except for lidocaine 4% patch. This is for INFORMATION ONLY.          Does not apply CONTINUOUS PRN 03/08/23 1852 03/12/23 1951 03/08/23 1852  hydrOXYzine (ATARAX) tablet 25 mg        Note to Pharmacy: PTA Sig:Take 1 tablet (25 mg) by mouth every 8 hours as needed for itching or anxiety      25 mg Oral EVERY 8 HOURS PRN 03/08/23 1852 03/08/23 1852  lidocaine 1 % 0.1-1 mL         0.1-1 mL Other EVERY 1 HOUR PRN 03/08/23 1852 03/08/23 1852  lidocaine (LMX4) cream          Topical EVERY 1 HOUR PRN 03/08/23 1852            Primary Service Contacted with Recommendations? Yes      Please see A&P for additional details of medical decision making.      Acute Inpatient Pain Service [facility]  Hours of pain coverage 24/7   Page via Amcom- Please Page the Pain Team Via Amcom: \"PAIN MANAGEMENT ACUTE INPATIENT/ [facility]\"           "

## 2023-03-12 ENCOUNTER — APPOINTMENT (OUTPATIENT)
Dept: GENERAL RADIOLOGY | Facility: CLINIC | Age: 38
DRG: 330 | End: 2023-03-12
Attending: SURGERY
Payer: MEDICAID

## 2023-03-12 LAB
ANION GAP SERPL CALCULATED.3IONS-SCNC: 10 MMOL/L (ref 7–15)
BUN SERPL-MCNC: 6.3 MG/DL (ref 6–20)
CALCIUM SERPL-MCNC: 8.2 MG/DL (ref 8.6–10)
CHLORIDE SERPL-SCNC: 95 MMOL/L (ref 98–107)
CREAT SERPL-MCNC: 0.54 MG/DL (ref 0.51–0.95)
DEPRECATED HCO3 PLAS-SCNC: 26 MMOL/L (ref 22–29)
GFR SERPL CREATININE-BSD FRML MDRD: >90 ML/MIN/1.73M2
GLUCOSE SERPL-MCNC: 114 MG/DL (ref 70–99)
HGB BLD-MCNC: 8.9 G/DL (ref 11.7–15.7)
HOLD SPECIMEN: NORMAL
POTASSIUM SERPL-SCNC: 3.2 MMOL/L (ref 3.4–5.3)
POTASSIUM SERPL-SCNC: 3.5 MMOL/L (ref 3.4–5.3)
SODIUM SERPL-SCNC: 131 MMOL/L (ref 136–145)

## 2023-03-12 PROCEDURE — 250N000013 HC RX MED GY IP 250 OP 250 PS 637: Performed by: SURGERY

## 2023-03-12 PROCEDURE — 999N000128 HC STATISTIC PERIPHERAL IV START W/O US GUIDANCE

## 2023-03-12 PROCEDURE — 250N000011 HC RX IP 250 OP 636: Performed by: SURGERY

## 2023-03-12 PROCEDURE — 250N000013 HC RX MED GY IP 250 OP 250 PS 637: Performed by: PHYSICIAN ASSISTANT

## 2023-03-12 PROCEDURE — 250N000011 HC RX IP 250 OP 636: Performed by: PHYSICIAN ASSISTANT

## 2023-03-12 PROCEDURE — 85018 HEMOGLOBIN: CPT | Performed by: COLON & RECTAL SURGERY

## 2023-03-12 PROCEDURE — 250N000011 HC RX IP 250 OP 636: Performed by: STUDENT IN AN ORGANIZED HEALTH CARE EDUCATION/TRAINING PROGRAM

## 2023-03-12 PROCEDURE — 250N000011 HC RX IP 250 OP 636: Performed by: COLON & RECTAL SURGERY

## 2023-03-12 PROCEDURE — 36415 COLL VENOUS BLD VENIPUNCTURE: CPT | Performed by: COLON & RECTAL SURGERY

## 2023-03-12 PROCEDURE — 258N000001 HC RX 258: Performed by: COLON & RECTAL SURGERY

## 2023-03-12 PROCEDURE — 36415 COLL VENOUS BLD VENIPUNCTURE: CPT | Performed by: SURGERY

## 2023-03-12 PROCEDURE — 250N000013 HC RX MED GY IP 250 OP 250 PS 637: Performed by: STUDENT IN AN ORGANIZED HEALTH CARE EDUCATION/TRAINING PROGRAM

## 2023-03-12 PROCEDURE — 74019 RADEX ABDOMEN 2 VIEWS: CPT

## 2023-03-12 PROCEDURE — 84132 ASSAY OF SERUM POTASSIUM: CPT | Performed by: SURGERY

## 2023-03-12 PROCEDURE — 99232 SBSQ HOSP IP/OBS MODERATE 35: CPT | Mod: GC | Performed by: ANESTHESIOLOGY

## 2023-03-12 PROCEDURE — 120N000002 HC R&B MED SURG/OB UMMC

## 2023-03-12 PROCEDURE — 250N000009 HC RX 250: Performed by: PHYSICIAN ASSISTANT

## 2023-03-12 PROCEDURE — 84132 ASSAY OF SERUM POTASSIUM: CPT | Performed by: COLON & RECTAL SURGERY

## 2023-03-12 PROCEDURE — 74019 RADEX ABDOMEN 2 VIEWS: CPT | Mod: 26 | Performed by: STUDENT IN AN ORGANIZED HEALTH CARE EDUCATION/TRAINING PROGRAM

## 2023-03-12 RX ORDER — METOCLOPRAMIDE HYDROCHLORIDE 5 MG/ML
5 INJECTION INTRAMUSCULAR; INTRAVENOUS EVERY 8 HOURS
Status: DISCONTINUED | OUTPATIENT
Start: 2023-03-12 | End: 2023-03-14

## 2023-03-12 RX ORDER — SIMETHICONE 80 MG
80 TABLET,CHEWABLE ORAL EVERY 6 HOURS PRN
Status: DISCONTINUED | OUTPATIENT
Start: 2023-03-12 | End: 2023-03-24 | Stop reason: HOSPADM

## 2023-03-12 RX ORDER — SODIUM CHLORIDE, SODIUM LACTATE, POTASSIUM CHLORIDE, CALCIUM CHLORIDE 600; 310; 30; 20 MG/100ML; MG/100ML; MG/100ML; MG/100ML
INJECTION, SOLUTION INTRAVENOUS CONTINUOUS
Status: DISCONTINUED | OUTPATIENT
Start: 2023-03-12 | End: 2023-03-12

## 2023-03-12 RX ORDER — DEXTROSE MONOHYDRATE, SODIUM CHLORIDE, AND POTASSIUM CHLORIDE 50; 1.49; 4.5 G/1000ML; G/1000ML; G/1000ML
INJECTION, SOLUTION INTRAVENOUS CONTINUOUS
Status: DISCONTINUED | OUTPATIENT
Start: 2023-03-12 | End: 2023-03-12

## 2023-03-12 RX ORDER — ACETAMINOPHEN 500 MG
1000 TABLET ORAL EVERY 6 HOURS
Status: DISCONTINUED | OUTPATIENT
Start: 2023-03-13 | End: 2023-03-16

## 2023-03-12 RX ORDER — METHOCARBAMOL 500 MG/1
500 TABLET, FILM COATED ORAL 3 TIMES DAILY
Status: DISCONTINUED | OUTPATIENT
Start: 2023-03-12 | End: 2023-03-14

## 2023-03-12 RX ORDER — POTASSIUM CHLORIDE 7.45 MG/ML
10 INJECTION INTRAVENOUS
Status: DISCONTINUED | OUTPATIENT
Start: 2023-03-12 | End: 2023-03-12

## 2023-03-12 RX ORDER — CALCIUM GLUCONATE 20 MG/ML
2 INJECTION, SOLUTION INTRAVENOUS ONCE
Status: COMPLETED | OUTPATIENT
Start: 2023-03-12 | End: 2023-03-12

## 2023-03-12 RX ORDER — POTASSIUM CHLORIDE 7.45 MG/ML
10 INJECTION INTRAVENOUS
Status: COMPLETED | OUTPATIENT
Start: 2023-03-12 | End: 2023-03-12

## 2023-03-12 RX ADMIN — HYDROMORPHONE HYDROCHLORIDE 0.2 MG: 0.2 INJECTION, SOLUTION INTRAMUSCULAR; INTRAVENOUS; SUBCUTANEOUS at 12:01

## 2023-03-12 RX ADMIN — HYDROMORPHONE HYDROCHLORIDE 2 MG: 2 TABLET ORAL at 18:13

## 2023-03-12 RX ADMIN — CALCIUM GLUCONATE 2 G: 20 INJECTION, SOLUTION INTRAVENOUS at 11:26

## 2023-03-12 RX ADMIN — GABAPENTIN 300 MG: 300 CAPSULE ORAL at 09:42

## 2023-03-12 RX ADMIN — POTASSIUM CHLORIDE 10 MEQ: 7.46 INJECTION, SOLUTION INTRAVENOUS at 00:25

## 2023-03-12 RX ADMIN — ONDANSETRON 8 MG: 4 TABLET, ORALLY DISINTEGRATING ORAL at 21:18

## 2023-03-12 RX ADMIN — POTASSIUM CHLORIDE 10 MEQ: 7.46 INJECTION, SOLUTION INTRAVENOUS at 16:51

## 2023-03-12 RX ADMIN — POTASSIUM CHLORIDE 10 MEQ: 7.46 INJECTION, SOLUTION INTRAVENOUS at 19:01

## 2023-03-12 RX ADMIN — CALCIUM CARBONATE (ANTACID) CHEW TAB 500 MG 500 MG: 500 CHEW TAB at 09:52

## 2023-03-12 RX ADMIN — ENOXAPARIN SODIUM 40 MG: 40 INJECTION SUBCUTANEOUS at 09:41

## 2023-03-12 RX ADMIN — POTASSIUM CHLORIDE 40 MEQ: 750 TABLET, EXTENDED RELEASE ORAL at 09:42

## 2023-03-12 RX ADMIN — SCOPALAMINE 1 PATCH: 1 PATCH, EXTENDED RELEASE TRANSDERMAL at 09:41

## 2023-03-12 RX ADMIN — HYDROMORPHONE HYDROCHLORIDE 0.4 MG: 0.2 INJECTION, SOLUTION INTRAMUSCULAR; INTRAVENOUS; SUBCUTANEOUS at 08:34

## 2023-03-12 RX ADMIN — HYDROMORPHONE HYDROCHLORIDE 0.4 MG: 0.2 INJECTION, SOLUTION INTRAMUSCULAR; INTRAVENOUS; SUBCUTANEOUS at 01:14

## 2023-03-12 RX ADMIN — GABAPENTIN 300 MG: 300 CAPSULE ORAL at 14:59

## 2023-03-12 RX ADMIN — POTASSIUM CHLORIDE: 2 INJECTION, SOLUTION, CONCENTRATE INTRAVENOUS at 13:05

## 2023-03-12 RX ADMIN — POTASSIUM CHLORIDE 10 MEQ: 7.46 INJECTION, SOLUTION INTRAVENOUS at 22:35

## 2023-03-12 RX ADMIN — HYDROMORPHONE HYDROCHLORIDE 0.4 MG: 0.2 INJECTION, SOLUTION INTRAMUSCULAR; INTRAVENOUS; SUBCUTANEOUS at 19:21

## 2023-03-12 RX ADMIN — HYDROMORPHONE HYDROCHLORIDE 0.4 MG: 0.2 INJECTION, SOLUTION INTRAMUSCULAR; INTRAVENOUS; SUBCUTANEOUS at 22:05

## 2023-03-12 RX ADMIN — POTASSIUM CHLORIDE 10 MEQ: 7.46 INJECTION, SOLUTION INTRAVENOUS at 15:00

## 2023-03-12 RX ADMIN — HYDROMORPHONE HYDROCHLORIDE 0.4 MG: 0.2 INJECTION, SOLUTION INTRAMUSCULAR; INTRAVENOUS; SUBCUTANEOUS at 05:29

## 2023-03-12 RX ADMIN — CALCIUM CARBONATE (ANTACID) CHEW TAB 500 MG 500 MG: 500 CHEW TAB at 05:46

## 2023-03-12 RX ADMIN — ONDANSETRON 8 MG: 4 TABLET, ORALLY DISINTEGRATING ORAL at 15:09

## 2023-03-12 RX ADMIN — METOCLOPRAMIDE 5 MG: 5 INJECTION, SOLUTION INTRAMUSCULAR; INTRAVENOUS at 11:12

## 2023-03-12 RX ADMIN — HYDROMORPHONE HYDROCHLORIDE 4 MG: 2 TABLET ORAL at 03:57

## 2023-03-12 RX ADMIN — ONDANSETRON 8 MG: 2 INJECTION INTRAMUSCULAR; INTRAVENOUS at 08:23

## 2023-03-12 RX ADMIN — HYDROMORPHONE HYDROCHLORIDE 4 MG: 2 TABLET ORAL at 14:59

## 2023-03-12 RX ADMIN — METHOCARBAMOL 500 MG: 500 TABLET ORAL at 20:46

## 2023-03-12 RX ADMIN — METOCLOPRAMIDE 5 MG: 5 INJECTION, SOLUTION INTRAMUSCULAR; INTRAVENOUS at 18:12

## 2023-03-12 RX ADMIN — SERTRALINE HYDROCHLORIDE 50 MG: 50 TABLET ORAL at 21:18

## 2023-03-12 RX ADMIN — GABAPENTIN 300 MG: 300 CAPSULE ORAL at 20:46

## 2023-03-12 RX ADMIN — HYDROMORPHONE HYDROCHLORIDE 4 MG: 2 TABLET ORAL at 21:18

## 2023-03-12 RX ADMIN — FENTANYL 1 PATCH: 25 PATCH TRANSDERMAL at 11:08

## 2023-03-12 RX ADMIN — ONDANSETRON 4 MG: 4 TABLET, ORALLY DISINTEGRATING ORAL at 03:58

## 2023-03-12 ASSESSMENT — ACTIVITIES OF DAILY LIVING (ADL)
ADLS_ACUITY_SCORE: 23

## 2023-03-12 NOTE — PLAN OF CARE
"Goal Outcome Evaluation:  A&Ox4. RA. Low fiber diet - tolerating poorly. UAL. Voiding spontaneously. No bm. Passing gas. Pain tolerated w/ IV and PO dilaudid, gabapentin, and fentanyl patch. Emesis x2 - provider aware. Continuous nausea tolerated w/ haldol, IV and PO zofran, scopolamine patch, and reglan. New fentanyl patch applied to left shoulder. New scopalamie patch applied behind left ear. Take down site - packed, dressing CDI, changed x2. Ca+ 8.2 - replaced.  K+ 3.2 - pt currently receiving two of four IV doses of K+ CL, run at 50 ml/hr to avoid pain at IV site, redraw not ordered. Receiving \"banana bag\" infusion, once complete start continuous D5 0.45% NaCl KCL infusion.  Pt ambulated x4 .  Received x-ray today.                             "

## 2023-03-12 NOTE — PROGRESS NOTES
"Colorectal Surgery Progress Note  [facility]  POD#4  Vicki Shell is a 37 year old female with a complex surgical history including pelvic sepsis s/p Merlin procedure. S/p loop ileostomy take down and small bowel anastomosis.    03/12/2023    Subjective:     Still having nausea, emesis last night and this morning. Pt wants to stay on low fiber diet, will self-regulate. Pain similar to yesterday. Passing some small amount of flatus     Objective:  /72 (BP Location: Right arm)   Pulse 62   Temp 97.4  F (36.3  C) (Temporal)   Resp 18   Ht 1.702 m (5' 7\")   Wt 80.4 kg (177 lb 4 oz)   LMP  (LMP Unknown)   SpO2 97%   BMI 27.76 kg/m      Gen: A&O x3, somewhat somnolent originally, but alert and responsive later during exam  Chest: breathing non-labored on RA  Abdomen: soft, moderately tender to palpation, non distended. Dressing over ostomy takedown site changed, healing well without significant purulence  Extremities: warm and well perfused      Assessment:  POD4 s/p ileostomy takedown has now had ROBF, persistent nausea/emesis.     Plan:  - Repeat KUB today (2 view) to evaluate progression  - IV fluid resumed  - Nausea/Vomiting:    - Continue IV zofran and haldol    - Continue Scopolamine patch   - Continue GI cocktal  - Pain:    - Appreciate recs from Pain Management service   - Continue fentanyl patch and PRN dilauded   - Monitor  - Diet:   - Low fiber, patient will self-regulate  - Ppx:   - SCDs, ambulate as able, lovenox  - Dressings:   - Change dressing daily and PRN, do not reinforce.    Discussed with Dr. Estela Doyle MD  Intern, General Surgery  Colorectal Intern     "

## 2023-03-12 NOTE — PLAN OF CARE
Goal Outcome Evaluation:   A&Ox4.  RA. Low fiber diet - tolerating poorly. UAL. Voiding spontaneously. No bm. Passing gas. Pain tolerated w/ IV and PO dilaudid, tylenol, and fentanyl patch. Pt has continuous nausea. No emesis. Nausea tolerated w/ haldol, zofran, and scopolamine patch. Take down site - packed, dressing intact, small drainage. Mag ( from 3/9) 1.6 - replaced today. K+ 3.1 - pt currently receiving 1 of 4 IV doses of K+ CL, redraw not ordered. Hold K+ ER tablet and give IV K+ replacement - per provider. Pt ambulated x3. Possible ileus - Received X-ray.

## 2023-03-12 NOTE — PLAN OF CARE
Pt is A/Ox4, VSS on RA, up independently. Continuous nausea and pain, somewhat controlled by zofran PRN, dilaudid PRN, fentanyl patch. Low fiber diet, drinking fluids overnight. Abdominal incision covered in dressing C/D/I. Potassium replaced, recheck at 0530 was 3.5. Pt reports passing gas, no BM. Voiding adequately. PIV SL.

## 2023-03-12 NOTE — PROVIDER NOTIFICATION
0625  Ling LEDESMA [address]  7C 7410-2 ISELA Luong sorry to bother you again, just to clarify - you're okay w/ the pt receiving the IV K+ for replacement protocol and the Oral K+ chloride ER tablet? Thank you!    Hold K+ ER tablet per provider  Give IV K+

## 2023-03-12 NOTE — PROVIDER NOTIFICATION
1140  Ling LEDESMA [address]  7C 7410-2 k.j.   Pt took 1 of 4 K+ ER tablets and then had unwitnessed emesis. Unable to tolerate tablets.   Provider paged

## 2023-03-12 NOTE — PROVIDER NOTIFICATION
Ling LEDESMA [address]  7C 7410-2 STEW.   Pt's K+ replaced last night w/ IV K Chloride. redraw K+ - 3.5. Do you still want me to give Pt scheduled K+ chloride ER tablet?  Thank you!  Provider notified

## 2023-03-13 ENCOUNTER — APPOINTMENT (OUTPATIENT)
Dept: CT IMAGING | Facility: CLINIC | Age: 38
DRG: 330 | End: 2023-03-13
Attending: SURGERY
Payer: MEDICAID

## 2023-03-13 LAB
ANION GAP SERPL CALCULATED.3IONS-SCNC: 13 MMOL/L (ref 7–15)
BUN SERPL-MCNC: 5.9 MG/DL (ref 6–20)
CALCIUM SERPL-MCNC: 8 MG/DL (ref 8.6–10)
CHLORIDE SERPL-SCNC: 100 MMOL/L (ref 98–107)
CREAT SERPL-MCNC: 0.58 MG/DL (ref 0.51–0.95)
DEPRECATED HCO3 PLAS-SCNC: 22 MMOL/L (ref 22–29)
GFR SERPL CREATININE-BSD FRML MDRD: >90 ML/MIN/1.73M2
GLUCOSE SERPL-MCNC: 118 MG/DL (ref 70–99)
MAGNESIUM SERPL-MCNC: 2.2 MG/DL (ref 1.7–2.3)
PATH REPORT.COMMENTS IMP SPEC: NORMAL
PATH REPORT.COMMENTS IMP SPEC: NORMAL
PATH REPORT.FINAL DX SPEC: NORMAL
PATH REPORT.GROSS SPEC: NORMAL
PATH REPORT.MICROSCOPIC SPEC OTHER STN: NORMAL
PATH REPORT.RELEVANT HX SPEC: NORMAL
PHOTO IMAGE: NORMAL
POTASSIUM SERPL-SCNC: 3.4 MMOL/L (ref 3.4–5.3)
POTASSIUM SERPL-SCNC: 3.7 MMOL/L (ref 3.4–5.3)
POTASSIUM SERPL-SCNC: 4.2 MMOL/L (ref 3.4–5.3)
RADIOLOGIST FLAGS: ABNORMAL
SODIUM SERPL-SCNC: 135 MMOL/L (ref 136–145)

## 2023-03-13 PROCEDURE — 36416 COLLJ CAPILLARY BLOOD SPEC: CPT | Performed by: STUDENT IN AN ORGANIZED HEALTH CARE EDUCATION/TRAINING PROGRAM

## 2023-03-13 PROCEDURE — 74177 CT ABD & PELVIS W/CONTRAST: CPT | Mod: 26 | Performed by: RADIOLOGY

## 2023-03-13 PROCEDURE — 250N000013 HC RX MED GY IP 250 OP 250 PS 637: Performed by: SURGERY

## 2023-03-13 PROCEDURE — 74177 CT ABD & PELVIS W/CONTRAST: CPT

## 2023-03-13 PROCEDURE — 250N000013 HC RX MED GY IP 250 OP 250 PS 637: Performed by: STUDENT IN AN ORGANIZED HEALTH CARE EDUCATION/TRAINING PROGRAM

## 2023-03-13 PROCEDURE — 84132 ASSAY OF SERUM POTASSIUM: CPT | Performed by: STUDENT IN AN ORGANIZED HEALTH CARE EDUCATION/TRAINING PROGRAM

## 2023-03-13 PROCEDURE — 250N000011 HC RX IP 250 OP 636: Performed by: SURGERY

## 2023-03-13 PROCEDURE — 120N000002 HC R&B MED SURG/OB UMMC

## 2023-03-13 PROCEDURE — 999N000128 HC STATISTIC PERIPHERAL IV START W/O US GUIDANCE

## 2023-03-13 PROCEDURE — 250N000011 HC RX IP 250 OP 636: Performed by: PHYSICIAN ASSISTANT

## 2023-03-13 PROCEDURE — 258N000003 HC RX IP 258 OP 636: Performed by: STUDENT IN AN ORGANIZED HEALTH CARE EDUCATION/TRAINING PROGRAM

## 2023-03-13 PROCEDURE — 84132 ASSAY OF SERUM POTASSIUM: CPT | Performed by: SURGERY

## 2023-03-13 PROCEDURE — 250N000011 HC RX IP 250 OP 636: Performed by: STUDENT IN AN ORGANIZED HEALTH CARE EDUCATION/TRAINING PROGRAM

## 2023-03-13 PROCEDURE — 36415 COLL VENOUS BLD VENIPUNCTURE: CPT | Performed by: SURGERY

## 2023-03-13 PROCEDURE — 99232 SBSQ HOSP IP/OBS MODERATE 35: CPT | Mod: GC | Performed by: ANESTHESIOLOGY

## 2023-03-13 PROCEDURE — 83735 ASSAY OF MAGNESIUM: CPT | Performed by: SURGERY

## 2023-03-13 PROCEDURE — 250N000011 HC RX IP 250 OP 636

## 2023-03-13 RX ORDER — POTASSIUM CHLORIDE 7.45 MG/ML
10 INJECTION INTRAVENOUS
Status: COMPLETED | OUTPATIENT
Start: 2023-03-13 | End: 2023-03-13

## 2023-03-13 RX ORDER — IOPAMIDOL 755 MG/ML
100 INJECTION, SOLUTION INTRAVASCULAR ONCE
Status: COMPLETED | OUTPATIENT
Start: 2023-03-13 | End: 2023-03-13

## 2023-03-13 RX ORDER — DEXTROSE MONOHYDRATE, SODIUM CHLORIDE, AND POTASSIUM CHLORIDE 50; 1.49; 4.5 G/1000ML; G/1000ML; G/1000ML
INJECTION, SOLUTION INTRAVENOUS CONTINUOUS
Status: DISCONTINUED | OUTPATIENT
Start: 2023-03-13 | End: 2023-03-14

## 2023-03-13 RX ORDER — HYDROMORPHONE HYDROCHLORIDE 1 MG/ML
0.3 INJECTION, SOLUTION INTRAMUSCULAR; INTRAVENOUS; SUBCUTANEOUS ONCE
Status: COMPLETED | OUTPATIENT
Start: 2023-03-13 | End: 2023-03-13

## 2023-03-13 RX ORDER — ENOXAPARIN SODIUM 100 MG/ML
40 INJECTION SUBCUTANEOUS EVERY 24 HOURS
Status: DISCONTINUED | OUTPATIENT
Start: 2023-03-13 | End: 2023-03-24 | Stop reason: HOSPADM

## 2023-03-13 RX ORDER — HYDROMORPHONE HCL IN WATER/PF 6 MG/30 ML
.2-.4 PATIENT CONTROLLED ANALGESIA SYRINGE INTRAVENOUS EVERY 4 HOURS PRN
Status: DISCONTINUED | OUTPATIENT
Start: 2023-03-13 | End: 2023-03-20

## 2023-03-13 RX ADMIN — HALOPERIDOL LACTATE 1 MG: 5 INJECTION, SOLUTION INTRAMUSCULAR at 09:24

## 2023-03-13 RX ADMIN — ENOXAPARIN SODIUM 40 MG: 40 INJECTION SUBCUTANEOUS at 17:08

## 2023-03-13 RX ADMIN — HYDROMORPHONE HYDROCHLORIDE 0.4 MG: 0.2 INJECTION, SOLUTION INTRAMUSCULAR; INTRAVENOUS; SUBCUTANEOUS at 14:13

## 2023-03-13 RX ADMIN — HYDROMORPHONE HYDROCHLORIDE 4 MG: 2 TABLET ORAL at 05:16

## 2023-03-13 RX ADMIN — POTASSIUM CHLORIDE 10 MEQ: 7.46 INJECTION, SOLUTION INTRAVENOUS at 14:14

## 2023-03-13 RX ADMIN — HYDROMORPHONE HYDROCHLORIDE 0.4 MG: 0.2 INJECTION, SOLUTION INTRAMUSCULAR; INTRAVENOUS; SUBCUTANEOUS at 06:24

## 2023-03-13 RX ADMIN — METOCLOPRAMIDE 5 MG: 5 INJECTION, SOLUTION INTRAMUSCULAR; INTRAVENOUS at 11:33

## 2023-03-13 RX ADMIN — METHOCARBAMOL 500 MG: 500 TABLET ORAL at 08:43

## 2023-03-13 RX ADMIN — POTASSIUM CHLORIDE 10 MEQ: 7.46 INJECTION, SOLUTION INTRAVENOUS at 12:20

## 2023-03-13 RX ADMIN — HYDROMORPHONE HYDROCHLORIDE 0.4 MG: 0.2 INJECTION, SOLUTION INTRAMUSCULAR; INTRAVENOUS; SUBCUTANEOUS at 11:30

## 2023-03-13 RX ADMIN — ONDANSETRON 8 MG: 4 TABLET, ORALLY DISINTEGRATING ORAL at 12:20

## 2023-03-13 RX ADMIN — HYDROMORPHONE HYDROCHLORIDE 4 MG: 2 TABLET ORAL at 00:22

## 2023-03-13 RX ADMIN — ACETAMINOPHEN 1000 MG: 500 TABLET ORAL at 00:22

## 2023-03-13 RX ADMIN — CALCIUM CARBONATE (ANTACID) CHEW TAB 500 MG 500 MG: 500 CHEW TAB at 00:57

## 2023-03-13 RX ADMIN — SERTRALINE HYDROCHLORIDE 50 MG: 50 TABLET ORAL at 21:39

## 2023-03-13 RX ADMIN — POTASSIUM CHLORIDE 10 MEQ: 7.46 INJECTION, SOLUTION INTRAVENOUS at 05:03

## 2023-03-13 RX ADMIN — HALOPERIDOL LACTATE 1 MG: 5 INJECTION, SOLUTION INTRAMUSCULAR at 16:13

## 2023-03-13 RX ADMIN — POTASSIUM CHLORIDE 10 MEQ: 7.46 INJECTION, SOLUTION INTRAVENOUS at 08:40

## 2023-03-13 RX ADMIN — METHOCARBAMOL 500 MG: 500 TABLET ORAL at 21:39

## 2023-03-13 RX ADMIN — ONDANSETRON 8 MG: 4 TABLET, ORALLY DISINTEGRATING ORAL at 18:14

## 2023-03-13 RX ADMIN — HYDROMORPHONE HYDROCHLORIDE 4 MG: 2 TABLET ORAL at 21:38

## 2023-03-13 RX ADMIN — HYDROMORPHONE HYDROCHLORIDE 0.4 MG: 0.2 INJECTION, SOLUTION INTRAMUSCULAR; INTRAVENOUS; SUBCUTANEOUS at 03:17

## 2023-03-13 RX ADMIN — HYDROMORPHONE HYDROCHLORIDE 4 MG: 2 TABLET ORAL at 12:20

## 2023-03-13 RX ADMIN — HYDROMORPHONE HYDROCHLORIDE 4 MG: 2 TABLET ORAL at 17:08

## 2023-03-13 RX ADMIN — METHYLNALTREXONE BROMIDE 12 MG: 12 INJECTION, SOLUTION SUBCUTANEOUS at 11:40

## 2023-03-13 RX ADMIN — HALOPERIDOL LACTATE 1 MG: 5 INJECTION, SOLUTION INTRAMUSCULAR at 22:28

## 2023-03-13 RX ADMIN — HYDROMORPHONE HYDROCHLORIDE 0.4 MG: 0.2 INJECTION, SOLUTION INTRAMUSCULAR; INTRAVENOUS; SUBCUTANEOUS at 08:33

## 2023-03-13 RX ADMIN — HYDROMORPHONE HYDROCHLORIDE 0.4 MG: 0.2 INJECTION, SOLUTION INTRAMUSCULAR; INTRAVENOUS; SUBCUTANEOUS at 18:15

## 2023-03-13 RX ADMIN — POTASSIUM CHLORIDE, DEXTROSE MONOHYDRATE AND SODIUM CHLORIDE: 150; 5; 450 INJECTION, SOLUTION INTRAVENOUS at 08:39

## 2023-03-13 RX ADMIN — HYDROMORPHONE HYDROCHLORIDE 0.3 MG: 1 INJECTION, SOLUTION INTRAMUSCULAR; INTRAVENOUS; SUBCUTANEOUS at 20:27

## 2023-03-13 RX ADMIN — SIMETHICONE 80 MG: 80 TABLET, CHEWABLE ORAL at 02:59

## 2023-03-13 RX ADMIN — GABAPENTIN 300 MG: 300 CAPSULE ORAL at 08:43

## 2023-03-13 RX ADMIN — METOCLOPRAMIDE 5 MG: 5 INJECTION, SOLUTION INTRAMUSCULAR; INTRAVENOUS at 02:48

## 2023-03-13 RX ADMIN — IOPAMIDOL 100 ML: 755 INJECTION, SOLUTION INTRAVENOUS at 20:34

## 2023-03-13 RX ADMIN — METOCLOPRAMIDE 5 MG: 5 INJECTION, SOLUTION INTRAMUSCULAR; INTRAVENOUS at 19:48

## 2023-03-13 RX ADMIN — ONDANSETRON 8 MG: 4 TABLET, ORALLY DISINTEGRATING ORAL at 06:22

## 2023-03-13 ASSESSMENT — ACTIVITIES OF DAILY LIVING (ADL)
ADLS_ACUITY_SCORE: 23
ADLS_ACUITY_SCORE: 22
ADLS_ACUITY_SCORE: 23

## 2023-03-13 NOTE — PROGRESS NOTES
Pain Service Progress Note  [facility]  Date: 03/12/2023       Patient Name: Vicki Shell  MRN: [id number]  Age: 38 year old  Sex: female      Assessment:  37 year old female with a complex surgical history s/p loop ileostomy take down and small bowel anastomosis on 3/8/23, consulted by Colorectal Service on 3/9/23 for acute postoperative pain in setting of chronic pain history.    # Acute post-operative pain   # History of chronic pain, opioid tolerance  Per MN  review pulled from system on 03/09/23. Last refill on 2/27/23, indicating the following analgesic usage: Fentanyl Patch 25 mcg/hr, Q 72 hr + Hydrocodone-Acetaminophen 5mg-325 mg 4 times/day    Review of MAR shows aggressive antiemetic treatment with reglan, zofran and scopalamine patches, able to take both po and IV hydromorphone     Plan:   1. Continue to treat nausea aggressively to enable wean to PO medications, encouraged recent activity.   2. Continue Transdermal Fentanyl Patch 25 mcg/hr, change Q 72 hr  3. Continue PO hydromorphone 2-4 mg q3h PRN   4. Continue IV hydromorphone as ordered. Work towards spacing out doses, off 24 hours prior to discharge  5. Continue gabapentin 300 mg TID  6. May consider starting lidocaine patches  7. Okay to use heat alternating with cold packs PRN   8. Restart acetaminophen 1 g q6h as able    Pain Service will continue to follow.    Discussed with attending anesthesiologist    Myles David PGY-2  03/12/2023     Overnight Events: None    Tubes/Drains: No    Subjective: Reports ongoing nausea and pain. Felt like times when she felt that she was not keeping up with her pain, she counter-intuitively felt more nausea. Tylenol was not taken yesterday due to nausea. Up walking the halls several times today.    Diet: Clear Liquid Diet  Snacks/Supplements Pediatric: Ensure Clear; Between Meals    Relevant Labs:  Last Comprehensive Metabolic Panel:  Lab Results   Component Value Date      "(L) 03/12/2023    POTASSIUM 3.2 (L) 03/12/2023    CHLORIDE 95 (L) 03/12/2023    CO2 26 03/12/2023    ANIONGAP 10 03/12/2023     (H) 03/12/2023    BUN 6.3 03/12/2023    CR 0.54 03/12/2023    GFRESTIMATED >90 03/12/2023    MANNY 8.2 (L) 03/12/2023       Physical Exam:  Vitals: /78 (BP Location: Right arm)   Pulse 61   Temp 36.2  C (97.1  F) (Temporal)   Resp 16   Ht 1.702 m (5' 7\")   Wt 80.4 kg (177 lb 4 oz)   LMP  (LMP Unknown)   SpO2 97%   BMI 27.76 kg/m      Physical Exam:   CONSTITUTIONAL/GENERAL APPEARANCE: In bed, awake, NAD  EYES: EOMI, sclera anicteric  ENT/NECK: atraumatic, moist mucous membranes  RESPIRATORY: non-labored breathing. No cough, wheeze  NEURO:  Alert oriented x 4. Answers questions appropriately  SKIN/VASCULAR EXAM:  No jaundice, no suspicious rash or lesions to exposed skin  PSYCHIATRIC/BEHAVIORAL/OBSERVATIONS:  No objective signs of pain observed during our interview.  Judgement and insight intact, cooperative     Relevant Medications:  Current Facility-Administered Medications   Medication Dose Route Frequency     fentaNYL  25 mcg Transdermal Q72H    And     fentaNYL   Transdermal Q8H BHARGAV     gabapentin  300 mg Oral TID     methocarbamol  500 mg Oral TID     metoclopramide  5 mg Intravenous Q8H     scopolamine  1 patch Transdermal Q72H    And     scopolamine   Transdermal Q8H     sertraline  50 mg Oral At Bedtime     sodium chloride (PF)  3 mL Intracatheter Q8H     Current Facility-Administered Medications   Medication Dose Route Frequency     sore throat  1 lozenge Buccal Q1H PRN     calcium carbonate  500 mg Oral BID PRN     haloperidol lactate  1 mg Intravenous Q6H PRN     HYDROmorphone  0.2-0.4 mg Intravenous Q2H PRN     HYDROmorphone  2-4 mg Oral Q3H PRN     hydrOXYzine  25 mg Oral Q8H PRN     lidocaine 4%   Topical Q1H PRN     lidocaine viscous 2% 15 mL and maalox/mylanta w/ simethicone 15 mL (GI COCKTAIL)  30 mL Oral TID PRN     lidocaine (buffered or not buffered)  " "0.1-1 mL Other Q1H PRN     naloxone  0.2 mg Intravenous Q2 Min PRN    Or     naloxone  0.4 mg Intravenous Q2 Min PRN    Or     naloxone  0.2 mg Intramuscular Q2 Min PRN    Or     naloxone  0.4 mg Intramuscular Q2 Min PRN     ondansetron  4-8 mg Oral Q6H PRN    Or     ondansetron  4-8 mg Intravenous Q6H PRN     simethicone  80 mg Oral Q6H PRN     sodium chloride (PF)  3 mL Intracatheter q1 min prn       Please see A&P for additional details of medical decision making.      Acute Inpatient Pain Service [facility]  Hours of pain coverage 24/7   Page via Amcom- Please Page the Pain Team Via Amcom: \"PAIN MANAGEMENT ACUTE INPATIENT/ [facility]\"         "

## 2023-03-13 NOTE — PLAN OF CARE
Goal Outcome Evaluation:    POD#5 Loop ileostomy take down and small bowel anastamosis.     AVSS on room. Alert and oriented. Continued to report pain and nausea. On IV and oral Dilaudid, Robaxin and Gabapentin, Fentanyl patch for pain. On Zofran, Reglan and Haldol for nausea. Also utilized aromatherapy and sea bands. On a low fiber diet but only taking in small amounts. Bowel sounds hypoactive. Passed minimal gas but is burping frequently. Also had a small BM after the Methylnaltrexone dose per patient report. Voided spontaneously. Up ad el. IV Potassium given slowly due to IV discomfort. On IV fluids. Continue with plan of care.    Addendum:   Patient showered and had takedown site packing changed per provider. Abdominal CT with oral contrast ordered, patient has started oral contrast. Potassium recheck pending.

## 2023-03-13 NOTE — PROVIDER NOTIFICATION
Notified Resident at 2:30 AM regarding patient's continued nausea and emesis occurrence.     Spoke with: Holden Doyle MD     Orders were obtained.    Comments: continue to monitor patient and notify MDs if she has another emesis.

## 2023-03-13 NOTE — PROGRESS NOTES
Pain Service Progress Note  [facility]  Date: 03/13/2023       Patient Name: Vicki Shell  MRN: [id number]  Age: 38 year old  Sex: female      Assessment:  37 year old female with a complex surgical history s/p loop ileostomy take down and small bowel anastomosis on 3/8/23, consulted by Colorectal Service on 3/9/23 for acute postoperative pain in setting of chronic pain history.    # Acute post-operative pain   # History of chronic pain, opioid tolerance  Per MN  review pulled from system on 03/09/23. Last refill on 2/27/23, indicating the following analgesic usage: Fentanyl Patch 25 mcg/hr, Q 72 hr + Hydrocodone-Acetaminophen 5mg-325 mg 4 times/day    Review of MAR shows aggressive antiemetic treatment with reglan, zofran and scopalamine patches, able to take both po and IV hydromorphone     Plan:   1. Discussed with Dr. Richardson and agree with spacing out IV Dilaudid to 0.2-0.4mg q4h and then wean off.    2. Could consider APAP suspension or IV if PO is not suitable for her.   3. Continue Transdermal Fentanyl Patch 25 mcg/hr, change Q 72 hr  4. Continue PO hydromorphone 2-4 mg q3h PRN   5. Continue gabapentin 300 mg TID  6. May consider starting lidocaine patches  7. Okay to use heat alternating with cold packs PRN   8. Continue to treat nausea, encouraged recent activity.     Pain Service will sign off. Please page/call for questions.     Discussed with attending anesthesiologist    Maria Ines Fong MD  Anesthesiology, CA-2   03/13/2023     Overnight Events: None    Tubes/Drains: No    Subjective: Reports ongoing nausea. She was able to take Tylenol today but the tablet is powdery and hard to swallow. Nausea, interestingly, gets better with Dilaudid.     Diet: Snacks/Supplements Pediatric: Other - Please comment; ensure max; Between Meals  Low Fiber Diet    Relevant Labs:  Last Comprehensive Metabolic Panel:  Lab Results   Component Value Date     (L) 03/13/2023    POTASSIUM 4.2  "03/13/2023    CHLORIDE 100 03/13/2023    CO2 22 03/13/2023    ANIONGAP 13 03/13/2023     (H) 03/13/2023    BUN 5.9 (L) 03/13/2023    CR 0.58 03/13/2023    GFRESTIMATED >90 03/13/2023    MANNY 8.0 (L) 03/13/2023       Physical Exam:  Vitals: /75   Pulse 62   Temp 97.9  F (36.6  C) (Temporal)   Resp 16   Ht 1.702 m (5' 7\")   Wt 80.4 kg (177 lb 4 oz)   LMP  (LMP Unknown)   SpO2 93%   BMI 27.76 kg/m      Physical Exam:   CONSTITUTIONAL/GENERAL APPEARANCE: In bed, awake, NAD  EYES: EOMI, sclera anicteric  ENT/NECK: atraumatic, moist mucous membranes  RESPIRATORY: non-labored breathing. No cough, wheeze  NEURO:  Alert oriented x 4. Answers questions appropriately  SKIN/VASCULAR EXAM:  No jaundice, no suspicious rash or lesions to exposed skin  PSYCHIATRIC/BEHAVIORAL/OBSERVATIONS:  No objective signs of pain observed during our interview.  Judgement and insight intact, cooperative     Relevant Medications:  Current Facility-Administered Medications   Medication Dose Route Frequency     acetaminophen  1,000 mg Oral Q6H     fentaNYL  25 mcg Transdermal Q72H    And     fentaNYL   Transdermal Q8H BHARGAV     gabapentin  300 mg Oral TID     methocarbamol  500 mg Oral TID     metoclopramide  5 mg Intravenous Q8H     potassium chloride  10 mEq Intravenous Q1H     scopolamine  1 patch Transdermal Q72H    And     scopolamine   Transdermal Q8H     sertraline  50 mg Oral At Bedtime     sodium chloride (PF)  3 mL Intracatheter Q8H     Current Facility-Administered Medications   Medication Dose Route Frequency     sore throat  1 lozenge Buccal Q1H PRN     calcium carbonate  500 mg Oral BID PRN     haloperidol lactate  1 mg Intravenous Q6H PRN     HYDROmorphone  0.2-0.4 mg Intravenous Q2H PRN     HYDROmorphone  2-4 mg Oral Q3H PRN     hydrOXYzine  25 mg Oral Q8H PRN     lidocaine 4%   Topical Q1H PRN     lidocaine viscous 2% 15 mL and maalox/mylanta w/ simethicone 15 mL (GI COCKTAIL)  30 mL Oral TID PRN     lidocaine " "(buffered or not buffered)  0.1-1 mL Other Q1H PRN     naloxone  0.2 mg Intravenous Q2 Min PRN    Or     naloxone  0.4 mg Intravenous Q2 Min PRN    Or     naloxone  0.2 mg Intramuscular Q2 Min PRN    Or     naloxone  0.4 mg Intramuscular Q2 Min PRN     ondansetron  4-8 mg Oral Q6H PRN    Or     ondansetron  4-8 mg Intravenous Q6H PRN     simethicone  80 mg Oral Q6H PRN     sodium chloride (PF)  3 mL Intracatheter q1 min prn       Please see A&P for additional details of medical decision making.      Acute Inpatient Pain Service [facility]  Hours of pain coverage 24/7   Page via Amcom- Please Page the Pain Team Via Amcom: \"PAIN MANAGEMENT ACUTE INPATIENT/ [facility]\"         "

## 2023-03-13 NOTE — PROGRESS NOTES
"Colorectal Surgery Progress Note  [facility]  POD#5 Loop ileostomy take down and SBA  Vicki Shell is a 37 year old female with a complex surgical history including pelvic sepsis s/p Merlin procedure. S/p loop ileostomy take down and small bowel anastomosis.    03/13/2023    Subjective:   Still having nausea, emesis last night and this morning. Feeling uncomfortable and distended. Planning to remain on fluid. Pain similar to yesterday. Passing small amounts of flatus. Ambulating x4 yesterday.    Objective:  /65 (BP Location: Right arm)   Pulse 73   Temp 98.9  F (37.2  C) (Temporal)   Resp 16   Ht 1.702 m (5' 7\")   Wt 80.4 kg (177 lb 4 oz)   LMP  (LMP Unknown)   SpO2 96%   BMI 27.76 kg/m      Gen: A&O x3 alert and responsive  Chest: breathing non-labored on RA  Abdomen: soft, mildly tender to palpation, distended. Dressing over ostomy takedown site. No no surrounding erythema.   Extremities: warm and well perfused    Assessment:  POD5 s/p ileostomy takedown has now had ROBF, persistent nausea/emesis with distension. KUB yesterday showed air-fluid levels consistent with post-op ileus.    Plan:  - IV fluids resumed  - Nausea/Vomiting:    - Continue IV zofran and haldol    - Continue Scopolamine patch   - Continue GI cocktail   - Give 1 dose of methylnaltrexone  - Pain:    - Appreciate recs from Pain Management service   - Continue fentanyl patch and PRN dilauded   - Monitor  - Diet:   - Low fiber, patient will self-regulate  - Ppx:   - SCDs, ambulate as able, lovenox  - Dressings:   - Change dressing daily and PRN, do not reinforce.    Seen and discussed with Dr. Caro, Colorectal fellow, who discussed with Dr. Tad Richardson, staff    Holden Doyle MD  Intern, General Surgery  Colorectal Intern       "

## 2023-03-13 NOTE — PLAN OF CARE
Pt is A/Ox4, VSS on RA. Up independently, ambulating in hallways. Diet advanced from clear liquid to low fiber diet. Voiding adequately, passing gas, no BM on shift. Abdominal incision dressing C/D/I. Pain somewhat controlled with PRN dilaudid, robaxin, gabapentin, and fentanyl patch; nausea controlled with PRN zofran x2 and scopolamine patch. Emesis x1. Pt declined 0600 tylenol stating that the previous dose made her nauseous.    Potassium recheck at 3.4, IV replacement infusing now. Recheck not scheduled yet.

## 2023-03-13 NOTE — PROGRESS NOTES
Patient has been educated on potential risks of choosing to leave the unit and that the responsibility for patient well-being will belong to the patient. Pt has been informed that admission to hospital is due to need for medical treatment. Education given to the patient on some of the potential risks included but are not limited to:      - lack of access to nursing intervention      - possible missed appointments with MD, therapies, tests      - possible missed medications, antibiotics, management of IV's    Patient Response: understanding    Patient notified staff prior to leaving unit: yes  Coban wrap placed over IV prior to pt leaving unit: yes

## 2023-03-14 ENCOUNTER — APPOINTMENT (OUTPATIENT)
Dept: GENERAL RADIOLOGY | Facility: CLINIC | Age: 38
DRG: 330 | End: 2023-03-14
Attending: SURGERY
Payer: MEDICAID

## 2023-03-14 LAB
ALP SERPL-CCNC: 93 U/L (ref 35–104)
ALT SERPL W P-5'-P-CCNC: 71 U/L (ref 10–35)
ANION GAP SERPL CALCULATED.3IONS-SCNC: 10 MMOL/L (ref 7–15)
AST SERPL W P-5'-P-CCNC: 65 U/L (ref 10–35)
BILIRUB SERPL-MCNC: 0.4 MG/DL
BUN SERPL-MCNC: 5.6 MG/DL (ref 6–20)
CALCIUM SERPL-MCNC: 7.9 MG/DL (ref 8.6–10)
CHLORIDE SERPL-SCNC: 97 MMOL/L (ref 98–107)
CREAT SERPL-MCNC: 0.57 MG/DL (ref 0.51–0.95)
DEPRECATED HCO3 PLAS-SCNC: 24 MMOL/L (ref 22–29)
GFR SERPL CREATININE-BSD FRML MDRD: >90 ML/MIN/1.73M2
GLUCOSE SERPL-MCNC: 113 MG/DL (ref 70–99)
HOLD SPECIMEN: NORMAL
MAGNESIUM SERPL-MCNC: 2.4 MG/DL (ref 1.7–2.3)
PHOSPHATE SERPL-MCNC: 3.7 MG/DL (ref 2.5–4.5)
POTASSIUM SERPL-SCNC: 3.7 MMOL/L (ref 3.4–5.3)
SODIUM SERPL-SCNC: 131 MMOL/L (ref 136–145)
TRIGL SERPL-MCNC: 98 MG/DL

## 2023-03-14 PROCEDURE — B4185 PARENTERAL SOL 10 GM LIPIDS: HCPCS | Performed by: SURGERY

## 2023-03-14 PROCEDURE — 84075 ASSAY ALKALINE PHOSPHATASE: CPT | Performed by: STUDENT IN AN ORGANIZED HEALTH CARE EDUCATION/TRAINING PROGRAM

## 2023-03-14 PROCEDURE — 250N000009 HC RX 250: Performed by: SURGERY

## 2023-03-14 PROCEDURE — 83735 ASSAY OF MAGNESIUM: CPT | Performed by: STUDENT IN AN ORGANIZED HEALTH CARE EDUCATION/TRAINING PROGRAM

## 2023-03-14 PROCEDURE — 250N000011 HC RX IP 250 OP 636: Performed by: SURGERY

## 2023-03-14 PROCEDURE — 84478 ASSAY OF TRIGLYCERIDES: CPT | Performed by: STUDENT IN AN ORGANIZED HEALTH CARE EDUCATION/TRAINING PROGRAM

## 2023-03-14 PROCEDURE — 272N000019 HC KIT OPEN ENDED DOUBLE LUMEN

## 2023-03-14 PROCEDURE — 82310 ASSAY OF CALCIUM: CPT | Performed by: STUDENT IN AN ORGANIZED HEALTH CARE EDUCATION/TRAINING PROGRAM

## 2023-03-14 PROCEDURE — 84100 ASSAY OF PHOSPHORUS: CPT | Performed by: STUDENT IN AN ORGANIZED HEALTH CARE EDUCATION/TRAINING PROGRAM

## 2023-03-14 PROCEDURE — 84450 TRANSFERASE (AST) (SGOT): CPT | Performed by: STUDENT IN AN ORGANIZED HEALTH CARE EDUCATION/TRAINING PROGRAM

## 2023-03-14 PROCEDURE — 250N000013 HC RX MED GY IP 250 OP 250 PS 637: Performed by: STUDENT IN AN ORGANIZED HEALTH CARE EDUCATION/TRAINING PROGRAM

## 2023-03-14 PROCEDURE — 250N000011 HC RX IP 250 OP 636: Performed by: STUDENT IN AN ORGANIZED HEALTH CARE EDUCATION/TRAINING PROGRAM

## 2023-03-14 PROCEDURE — 999N000065 XR CHEST PORT 1 VIEW

## 2023-03-14 PROCEDURE — 71045 X-RAY EXAM CHEST 1 VIEW: CPT | Mod: 26 | Performed by: RADIOLOGY

## 2023-03-14 PROCEDURE — 120N000002 HC R&B MED SURG/OB UMMC

## 2023-03-14 PROCEDURE — 84460 ALANINE AMINO (ALT) (SGPT): CPT | Performed by: STUDENT IN AN ORGANIZED HEALTH CARE EDUCATION/TRAINING PROGRAM

## 2023-03-14 PROCEDURE — 36569 INSJ PICC 5 YR+ W/O IMAGING: CPT

## 2023-03-14 PROCEDURE — 82247 BILIRUBIN TOTAL: CPT | Performed by: STUDENT IN AN ORGANIZED HEALTH CARE EDUCATION/TRAINING PROGRAM

## 2023-03-14 PROCEDURE — 272N000451 HC KIT SHRLOCK 5FR POWER PICC DOUBLE LUMEN

## 2023-03-14 PROCEDURE — 258N000003 HC RX IP 258 OP 636: Performed by: SURGERY

## 2023-03-14 PROCEDURE — 272N000277 HC DEVICE 4FR SECURACATH

## 2023-03-14 PROCEDURE — 250N000013 HC RX MED GY IP 250 OP 250 PS 637: Performed by: SURGERY

## 2023-03-14 PROCEDURE — 250N000011 HC RX IP 250 OP 636: Performed by: PHYSICIAN ASSISTANT

## 2023-03-14 PROCEDURE — 3E0436Z INTRODUCTION OF NUTRITIONAL SUBSTANCE INTO CENTRAL VEIN, PERCUTANEOUS APPROACH: ICD-10-PCS | Performed by: SURGERY

## 2023-03-14 PROCEDURE — 36415 COLL VENOUS BLD VENIPUNCTURE: CPT | Performed by: STUDENT IN AN ORGANIZED HEALTH CARE EDUCATION/TRAINING PROGRAM

## 2023-03-14 RX ORDER — ONDANSETRON 4 MG/1
4 TABLET, ORALLY DISINTEGRATING ORAL EVERY 6 HOURS
Status: DISCONTINUED | OUTPATIENT
Start: 2023-03-14 | End: 2023-03-24 | Stop reason: HOSPADM

## 2023-03-14 RX ORDER — METHOCARBAMOL 750 MG/1
750 TABLET, FILM COATED ORAL 4 TIMES DAILY
Status: DISCONTINUED | OUTPATIENT
Start: 2023-03-14 | End: 2023-03-16

## 2023-03-14 RX ORDER — LIDOCAINE 40 MG/G
CREAM TOPICAL
Status: ACTIVE | OUTPATIENT
Start: 2023-03-14 | End: 2023-03-17

## 2023-03-14 RX ORDER — HYDROMORPHONE HYDROCHLORIDE 2 MG/1
2-4 TABLET ORAL EVERY 4 HOURS PRN
Status: DISCONTINUED | OUTPATIENT
Start: 2023-03-14 | End: 2023-03-16

## 2023-03-14 RX ORDER — DEXTROSE MONOHYDRATE 100 MG/ML
INJECTION, SOLUTION INTRAVENOUS CONTINUOUS PRN
Status: DISCONTINUED | OUTPATIENT
Start: 2023-03-14 | End: 2023-03-24 | Stop reason: HOSPADM

## 2023-03-14 RX ORDER — METOCLOPRAMIDE HYDROCHLORIDE 5 MG/ML
5 INJECTION INTRAMUSCULAR; INTRAVENOUS EVERY 6 HOURS
Status: DISCONTINUED | OUTPATIENT
Start: 2023-03-14 | End: 2023-03-16

## 2023-03-14 RX ORDER — METHOCARBAMOL 500 MG/1
500 TABLET, FILM COATED ORAL 4 TIMES DAILY
Status: DISCONTINUED | OUTPATIENT
Start: 2023-03-14 | End: 2023-03-14

## 2023-03-14 RX ORDER — ONDANSETRON 2 MG/ML
4-8 INJECTION INTRAMUSCULAR; INTRAVENOUS EVERY 6 HOURS PRN
Status: DISCONTINUED | OUTPATIENT
Start: 2023-03-14 | End: 2023-03-16

## 2023-03-14 RX ORDER — POLYETHYLENE GLYCOL 3350 17 G/17G
17 POWDER, FOR SOLUTION ORAL DAILY
Status: DISCONTINUED | OUTPATIENT
Start: 2023-03-15 | End: 2023-03-17

## 2023-03-14 RX ORDER — ONDANSETRON 4 MG/1
4 TABLET, ORALLY DISINTEGRATING ORAL EVERY 6 HOURS PRN
Status: DISCONTINUED | OUTPATIENT
Start: 2023-03-14 | End: 2023-03-16

## 2023-03-14 RX ORDER — HYDROXYZINE HYDROCHLORIDE 25 MG/1
25-50 TABLET, FILM COATED ORAL EVERY 6 HOURS PRN
Status: DISCONTINUED | OUTPATIENT
Start: 2023-03-14 | End: 2023-03-15

## 2023-03-14 RX ORDER — POLYETHYLENE GLYCOL 3350 17 G/17G
17 POWDER, FOR SOLUTION ORAL ONCE
Status: COMPLETED | OUTPATIENT
Start: 2023-03-14 | End: 2023-03-14

## 2023-03-14 RX ADMIN — METHOCARBAMOL 750 MG: 750 TABLET ORAL at 12:26

## 2023-03-14 RX ADMIN — GABAPENTIN 300 MG: 300 CAPSULE ORAL at 10:02

## 2023-03-14 RX ADMIN — ONDANSETRON 4 MG: 4 TABLET, ORALLY DISINTEGRATING ORAL at 00:29

## 2023-03-14 RX ADMIN — HYDROMORPHONE HYDROCHLORIDE 0.4 MG: 0.2 INJECTION, SOLUTION INTRAMUSCULAR; INTRAVENOUS; SUBCUTANEOUS at 22:17

## 2023-03-14 RX ADMIN — HYDROMORPHONE HYDROCHLORIDE 0.4 MG: 0.2 INJECTION, SOLUTION INTRAMUSCULAR; INTRAVENOUS; SUBCUTANEOUS at 04:18

## 2023-03-14 RX ADMIN — HYDROMORPHONE HYDROCHLORIDE 4 MG: 2 TABLET ORAL at 18:48

## 2023-03-14 RX ADMIN — ONDANSETRON 4 MG: 4 TABLET, ORALLY DISINTEGRATING ORAL at 10:03

## 2023-03-14 RX ADMIN — HYDROMORPHONE HYDROCHLORIDE 0.4 MG: 0.2 INJECTION, SOLUTION INTRAMUSCULAR; INTRAVENOUS; SUBCUTANEOUS at 12:52

## 2023-03-14 RX ADMIN — OLIVE OIL AND SOYBEAN OIL 250 ML: 16; 4 INJECTION, EMULSION INTRAVENOUS at 21:14

## 2023-03-14 RX ADMIN — HYDROMORPHONE HYDROCHLORIDE 0.4 MG: 0.2 INJECTION, SOLUTION INTRAMUSCULAR; INTRAVENOUS; SUBCUTANEOUS at 17:34

## 2023-03-14 RX ADMIN — ACETAMINOPHEN 1000 MG: 500 TABLET ORAL at 23:19

## 2023-03-14 RX ADMIN — ENOXAPARIN SODIUM 40 MG: 40 INJECTION SUBCUTANEOUS at 16:04

## 2023-03-14 RX ADMIN — ONDANSETRON 4 MG: 4 TABLET, ORALLY DISINTEGRATING ORAL at 16:04

## 2023-03-14 RX ADMIN — HYDROMORPHONE HYDROCHLORIDE 4 MG: 2 TABLET ORAL at 02:30

## 2023-03-14 RX ADMIN — ONDANSETRON 4 MG: 4 TABLET, ORALLY DISINTEGRATING ORAL at 03:57

## 2023-03-14 RX ADMIN — GABAPENTIN 300 MG: 300 CAPSULE ORAL at 21:20

## 2023-03-14 RX ADMIN — HYDROMORPHONE HYDROCHLORIDE 0.4 MG: 0.2 INJECTION, SOLUTION INTRAMUSCULAR; INTRAVENOUS; SUBCUTANEOUS at 08:37

## 2023-03-14 RX ADMIN — ACETAMINOPHEN 1000 MG: 500 TABLET ORAL at 17:41

## 2023-03-14 RX ADMIN — METOCLOPRAMIDE 5 MG: 5 INJECTION, SOLUTION INTRAMUSCULAR; INTRAVENOUS at 03:49

## 2023-03-14 RX ADMIN — METHYLNALTREXONE BROMIDE 12 MG: 12 INJECTION, SOLUTION SUBCUTANEOUS at 12:27

## 2023-03-14 RX ADMIN — POTASSIUM CHLORIDE, DEXTROSE MONOHYDRATE AND SODIUM CHLORIDE: 150; 5; 450 INJECTION, SOLUTION INTRAVENOUS at 05:54

## 2023-03-14 RX ADMIN — ONDANSETRON 8 MG: 2 INJECTION INTRAMUSCULAR; INTRAVENOUS at 19:33

## 2023-03-14 RX ADMIN — METOCLOPRAMIDE 5 MG: 5 INJECTION, SOLUTION INTRAMUSCULAR; INTRAVENOUS at 10:03

## 2023-03-14 RX ADMIN — HYDROMORPHONE HYDROCHLORIDE 0.4 MG: 0.2 INJECTION, SOLUTION INTRAMUSCULAR; INTRAVENOUS; SUBCUTANEOUS at 00:21

## 2023-03-14 RX ADMIN — METHOCARBAMOL 750 MG: 750 TABLET ORAL at 21:20

## 2023-03-14 RX ADMIN — HYDROMORPHONE HYDROCHLORIDE 4 MG: 2 TABLET ORAL at 23:19

## 2023-03-14 RX ADMIN — HALOPERIDOL LACTATE 1 MG: 5 INJECTION, SOLUTION INTRAMUSCULAR at 06:15

## 2023-03-14 RX ADMIN — HYDROMORPHONE HYDROCHLORIDE 4 MG: 2 TABLET ORAL at 05:55

## 2023-03-14 RX ADMIN — HYDROMORPHONE HYDROCHLORIDE 4 MG: 2 TABLET ORAL at 09:38

## 2023-03-14 RX ADMIN — SERTRALINE HYDROCHLORIDE 50 MG: 50 TABLET ORAL at 21:20

## 2023-03-14 RX ADMIN — METOCLOPRAMIDE 5 MG: 5 INJECTION, SOLUTION INTRAMUSCULAR; INTRAVENOUS at 16:03

## 2023-03-14 RX ADMIN — MAGNESIUM SULFATE HEPTAHYDRATE: 500 INJECTION, SOLUTION INTRAMUSCULAR; INTRAVENOUS at 21:13

## 2023-03-14 RX ADMIN — HYDROMORPHONE HYDROCHLORIDE 4 MG: 2 TABLET ORAL at 14:23

## 2023-03-14 RX ADMIN — GABAPENTIN 300 MG: 300 CAPSULE ORAL at 14:27

## 2023-03-14 ASSESSMENT — ACTIVITIES OF DAILY LIVING (ADL)
ADLS_ACUITY_SCORE: 22

## 2023-03-14 NOTE — PROGRESS NOTES
CLINICAL NUTRITION SERVICES - ASSESSMENT NOTE     Nutrition Prescription    RECOMMENDATIONS FOR MDs/PROVIDERS TO ORDER:  Adjust/titrate IV fluids when TPN begins.  If pt requires IV fluids on top of TPN , please use non-dextrose IV fluids.  TPN will run at current IV fluid rate of 40 mL/hr.    Recommendations already ordered by Registered Dietitian (RD):  -- Once central line in place:   1. Dosing weight:  66 kg  2. Access: central  3. Initial parameters (per day)  Volume:  960 mL  Dextrose: 140 g  AA: 90 g  Lipids: 250 mL 20%, 7 days per week   4. Dextrose titration:   Monitor lytes and if within acceptable parameters (Mg++ > or = 1.5, K+ is > or = 3, and PO4 > or = 1.9), increase dextrose by 50 g/day to goal of 240 g dextrose.  5. Additives: per phamacist    Goal PN provides 240 g dextrose, 90 g AA, and 250 mL 20% lipids 7 days per week for total provision of 1676 Kcals (25 Kcals/kg), 1.3 g/kg protein, GIR 2.5 mg/kg/minute, and 30% fat kcals on average daily.     -- Lab add-ons: Alk Phos, ALT, AST, Tbili, and TG  -- Weigh patient    Future/Additional Recommendations:  Monitor TPN tolerance, labs, wt trends, ability to re-advance diet     REASON FOR ASSESSMENT  Vicki Shell is a/an 38 year old female assessed by the dietitian for Pharmacy/Nutrition to Start and Manage PN (Central Line NOT in place (Line has been ordered, but not yet inserted))    NUTRITION HISTORY  Obtained information from chart, pt with other cares during 2 attempts to visit today (will attempt back at later time as able). Per chart, pt is s/p redo LAR for stricuture, now POD#6 s/p DLI closure with persistent nausea/vomiting, in setting of anastomotic hematoma.    CURRENT NUTRITION ORDERS  Diet: NPO  Intake/Tolerance: made NPO this morning after not tolerating low fiber diet well yesterday due to N/V; pt had been on clear liquid diet 3/12, low fiber diet 3/10-3/12, and NPO or clear liquids 3/8-3/9.    LABS  Labs reviewed  - Na+ 131 (L)  - K+  "and Phos WNL  - Mg++ 2.4 (H)  - BUN 5.6 (L)  - BGs stable  - Alk Phos and Tbili WNL  - ALT 71 (H), AST 65 (H)  - TG WNL    MEDICATIONS  Medications reviewed  - D5 @ 40 mL/hr since 3/13     ANTHROPOMETRICS  Height: 170.2 cm (5' 7\")  Most Recent Weight: 80.4 kg (177 lb 4 oz)    IBW: 61.4 kg   BMI: Overweight BMI 25-29.9  Weight History: no recent wt loss, trending up since August.  Ordered updated weight today  Wt Readings from Last 10 Encounters:   03/08/23 80.4 kg (177 lb 4 oz)   02/02/23 80.3 kg (177 lb)   01/04/23 78.6 kg (173 lb 4.5 oz)   01/03/23 77.6 kg (171 lb)   12/15/22 78 kg (172 lb)   09/13/22 76.3 kg (168 lb 3.4 oz)   08/03/22 75.6 kg (166 lb 10.7 oz)   05/25/22 77.6 kg (171 lb)   04/30/22 45.1 kg (99 lb 6.4 oz)   04/27/22 83.5 kg (184 lb 1.6 oz)      Dosing Weight: 66 kg (adjusted based on actual wt 80.4 kg and IBW)    ASSESSED NUTRITION NEEDS  Estimated Energy Needs: 1253-8648 kcals/day (25 - 30 kcals/kg)  Justification: Maintenance, aim lower end with PN  Estimated Protein Needs: 79-99 grams protein/day (1.2 - 1.5 grams of pro/kg)  Justification: Post-op  Estimated Fluid Needs: (1 mL/kcal)   Justification: Maintenance, or other per provider pending fluid status    PHYSICAL FINDINGS  See malnutrition section below.    MALNUTRITION  % Intake: </= 50% for >/= 5 days (severe)  % Weight Loss: Unable to assess  Subcutaneous Fat Loss: Unable to assess  Muscle Loss: Unable to assess  Fluid Accumulation/Edema: None noted  Malnutrition Diagnosis: Unable to determine due to pt with other cares during 2 attempts to visit today    NUTRITION DIAGNOSIS  Inadequate oral intake related to slow diet advancement post-op and N/V limiting PO intake tolerance as evidenced by overall poor PO intake x 6-7 days since admission    INTERVENTIONS  Implementation  Nutrition Education: Unable to complete due to pt with other cares during 2 attempts to visit   Collaboration with other providers - paged provider with above IV fluid " recs  Parenteral Nutrition/IV Fluids - sent change order request    Goals  Once TPN reaches goal, total avg nutritional intake to meet a minimum of 25 kcal/kg and 1.2 g PRO/kg daily (per dosing wt 66 kg).     Monitoring/Evaluation  Progress toward goals will be monitored and evaluated per protocol.     Adrianna Sawyer, RD, , LD  Weekday Pager: [phone]  Weekday Units covered: 7C (all beds) and 5A (beds 3105 through 6992)  Weekend/Holiday RD Pager: [phone]

## 2023-03-14 NOTE — PROGRESS NOTES
"SPIRITUAL HEALTH SERVICES  [facility] 7C  REFERRAL SOURCE: Length of Stay     SUMMARY OF VISIT  Pt shared her medical narrative with understanding.  She shares that what helps her cope are \"My kids.\"  Her Jehovah's witness jose is of support  and she asked for prayer.  Pt affirmed she would like some healing touch.     PLAN: I provided both prayer and healing touch and will follow-up later in the week.     Rev. Mariel Vargas MDiv, [facility]  Staff    Pager [phone]  * [facility] remains available 24/7 for emergent requests/referrals, either by having the switchboard page the on-call  or by entering an ASAP/STAT consult in Epic (this will also page the on-call ).*   "

## 2023-03-14 NOTE — PROCEDURES
[facility]    Double Lumen PICC Placement    Date/Time: 3/14/2023 3:53 PM  Performed by: Ap Galicia RN  Authorized by: Tad Richardson MD   Indications: vascular access      UNIVERSAL PROTOCOL   Site Marked: Yes  Prior Images Obtained and Reviewed:  Yes  Required items: Required blood products, implants, devices and special equipment available    Patient identity confirmed:  Verbally with patient and arm band  NA - No sedation, light sedation, or local anesthesia  Confirmation Checklist:  Patient's identity using two indicators and relevant allergies  Time out: Immediately prior to the procedure a time out was called    Universal Protocol: the Joint Commission Universal Protocol was followed    Preparation: Patient was prepped and draped in usual sterile fashion       ANESTHESIA    Anesthesia: Local infiltration  Local Anesthetic:  Lidocaine 1% without epinephrine  Anesthetic Total (mL):  5      SEDATION    Patient Sedated: No        Preparation: skin prepped with ChloraPrep  Skin prep agent: skin prep agent completely dried prior to procedure  Sterile barriers: maximum sterile barriers were used: cap, mask, sterile gown, sterile gloves, and large sterile sheet  Hand hygiene: hand hygiene performed prior to central venous catheter insertion  Type of line used: PICC  Catheter type: double lumen  Lumen type: power PICC and non-valved  Lumen Identification: Purple and Red  Catheter size: 4 Fr  Brand: Bard  Lot number: [id number]  Placement method: venipuncture, MST, ultrasound and tip navigation system  Number of attempts: 1  Difficulty threading catheter: no  Successful placement: yes  Orientation: right    Location: cephalic vein (vd 0.50 cm)  Tip Location: SVC  Arm circumference: adults 10 cm  Extremity circumference: 29  Visible catheter length: 3  Total catheter length: 42  Dressing and securement: adhesive securement device, alcohol impregnated  caps, blood cleaned with CHG, chlorhexidine disc applied, dressing applied, line secured, glue, securement device, site cleansed, sterile dressing applied, subcutaneous anchor securement system, transparent securement dressing, transparent dressing and tissue adhesive  Post procedure assessment: blood return through all ports, free fluid flow and placement verified by 3CG technology  PROCEDURE Describe Procedure: PICC ok,to use  Disposal: sharps and needle count correct at the end of procedure, needles and guidewire disposed in sharps container

## 2023-03-14 NOTE — PLAN OF CARE
8549-1114 Dressing dry and intact over ostomy takedown site. Abdomen distended. Passing flatus, no BM. PO Zofran and IV Reglan and Haldol given for constant nausea. Small emesis x2. Abdominal pain somewhat managed with Robaxin, PO Dilaudid, IV Dilaudid and fentanyl patch. Pt self regulating low fiber diet, only PO intake this shift was small amount of clear liquids. Potassium recheck 3.7, next potassium draw scheduled for this morning. Voiding spontaneously, not saving. Up independently. Temp max 99.5, OVSS on room air.

## 2023-03-14 NOTE — PROGRESS NOTES
Colorectal Surgery Progress Note  [facility]  POD#6      Subjective:  CT A/P overnight with hematoma near anastomosis, but no obstruction with contrast in colon. Patient reports persistent nausea/small emesis, passing flatus. No BM     Vitals:  Vitals:    03/13/23 0646 03/13/23 1506 03/13/23 2208 03/14/23 0600   BP: 128/75 124/77 131/79 126/72   BP Location:  Right arm Right arm Right arm   Pulse: 62 60 78 83   Resp: 16 16 18 12   Temp: 97.9  F (36.6  C) 98.3  F (36.8  C) 99.5  F (37.5  C) 98.6  F (37  C)   TempSrc: Temporal Temporal Oral Oral   SpO2: 93% 95% 95% 98%   Weight:       Height:         I/O:  I/O last 3 completed shifts:  In: 1156.75 [P.O.:150; I.V.:1006.75]  Out: 250 [Emesis/NG output:250]    Physical Exam:  Gen: AAOx3, NAD  Pulm: Non-labored breathing  Abd: Soft, moderately distended, appropriately tender, no guarding   Prior stoma site- c/d/i      BMP  Recent Labs   Lab 03/13/23  1829 03/13/23  0704 03/13/23  0339 03/12/23  1034 03/12/23  0528 03/11/23  1431 03/09/23  0714   NA  --  135*  --  131*  --  134* 136   POTASSIUM 3.7 4.2 3.4 3.2*   < > 3.1* 3.7   CHLORIDE  --  100  --  95*  --  97* 104   CO2  --  22  --  26  --  27 21*   BUN  --  5.9*  --  6.3  --  6.2 7.6   CR  --  0.58  --  0.54  --  0.51 0.56   GLC  --  118*  --  114*  --  119* 154*   MAG  --  2.2  --   --   --   --  1.6*    < > = values in this interval not displayed.     CBC  Recent Labs   Lab 03/12/23  1034 03/11/23  1431 03/11/23  0732 03/09/23  0714   WBC  --  9.2  --  14.9*   HGB 8.9* 8.9*  --  10.9*   HCT  --  28.0*  --  34.2*   PLT  --  190 169 210     Results for orders placed or performed during the hospital encounter of 03/08/23   POC US Guidance Needle Placement    Impression    Bilateral TAP block.   XR Abdomen Port 1 View    Impression    IMPRESSION: Dilated loops of small and large bowel projecting over the  abdomen, which can be seen in adynamic ileus versus developing  obstruction.     I  have personally reviewed the examination and initial interpretation  and I agree with the findings.    HOWIE FEROR MD         SYSTEM ID:  [id number]   XR Abdomen 2 Views    Impression    IMPRESSION:    Persistent air-filled dilated loops small bowel with associated  multiple air-fluid levels. Findings representing ongoing multiple  obstruction versus adynamic ileus.    I have personally reviewed the examination and initial interpretation  and I agree with the findings.    BELLA IVORY MD         SYSTEM ID:  [id number]   CT Abdomen Pelvis w Contrast   Result Value Ref Range    Radiologist flags Pelvic hematoma (Urgent)     Impression    IMPRESSION:   1.  Post surgical changes of ileostomy takedown.  2.  Large hyperdense collection suggestive of hematoma in the pelvis  adjacent to the ileocecal junction, measuring 11.4 x 7.1 x 6.6 cm. No  active extravasation of contrast detected.  3.  Mildly dilated small bowel with transition point in the pelvis  adjacent to the hematoma, suggestive of obstruction/extrinsic  compression.  4.  Small volume ascites. No pneumoperitoneum.   5.  3.7 cm right ovarian cyst.      [Urgent Result: Pelvic hematoma]    Finding was identified on 3/13/2023 9:02 PM.     Surgery team was contacted by Dr. Coreas at 3/13/2023 9:25 PM and  verbalized understanding of the urgent finding.     I have personally reviewed the examination and initial interpretation  and I agree with the findings.    BRITTANY LOCO MD         SYSTEM ID:  [id number]       ASSESSMENT: This is a 38 year old woman s/p redo LAR for stricuture, now POD#6 s/p DLI closure with persistent nausea/vomiting, in setting of anastomotic hematoma. Symptoms should improve with resolution of hematoma, but will require time    Plan:   - NPO with sips/chips. Cont antiemetics  - PICC/TPN today  - Cont home fentanyl patch  - Stop IVF once on TPN  - F/u am lytes  - Daily dressing change to prior stoma site  - SCDs, IS, OOB, ambulate, lovenox  PPx      Discussed with Dr. Debra Caro MD  Colon and Rectal Surgery Fellow

## 2023-03-14 NOTE — PLAN OF CARE
Goal Outcome Evaluation:      Plan of Care Reviewed With: other (see comments)    Overall Patient Progress: improvingOverall Patient Progress: improving    Outcome Evaluation: Starting TPN.  See RD progress note.

## 2023-03-14 NOTE — PLAN OF CARE
A&Ox4. VSS on RA. Nausea managed with PRN zofran, reglan, and scopolomine patch, Pain managed with PRN IV and PO dilaudid, robaxin, tylenol and fentanyl patch. Up independently in room. Tolerating NPO/sips of clears. PIV x2 saline locked. PICC placed in R arm. Passing gas, one small bm. Voiding, not saving. Distended, takedown site with packing, dressing clean dry intact. Continue to monitor.

## 2023-03-15 LAB
ALBUMIN SERPL BCG-MCNC: 3.4 G/DL (ref 3.5–5.2)
ALP SERPL-CCNC: 79 U/L (ref 35–104)
ALT SERPL W P-5'-P-CCNC: 54 U/L (ref 10–35)
ANION GAP SERPL CALCULATED.3IONS-SCNC: 10 MMOL/L (ref 7–15)
AST SERPL W P-5'-P-CCNC: 35 U/L (ref 10–35)
BILIRUB DIRECT SERPL-MCNC: <0.2 MG/DL (ref 0–0.3)
BILIRUB SERPL-MCNC: 0.4 MG/DL
BUN SERPL-MCNC: 9.5 MG/DL (ref 6–20)
CALCIUM SERPL-MCNC: 8.3 MG/DL (ref 8.6–10)
CHLORIDE SERPL-SCNC: 97 MMOL/L (ref 98–107)
CREAT SERPL-MCNC: 0.62 MG/DL (ref 0.51–0.95)
DEPRECATED HCO3 PLAS-SCNC: 28 MMOL/L (ref 22–29)
GFR SERPL CREATININE-BSD FRML MDRD: >90 ML/MIN/1.73M2
GLUCOSE BLDC GLUCOMTR-MCNC: 114 MG/DL (ref 70–99)
GLUCOSE BLDC GLUCOMTR-MCNC: 123 MG/DL (ref 70–99)
GLUCOSE BLDC GLUCOMTR-MCNC: 127 MG/DL (ref 70–99)
GLUCOSE BLDC GLUCOMTR-MCNC: 128 MG/DL (ref 70–99)
GLUCOSE SERPL-MCNC: 110 MG/DL (ref 70–99)
INR PPP: 1.17 (ref 0.85–1.15)
MAGNESIUM SERPL-MCNC: 2.1 MG/DL (ref 1.7–2.3)
PHOSPHATE SERPL-MCNC: 5 MG/DL (ref 2.5–4.5)
POTASSIUM SERPL-SCNC: 3.2 MMOL/L (ref 3.4–5.3)
POTASSIUM SERPL-SCNC: 3.9 MMOL/L (ref 3.4–5.3)
PREALB SERPL IA-MCNC: 15 MG/DL (ref 15–45)
PROT SERPL-MCNC: 5.8 G/DL (ref 6.4–8.3)
SODIUM SERPL-SCNC: 135 MMOL/L (ref 136–145)

## 2023-03-15 PROCEDURE — 250N000009 HC RX 250: Performed by: PHYSICIAN ASSISTANT

## 2023-03-15 PROCEDURE — 250N000011 HC RX IP 250 OP 636: Performed by: STUDENT IN AN ORGANIZED HEALTH CARE EDUCATION/TRAINING PROGRAM

## 2023-03-15 PROCEDURE — 120N000002 HC R&B MED SURG/OB UMMC

## 2023-03-15 PROCEDURE — 250N000011 HC RX IP 250 OP 636: Performed by: SURGERY

## 2023-03-15 PROCEDURE — B4185 PARENTERAL SOL 10 GM LIPIDS: HCPCS | Performed by: SURGERY

## 2023-03-15 PROCEDURE — 250N000013 HC RX MED GY IP 250 OP 250 PS 637: Performed by: PHYSICIAN ASSISTANT

## 2023-03-15 PROCEDURE — 84132 ASSAY OF SERUM POTASSIUM: CPT | Performed by: SURGERY

## 2023-03-15 PROCEDURE — 83735 ASSAY OF MAGNESIUM: CPT | Performed by: SURGERY

## 2023-03-15 PROCEDURE — 82248 BILIRUBIN DIRECT: CPT | Performed by: SURGERY

## 2023-03-15 PROCEDURE — 250N000013 HC RX MED GY IP 250 OP 250 PS 637: Performed by: SURGERY

## 2023-03-15 PROCEDURE — 84100 ASSAY OF PHOSPHORUS: CPT | Performed by: SURGERY

## 2023-03-15 PROCEDURE — 93010 ELECTROCARDIOGRAM REPORT: CPT | Performed by: INTERNAL MEDICINE

## 2023-03-15 PROCEDURE — 250N000009 HC RX 250: Performed by: SURGERY

## 2023-03-15 PROCEDURE — 93005 ELECTROCARDIOGRAM TRACING: CPT

## 2023-03-15 PROCEDURE — 80053 COMPREHEN METABOLIC PANEL: CPT | Performed by: SURGERY

## 2023-03-15 PROCEDURE — 85610 PROTHROMBIN TIME: CPT | Performed by: SURGERY

## 2023-03-15 PROCEDURE — 36592 COLLECT BLOOD FROM PICC: CPT | Performed by: SURGERY

## 2023-03-15 PROCEDURE — 84134 ASSAY OF PREALBUMIN: CPT | Performed by: SURGERY

## 2023-03-15 PROCEDURE — 250N000013 HC RX MED GY IP 250 OP 250 PS 637: Performed by: STUDENT IN AN ORGANIZED HEALTH CARE EDUCATION/TRAINING PROGRAM

## 2023-03-15 RX ORDER — HEPARIN SODIUM,PORCINE 10 UNIT/ML
5-20 VIAL (ML) INTRAVENOUS
Status: DISCONTINUED | OUTPATIENT
Start: 2023-03-15 | End: 2023-03-24 | Stop reason: HOSPADM

## 2023-03-15 RX ORDER — POTASSIUM CHLORIDE 7.45 MG/ML
10 INJECTION INTRAVENOUS
Status: COMPLETED | OUTPATIENT
Start: 2023-03-15 | End: 2023-03-15

## 2023-03-15 RX ORDER — HEPARIN SODIUM,PORCINE 10 UNIT/ML
5-20 VIAL (ML) INTRAVENOUS EVERY 24 HOURS
Status: DISCONTINUED | OUTPATIENT
Start: 2023-03-15 | End: 2023-03-24 | Stop reason: HOSPADM

## 2023-03-15 RX ORDER — HYDROXYZINE HYDROCHLORIDE 25 MG/1
25-50 TABLET, FILM COATED ORAL EVERY 6 HOURS PRN
Status: DISCONTINUED | OUTPATIENT
Start: 2023-03-15 | End: 2023-03-20

## 2023-03-15 RX ADMIN — POTASSIUM CHLORIDE 10 MEQ: 7.46 INJECTION, SOLUTION INTRAVENOUS at 15:51

## 2023-03-15 RX ADMIN — ONDANSETRON 4 MG: 4 TABLET, ORALLY DISINTEGRATING ORAL at 01:14

## 2023-03-15 RX ADMIN — POLYETHYLENE GLYCOL 3350 17 G: 17 POWDER, FOR SOLUTION ORAL at 14:09

## 2023-03-15 RX ADMIN — METHOCARBAMOL 750 MG: 750 TABLET ORAL at 17:00

## 2023-03-15 RX ADMIN — HYDROMORPHONE HYDROCHLORIDE 0.4 MG: 0.2 INJECTION, SOLUTION INTRAMUSCULAR; INTRAVENOUS; SUBCUTANEOUS at 06:53

## 2023-03-15 RX ADMIN — POTASSIUM CHLORIDE 10 MEQ: 7.46 INJECTION, SOLUTION INTRAVENOUS at 14:29

## 2023-03-15 RX ADMIN — MAGNESIUM SULFATE HEPTAHYDRATE: 500 INJECTION, SOLUTION INTRAMUSCULAR; INTRAVENOUS at 20:50

## 2023-03-15 RX ADMIN — HYDROMORPHONE HYDROCHLORIDE 4 MG: 2 TABLET ORAL at 09:57

## 2023-03-15 RX ADMIN — OLIVE OIL AND SOYBEAN OIL 250 ML: 16; 4 INJECTION, EMULSION INTRAVENOUS at 20:50

## 2023-03-15 RX ADMIN — HALOPERIDOL LACTATE 1 MG: 5 INJECTION, SOLUTION INTRAMUSCULAR at 19:06

## 2023-03-15 RX ADMIN — ONDANSETRON 4 MG: 4 TABLET, ORALLY DISINTEGRATING ORAL at 09:12

## 2023-03-15 RX ADMIN — ENOXAPARIN SODIUM 40 MG: 40 INJECTION SUBCUTANEOUS at 16:55

## 2023-03-15 RX ADMIN — ONDANSETRON 4 MG: 4 TABLET, ORALLY DISINTEGRATING ORAL at 22:35

## 2023-03-15 RX ADMIN — GABAPENTIN 300 MG: 300 CAPSULE ORAL at 14:02

## 2023-03-15 RX ADMIN — Medication 5 ML: at 19:12

## 2023-03-15 RX ADMIN — GABAPENTIN 300 MG: 300 CAPSULE ORAL at 09:12

## 2023-03-15 RX ADMIN — ONDANSETRON 4 MG: 4 TABLET, ORALLY DISINTEGRATING ORAL at 15:47

## 2023-03-15 RX ADMIN — SCOPALAMINE 1 PATCH: 1 PATCH, EXTENDED RELEASE TRANSDERMAL at 09:12

## 2023-03-15 RX ADMIN — HYDROMORPHONE HYDROCHLORIDE 0.4 MG: 0.2 INJECTION, SOLUTION INTRAMUSCULAR; INTRAVENOUS; SUBCUTANEOUS at 11:19

## 2023-03-15 RX ADMIN — HYDROMORPHONE HYDROCHLORIDE 0.4 MG: 0.2 INJECTION, SOLUTION INTRAMUSCULAR; INTRAVENOUS; SUBCUTANEOUS at 15:53

## 2023-03-15 RX ADMIN — SERTRALINE HYDROCHLORIDE 50 MG: 50 TABLET ORAL at 22:36

## 2023-03-15 RX ADMIN — GABAPENTIN 300 MG: 300 CAPSULE ORAL at 20:39

## 2023-03-15 RX ADMIN — HYDROMORPHONE HYDROCHLORIDE 4 MG: 2 TABLET ORAL at 04:59

## 2023-03-15 RX ADMIN — FENTANYL 1 PATCH: 25 PATCH TRANSDERMAL at 11:34

## 2023-03-15 RX ADMIN — ACETAMINOPHEN 1000 MG: 500 TABLET ORAL at 05:00

## 2023-03-15 RX ADMIN — HALOPERIDOL LACTATE 1 MG: 5 INJECTION, SOLUTION INTRAMUSCULAR at 12:17

## 2023-03-15 RX ADMIN — HYDROMORPHONE HYDROCHLORIDE 0.4 MG: 0.2 INJECTION, SOLUTION INTRAMUSCULAR; INTRAVENOUS; SUBCUTANEOUS at 02:56

## 2023-03-15 RX ADMIN — HYDROMORPHONE HYDROCHLORIDE 4 MG: 2 TABLET ORAL at 14:14

## 2023-03-15 RX ADMIN — POTASSIUM CHLORIDE 10 MEQ: 7.46 INJECTION, SOLUTION INTRAVENOUS at 13:08

## 2023-03-15 RX ADMIN — HYDROMORPHONE HYDROCHLORIDE 4 MG: 2 TABLET ORAL at 22:35

## 2023-03-15 RX ADMIN — CALCIUM CARBONATE (ANTACID) CHEW TAB 500 MG 500 MG: 500 CHEW TAB at 06:53

## 2023-03-15 RX ADMIN — HYDROMORPHONE HYDROCHLORIDE 0.4 MG: 0.2 INJECTION, SOLUTION INTRAMUSCULAR; INTRAVENOUS; SUBCUTANEOUS at 20:44

## 2023-03-15 RX ADMIN — METHOCARBAMOL 750 MG: 750 TABLET ORAL at 09:12

## 2023-03-15 RX ADMIN — POTASSIUM CHLORIDE 10 MEQ: 7.46 INJECTION, SOLUTION INTRAVENOUS at 17:00

## 2023-03-15 RX ADMIN — HYDROMORPHONE HYDROCHLORIDE 4 MG: 2 TABLET ORAL at 18:30

## 2023-03-15 ASSESSMENT — ACTIVITIES OF DAILY LIVING (ADL)
ADLS_ACUITY_SCORE: 22

## 2023-03-15 NOTE — PROVIDER NOTIFICATION
[address]  [address] [address]  7C 7409-1 ISELA GARY - Pt had unwitnessed 75cc emesis.   Provider paged

## 2023-03-15 NOTE — PLAN OF CARE
Goal Outcome Evaluation:  6515-7366  AOx4  VSS on RA  Pain managed with PRN IV and PO dilaudid  Nausea managed with PRN zofran, refused scheduled reglan  Tolerated sips of clears and ice chips without episodes of emesis  Bowel sounds hypoactive, abdomen distended  PICC placement confirmed with CXR  TPN and lipids running through PICC  Patient showered, ostomy takedown site dressing/packing changed (became saturated in shower)  Voiding spontaneously not saving  No acute events overnight

## 2023-03-15 NOTE — PLAN OF CARE
Goal Outcome Evaluation:  A&Ox4. RA. UAL. Low fiber diet - tolerating poorly. Voiding spontaneously. Passing gas. Small BM in AM. Nausea tolerated w/ scopolamine patch, Zofran, and haldol. Emesis x1 - provider paged. Pain tolerated with fentanyl patch, robaxin, gabapentin, IV and PO dilaudid. Fentanyl patch on R. Shoulder.  PICC - purple lumen infusing TPN, Red lumen infusing IV K+ replacement. K+ 3.2 - currently being replaced, recheck not ordered. BG  q6h 114 and 128. Take down site - CDI.

## 2023-03-15 NOTE — PROGRESS NOTES
Colorectal Surgery Progress Note  [facility]  POD#7    Subjective:  Patient reports feeling better with no emesis since yesterday AM. Some nausea and bloating but passing flatus with one small BM reported. Tolerating NPO with ice chips. PICC in place and TPN started.    Vitals:  Vitals:    03/13/23 1506 03/13/23 2208 03/14/23 0600 03/14/23 2322   BP: 124/77 131/79 126/72 117/60   BP Location: Right arm Right arm Right arm Left arm   Pulse: 60 78 83 72   Resp: 16 18 12    Temp: 98.3  F (36.8  C) 99.5  F (37.5  C) 98.6  F (37  C) 97.2  F (36.2  C)   TempSrc: Temporal Oral Oral Oral   SpO2: 95% 95% 98% 96%   Weight:       Height:         I/O:  I/O last 3 completed shifts:  In: 483 [P.O.:160; I.V.:323]  Out: 100 [Emesis/NG output:100]    Physical Exam:  Gen: AAOx3, NAD  Pulm: Non-labored breathing  Abd: Soft, moderately distended, appropriately tender, no guarding   Wound: Prior stoma site- clean, dry, without surrounding erythema. with some strikethrough      BMP  Recent Labs   Lab 03/15/23  0110 03/14/23  0738 03/13/23  1829 03/13/23  0704 03/13/23  0339 03/12/23  1034 03/12/23  0528 03/11/23  1431 03/09/23  0714   NA  --  131*  --  135*  --  131*  --  134* 136   POTASSIUM  --  3.7 3.7 4.2 3.4 3.2*   < > 3.1* 3.7   CHLORIDE  --  97*  --  100  --  95*  --  97* 104   CO2  --  24  --  22  --  26  --  27 21*   BUN  --  5.6*  --  5.9*  --  6.3  --  6.2 7.6   CR  --  0.57  --  0.58  --  0.54  --  0.51 0.56   * 113*  --  118*  --  114*  --  119* 154*   MAG  --  2.4*  --  2.2  --   --   --   --  1.6*   PHOS  --  3.7  --   --   --   --   --   --   --     < > = values in this interval not displayed.     CBC  Recent Labs   Lab 03/12/23  1034 03/11/23  1431 03/11/23  0732 03/09/23  0714   WBC  --  9.2  --  14.9*   HGB 8.9* 8.9*  --  10.9*   HCT  --  28.0*  --  34.2*   PLT  --  190 169 210     Results for orders placed or performed during the hospital encounter of 03/08/23   POC US Guidance  Needle Placement    Impression    Bilateral TAP block.   XR Abdomen Port 1 View    Impression    IMPRESSION: Dilated loops of small and large bowel projecting over the  abdomen, which can be seen in adynamic ileus versus developing  obstruction.     I have personally reviewed the examination and initial interpretation  and I agree with the findings.    HOWIE FERRO MD         SYSTEM ID:  [id number]   XR Abdomen 2 Views    Impression    IMPRESSION:    Persistent air-filled dilated loops small bowel with associated  multiple air-fluid levels. Findings representing ongoing multiple  obstruction versus adynamic ileus.    I have personally reviewed the examination and initial interpretation  and I agree with the findings.    BELLA IVORY MD         SYSTEM ID:  [id number]   CT Abdomen Pelvis w Contrast   Result Value Ref Range    Radiologist flags Pelvic hematoma (Urgent)     Impression    IMPRESSION:   1.  Post surgical changes of ileostomy takedown.  2.  Large hyperdense collection suggestive of hematoma in the pelvis  adjacent to the ileocecal junction, measuring 11.4 x 7.1 x 6.6 cm. No  active extravasation of contrast detected.  3.  Mildly dilated small bowel with transition point in the pelvis  adjacent to the hematoma, suggestive of obstruction/extrinsic  compression.  4.  Small volume ascites. No pneumoperitoneum.   5.  3.7 cm right ovarian cyst.      [Urgent Result: Pelvic hematoma]    Finding was identified on 3/13/2023 9:02 PM.     Surgery team was contacted by Dr. Coreas at 3/13/2023 9:25 PM and  verbalized understanding of the urgent finding.     I have personally reviewed the examination and initial interpretation  and I agree with the findings.    BRITTANY LOCO MD         SYSTEM ID:  [id number]       ASSESSMENT: This is a 38 year old woman s/p redo LAR for stricuture, now POD#6 s/p DLI closure with persistent nausea/vomiting, in setting of anastomotic hematoma. Symptoms should improve with resolution of  hematoma, but will require time. Started on TPN this AM to facilitate.    Plan:   - Full diet, patient will titrate as comfortable  - Continue antiemetics: zofran, reglan, haloperidol, scopolamine patch  - PICC/TPN initiated yesterday  - Stoped mIVF  - Continue home fentanyl patch  - AM lytes with some mild hypokalemia and hypochloremia  - Daily dressing change to prior stoma site  - SCDs, IS, OOB, ambulate, lovenox PPx    Seen and discussed with Dr. Caro who discussed with Dr. Debra Doyle MD  Intern, General Surgery  Colorectal Intern

## 2023-03-16 LAB
ANION GAP SERPL CALCULATED.3IONS-SCNC: 9 MMOL/L (ref 7–15)
ATRIAL RATE - MUSE: 67 BPM
BUN SERPL-MCNC: 13.3 MG/DL (ref 6–20)
CALCIUM SERPL-MCNC: 8.6 MG/DL (ref 8.6–10)
CHLORIDE SERPL-SCNC: 102 MMOL/L (ref 98–107)
CREAT SERPL-MCNC: 0.54 MG/DL (ref 0.51–0.95)
DEPRECATED HCO3 PLAS-SCNC: 25 MMOL/L (ref 22–29)
DIASTOLIC BLOOD PRESSURE - MUSE: NORMAL MMHG
GFR SERPL CREATININE-BSD FRML MDRD: >90 ML/MIN/1.73M2
GLUCOSE BLDC GLUCOMTR-MCNC: 118 MG/DL (ref 70–99)
GLUCOSE BLDC GLUCOMTR-MCNC: 132 MG/DL (ref 70–99)
GLUCOSE BLDC GLUCOMTR-MCNC: 137 MG/DL (ref 70–99)
GLUCOSE BLDC GLUCOMTR-MCNC: 150 MG/DL (ref 70–99)
GLUCOSE SERPL-MCNC: 140 MG/DL (ref 70–99)
HOLD SPECIMEN: NORMAL
INTERPRETATION ECG - MUSE: NORMAL
MAGNESIUM SERPL-MCNC: 2.4 MG/DL (ref 1.7–2.3)
P AXIS - MUSE: 14 DEGREES
PHOSPHATE SERPL-MCNC: 2.8 MG/DL (ref 2.5–4.5)
POTASSIUM SERPL-SCNC: 4 MMOL/L (ref 3.4–5.3)
PR INTERVAL - MUSE: 136 MS
QRS DURATION - MUSE: 82 MS
QT - MUSE: 394 MS
QTC - MUSE: 416 MS
R AXIS - MUSE: 23 DEGREES
SODIUM SERPL-SCNC: 136 MMOL/L (ref 136–145)
SYSTOLIC BLOOD PRESSURE - MUSE: NORMAL MMHG
T AXIS - MUSE: 19 DEGREES
VENTRICULAR RATE- MUSE: 67 BPM

## 2023-03-16 PROCEDURE — 36415 COLL VENOUS BLD VENIPUNCTURE: CPT | Performed by: SURGERY

## 2023-03-16 PROCEDURE — 84100 ASSAY OF PHOSPHORUS: CPT | Performed by: SURGERY

## 2023-03-16 PROCEDURE — B4185 PARENTERAL SOL 10 GM LIPIDS: HCPCS | Performed by: SURGERY

## 2023-03-16 PROCEDURE — 250N000013 HC RX MED GY IP 250 OP 250 PS 637: Performed by: STUDENT IN AN ORGANIZED HEALTH CARE EDUCATION/TRAINING PROGRAM

## 2023-03-16 PROCEDURE — 120N000002 HC R&B MED SURG/OB UMMC

## 2023-03-16 PROCEDURE — 250N000011 HC RX IP 250 OP 636: Performed by: SURGERY

## 2023-03-16 PROCEDURE — 250N000011 HC RX IP 250 OP 636: Performed by: STUDENT IN AN ORGANIZED HEALTH CARE EDUCATION/TRAINING PROGRAM

## 2023-03-16 PROCEDURE — 83735 ASSAY OF MAGNESIUM: CPT | Performed by: SURGERY

## 2023-03-16 PROCEDURE — 250N000009 HC RX 250: Performed by: SURGERY

## 2023-03-16 PROCEDURE — 250N000013 HC RX MED GY IP 250 OP 250 PS 637: Performed by: SURGERY

## 2023-03-16 PROCEDURE — 80048 BASIC METABOLIC PNL TOTAL CA: CPT | Performed by: SURGERY

## 2023-03-16 RX ORDER — ERYTHROMYCIN 250 MG/1
250 TABLET, COATED ORAL
Status: DISCONTINUED | OUTPATIENT
Start: 2023-03-16 | End: 2023-03-22

## 2023-03-16 RX ORDER — LORAZEPAM 2 MG/ML
1 INJECTION INTRAMUSCULAR EVERY 6 HOURS PRN
Status: DISCONTINUED | OUTPATIENT
Start: 2023-03-16 | End: 2023-03-16

## 2023-03-16 RX ORDER — LORAZEPAM 2 MG/ML
0.5 INJECTION INTRAMUSCULAR EVERY 4 HOURS PRN
Status: DISCONTINUED | OUTPATIENT
Start: 2023-03-16 | End: 2023-03-16

## 2023-03-16 RX ORDER — HYDROCODONE BITARTRATE AND ACETAMINOPHEN 5; 325 MG/1; MG/1
1 TABLET ORAL EVERY 6 HOURS PRN
Status: DISCONTINUED | OUTPATIENT
Start: 2023-03-16 | End: 2023-03-20

## 2023-03-16 RX ORDER — HALOPERIDOL 5 MG/ML
1 INJECTION INTRAMUSCULAR EVERY 6 HOURS PRN
Status: DISCONTINUED | OUTPATIENT
Start: 2023-03-16 | End: 2023-03-22

## 2023-03-16 RX ADMIN — Medication 5 ML: at 19:00

## 2023-03-16 RX ADMIN — LORAZEPAM 0.5 MG: 2 INJECTION INTRAMUSCULAR; INTRAVENOUS at 08:33

## 2023-03-16 RX ADMIN — HALOPERIDOL LACTATE 1 MG: 5 INJECTION, SOLUTION INTRAMUSCULAR at 18:58

## 2023-03-16 RX ADMIN — METHYLNALTREXONE BROMIDE 12 MG: 12 INJECTION, SOLUTION SUBCUTANEOUS at 10:04

## 2023-03-16 RX ADMIN — OLIVE OIL AND SOYBEAN OIL 250 ML: 16; 4 INJECTION, EMULSION INTRAVENOUS at 19:59

## 2023-03-16 RX ADMIN — ERYTHROMYCIN 250 MG: 250 TABLET, FILM COATED ORAL at 18:44

## 2023-03-16 RX ADMIN — ENOXAPARIN SODIUM 40 MG: 40 INJECTION SUBCUTANEOUS at 15:41

## 2023-03-16 RX ADMIN — ONDANSETRON 4 MG: 4 TABLET, ORALLY DISINTEGRATING ORAL at 03:41

## 2023-03-16 RX ADMIN — HYDROMORPHONE HYDROCHLORIDE 0.4 MG: 0.2 INJECTION, SOLUTION INTRAMUSCULAR; INTRAVENOUS; SUBCUTANEOUS at 05:51

## 2023-03-16 RX ADMIN — ACETAMINOPHEN 1000 MG: 500 TABLET ORAL at 13:27

## 2023-03-16 RX ADMIN — METHOCARBAMOL 750 MG: 750 TABLET ORAL at 13:26

## 2023-03-16 RX ADMIN — ERYTHROMYCIN 250 MG: 250 TABLET, FILM COATED ORAL at 22:18

## 2023-03-16 RX ADMIN — GABAPENTIN 300 MG: 300 CAPSULE ORAL at 13:26

## 2023-03-16 RX ADMIN — HYDROMORPHONE HYDROCHLORIDE 0.4 MG: 0.2 INJECTION, SOLUTION INTRAMUSCULAR; INTRAVENOUS; SUBCUTANEOUS at 21:26

## 2023-03-16 RX ADMIN — ONDANSETRON 4 MG: 4 TABLET, ORALLY DISINTEGRATING ORAL at 09:59

## 2023-03-16 RX ADMIN — MAGNESIUM SULFATE HEPTAHYDRATE: 500 INJECTION, SOLUTION INTRAMUSCULAR; INTRAVENOUS at 19:59

## 2023-03-16 RX ADMIN — METHOCARBAMOL 750 MG: 750 TABLET ORAL at 15:41

## 2023-03-16 RX ADMIN — ONDANSETRON 4 MG: 4 TABLET, ORALLY DISINTEGRATING ORAL at 15:41

## 2023-03-16 RX ADMIN — SERTRALINE HYDROCHLORIDE 50 MG: 50 TABLET ORAL at 22:18

## 2023-03-16 RX ADMIN — Medication 5 ML: at 21:27

## 2023-03-16 RX ADMIN — HALOPERIDOL LACTATE 1 MG: 5 INJECTION, SOLUTION INTRAMUSCULAR at 05:24

## 2023-03-16 RX ADMIN — CALCIUM CARBONATE (ANTACID) CHEW TAB 500 MG 500 MG: 500 CHEW TAB at 05:31

## 2023-03-16 RX ADMIN — HYDROMORPHONE HYDROCHLORIDE 4 MG: 2 TABLET ORAL at 12:02

## 2023-03-16 RX ADMIN — HYDROMORPHONE HYDROCHLORIDE 0.4 MG: 0.2 INJECTION, SOLUTION INTRAMUSCULAR; INTRAVENOUS; SUBCUTANEOUS at 02:10

## 2023-03-16 RX ADMIN — ERYTHROMYCIN 250 MG: 250 TABLET, FILM COATED ORAL at 13:48

## 2023-03-16 RX ADMIN — ONDANSETRON 4 MG: 4 TABLET, ORALLY DISINTEGRATING ORAL at 21:26

## 2023-03-16 RX ADMIN — Medication 5 ML: at 08:46

## 2023-03-16 ASSESSMENT — ACTIVITIES OF DAILY LIVING (ADL)
ADLS_ACUITY_SCORE: 22

## 2023-03-16 NOTE — PROVIDER NOTIFICATION
1322  Ling LEDESMA [address] 2F 0111 K.J  Pt not eating due to nausea, should she still take erythromycin?   Thank you

## 2023-03-16 NOTE — PLAN OF CARE
"Goal Outcome Evaluation:      Plan of Care Reviewed With: patient    Overall Patient Progress: no changeOverall Patient Progress: no change    Time: 8234-9009     Reason for admission: S/P ileostomy (H) [Z93.2]  Anastomotic stricture of colorectal region [K91.30]  Ileostomy status (H) [Z93.2]    Vitals: /62 (BP Location: Left arm)   Pulse 62   Temp 98.8  F (37.1  C) (Temporal)   Resp 16   Ht 1.702 m (5' 7.01\")   Wt 80.1 kg (176 lb 9.4 oz)   LMP  (LMP Unknown)   SpO2 96%   BMI 27.65 kg/m       Activity: IND in room.   Pain: 7/10 abdominal pain. PRN dilaudid, refuses to take tylenol.   Neuro: AxOx4. Flat affect. Hypoactive.   Cardiac: WNL.   Respiratory: WNL  GI/: Voids without difficulty. Nausea persists. Scheduled zofran with little relief. IV haldol given for nausea without much help.   Skin/Wounds: gauze covering takedown site.   Diet: Orders Placed This Encounter      Low Fiber Diet    Lines: DL PICC line TPN/LIPIDs running overnight.    BGs Q6h BGs stable.   New changes this shift: Nausea/ vomiting persists. Unrelieved with Scheduled Zofran. IV haldol given with some relief. PO dilaudid was given but patient threw up 10 seconds after, Primary paged for the okay to give IV dilaudid 30 minutes early d/t persistent nausea. Patient had 3 episodes of emesis in total overnight. Unable to get accurate output measurement.      Plan: Continue plan of care, Pain/nausea management. Encourage PO intake     "

## 2023-03-16 NOTE — PROVIDER NOTIFICATION
1030  Ling LEDESMA [address] 3J 7010 [address] GARY - the only PO morning medication pt was able to tolerate was oral Zofran. Robaxin, gabapentin, and erythromycin not given.   Provider notified

## 2023-03-16 NOTE — PLAN OF CARE
Pt is A/Ox4. VSS on RA. Low fiber diet, small bites this evening, tolerating well. Up independently, ambulated in hallways. Voiding every 2-3 hours per pt, not saving. No BM on shift. Pain controlled with PRN dilaudid, routine gabapentin, fentanyl patch; declined routine tylenol and robaxin. Nausea controlled with routine zofran, scopolamine patch, PRN haldol x1. Double lumen PICC, purple line infusing TPN and lipids, red line SL. Potassium replaced, rechecked at 3.9, redraw scheduled for tomorrow morning. BG checks Q6H. Abdominal incision covered with dressing, changed x1.

## 2023-03-16 NOTE — PLAN OF CARE
Goal Outcome Evaluation:  A&Ox4. Flat. RA. UAL. Low fiber diet - tolerating poorly. Voiding spontaneously. Passing gas. No BM. Continuous nausea. Nausea somewhat tolerated w/ scopolamine patch, Zofran, and ativan. Pain tolerated with fentanyl patch, robaxin, gabapentin and dilaudid. Fentanyl patch on R. Shoulder. PICC - purple lumen infusing TPN, red lumen heparin locked. BG q6h 132 and 150 - provider paged. Take down site - CDI, changed x1.  MD advised to encourage PO pain medications.

## 2023-03-16 NOTE — PLAN OF CARE
Pt is A/Ox4, VSS on RA. Up independently. On TPN/lipids infusing through PICC purple line. Low fiber diet, minimal oral intake. Emesis x1, provider notified. Pain rated 5/10, control with fentanyl patch, tylenol. Encouraged oral PRN norco over IV dilaudid; pt felt she could not tolerate pills and requested IV dilaudidx1. Nausea control with routine zofran, scopolamine patch, PRN Haldol. Blood sugar check Q6H. Double lumen PICC, red line hep locked, purple line TPN/lipids. Voiding adequately, saving. No BM on shift. Passing flatus. Abdominal incision dressing C/D/I.

## 2023-03-16 NOTE — PROVIDER NOTIFICATION
0804  Ling LEDESMA [address] 6H 1220 K.J.  Can you clarify Ativan order? Also, can you you possibly order other IV pain medications? unable to tolerate oral meds - I can not give dilaudid for another 2 hours.   Thank you!  Provider paged

## 2023-03-16 NOTE — PROGRESS NOTES
"Colorectal Surgery Progress Note  [facility]  POD#8    Subjective:  Feeling worse this AM, 3x episodes of emesis and nausea. Vomiting and heaving during interview. Currently on maximal antiemetic PRNs.  Still bloated but feels distension improving. Still passing flatus, no BM since small output yesterday. Tolerated few bites low fiber yesterday. PICC in place, receiving TPN.    Vitals:  Vitals:    03/15/23 0648 03/15/23 1200 03/15/23 1541 03/15/23 2210   BP: 113/70  120/62 101/62   BP Location: Left arm  Left arm Left arm   Pulse: 65  68 62   Resp: 18  16 16   Temp: 97.6  F (36.4  C)  98.1  F (36.7  C) 98.8  F (37.1  C)   TempSrc: Temporal  Temporal Temporal   SpO2: 96%  99% 96%   Weight:  80.1 kg (176 lb 9.4 oz)     Height:  1.702 m (5' 7.01\")       I/O:  I/O last 3 completed shifts:  In: 2911.38 [P.O.:1580]  Out: 75 [Emesis/NG output:75]    Physical Exam:  Gen: AAOx3, NAD  Pulm: Non-labored breathing  Abd: Soft, moderately distended but improved from yesterday's exam. Appropriately tender, no guarding   Wound: Prior stoma site covered in dressing. Appearing clean, dry with some serosanguinous drainage visible.       BMP  Recent Labs   Lab 03/16/23  0206 03/15/23  2010 03/15/23  1923 03/15/23  1229 03/15/23  0909 03/15/23  0837 03/15/23  0110 03/14/23  0738 03/13/23  1829 03/13/23  0704 03/13/23  0339 03/12/23  1034 03/11/23  1431 03/09/23  0714   NA  --   --   --   --   --  135*  --  131*  --  135*  --  131*   < > 136   POTASSIUM  --  3.9  --   --   --  3.2*  --  3.7 3.7 4.2   < > 3.2*   < > 3.7   CHLORIDE  --   --   --   --   --  97*  --  97*  --  100  --  95*   < > 104   CO2  --   --   --   --   --  28  --  24  --  22  --  26   < > 21*   BUN  --   --   --   --   --  9.5  --  5.6*  --  5.9*  --  6.3   < > 7.6   CR  --   --   --   --   --  0.62  --  0.57  --  0.58  --  0.54   < > 0.56   *  --  127* 128* 114* 110*   < > 113*  --  118*  --  114*   < > 154*   MAG  --   --   --   " --   --  2.1  --  2.4*  --  2.2  --   --   --  1.6*   PHOS  --   --   --   --   --  5.0*  --  3.7  --   --   --   --   --   --     < > = values in this interval not displayed.     CBC  Recent Labs   Lab 03/12/23  1034 03/11/23  1431 03/11/23  0732 03/09/23  0714   WBC  --  9.2  --  14.9*   HGB 8.9* 8.9*  --  10.9*   HCT  --  28.0*  --  34.2*   PLT  --  190 169 210     Results for orders placed or performed during the hospital encounter of 03/08/23   POC US Guidance Needle Placement    Impression    Bilateral TAP block.   XR Abdomen Port 1 View    Impression    IMPRESSION: Dilated loops of small and large bowel projecting over the  abdomen, which can be seen in adynamic ileus versus developing  obstruction.     I have personally reviewed the examination and initial interpretation  and I agree with the findings.    HOWIE FERRO MD         SYSTEM ID:  [id number]   XR Abdomen 2 Views    Impression    IMPRESSION:    Persistent air-filled dilated loops small bowel with associated  multiple air-fluid levels. Findings representing ongoing multiple  obstruction versus adynamic ileus.    I have personally reviewed the examination and initial interpretation  and I agree with the findings.    BELLA IVORY MD         SYSTEM ID:  [id number]   CT Abdomen Pelvis w Contrast   Result Value Ref Range    Radiologist flags Pelvic hematoma (Urgent)     Impression    IMPRESSION:   1.  Post surgical changes of ileostomy takedown.  2.  Large hyperdense collection suggestive of hematoma in the pelvis  adjacent to the ileocecal junction, measuring 11.4 x 7.1 x 6.6 cm. No  active extravasation of contrast detected.  3.  Mildly dilated small bowel with transition point in the pelvis  adjacent to the hematoma, suggestive of obstruction/extrinsic  compression.  4.  Small volume ascites. No pneumoperitoneum.   5.  3.7 cm right ovarian cyst.      [Urgent Result: Pelvic hematoma]    Finding was identified on 3/13/2023 9:02 PM.     Surgery  team was contacted by Dr. Coreas at 3/13/2023 9:25 PM and  verbalized understanding of the urgent finding.     I have personally reviewed the examination and initial interpretation  and I agree with the findings.    BRITTANY LOCO MD         SYSTEM ID:  [id number]       ASSESSMENT: Vicki is a 38 year old woman s/p redo LAR for stricuture, now POD#6 s/p DLI closure with persistent nausea/vomiting, in setting of anastomotic hematoma. Symptoms should improve with resolution of hematoma, but will require time. Started on TPN 3/14 to facilitate. Less frequent emesis today.     Plan:   - Full diet, patient will titrate as comfortable  - Continue antiemetics: zofran, ativan, scopolamine patch   - IV zofran   - ativan PRN (stopped haldol)   - stopped reglan, start erythromycin for gut motility   - EKG yesterday, QTc acceptable at 416  - PICC in place, continue TPN  - No mIVF  - Continue home fentanyl patch  - Daily dressing change to prior stoma site  - SCDs, IS, OOB, ambulate, lovenox PPx    Seen and discussed with Dr. Caro who discussed with Dr. Debra Victoria, MS3  March 16, 2023 6:17 AM    I saw and examined the patient with the medical student and agree with the findings as documented. I have edited the note to reflect my findings and plan where necessary.    Shani Anglin MD (PGY-3)  Surgery

## 2023-03-17 ENCOUNTER — HOME INFUSION (PRE-WILLOW HOME INFUSION) (OUTPATIENT)
Dept: PHARMACY | Facility: CLINIC | Age: 38
End: 2023-03-17
Payer: MEDICAID

## 2023-03-17 ENCOUNTER — APPOINTMENT (OUTPATIENT)
Dept: GENERAL RADIOLOGY | Facility: CLINIC | Age: 38
DRG: 330 | End: 2023-03-17
Attending: SURGERY
Payer: MEDICAID

## 2023-03-17 ENCOUNTER — APPOINTMENT (OUTPATIENT)
Dept: CT IMAGING | Facility: CLINIC | Age: 38
DRG: 330 | End: 2023-03-17
Attending: SURGERY
Payer: MEDICAID

## 2023-03-17 ENCOUNTER — APPOINTMENT (OUTPATIENT)
Dept: CT IMAGING | Facility: CLINIC | Age: 38
DRG: 330 | End: 2023-03-17
Attending: PHYSICIAN ASSISTANT
Payer: MEDICAID

## 2023-03-17 LAB
ANION GAP SERPL CALCULATED.3IONS-SCNC: 12 MMOL/L (ref 7–15)
BUN SERPL-MCNC: 15.3 MG/DL (ref 6–20)
CALCIUM SERPL-MCNC: 8.8 MG/DL (ref 8.6–10)
CHLORIDE SERPL-SCNC: 102 MMOL/L (ref 98–107)
CREAT SERPL-MCNC: 0.53 MG/DL (ref 0.51–0.95)
DEPRECATED HCO3 PLAS-SCNC: 23 MMOL/L (ref 22–29)
GFR SERPL CREATININE-BSD FRML MDRD: >90 ML/MIN/1.73M2
GLUCOSE BLDC GLUCOMTR-MCNC: 143 MG/DL (ref 70–99)
GLUCOSE BLDC GLUCOMTR-MCNC: 143 MG/DL (ref 70–99)
GLUCOSE SERPL-MCNC: 126 MG/DL (ref 70–99)
HOLD SPECIMEN: NORMAL
MAGNESIUM SERPL-MCNC: 2.1 MG/DL (ref 1.7–2.3)
PHOSPHATE SERPL-MCNC: 3 MG/DL (ref 2.5–4.5)
POTASSIUM SERPL-SCNC: 3.7 MMOL/L (ref 3.4–5.3)
SODIUM SERPL-SCNC: 137 MMOL/L (ref 136–145)

## 2023-03-17 PROCEDURE — B4185 PARENTERAL SOL 10 GM LIPIDS: HCPCS | Performed by: SURGERY

## 2023-03-17 PROCEDURE — 250N000011 HC RX IP 250 OP 636: Performed by: SURGERY

## 2023-03-17 PROCEDURE — 250N000011 HC RX IP 250 OP 636: Performed by: STUDENT IN AN ORGANIZED HEALTH CARE EDUCATION/TRAINING PROGRAM

## 2023-03-17 PROCEDURE — 74018 RADEX ABDOMEN 1 VIEW: CPT | Mod: 26 | Performed by: RADIOLOGY

## 2023-03-17 PROCEDURE — 250N000013 HC RX MED GY IP 250 OP 250 PS 637: Performed by: SURGERY

## 2023-03-17 PROCEDURE — 80048 BASIC METABOLIC PNL TOTAL CA: CPT | Performed by: SURGERY

## 2023-03-17 PROCEDURE — 36592 COLLECT BLOOD FROM PICC: CPT | Performed by: SURGERY

## 2023-03-17 PROCEDURE — 83735 ASSAY OF MAGNESIUM: CPT | Performed by: SURGERY

## 2023-03-17 PROCEDURE — 74176 CT ABD & PELVIS W/O CONTRAST: CPT | Mod: 26 | Performed by: RADIOLOGY

## 2023-03-17 PROCEDURE — 250N000013 HC RX MED GY IP 250 OP 250 PS 637: Performed by: STUDENT IN AN ORGANIZED HEALTH CARE EDUCATION/TRAINING PROGRAM

## 2023-03-17 PROCEDURE — 120N000002 HC R&B MED SURG/OB UMMC

## 2023-03-17 PROCEDURE — 74177 CT ABD & PELVIS W/CONTRAST: CPT | Mod: 26 | Performed by: RADIOLOGY

## 2023-03-17 PROCEDURE — 74176 CT ABD & PELVIS W/O CONTRAST: CPT

## 2023-03-17 PROCEDURE — 74177 CT ABD & PELVIS W/CONTRAST: CPT

## 2023-03-17 PROCEDURE — 250N000009 HC RX 250: Performed by: SURGERY

## 2023-03-17 PROCEDURE — 999N000065 XR ABDOMEN PORT 1 VIEW

## 2023-03-17 PROCEDURE — 84100 ASSAY OF PHOSPHORUS: CPT | Performed by: SURGERY

## 2023-03-17 RX ORDER — HYDROMORPHONE HCL IN WATER/PF 6 MG/30 ML
0.2 PATIENT CONTROLLED ANALGESIA SYRINGE INTRAVENOUS
Status: COMPLETED | OUTPATIENT
Start: 2023-03-17 | End: 2023-03-18

## 2023-03-17 RX ORDER — IOPAMIDOL 755 MG/ML
100 INJECTION, SOLUTION INTRAVASCULAR ONCE
Status: COMPLETED | OUTPATIENT
Start: 2023-03-17 | End: 2023-03-17

## 2023-03-17 RX ADMIN — ONDANSETRON 4 MG: 4 TABLET, ORALLY DISINTEGRATING ORAL at 23:46

## 2023-03-17 RX ADMIN — OLIVE OIL AND SOYBEAN OIL 250 ML: 16; 4 INJECTION, EMULSION INTRAVENOUS at 20:27

## 2023-03-17 RX ADMIN — HALOPERIDOL LACTATE 1 MG: 5 INJECTION, SOLUTION INTRAMUSCULAR at 12:11

## 2023-03-17 RX ADMIN — HALOPERIDOL LACTATE 1 MG: 5 INJECTION, SOLUTION INTRAMUSCULAR at 06:00

## 2023-03-17 RX ADMIN — ENOXAPARIN SODIUM 40 MG: 40 INJECTION SUBCUTANEOUS at 16:22

## 2023-03-17 RX ADMIN — ONDANSETRON 4 MG: 4 TABLET, ORALLY DISINTEGRATING ORAL at 10:59

## 2023-03-17 RX ADMIN — IOPAMIDOL 100 ML: 755 INJECTION, SOLUTION INTRAVENOUS at 10:31

## 2023-03-17 RX ADMIN — HYDROCODONE BITARTRATE AND ACETAMINOPHEN 1 TABLET: 5; 325 TABLET ORAL at 07:44

## 2023-03-17 RX ADMIN — HYDROCODONE BITARTRATE AND ACETAMINOPHEN 1 TABLET: 5; 325 TABLET ORAL at 20:23

## 2023-03-17 RX ADMIN — HYDROMORPHONE HYDROCHLORIDE 0.4 MG: 0.2 INJECTION, SOLUTION INTRAMUSCULAR; INTRAVENOUS; SUBCUTANEOUS at 02:48

## 2023-03-17 RX ADMIN — ONDANSETRON 4 MG: 4 TABLET, ORALLY DISINTEGRATING ORAL at 17:49

## 2023-03-17 RX ADMIN — HYDROMORPHONE HYDROCHLORIDE 0.4 MG: 0.2 INJECTION, SOLUTION INTRAMUSCULAR; INTRAVENOUS; SUBCUTANEOUS at 08:59

## 2023-03-17 RX ADMIN — HYDROCODONE BITARTRATE AND ACETAMINOPHEN 1 TABLET: 5; 325 TABLET ORAL at 00:32

## 2023-03-17 RX ADMIN — HYDROMORPHONE HYDROCHLORIDE 0.4 MG: 0.2 INJECTION, SOLUTION INTRAMUSCULAR; INTRAVENOUS; SUBCUTANEOUS at 16:23

## 2023-03-17 RX ADMIN — Medication 5 ML: at 16:23

## 2023-03-17 RX ADMIN — SERTRALINE HYDROCHLORIDE 50 MG: 50 TABLET ORAL at 20:23

## 2023-03-17 RX ADMIN — HYDROCODONE BITARTRATE AND ACETAMINOPHEN 1 TABLET: 5; 325 TABLET ORAL at 14:21

## 2023-03-17 RX ADMIN — HYDROMORPHONE HYDROCHLORIDE 0.4 MG: 0.2 INJECTION, SOLUTION INTRAMUSCULAR; INTRAVENOUS; SUBCUTANEOUS at 23:49

## 2023-03-17 RX ADMIN — ONDANSETRON 4 MG: 4 TABLET, ORALLY DISINTEGRATING ORAL at 02:56

## 2023-03-17 RX ADMIN — ERYTHROMYCIN 250 MG: 250 TABLET, FILM COATED ORAL at 08:58

## 2023-03-17 RX ADMIN — MAGNESIUM SULFATE HEPTAHYDRATE: 500 INJECTION, SOLUTION INTRAMUSCULAR; INTRAVENOUS at 20:27

## 2023-03-17 RX ADMIN — HALOPERIDOL LACTATE 1 MG: 5 INJECTION, SOLUTION INTRAMUSCULAR at 20:23

## 2023-03-17 ASSESSMENT — ACTIVITIES OF DAILY LIVING (ADL)
ADLS_ACUITY_SCORE: 22

## 2023-03-17 NOTE — PROGRESS NOTES
Care Management Follow Up    Length of Stay (days): 9  Expected Discharge Date: 03/20/2023  Concerns to be Addressed: discharge planning      Patient plan of care discussed at interdisciplinary rounds: Yes  Anticipated Discharge Disposition: Home  Anticipated Discharge Services:  NA  Anticipated Discharge DME:  NA      Additional Information:  Per [facility], pt has 100% TPN coverage as long as TPN or any therapy is on a CADD or Infinity Pump. [facility] cannot bill for nursing, pt will need to be set up with home care agency in [place] for nursing.     Daniel Alfredo, RN BSN  7C RN Care Coordinator   Ph: [phone]  Pager: [phone]

## 2023-03-17 NOTE — PROVIDER NOTIFICATION
[facility] paging: colorectal inpatient        7C  7409-1 JK    Pt would like you to give her a call to her room. She said her NG placement was discussed for tomorrow not today. Could you call and clarify with Pt, that her NG placement is for today.    Thank You!

## 2023-03-17 NOTE — PROGRESS NOTES
Therapy: TPN   Insurance: [place]    Coverage is 100% as long as TPN or any therapy is on a CADD or Infinity Pump.    [facility] cannot bill for nursing, must be a [place] agency within [place].     Please contact Intake with any questions, [phone] or In Basket pool, [facility] ([phone]).

## 2023-03-17 NOTE — PLAN OF CARE
"Goal Outcome Evaluation:    /62 (BP Location: Left arm)   Pulse 66   Temp 98.8  F (37.1  C) (Oral)   Resp 18   Ht 1.702 m (5' 7.01\")   Wt 80.1 kg (176 lb 9.4 oz)   LMP  (LMP Unknown)   SpO2 96%   BMI 27.65 kg/m      2121-4868  Aox4. on RA. Patient is up ad el. Patient has been voding well. Patient reporetd pain at diffrent times during the night. Patient was given   norco, diluadid, and haldol. Patient denied nausea. Patient declined other pain interventions. Patient is on TPN/lipids until 0800.   Patient has a R Picc double. Last BS  of 143. Abdominal Incision covered with dressing. Scopolamine on right ear mainatined and Right shoulder Fentanly patch maintained.      Handoff given to following RN.      "

## 2023-03-17 NOTE — PROGRESS NOTES
Colorectal Surgery Progress Note  [facility]  POD#9    Subjective:  Feeling nauseous this AM, continued emesis overnight and heaving during interview. Haldol helps, ativan did not. Denies pain but reiterates that she feels PO meds are not staying down and that IV dilaudid helps more than antiemetics for nausea. Still bloated, burping and passing flatus, no BMs since low volume liquid stool AM of 3/15. Unable to tolerate PO yesterday, trying to drink clears. PICC in place, receiving TPN.    Vitals:  Vitals:    03/16/23 0937 03/16/23 1520 03/17/23 0018 03/17/23 0547   BP:  130/81 130/76 111/62   BP Location:  Left arm Left arm Left arm   Pulse: 82 77 76 66   Resp: 16 14 18 18   Temp:  98.1  F (36.7  C) 100.1  F (37.8  C) 98.8  F (37.1  C)   TempSrc:  Axillary Oral Oral   SpO2: 95% 96% 96% 96%   Weight:       Height:         I/O:  I/O last 3 completed shifts:  In: 1458 [P.O.:240; I.V.:10]  Out: 1800 [Urine:1500; Emesis/NG output:300]    Physical Exam:  Gen: AAOx3, NAD  Pulm: Non-labored breathing  Abd: Soft, moderately distended and stable since yesterday. Nontender, no guarding.  Wound: Prior stoma site covered in dressing. Appearing clean, dry with some serosanguinous drainage visible.       BMP  Recent Labs   Lab 03/17/23  0546 03/17/23  0051 03/16/23  1847 03/16/23  1339 03/16/23  0820 03/16/23  0818 03/16/23  0206 03/15/23  2010 03/15/23  0909 03/15/23  0837 03/15/23  0110 03/14/23  0738 03/13/23  1829 03/13/23  0704   NA  --   --   --   --   --  136  --   --   --  135*  --  131*  --  135*   POTASSIUM  --   --   --   --   --  4.0  --  3.9  --  3.2*  --  3.7   < > 4.2   CHLORIDE  --   --   --   --   --  102  --   --   --  97*  --  97*  --  100   CO2  --   --   --   --   --  25  --   --   --  28  --  24  --  22   BUN  --   --   --   --   --  13.3  --   --   --  9.5  --  5.6*  --  5.9*   CR  --   --   --   --   --  0.54  --   --   --  0.62  --  0.57  --  0.58   * 143* 137* 150*    < > 140*   < >  --    < > 110*   < > 113*  --  118*   MAG  --   --   --   --   --  2.4*  --   --   --  2.1  --  2.4*  --  2.2   PHOS  --   --   --   --   --  2.8  --   --   --  5.0*  --  3.7  --   --     < > = values in this interval not displayed.     CBC  Recent Labs   Lab 03/12/23  1034 03/11/23  1431 03/11/23  0732   WBC  --  9.2  --    HGB 8.9* 8.9*  --    HCT  --  28.0*  --    PLT  --  190 169     Results for orders placed or performed during the hospital encounter of 03/08/23   POC US Guidance Needle Placement    Impression    Bilateral TAP block.   XR Abdomen Port 1 View    Impression    IMPRESSION: Dilated loops of small and large bowel projecting over the  abdomen, which can be seen in adynamic ileus versus developing  obstruction.     I have personally reviewed the examination and initial interpretation  and I agree with the findings.    HOWIE FERRO MD         SYSTEM ID:  [id number]   XR Abdomen 2 Views    Impression    IMPRESSION:    Persistent air-filled dilated loops small bowel with associated  multiple air-fluid levels. Findings representing ongoing multiple  obstruction versus adynamic ileus.    I have personally reviewed the examination and initial interpretation  and I agree with the findings.    BELLA IVORY MD         SYSTEM ID:  [id number]   CT Abdomen Pelvis w Contrast   Result Value Ref Range    Radiologist flags Pelvic hematoma (Urgent)     Impression    IMPRESSION:   1.  Post surgical changes of ileostomy takedown.  2.  Large hyperdense collection suggestive of hematoma in the pelvis  adjacent to the ileocecal junction, measuring 11.4 x 7.1 x 6.6 cm. No  active extravasation of contrast detected.  3.  Mildly dilated small bowel with transition point in the pelvis  adjacent to the hematoma, suggestive of obstruction/extrinsic  compression.  4.  Small volume ascites. No pneumoperitoneum.   5.  3.7 cm right ovarian cyst.      [Urgent Result: Pelvic hematoma]    Finding was identified  on 3/13/2023 9:02 PM.     Surgery team was contacted by Dr. Coreas at 3/13/2023 9:25 PM and  verbalized understanding of the urgent finding.     I have personally reviewed the examination and initial interpretation  and I agree with the findings.    BRITTANY LOCO MD         SYSTEM ID:  [id number]       ASSESSMENT: Vicki is a 38 year old woman s/p redo LAR for stricuture, now POD#9 s/p DLI closure with persistent nausea/vomiting in setting of anastomotic hematoma. Symptoms should improve with resolution of hematoma, however given duration of N/V, started on TPN 3/14 to facilitate.     Plan:    Changes for 3/17:  - CT scan today with IV, PO, Rectal contrast    Diet:  - NPO except for meds  - PICC in place, continue TPN  N/V:  - Continue antiemetics: zofran, ativan, scopolamine patch   - IV zofran   - restarted haldol and stopped ativan per pt preference   - continue erythromycin for gut motility   - EKG 3/15, QTc acceptable at 416  FEN:  - No mIVF  Pain:  - Continue home fentanyl patch for pain  - Encourage PO hydrocodone over PRN IV dilaudid  Wound:  - Daily dressing change to prior stoma site  Activity/PPx  - SCDs, IS, OOB, ambulate, lovenox PPx    Seen and discussed with Dr. Caor who discussed with Dr. Richardson.    Donavan Victoria, MS3  March 17, 2023 6:02 AM    I saw and examined the patient with the medical student and agree with the findings as documented. I have edited the note to reflect my findings and plan where necessary.    Shani Anglin MD (PGY-3)  Surgery

## 2023-03-17 NOTE — PROGRESS NOTES
"SPIRITUAL HEALTH SERVICES  [facility]C  REFERRAL SOURCE: Follow-up    SUMMARY OF VISIT   Pt stated \"I was feeling ok, and now I'm not again. I'm waiting to go down for a scan.\"  Pt requested prayer which I provided.     PLAN: Will follow-up next week.     Rev. Mariel Vargas MDiv, [facility]  Staff    Pager [phone]  * [facility] remains available 24/7 for emergent requests/referrals, either by having the switchboard page the on-call  or by entering an ASAP/STAT consult in Epic (this will also page the on-call ).*   "

## 2023-03-17 NOTE — PROGRESS NOTES
[facility]  Nurse Liaison    Received referral from TRA [facility]cd[facility] for TPN therapy.  Benefits verified, Pt has [place] [employer] [employer] plan, covered at 100% as long as via CADD pump. Spoke with patient to introduce home infusion services, review benefits and offer choice of providers. She wishes to use hospitals.  Provided info on hospitals services including RN visits, RPH/RN on call 24/7, supplies, and delivery process to Hospital on day of discharge.  Educated on how to contact hospitals. Informed pt that she would receive SN visits from  agency x 3 days in a row for TPN education. She is in agreement with plan and willing and able to learn. I Liaison will continue to follow until DC for any changes or additional needs.    Peyton Brody RN/ hospitals-Nurse Liaison  Cell:  [phone] [facility]  [email or web address]  [facility]-24 hrs  [phone]

## 2023-03-17 NOTE — PLAN OF CARE
"7091-8810    /72 (BP Location: Left arm)   Pulse 74   Temp 99.2  F (37.3  C) (Oral)   Resp 16   Ht 1.702 m (5' 7.01\")   Wt 80.1 kg (176 lb 9.4 oz)   LMP  (LMP Unknown)   SpO2 96%   BMI 27.65 kg/m      Activity: Independent  Pain: Dilaudid x 1 for pain of 7/10   Neuro: A/O x4  Cardiac: OVSS  Respiratory: RA  GI/: Voiding adequately, LBM 3/15/23 -passing flatus (as per Pt)  Diet: TPN continuous   Lines: PICC double lumen-cap changed 3/17/23  Wounds: Intact   Labs/imaging: Reviewed      New changes this shift: Pt has an order for an NG-tube placement, Pt will like placement before bedtime. Pt was seen walking around the unit-back in bed visiting with visitor.          Continue to monitor and follow POC   "

## 2023-03-17 NOTE — PLAN OF CARE
VSS on RA. Continues to have intermittent nausea and occasionally dry heaving. Per pt report IV haldol only helps w/ nausea for 2 hrs. Requesting IV Dilaudid for nausea. Education provided that IV dilaudid is not indicated for nausea, but pain. Reinforced education that MD team would prefer pt try PO Norco before taking IV dilaudid for pain. Declined gabapentin when offered. NPO today for CT scan w/ contrast. Pt was able to tolerate only 1/4 of contrast. Reports passing flatus, no BM for a couple of days. Voiding spontaneously. TPN infusing. Labs WNL, ordered recheck for tomorrow. Takedown site dressing changed after shower. Up ad el in room. Ambulation encouraged. Continue w/ POC.

## 2023-03-18 ENCOUNTER — APPOINTMENT (OUTPATIENT)
Dept: GENERAL RADIOLOGY | Facility: CLINIC | Age: 38
DRG: 330 | End: 2023-03-18
Attending: SURGERY
Payer: MEDICAID

## 2023-03-18 LAB
ANION GAP SERPL CALCULATED.3IONS-SCNC: 13 MMOL/L (ref 7–15)
BUN SERPL-MCNC: 14.7 MG/DL (ref 6–20)
CALCIUM SERPL-MCNC: 8.6 MG/DL (ref 8.6–10)
CHLORIDE SERPL-SCNC: 102 MMOL/L (ref 98–107)
CREAT SERPL-MCNC: 0.54 MG/DL (ref 0.51–0.95)
DEPRECATED HCO3 PLAS-SCNC: 21 MMOL/L (ref 22–29)
GFR SERPL CREATININE-BSD FRML MDRD: >90 ML/MIN/1.73M2
GLUCOSE BLDC GLUCOMTR-MCNC: 148 MG/DL (ref 70–99)
GLUCOSE SERPL-MCNC: 140 MG/DL (ref 70–99)
HOLD SPECIMEN: NORMAL
MAGNESIUM SERPL-MCNC: 2.2 MG/DL (ref 1.7–2.3)
PHOSPHATE SERPL-MCNC: 3.1 MG/DL (ref 2.5–4.5)
POTASSIUM SERPL-SCNC: 3.7 MMOL/L (ref 3.4–5.3)
POTASSIUM SERPL-SCNC: 3.7 MMOL/L (ref 3.4–5.3)
SODIUM SERPL-SCNC: 136 MMOL/L (ref 136–145)

## 2023-03-18 PROCEDURE — 120N000002 HC R&B MED SURG/OB UMMC

## 2023-03-18 PROCEDURE — 84132 ASSAY OF SERUM POTASSIUM: CPT | Performed by: SURGERY

## 2023-03-18 PROCEDURE — 74018 RADEX ABDOMEN 1 VIEW: CPT | Mod: 26 | Performed by: RADIOLOGY

## 2023-03-18 PROCEDURE — 83735 ASSAY OF MAGNESIUM: CPT | Performed by: SURGERY

## 2023-03-18 PROCEDURE — 80048 BASIC METABOLIC PNL TOTAL CA: CPT | Performed by: SURGERY

## 2023-03-18 PROCEDURE — 250N000013 HC RX MED GY IP 250 OP 250 PS 637: Performed by: STUDENT IN AN ORGANIZED HEALTH CARE EDUCATION/TRAINING PROGRAM

## 2023-03-18 PROCEDURE — 250N000011 HC RX IP 250 OP 636: Performed by: SURGERY

## 2023-03-18 PROCEDURE — 250N000011 HC RX IP 250 OP 636: Performed by: STUDENT IN AN ORGANIZED HEALTH CARE EDUCATION/TRAINING PROGRAM

## 2023-03-18 PROCEDURE — 84100 ASSAY OF PHOSPHORUS: CPT | Performed by: SURGERY

## 2023-03-18 PROCEDURE — 250N000013 HC RX MED GY IP 250 OP 250 PS 637: Performed by: SURGERY

## 2023-03-18 PROCEDURE — B4185 PARENTERAL SOL 10 GM LIPIDS: HCPCS | Performed by: SURGERY

## 2023-03-18 PROCEDURE — 999N000065 XR ABDOMEN PORT 1 VIEW

## 2023-03-18 PROCEDURE — 250N000013 HC RX MED GY IP 250 OP 250 PS 637: Performed by: PHYSICIAN ASSISTANT

## 2023-03-18 PROCEDURE — 250N000009 HC RX 250: Performed by: PHYSICIAN ASSISTANT

## 2023-03-18 PROCEDURE — 36592 COLLECT BLOOD FROM PICC: CPT | Performed by: SURGERY

## 2023-03-18 PROCEDURE — 250N000009 HC RX 250: Performed by: SURGERY

## 2023-03-18 RX ADMIN — OLIVE OIL AND SOYBEAN OIL 250 ML: 16; 4 INJECTION, EMULSION INTRAVENOUS at 19:43

## 2023-03-18 RX ADMIN — HALOPERIDOL LACTATE 1 MG: 5 INJECTION, SOLUTION INTRAMUSCULAR at 23:24

## 2023-03-18 RX ADMIN — Medication 1 ML: at 21:02

## 2023-03-18 RX ADMIN — HYDROMORPHONE HYDROCHLORIDE 0.4 MG: 0.2 INJECTION, SOLUTION INTRAMUSCULAR; INTRAVENOUS; SUBCUTANEOUS at 14:13

## 2023-03-18 RX ADMIN — SERTRALINE HYDROCHLORIDE 50 MG: 50 TABLET ORAL at 22:09

## 2023-03-18 RX ADMIN — Medication 1 ML: at 15:22

## 2023-03-18 RX ADMIN — HALOPERIDOL LACTATE 1 MG: 5 INJECTION, SOLUTION INTRAMUSCULAR at 09:43

## 2023-03-18 RX ADMIN — Medication 1 ML: at 18:06

## 2023-03-18 RX ADMIN — ONDANSETRON 4 MG: 4 TABLET, ORALLY DISINTEGRATING ORAL at 10:50

## 2023-03-18 RX ADMIN — Medication 5 ML: at 07:37

## 2023-03-18 RX ADMIN — Medication 5 ML: at 15:57

## 2023-03-18 RX ADMIN — FENTANYL 1 PATCH: 25 PATCH TRANSDERMAL at 11:27

## 2023-03-18 RX ADMIN — Medication 1 ML: at 22:23

## 2023-03-18 RX ADMIN — Medication 1 ML: at 14:12

## 2023-03-18 RX ADMIN — HYDROMORPHONE HYDROCHLORIDE 0.2 MG: 0.2 INJECTION, SOLUTION INTRAMUSCULAR; INTRAVENOUS; SUBCUTANEOUS at 07:10

## 2023-03-18 RX ADMIN — HYDROCODONE BITARTRATE AND ACETAMINOPHEN 1 TABLET: 5; 325 TABLET ORAL at 02:19

## 2023-03-18 RX ADMIN — Medication 5 ML: at 09:44

## 2023-03-18 RX ADMIN — HYDROCODONE BITARTRATE AND ACETAMINOPHEN 1 TABLET: 5; 325 TABLET ORAL at 17:13

## 2023-03-18 RX ADMIN — HYDROMORPHONE HYDROCHLORIDE 0.4 MG: 0.2 INJECTION, SOLUTION INTRAMUSCULAR; INTRAVENOUS; SUBCUTANEOUS at 22:09

## 2023-03-18 RX ADMIN — HYDROMORPHONE HYDROCHLORIDE 0.2 MG: 0.2 INJECTION, SOLUTION INTRAMUSCULAR; INTRAVENOUS; SUBCUTANEOUS at 04:30

## 2023-03-18 RX ADMIN — HYDROXYZINE HYDROCHLORIDE 25 MG: 25 TABLET, FILM COATED ORAL at 06:40

## 2023-03-18 RX ADMIN — ENOXAPARIN SODIUM 40 MG: 40 INJECTION SUBCUTANEOUS at 15:57

## 2023-03-18 RX ADMIN — ONDANSETRON 4 MG: 4 TABLET, ORALLY DISINTEGRATING ORAL at 05:30

## 2023-03-18 RX ADMIN — HALOPERIDOL LACTATE 1 MG: 5 INJECTION, SOLUTION INTRAMUSCULAR at 02:19

## 2023-03-18 RX ADMIN — Medication 1 ML: at 19:47

## 2023-03-18 RX ADMIN — Medication 1 LOZENGE: at 16:07

## 2023-03-18 RX ADMIN — HYDROMORPHONE HYDROCHLORIDE 0.2 MG: 0.2 INJECTION, SOLUTION INTRAMUSCULAR; INTRAVENOUS; SUBCUTANEOUS at 08:51

## 2023-03-18 RX ADMIN — SCOPALAMINE 1 PATCH: 1 PATCH, EXTENDED RELEASE TRANSDERMAL at 08:36

## 2023-03-18 RX ADMIN — MAGNESIUM SULFATE HEPTAHYDRATE: 500 INJECTION, SOLUTION INTRAMUSCULAR; INTRAVENOUS at 19:43

## 2023-03-18 RX ADMIN — ONDANSETRON 4 MG: 4 TABLET, ORALLY DISINTEGRATING ORAL at 22:09

## 2023-03-18 RX ADMIN — HALOPERIDOL LACTATE 1 MG: 5 INJECTION, SOLUTION INTRAMUSCULAR at 16:07

## 2023-03-18 RX ADMIN — ONDANSETRON 4 MG: 4 TABLET, ORALLY DISINTEGRATING ORAL at 15:57

## 2023-03-18 RX ADMIN — HYDROMORPHONE HYDROCHLORIDE 0.4 MG: 0.2 INJECTION, SOLUTION INTRAMUSCULAR; INTRAVENOUS; SUBCUTANEOUS at 18:06

## 2023-03-18 RX ADMIN — Medication 1 LOZENGE: at 22:39

## 2023-03-18 ASSESSMENT — ACTIVITIES OF DAILY LIVING (ADL)
ADLS_ACUITY_SCORE: 22

## 2023-03-18 NOTE — PROGRESS NOTES
Pt sleeping overnight. VSS, on RA. [place] and IV Dilaudid for pain. Rating pain 6-7/10 for most of the night. Scheduled Zofran and prn Haldol for persistent nausea. Atarax given x1 this AM to help w/ pain. Ice pack on upper abdomen to help w/ pain as well. No emesis until 0540 this AM. ~150 ml out. Hypo bs, +flatus, voiding fine. Independent in room and on unit, tolerating. NPO overnight.     NG supposed to be placed at bedtime, resource RN paged x2 to help. They were unable to come help writer w/ NG placement. Pt made aware of this and states she wants to wait until AM. No severe nausea or vomiting present all shift. FYI page sent to on call resident. Per MD, okay to do in AM per pt request.     Jennifer Fernando, RN on 3/18/2023 at 6:45 AM

## 2023-03-18 NOTE — PLAN OF CARE
Goal Outcome Evaluation:      Plan of Care Reviewed With: patient    Overall Patient Progress: improvingOverall Patient Progress: improving    NG placed this am with 200ml output, patient reports less nausea,NPO except ice ships, no gas passed this shift.Voiding spont, up with sba to bathroom.TPN via PICC line, Dilaudid IV for abdominal pain. Ileostomy takedown site dressing changed, scant drainage on dressing.  1400 abdominal xray done to confirm NG placement, ok to use per Dr Shirley.

## 2023-03-18 NOTE — PROGRESS NOTES
Colorectal Surgery Progress Note  [facility]  POD#10    Subjective:  Continued nausea with emesis this AM. No BM for a few days, passing some gas. Feels more bloated.     Vitals:  Vitals:    03/17/23 0547 03/17/23 1613 03/17/23 2205 03/18/23 0700   BP: 111/62 114/72 116/65 112/63   BP Location: Left arm Left arm Left arm Left arm   Pulse: 66 74 73 74   Resp: 18 16 18 18   Temp: 98.8  F (37.1  C) 99.2  F (37.3  C) 97.2  F (36.2  C) 98  F (36.7  C)   TempSrc: Oral Oral Oral Oral   SpO2: 96% 96% 97% 97%   Weight:   81.5 kg (179 lb 9.6 oz)    Height:         I/O:  I/O last 3 completed shifts:  In: 21.15   Out: 150 [Emesis/NG output:150]    Physical Exam:  Gen: AAOx3, NAD  Pulm: Non-labored breathing  Abd: Soft, moderately distended and stable since yesterday. Nontender, no guarding.  Wound: Prior stoma site covered in dressing. Appearing clean and dry    ASSESSMENT: Vicki is a 38 year old woman s/p redo LAR for stricuture, now POD#10 s/p DLI closure with prolonged ileus and resolving anastomotic hematoma. Started on TPN 3/14 to facilitate.     Plan:    Changes for 3/18:  - NGT    Diet:  - NPO except for meds, ice chips  - NGT  - PICC in place, continue TPN  N/V:  - Continue antiemetics: zofran, ativan, scopolamine patch   - IV zofran   - restarted haldol and stopped ativan per pt preference   - continue erythromycin for gut motility   - EKG 3/15, QTc acceptable at 416  FEN:  - No mIVF  Pain:  - Continue home fentanyl patch for pain  - Encourage PO hydrocodone over PRN IV dilaudid  Wound:  - Daily dressing change to prior stoma site  Activity/PPx  - SCDs, IS, OOB, ambulate, lovenox PPx    Seen and discussed with Dr. Caro who discussed with Dr. Schrader.    Shani Anglin MD (PGY-3)  Surgery

## 2023-03-19 LAB
ANION GAP SERPL CALCULATED.3IONS-SCNC: 14 MMOL/L (ref 7–15)
BUN SERPL-MCNC: 19.9 MG/DL (ref 6–20)
CALCIUM SERPL-MCNC: 9 MG/DL (ref 8.6–10)
CHLORIDE SERPL-SCNC: 102 MMOL/L (ref 98–107)
CREAT SERPL-MCNC: 0.59 MG/DL (ref 0.51–0.95)
DEPRECATED HCO3 PLAS-SCNC: 22 MMOL/L (ref 22–29)
GFR SERPL CREATININE-BSD FRML MDRD: >90 ML/MIN/1.73M2
GLUCOSE BLDC GLUCOMTR-MCNC: 111 MG/DL (ref 70–99)
GLUCOSE SERPL-MCNC: 147 MG/DL (ref 70–99)
GLUCOSE SERPL-MCNC: 147 MG/DL (ref 70–99)
MAGNESIUM SERPL-MCNC: 2.1 MG/DL (ref 1.7–2.3)
PHOSPHATE SERPL-MCNC: 4.2 MG/DL (ref 2.5–4.5)
POTASSIUM SERPL-SCNC: 4.3 MMOL/L (ref 3.4–5.3)
POTASSIUM SERPL-SCNC: 4.4 MMOL/L (ref 3.4–5.3)
SODIUM SERPL-SCNC: 138 MMOL/L (ref 136–145)

## 2023-03-19 PROCEDURE — 36415 COLL VENOUS BLD VENIPUNCTURE: CPT | Performed by: SURGERY

## 2023-03-19 PROCEDURE — 84100 ASSAY OF PHOSPHORUS: CPT | Performed by: SURGERY

## 2023-03-19 PROCEDURE — 83735 ASSAY OF MAGNESIUM: CPT | Performed by: SURGERY

## 2023-03-19 PROCEDURE — 84132 ASSAY OF SERUM POTASSIUM: CPT | Performed by: SURGERY

## 2023-03-19 PROCEDURE — 250N000011 HC RX IP 250 OP 636: Performed by: STUDENT IN AN ORGANIZED HEALTH CARE EDUCATION/TRAINING PROGRAM

## 2023-03-19 PROCEDURE — 250N000009 HC RX 250: Performed by: SURGERY

## 2023-03-19 PROCEDURE — 258N000003 HC RX IP 258 OP 636: Performed by: STUDENT IN AN ORGANIZED HEALTH CARE EDUCATION/TRAINING PROGRAM

## 2023-03-19 PROCEDURE — B4185 PARENTERAL SOL 10 GM LIPIDS: HCPCS | Performed by: SURGERY

## 2023-03-19 PROCEDURE — 250N000011 HC RX IP 250 OP 636: Performed by: SURGERY

## 2023-03-19 PROCEDURE — 250N000013 HC RX MED GY IP 250 OP 250 PS 637: Performed by: SURGERY

## 2023-03-19 PROCEDURE — 80048 BASIC METABOLIC PNL TOTAL CA: CPT | Performed by: SURGERY

## 2023-03-19 PROCEDURE — 250N000013 HC RX MED GY IP 250 OP 250 PS 637: Performed by: STUDENT IN AN ORGANIZED HEALTH CARE EDUCATION/TRAINING PROGRAM

## 2023-03-19 PROCEDURE — 120N000002 HC R&B MED SURG/OB UMMC

## 2023-03-19 RX ORDER — SODIUM CHLORIDE, SODIUM LACTATE, POTASSIUM CHLORIDE, CALCIUM CHLORIDE 600; 310; 30; 20 MG/100ML; MG/100ML; MG/100ML; MG/100ML
INJECTION, SOLUTION INTRAVENOUS CONTINUOUS
Status: DISCONTINUED | OUTPATIENT
Start: 2023-03-19 | End: 2023-03-22

## 2023-03-19 RX ADMIN — HALOPERIDOL LACTATE 1 MG: 5 INJECTION, SOLUTION INTRAMUSCULAR at 18:02

## 2023-03-19 RX ADMIN — Medication 1 ML: at 15:47

## 2023-03-19 RX ADMIN — HYDROMORPHONE HYDROCHLORIDE 0.4 MG: 0.2 INJECTION, SOLUTION INTRAMUSCULAR; INTRAVENOUS; SUBCUTANEOUS at 11:14

## 2023-03-19 RX ADMIN — HYDROMORPHONE HYDROCHLORIDE 0.4 MG: 0.2 INJECTION, SOLUTION INTRAMUSCULAR; INTRAVENOUS; SUBCUTANEOUS at 15:47

## 2023-03-19 RX ADMIN — Medication 1 ML: at 06:50

## 2023-03-19 RX ADMIN — HYDROMORPHONE HYDROCHLORIDE 0.4 MG: 0.2 INJECTION, SOLUTION INTRAMUSCULAR; INTRAVENOUS; SUBCUTANEOUS at 06:46

## 2023-03-19 RX ADMIN — Medication 1 ML: at 04:19

## 2023-03-19 RX ADMIN — ONDANSETRON 4 MG: 4 TABLET, ORALLY DISINTEGRATING ORAL at 09:33

## 2023-03-19 RX ADMIN — HYDROMORPHONE HYDROCHLORIDE 0.4 MG: 0.2 INJECTION, SOLUTION INTRAMUSCULAR; INTRAVENOUS; SUBCUTANEOUS at 02:06

## 2023-03-19 RX ADMIN — ONDANSETRON 4 MG: 4 TABLET, ORALLY DISINTEGRATING ORAL at 04:17

## 2023-03-19 RX ADMIN — SERTRALINE HYDROCHLORIDE 50 MG: 50 TABLET ORAL at 22:01

## 2023-03-19 RX ADMIN — SODIUM CHLORIDE, POTASSIUM CHLORIDE, SODIUM LACTATE AND CALCIUM CHLORIDE: 600; 310; 30; 20 INJECTION, SOLUTION INTRAVENOUS at 09:27

## 2023-03-19 RX ADMIN — HALOPERIDOL LACTATE 1 MG: 5 INJECTION, SOLUTION INTRAMUSCULAR at 09:33

## 2023-03-19 RX ADMIN — OLIVE OIL AND SOYBEAN OIL 250 ML: 16; 4 INJECTION, EMULSION INTRAVENOUS at 20:23

## 2023-03-19 RX ADMIN — Medication 1 ML: at 08:15

## 2023-03-19 RX ADMIN — MAGNESIUM SULFATE HEPTAHYDRATE: 500 INJECTION, SOLUTION INTRAMUSCULAR; INTRAVENOUS at 20:24

## 2023-03-19 RX ADMIN — Medication 1 ML: at 11:18

## 2023-03-19 RX ADMIN — Medication 1 ML: at 02:06

## 2023-03-19 RX ADMIN — ENOXAPARIN SODIUM 40 MG: 40 INJECTION SUBCUTANEOUS at 15:49

## 2023-03-19 RX ADMIN — ONDANSETRON 4 MG: 4 TABLET, ORALLY DISINTEGRATING ORAL at 22:00

## 2023-03-19 RX ADMIN — HYDROCODONE BITARTRATE AND ACETAMINOPHEN 1 TABLET: 5; 325 TABLET ORAL at 22:04

## 2023-03-19 RX ADMIN — HYDROMORPHONE HYDROCHLORIDE 0.4 MG: 0.2 INJECTION, SOLUTION INTRAMUSCULAR; INTRAVENOUS; SUBCUTANEOUS at 20:28

## 2023-03-19 RX ADMIN — ONDANSETRON 4 MG: 4 TABLET, ORALLY DISINTEGRATING ORAL at 15:51

## 2023-03-19 ASSESSMENT — ACTIVITIES OF DAILY LIVING (ADL)
ADLS_ACUITY_SCORE: 23
ADLS_ACUITY_SCORE: 22
ADLS_ACUITY_SCORE: 23
ADLS_ACUITY_SCORE: 23
ADLS_ACUITY_SCORE: 22
ADLS_ACUITY_SCORE: 23
ADLS_ACUITY_SCORE: 22
ADLS_ACUITY_SCORE: 23

## 2023-03-19 NOTE — PLAN OF CARE
VSS. Pt up ad el in room. Voids spont with adequate UOP. No gas, no BM. NG tube to LIS with moderate amount of yellow liquid output. Pain somewhat controlled with IV dilaudid Q4H and Norco x1. Haldol and zofran given for nausea. Takedown site CDI. Throat spray and lozanges given for NG tube discomfort. TPN/Lipids infusing through PICC. Cont. POC.

## 2023-03-19 NOTE — PROGRESS NOTES
Colorectal Surgery Progress Note  [facility]  POD#11    Subjective:  Denies nausea or emesis since NG tube placement. Mild pain. No BM for a few days, passing some gas. Feels bloated. Urinating appropriately    Vitals:  Vitals:    03/18/23 1500 03/18/23 2311 03/19/23 0715 03/19/23 0800   BP: 114/54 116/61 108/55    BP Location: Left arm Left arm Left arm    Pulse: 79 84 71    Resp: 18 18 18    Temp: 98.6  F (37  C) 99.7  F (37.6  C) 98.5  F (36.9  C)    TempSrc: Oral Temporal Temporal    SpO2: 97% 96% 95%    Weight:    78.6 kg (173 lb 4.8 oz)   Height:         I/O:  I/O last 3 completed shifts:  In: 1544.99 [P.O.:100]  Out: 1700 [Urine:800; Emesis/NG output:900]    Physical Exam:  Gen: AAOx3, NAD  Pulm: Non-labored breathing  Abd: Soft, moderately distended and stable since yesterday. Nontender, no guarding.  Wound: Prior stoma site covered in dressing. Appearing clean and dry with small amount of serosanguinous strikethrough    ASSESSMENT: Vicki is a 38 year old woman s/p redo LAR for stricuture, now POD#11 s/p DLI closure with prolonged ileus and resolving anastomotic hematoma. Started on TPN 3/14 to facilitate.     Plan:    Changes for 3/18:  - Increase mIVF to a total fluid rate of 75cc/hr    Diet:  - NPO except for meds, ice chips  - NGT  - PICC in place, continue TPN  N/V:  - Continue antiemetics: zofran, ativan, scopolamine patch   - IV zofran   - restarted haldol and stopped ativan per pt preference   - continue erythromycin for gut motility   - EKG 3/15, QTc acceptable at 416  FEN:  - 35cc mIVF  Pain:  - Continue home fentanyl patch for pain  - Encourage PO hydrocodone over PRN IV dilaudid  Wound:  - Daily dressing change to prior stoma site  Activity/PPx  - SCDs, IS, OOB, ambulate, lovenox PPx    Seen and discussed with Dr. Caro who discussed with Dr. Schrader.    Holden Doyle MD  Intern, General Surgery  Colorectal Intern

## 2023-03-19 NOTE — PROGRESS NOTES
Pt sleeping overnight. Elevated temp at bedtime. Otherwise VSS, on RA. NG to LIS, brown output. Intermittent nausea, prn haldol and scheduled zofran. Throat spray prn for NG irritation. Abdominal pain rated 7/10, IV Dilaudid and PO Norco. Hypo bs, +gas per pt, voiding fine. Takedown site WNL.     Jennifer Fernando RN on 3/19/2023 at 7:00 AM

## 2023-03-19 NOTE — PLAN OF CARE
Goal Outcome Evaluation:      Plan of Care Reviewed With: patient    Overall Patient Progress: improvingOverall Patient Progress: improving    Passed loose stool this am, denies gas, ileostomy take down site dressing changed after shower, scant drainage. Marginal urine volumes, IV fluid in addition to TPN started this shift. NG to lis, c/o occasional nausea, Zofran/Haldol given with relief.Dilaudid IV for abdominal pain with relief, ambulated in halls independently.

## 2023-03-20 ENCOUNTER — APPOINTMENT (OUTPATIENT)
Dept: GENERAL RADIOLOGY | Facility: CLINIC | Age: 38
DRG: 330 | End: 2023-03-20
Attending: SURGERY
Payer: MEDICAID

## 2023-03-20 LAB
ALBUMIN SERPL BCG-MCNC: 4.1 G/DL (ref 3.5–5.2)
ALP SERPL-CCNC: 157 U/L (ref 35–104)
ALT SERPL W P-5'-P-CCNC: 87 U/L (ref 10–35)
ANION GAP SERPL CALCULATED.3IONS-SCNC: 12 MMOL/L (ref 7–15)
AST SERPL W P-5'-P-CCNC: 78 U/L (ref 10–35)
BILIRUB SERPL-MCNC: 0.6 MG/DL
BUN SERPL-MCNC: 20.8 MG/DL (ref 6–20)
CALCIUM SERPL-MCNC: 9.2 MG/DL (ref 8.6–10)
CHLORIDE SERPL-SCNC: 101 MMOL/L (ref 98–107)
CREAT SERPL-MCNC: 0.61 MG/DL (ref 0.51–0.95)
DEPRECATED HCO3 PLAS-SCNC: 25 MMOL/L (ref 22–29)
GFR SERPL CREATININE-BSD FRML MDRD: >90 ML/MIN/1.73M2
GLUCOSE BLDC GLUCOMTR-MCNC: 121 MG/DL (ref 70–99)
GLUCOSE SERPL-MCNC: 108 MG/DL (ref 70–99)
HGB BLD-MCNC: 10.4 G/DL (ref 11.7–15.7)
HOLD SPECIMEN: NORMAL
MAGNESIUM SERPL-MCNC: 2.5 MG/DL (ref 1.7–2.3)
PHOSPHATE SERPL-MCNC: 4.1 MG/DL (ref 2.5–4.5)
PLATELET # BLD AUTO: 345 10E3/UL (ref 150–450)
POTASSIUM SERPL-SCNC: 4.1 MMOL/L (ref 3.4–5.3)
PREALB SERPL IA-MCNC: 24 MG/DL (ref 15–45)
PROT SERPL-MCNC: 7.2 G/DL (ref 6.4–8.3)
SODIUM SERPL-SCNC: 138 MMOL/L (ref 136–145)

## 2023-03-20 PROCEDURE — B4185 PARENTERAL SOL 10 GM LIPIDS: HCPCS | Performed by: SURGERY

## 2023-03-20 PROCEDURE — 74018 RADEX ABDOMEN 1 VIEW: CPT

## 2023-03-20 PROCEDURE — 250N000011 HC RX IP 250 OP 636: Performed by: STUDENT IN AN ORGANIZED HEALTH CARE EDUCATION/TRAINING PROGRAM

## 2023-03-20 PROCEDURE — 83735 ASSAY OF MAGNESIUM: CPT | Performed by: SURGERY

## 2023-03-20 PROCEDURE — 250N000013 HC RX MED GY IP 250 OP 250 PS 637: Performed by: STUDENT IN AN ORGANIZED HEALTH CARE EDUCATION/TRAINING PROGRAM

## 2023-03-20 PROCEDURE — 85049 AUTOMATED PLATELET COUNT: CPT | Performed by: SURGERY

## 2023-03-20 PROCEDURE — 36415 COLL VENOUS BLD VENIPUNCTURE: CPT | Performed by: SURGERY

## 2023-03-20 PROCEDURE — 250N000011 HC RX IP 250 OP 636: Performed by: SURGERY

## 2023-03-20 PROCEDURE — 84134 ASSAY OF PREALBUMIN: CPT | Performed by: SURGERY

## 2023-03-20 PROCEDURE — 999N000065 XR ABDOMEN PORT 1 VIEW

## 2023-03-20 PROCEDURE — 258N000003 HC RX IP 258 OP 636

## 2023-03-20 PROCEDURE — 250N000009 HC RX 250: Performed by: SURGERY

## 2023-03-20 PROCEDURE — 93005 ELECTROCARDIOGRAM TRACING: CPT

## 2023-03-20 PROCEDURE — 80053 COMPREHEN METABOLIC PANEL: CPT | Performed by: SURGERY

## 2023-03-20 PROCEDURE — 258N000003 HC RX IP 258 OP 636: Performed by: STUDENT IN AN ORGANIZED HEALTH CARE EDUCATION/TRAINING PROGRAM

## 2023-03-20 PROCEDURE — 74018 RADEX ABDOMEN 1 VIEW: CPT | Mod: 26 | Performed by: STUDENT IN AN ORGANIZED HEALTH CARE EDUCATION/TRAINING PROGRAM

## 2023-03-20 PROCEDURE — 84100 ASSAY OF PHOSPHORUS: CPT | Performed by: SURGERY

## 2023-03-20 PROCEDURE — 250N000013 HC RX MED GY IP 250 OP 250 PS 637: Performed by: SURGERY

## 2023-03-20 PROCEDURE — 120N000002 HC R&B MED SURG/OB UMMC

## 2023-03-20 PROCEDURE — 93010 ELECTROCARDIOGRAM REPORT: CPT | Performed by: INTERNAL MEDICINE

## 2023-03-20 PROCEDURE — 85018 HEMOGLOBIN: CPT | Performed by: SURGERY

## 2023-03-20 RX ORDER — HYDROXYZINE HYDROCHLORIDE 25 MG/1
25-50 TABLET, FILM COATED ORAL EVERY 6 HOURS PRN
Status: DISCONTINUED | OUTPATIENT
Start: 2023-03-20 | End: 2023-03-24 | Stop reason: HOSPADM

## 2023-03-20 RX ORDER — HYDROCODONE BITARTRATE AND ACETAMINOPHEN 5; 325 MG/1; MG/1
1 TABLET ORAL EVERY 6 HOURS PRN
Status: DISCONTINUED | OUTPATIENT
Start: 2023-03-20 | End: 2023-03-22

## 2023-03-20 RX ORDER — HYDROMORPHONE HCL IN WATER/PF 6 MG/30 ML
0.2 PATIENT CONTROLLED ANALGESIA SYRINGE INTRAVENOUS EVERY 4 HOURS PRN
Status: DISCONTINUED | OUTPATIENT
Start: 2023-03-20 | End: 2023-03-22

## 2023-03-20 RX ADMIN — HYDROMORPHONE HYDROCHLORIDE 0.4 MG: 0.2 INJECTION, SOLUTION INTRAMUSCULAR; INTRAVENOUS; SUBCUTANEOUS at 00:47

## 2023-03-20 RX ADMIN — OLIVE OIL AND SOYBEAN OIL 250 ML: 16; 4 INJECTION, EMULSION INTRAVENOUS at 19:31

## 2023-03-20 RX ADMIN — ONDANSETRON 4 MG: 4 TABLET, ORALLY DISINTEGRATING ORAL at 11:22

## 2023-03-20 RX ADMIN — ENOXAPARIN SODIUM 40 MG: 40 INJECTION SUBCUTANEOUS at 16:01

## 2023-03-20 RX ADMIN — MAGNESIUM SULFATE HEPTAHYDRATE: 500 INJECTION, SOLUTION INTRAMUSCULAR; INTRAVENOUS at 19:24

## 2023-03-20 RX ADMIN — Medication 1 ML: at 11:29

## 2023-03-20 RX ADMIN — HYDROMORPHONE HYDROCHLORIDE 0.2 MG: 0.2 INJECTION, SOLUTION INTRAMUSCULAR; INTRAVENOUS; SUBCUTANEOUS at 16:50

## 2023-03-20 RX ADMIN — Medication 1 ML: at 14:49

## 2023-03-20 RX ADMIN — ONDANSETRON 4 MG: 4 TABLET, ORALLY DISINTEGRATING ORAL at 18:43

## 2023-03-20 RX ADMIN — SODIUM CHLORIDE, POTASSIUM CHLORIDE, SODIUM LACTATE AND CALCIUM CHLORIDE: 600; 310; 30; 20 INJECTION, SOLUTION INTRAVENOUS at 11:23

## 2023-03-20 RX ADMIN — Medication 5 ML: at 16:59

## 2023-03-20 RX ADMIN — SODIUM CHLORIDE, POTASSIUM CHLORIDE, SODIUM LACTATE AND CALCIUM CHLORIDE 500 ML: 600; 310; 30; 20 INJECTION, SOLUTION INTRAVENOUS at 11:25

## 2023-03-20 RX ADMIN — HYDROMORPHONE HYDROCHLORIDE 0.2 MG: 0.2 INJECTION, SOLUTION INTRAMUSCULAR; INTRAVENOUS; SUBCUTANEOUS at 21:38

## 2023-03-20 RX ADMIN — Medication 1 LOZENGE: at 15:58

## 2023-03-20 RX ADMIN — HYDROCODONE BITARTRATE AND ACETAMINOPHEN 1 TABLET: 5; 325 TABLET ORAL at 14:44

## 2023-03-20 RX ADMIN — Medication 1 LOZENGE: at 19:35

## 2023-03-20 RX ADMIN — ONDANSETRON 4 MG: 4 TABLET, ORALLY DISINTEGRATING ORAL at 04:40

## 2023-03-20 RX ADMIN — HYDROMORPHONE HYDROCHLORIDE 0.4 MG: 0.2 INJECTION, SOLUTION INTRAMUSCULAR; INTRAVENOUS; SUBCUTANEOUS at 06:45

## 2023-03-20 RX ADMIN — HYDROMORPHONE HYDROCHLORIDE 0.2 MG: 0.2 INJECTION, SOLUTION INTRAMUSCULAR; INTRAVENOUS; SUBCUTANEOUS at 11:17

## 2023-03-20 RX ADMIN — HYDROCODONE BITARTRATE AND ACETAMINOPHEN 1 TABLET: 5; 325 TABLET ORAL at 23:39

## 2023-03-20 RX ADMIN — SERTRALINE HYDROCHLORIDE 50 MG: 50 TABLET ORAL at 21:39

## 2023-03-20 RX ADMIN — Medication 1 ML: at 18:43

## 2023-03-20 ASSESSMENT — ACTIVITIES OF DAILY LIVING (ADL)
ADLS_ACUITY_SCORE: 23

## 2023-03-20 NOTE — PROGRESS NOTES
[id number]    Pt is AOx4. VSS on RA. Pain managed with IV dilaudid. Ileostomy takedown site with dressing, CDI. PICC infusing with TPN/lipids. NG tube on low intermittent suction. BG checks Q6. NPO except for meds and ice chips. Up ad el. Passing gas. No BM this shift.

## 2023-03-20 NOTE — PROGRESS NOTES
Colorectal Surgery Progress Note  [facility]  POD#12    Subjective:  Denies nausea or emesis since NG tube placement. Minimal pain. Passing stool, 2 BMs since yesterday. High output from NG tube, though clear. Feels some distension and attendant discomfort but improving. Urinating appropriately.    Vitals:  Vitals:    03/19/23 0800 03/19/23 1425 03/19/23 2230 03/20/23 0757   BP:  117/69 111/52 106/52   BP Location:  Right arm Left arm Left arm   Pulse:  71 74 68   Resp:  18 16 16   Temp:  98.7  F (37.1  C) 99  F (37.2  C) 98.8  F (37.1  C)   TempSrc:  Temporal Temporal Temporal   SpO2:  99% 97% 94%   Weight: 78.6 kg (173 lb 4.8 oz)      Height:         I/O:  I/O last 3 completed shifts:  In: 1784.15 [P.O.:120; I.V.:483.67]  Out: 2075 [Urine:625; Emesis/NG output:1450]    Physical Exam:  Gen: AAOx3, NAD  Pulm: Non-labored breathing  Abd: Soft, moderately distended. Nontender, no guarding.  Wound: Prior stoma site covered in dressing. Appearing clean and dry with small amount of serosanguinous strikethrough    ASSESSMENT: Vicki is a 38 year old woman s/p redo LAR for stricuture, now POD#12 s/p DLI closure with prolonged ileus and resolving anastomotic hematoma. Started on TPN 3/14.    Plan:  Changes for 3/20: NG clamp trial today  Diet:  - NPO except for meds, ice chips  - NGT clamp trial today for 4 hours. Plan to leave NGT today with repeat clamp trial tomorrow 7a-1p and can pull NGT if residual is less than 250cc. Discussed with patient that she should try to limit ice intake during clamp trial to ensure accurate residual measurement.  - PICC in place, continue TPN  - plan to discontinue TPN prior to discharge  N/V:  - Continue antiemetics: zofran, ativan, scopolamine patch  - IV zofran  - restarted haldol and stopped ativan per pt preference  - continue erythromycin for gut motility  - EKG 3/15, QTc acceptable at 416.  - Recheck EKG today to check for QTc prolongation. Per Pharmacy,  should check EKG every 3-5 days to monitor QTc while using Haldol.  FEN:  - 35cc mIVF  Pain:  - Continue home fentanyl patch  - PRN IV dilaudid  Wound:  - Daily dressing change to prior stoma site  Activity/PPx  - SCDs, IS, OOB, ambulate, lovenox PPx    Seen and discussed with Dr. Caro who discussed with Dr. Richardson.    Donavan Victoria, MS3  March 20, 2023 8:27 AM    ------------------------------------------------------------------------  Patient seen today, 03/20/23, with Donavan Victoria, MS3.  I agree with the dictation and have made any necessary changes.     Discussed plan of care with Dr. Caro and Dr. Richardson.    Sharlene Valencia PA-C  Colon and Rectal Surgery  [facility]

## 2023-03-20 NOTE — PROVIDER NOTIFICATION
Paged colorectal resident:    4Y 1-524 ISELA Her NG location is charted previously at 60cm. Today it is at 68cm. Can I please have another XR to confirm placement before I give her Norco in her NG? Please advise. Fabiola GREENE RN [phone]

## 2023-03-20 NOTE — PLAN OF CARE
15:00-23:30    Goal Outcome Evaluation:    Plan of Care Reviewed With: patient    Overall Patient Progress: improving    Temp: 99  F (37.2  C) Temp src: Temporal BP: 111/52 Pulse: 74   Resp: 16 SpO2: 97 % O2 Device: None (Room air)        Status: S/P Diverting Loop Ileostomy closure and flexible sigmoidoscopy on 3/8    -abdominal and throat pain managed with IV Dilaudid,  Norco, and Fentanyl patch  -nausea controlled with scheduled Zofran, PRN Haldol and Scopolamine patch  -NG to low continuous suction, unable to accurately measure output because patient has been having ice-chips  -voiding marginal amount   -had 2 BMs today  -NPO except for meds and ice-chips  -on TPN/Lipids;   -ambulated total 3x in the hallways today  -up independently    Continue with plan of care

## 2023-03-21 ENCOUNTER — APPOINTMENT (OUTPATIENT)
Dept: EDUCATION SERVICES | Facility: CLINIC | Age: 38
DRG: 330 | End: 2023-03-21
Attending: SURGERY
Payer: MEDICAID

## 2023-03-21 LAB
ANION GAP SERPL CALCULATED.3IONS-SCNC: 13 MMOL/L (ref 7–15)
ATRIAL RATE - MUSE: 71 BPM
BUN SERPL-MCNC: 18.5 MG/DL (ref 6–20)
CALCIUM SERPL-MCNC: 9 MG/DL (ref 8.6–10)
CHLORIDE SERPL-SCNC: 102 MMOL/L (ref 98–107)
CREAT SERPL-MCNC: 0.62 MG/DL (ref 0.51–0.95)
DEPRECATED HCO3 PLAS-SCNC: 22 MMOL/L (ref 22–29)
DIASTOLIC BLOOD PRESSURE - MUSE: NORMAL MMHG
GFR SERPL CREATININE-BSD FRML MDRD: >90 ML/MIN/1.73M2
GLUCOSE BLDC GLUCOMTR-MCNC: 121 MG/DL (ref 70–99)
GLUCOSE BLDC GLUCOMTR-MCNC: 125 MG/DL (ref 70–99)
GLUCOSE SERPL-MCNC: 125 MG/DL (ref 70–99)
INTERPRETATION ECG - MUSE: NORMAL
MAGNESIUM SERPL-MCNC: 2.4 MG/DL (ref 1.7–2.3)
P AXIS - MUSE: 4 DEGREES
PHOSPHATE SERPL-MCNC: 3.4 MG/DL (ref 2.5–4.5)
POTASSIUM SERPL-SCNC: 4.1 MMOL/L (ref 3.4–5.3)
POTASSIUM SERPL-SCNC: 4.2 MMOL/L (ref 3.4–5.3)
PR INTERVAL - MUSE: 132 MS
QRS DURATION - MUSE: 82 MS
QT - MUSE: 382 MS
QTC - MUSE: 415 MS
R AXIS - MUSE: 32 DEGREES
SODIUM SERPL-SCNC: 137 MMOL/L (ref 136–145)
SYSTOLIC BLOOD PRESSURE - MUSE: NORMAL MMHG
T AXIS - MUSE: 43 DEGREES
TRIGL SERPL-MCNC: 112 MG/DL
VENTRICULAR RATE- MUSE: 71 BPM

## 2023-03-21 PROCEDURE — 250N000013 HC RX MED GY IP 250 OP 250 PS 637: Performed by: PHYSICIAN ASSISTANT

## 2023-03-21 PROCEDURE — 250N000011 HC RX IP 250 OP 636: Performed by: STUDENT IN AN ORGANIZED HEALTH CARE EDUCATION/TRAINING PROGRAM

## 2023-03-21 PROCEDURE — 258N000003 HC RX IP 258 OP 636: Performed by: STUDENT IN AN ORGANIZED HEALTH CARE EDUCATION/TRAINING PROGRAM

## 2023-03-21 PROCEDURE — 250N000011 HC RX IP 250 OP 636: Performed by: SURGERY

## 2023-03-21 PROCEDURE — 250N000009 HC RX 250: Performed by: SURGERY

## 2023-03-21 PROCEDURE — 84100 ASSAY OF PHOSPHORUS: CPT | Performed by: SURGERY

## 2023-03-21 PROCEDURE — 250N000013 HC RX MED GY IP 250 OP 250 PS 637: Performed by: STUDENT IN AN ORGANIZED HEALTH CARE EDUCATION/TRAINING PROGRAM

## 2023-03-21 PROCEDURE — 999N000044 HC STATISTIC CVC DRESSING CHANGE

## 2023-03-21 PROCEDURE — 250N000013 HC RX MED GY IP 250 OP 250 PS 637: Performed by: SURGERY

## 2023-03-21 PROCEDURE — 36592 COLLECT BLOOD FROM PICC: CPT | Performed by: SURGERY

## 2023-03-21 PROCEDURE — 36592 COLLECT BLOOD FROM PICC: CPT | Performed by: STUDENT IN AN ORGANIZED HEALTH CARE EDUCATION/TRAINING PROGRAM

## 2023-03-21 PROCEDURE — B4185 PARENTERAL SOL 10 GM LIPIDS: HCPCS | Performed by: SURGERY

## 2023-03-21 PROCEDURE — 250N000009 HC RX 250: Performed by: PHYSICIAN ASSISTANT

## 2023-03-21 PROCEDURE — 84478 ASSAY OF TRIGLYCERIDES: CPT | Performed by: STUDENT IN AN ORGANIZED HEALTH CARE EDUCATION/TRAINING PROGRAM

## 2023-03-21 PROCEDURE — 83735 ASSAY OF MAGNESIUM: CPT | Performed by: SURGERY

## 2023-03-21 PROCEDURE — 84132 ASSAY OF SERUM POTASSIUM: CPT | Performed by: SURGERY

## 2023-03-21 PROCEDURE — 120N000002 HC R&B MED SURG/OB UMMC

## 2023-03-21 PROCEDURE — 80048 BASIC METABOLIC PNL TOTAL CA: CPT | Performed by: SURGERY

## 2023-03-21 RX ADMIN — Medication 1 ML: at 10:07

## 2023-03-21 RX ADMIN — OLIVE OIL AND SOYBEAN OIL 250 ML: 16; 4 INJECTION, EMULSION INTRAVENOUS at 20:56

## 2023-03-21 RX ADMIN — ENOXAPARIN SODIUM 40 MG: 40 INJECTION SUBCUTANEOUS at 16:11

## 2023-03-21 RX ADMIN — HYDROMORPHONE HYDROCHLORIDE 0.2 MG: 0.2 INJECTION, SOLUTION INTRAMUSCULAR; INTRAVENOUS; SUBCUTANEOUS at 07:35

## 2023-03-21 RX ADMIN — ONDANSETRON 4 MG: 4 TABLET, ORALLY DISINTEGRATING ORAL at 16:10

## 2023-03-21 RX ADMIN — SODIUM CHLORIDE, POTASSIUM CHLORIDE, SODIUM LACTATE AND CALCIUM CHLORIDE: 600; 310; 30; 20 INJECTION, SOLUTION INTRAVENOUS at 06:42

## 2023-03-21 RX ADMIN — ONDANSETRON 4 MG: 4 TABLET, ORALLY DISINTEGRATING ORAL at 22:04

## 2023-03-21 RX ADMIN — HYDROMORPHONE HYDROCHLORIDE 0.2 MG: 0.2 INJECTION, SOLUTION INTRAMUSCULAR; INTRAVENOUS; SUBCUTANEOUS at 11:59

## 2023-03-21 RX ADMIN — ONDANSETRON 4 MG: 4 TABLET, ORALLY DISINTEGRATING ORAL at 04:02

## 2023-03-21 RX ADMIN — FENTANYL 1 PATCH: 25 PATCH TRANSDERMAL at 11:59

## 2023-03-21 RX ADMIN — Medication 5 ML: at 15:08

## 2023-03-21 RX ADMIN — HYDROCODONE BITARTRATE AND ACETAMINOPHEN 1 TABLET: 5; 325 TABLET ORAL at 15:05

## 2023-03-21 RX ADMIN — SCOPALAMINE 1 PATCH: 1 PATCH, EXTENDED RELEASE TRANSDERMAL at 07:40

## 2023-03-21 RX ADMIN — MAGNESIUM SULFATE HEPTAHYDRATE: 500 INJECTION, SOLUTION INTRAMUSCULAR; INTRAVENOUS at 20:13

## 2023-03-21 RX ADMIN — SERTRALINE HYDROCHLORIDE 50 MG: 50 TABLET ORAL at 22:04

## 2023-03-21 RX ADMIN — HYDROMORPHONE HYDROCHLORIDE 0.2 MG: 0.2 INJECTION, SOLUTION INTRAMUSCULAR; INTRAVENOUS; SUBCUTANEOUS at 01:59

## 2023-03-21 RX ADMIN — HYDROMORPHONE HYDROCHLORIDE 0.2 MG: 0.2 INJECTION, SOLUTION INTRAMUSCULAR; INTRAVENOUS; SUBCUTANEOUS at 20:24

## 2023-03-21 RX ADMIN — Medication 1 LOZENGE: at 13:36

## 2023-03-21 RX ADMIN — Medication 5 ML: at 15:09

## 2023-03-21 RX ADMIN — HYDROCODONE BITARTRATE AND ACETAMINOPHEN 1 TABLET: 5; 325 TABLET ORAL at 06:36

## 2023-03-21 RX ADMIN — HYDROMORPHONE HYDROCHLORIDE 0.2 MG: 0.2 INJECTION, SOLUTION INTRAMUSCULAR; INTRAVENOUS; SUBCUTANEOUS at 16:13

## 2023-03-21 RX ADMIN — ONDANSETRON 4 MG: 4 TABLET, ORALLY DISINTEGRATING ORAL at 10:02

## 2023-03-21 RX ADMIN — Medication 1 ML: at 13:36

## 2023-03-21 ASSESSMENT — ACTIVITIES OF DAILY LIVING (ADL)
ADLS_ACUITY_SCORE: 23
ADLS_ACUITY_SCORE: 26
ADLS_ACUITY_SCORE: 26
ADLS_ACUITY_SCORE: 23
ADLS_ACUITY_SCORE: 26
ADLS_ACUITY_SCORE: 23
ADLS_ACUITY_SCORE: 26

## 2023-03-21 NOTE — PLAN OF CARE
Goal Outcome Evaluation: POD12 of a DLI closure with prolonged ileus and resolving anastomotic hematoma. A&Ox4, VSS on room air. Pain is controlled with Norco and IV dilaudid fentanyl patch in place. Up independently. Passing gas, BM x2 yesterday. NG to LIS, 4 hour clamp trial today with 175 residual. Taking in a lot of Ice chips prior to clamp trial. Discussed how that does not give an accurate account of fluid output. Pt has been more compliant after the clamp trial. TPN and lipids running, IVM at 35/hr. Takedown site dressing changed. Pt left the floor to walk outside. This ok per team.     Plan is for clamp trial of 6 hours tomorrow.

## 2023-03-21 NOTE — PROGRESS NOTES
POD12 of a DLI closure with prolonged ileus and resolving anastomotic hematoma. Pain  controlled with Norco and IV dilaudid fentanyl patch in place. Up independently. Passing gas, BM x2 yesterday. NG to LIS, 4 hour clamp trial yesterday with 175 residual.t. Pt has been more compliant after the clamp trial. TPN and lipids running, IVM at 35/hr. Pt has the  ok of care team to leave the floor.      NG clamped after taking medication @ 0640. Will go back to LIS per policy following med administration.  Pt  Will have a 6 hour clamp trial today. Pt is asking for ice often and apple juice. Pt is aware that this creates a discrepency in I & O.

## 2023-03-21 NOTE — CONSULTS
Vicki was seen bedside for PICC and TPN. She may not go home on it but wanted to see how it was done in case she does need to go home on TPN. I demonstrated how to do a heparin flush and then a complete setup and take down of TPN using the CADD pump. Vicki watched the entire process but declined to practice skills today. She stated understanding and said she would be able to do this if needed .    Literature given: Handwashing and Skin Care, Getting Ready for Your PICC, Caring for Your PICC at Home, Changing the End Cap, Flushing the Line with Heparin, Saline or Citrate,   Literature given: Handwashing and Skin Care and TPN Therapy Using a CADD Pump

## 2023-03-21 NOTE — PLAN OF CARE
Goal Outcome Evaluation: POD13 of a DLI closure. A&Ox4, VSS on room air. Pain is controlled with a fentanyl patch, PO norco through her NG and IV dilaudid. Up independent, voiding, passing gas, LBM 3/20 early in the morning. Takedown site dressing changed, WDL. NG clamped at 0715 for 6 hour clamp trial. To suction at 1330.    Plan is to monitor residual after 1 hour, notify resident.

## 2023-03-21 NOTE — PROGRESS NOTES
CLINICAL NUTRITION SERVICES - REASSESSMENT NOTE     Nutrition Prescription    RECOMMENDATIONS FOR MDs/PROVIDERS TO ORDER:  Please alert RD or pharmacist if pt requires adjustments to PN fluid volume    Recommendations already ordered by Registered Dietitian (RD):  1. Sent change order request to pharmacist: increase TPN volume to 75 mL/hr, decrease dextrose to 220 g/day, increase AA to 100 g/day. Continue IV lipids daily.    Updated goal TPN: 1648 kcal (~25 kcal/kg), 1.5 g/kg PRO, GIR 2.3, and 30% kcal from fat per dosing weight 66 kg    2. TG level today @ noon (not ordering as add-on, IV lipids were running when AM labs drawn which can lead to falsely elevated TG level)    Future/Additional Recommendations:  Monitor TPN tolerance, labs, wt trends, diet adv/PO intakes      EVALUATION OF THE PROGRESS TOWARD GOALS   Diet: NPO    Nutrition Support: Goal TPN: 40 mL/hr continuous (960 mL/day) with 240 g dextrose, 90 g AA, and 250 mL 20% lipids 7 days per week for total provision of 1676 Kcals (25 kcals/kg), 1.3 g/kg protein, GIR 2.5 mg/kg/minute, and 30% fat kcals on average daily    Intake: NPO since 3/17 and NG placed for decompression since 3/18.  Was on low fiber diet 3/15-2/17 AM. TPN started 3/14 with initial dextrose 140 g/day, increased to 190 g dex on 3/16, and advanced to goal on 3/17.  No mention of any TPN/lipid interruptions the past week per review of progress notes the past week.     NEW FINDINGS   Weight: trending down the past week (2%), some of this could be fluid related with NG for decompression.  Dosing weight remains 66 kg (adjusted based on lowest wt this admit 78.5 kg and IBW)  03/21/23 0801 78.5 kg (173 lb) --   03/19/23 0800 78.6 kg (173 lb 4.8 oz) --   03/18/23 1000 79.8 kg (175 lb 14.4 oz) --   03/17/23 2205 81.5 kg (179 lb 9.6 oz) Bed scale   03/15/23 1200 80.1 kg (176 lb 9.4 oz) Standing scale   03/08/23 1033 80.4 kg (177 lb 4 oz)      Labs:   - K+ WNL  (3/20):  - Phos WNL; Mg++ 2.5 (H)  -  Alk Phos 157 (H), ALT 87 (H), AST 78 (H), Tbili WNL  - BUN 20.8 (H)    Meds: IVF @ 35 mL/hr, TPN @ 40 mL/hr    GI:  - NG for decompression (LIS) placed 3/18; per I/Os, pt had 1250 mL output 3/20, 1650 mL output 3/19, 850 mL output 3/18  - NG clamp trial yesterday for 4 hrs; another 6 hr NG clamp trial again today per provider note  - 2 BM 3/19 per RN note yesterday    MALNUTRITION  % Intake: Decreased intake does not meet criteria  % Weight Loss: 1-2% in 1 week (moderate)  Subcutaneous Fat Loss: Unable to assess  Muscle Loss: Unable to assess  Fluid Accumulation/Edema: None noted  Malnutrition Diagnosis: Unable to determine due to pt with other cares during attempts to complete NFPE    Previous Goals   Once TPN reaches goal, total avg nutritional intake to meet a minimum of 25 kcal/kg and 1.2 g PRO/kg daily (per dosing wt 66 kg).  Evaluation: Met    Previous Nutrition Diagnosis  Inadequate oral intake related to slow diet advancement post-op and N/V limiting PO intake tolerance as evidenced by overall poor PO intake x 6-7 days since admission  Evaluation: No change    CURRENT NUTRITION DIAGNOSIS  Inadequate oral intake related to NPO 2/2 prolonged ileus as evidenced by NPO and on TPN/lipids to help meet estimated nutrition needs      INTERVENTIONS  Implementation  1. Collaboration with other providers:  -- Per team, pt may need to discharge with TPN.  For this reason, team wants to keep TPN continuous for now (team ok with TPN cycling as an outpatient but given pt's distance to travel home at discharge, keeping TPN continuous will make the most sense logistically with home care, etc)    -- Primary team ok with increase TPN volume by 35 ml/hr so can stop additional IV fluids (currently getting IVF @ 35 mL/hr in addition to TPN at 40 mL/hr)  2. Parenteral Nutrition/IV Fluids - sent change order request with above TPN changes  3. Ordered TG add-on    Goals  Total avg nutritional intake to meet a minimum of 25 kcal/kg  and 1.2 g PRO/kg daily (per dosing wt 66 kg).    Monitoring/Evaluation  Progress toward goals will be monitored and evaluated per protocol.     Adrianna Sawyer RD, , LD  Weekday Pager: [phone]  Weekday Units covered: 7C (all beds) and 5A (beds 5201 through 5209)  Weekend/Holiday RD Pager: [phone]

## 2023-03-21 NOTE — PROGRESS NOTES
Colorectal Surgery Progress Note  [facility]  POD#13    Subjective:  Denies nausea or emesis since NG tube placement. Minimal pain. Passing stool and gas. High output from NG tube, though clear. Feels some distension and attendant discomfort but improving. Urinating appropriately.    Vitals:  Vitals:    03/20/23 1438 03/20/23 2304 03/21/23 0656 03/21/23 0801   BP: 111/58 111/59 110/69    BP Location: Left arm Left arm Left arm    Pulse: 74 74 85    Resp: 16 16 16    Temp: 99.7  F (37.6  C) 99  F (37.2  C) 98.9  F (37.2  C)    TempSrc: Temporal Temporal Temporal    SpO2: 96% 96% 96%    Weight:    78.5 kg (173 lb)   Height:         I/O:  I/O last 3 completed shifts:  In: 2728.9 [P.O.:620; I.V.:575.17; IV Piggyback:500]  Out: 3125 [Urine:1075; Emesis/NG output:2050]    Physical Exam:  Gen: AAOx3, NAD  Pulm: Non-labored breathing  Abd: Soft, moderately distended. Nontender, no guarding.  Wound: Prior stoma site covered in dressing. Appearing clean and dry with small amount of serosanguinous strikethrough    ASSESSMENT: Vicki is a 38 year old woman s/p redo LAR for stricuture, now POD#12 s/p DLI closure with prolonged ileus and resolving anastomotic hematoma. Started on TPN 3/14.    Plan:  Changes for 3/21: 6 hour NG clamp trial, remove if residual <250cc  Diet:  - NPO except for meds, ice chips  - NGT clamp trial today for 6 hours. Taken off suction while rounding today at 7:15am. Plan to pull NGT if residual is less than 250cc. Discussed with patient that she should try to limit ice intake during clamp trial to ensure accurate residual measurement.  - PICC in place, continue TPN  - plan to discontinue TPN prior to discharge  N/V:  - Continue antiemetics: zofran, haldol, scopolamine patch  - IV zofran  - restarted haldol and stopped ativan per pt preference  - continue erythromycin for gut motility  - EKG 3/15 and 3/20 with acceptable QTc  - Per Pharmacy, should check EKG every 3-5 days to  monitor QTc while using Haldol.  FEN:  - 35cc mIVF  Pain:  - Continue home fentanyl patch  - PRN IV dilaudid  Wound:  - Daily dressing change to prior stoma site  Activity/PPx  - SCDs, IS, OOB, ambulate, lovenox PPx      Discussed plan of care with Dr. Caro and Dr. Richardson.    Sharlene Valencia PA-C  Colon and Rectal Surgery  [facility]

## 2023-03-21 NOTE — PLAN OF CARE
Goal Outcome Evaluation:      Plan of Care Reviewed With: patient    Overall Patient Progress: no changeOverall Patient Progress: no change    Pt states she has a sore throat. Pt up independently. Pt is able to walk halls and was reminded to let nurse or staff know if she plans to go outside. Pt c/o pain 6-8/10. Has a Fentanyl patch on, requests IV Dilaudid as well as Hydrocodone. Pt able to swallow small pills but requests lager pills be crushed and mixed w/ apple juice to drink or down G tube. Gtube has reddish output in large amounts. Pt able to sleep between cares and able to to make needs be known. Will monitor.

## 2023-03-21 NOTE — PLAN OF CARE
Goal Outcome Evaluation:    A &O x4. VSS. RA. Gave iv dilaudid for pain with relieved. Reports 1 large loose brown BM. Voiding adequate amount. Taking ice chips and sips of water. No c/o nausea

## 2023-03-21 NOTE — PROGRESS NOTES
Patient has been educated on potential risks of choosing to leave the unit and that the responsibility for patient well-being will belong to the patient. Pt has been informed that admission to hospital is due to need for medical treatment. Education given to the patient on some of the potential risks included but are not limited to:      - lack of access to nursing intervention      - possible missed appointments with MD, therapies, tests      - possible missed medications, antibiotics, management of IV's    Patient Response:Agrees    Patient notified staff prior to leaving unit: yes  Coban wrap placed over IV prior to pt leaving unit No, continuous infusion running.

## 2023-03-22 LAB
ANION GAP SERPL CALCULATED.3IONS-SCNC: 11 MMOL/L (ref 7–15)
ANION GAP SERPL CALCULATED.3IONS-SCNC: 9 MMOL/L (ref 7–15)
ANION GAP SERPL CALCULATED.3IONS-SCNC: 9 MMOL/L (ref 7–15)
BUN SERPL-MCNC: 19.7 MG/DL (ref 6–20)
BUN SERPL-MCNC: 19.8 MG/DL (ref 6–20)
BUN SERPL-MCNC: 21.6 MG/DL (ref 6–20)
CALCIUM SERPL-MCNC: 8.6 MG/DL (ref 8.6–10)
CALCIUM SERPL-MCNC: 8.8 MG/DL (ref 8.6–10)
CALCIUM SERPL-MCNC: 8.9 MG/DL (ref 8.6–10)
CHLORIDE SERPL-SCNC: 102 MMOL/L (ref 98–107)
CHLORIDE SERPL-SCNC: 103 MMOL/L (ref 98–107)
CHLORIDE SERPL-SCNC: 97 MMOL/L (ref 98–107)
CREAT SERPL-MCNC: 0.59 MG/DL (ref 0.51–0.95)
CREAT SERPL-MCNC: 0.61 MG/DL (ref 0.51–0.95)
CREAT SERPL-MCNC: 0.61 MG/DL (ref 0.51–0.95)
DEPRECATED HCO3 PLAS-SCNC: 21 MMOL/L (ref 22–29)
DEPRECATED HCO3 PLAS-SCNC: 23 MMOL/L (ref 22–29)
DEPRECATED HCO3 PLAS-SCNC: 23 MMOL/L (ref 22–29)
GFR SERPL CREATININE-BSD FRML MDRD: >90 ML/MIN/1.73M2
GLUCOSE SERPL-MCNC: 116 MG/DL (ref 70–99)
GLUCOSE SERPL-MCNC: 120 MG/DL (ref 70–99)
GLUCOSE SERPL-MCNC: 292 MG/DL (ref 70–99)
HOLD SPECIMEN: NORMAL
MAGNESIUM SERPL-MCNC: 1.9 MG/DL (ref 1.7–2.3)
PHOSPHATE SERPL-MCNC: 3.3 MG/DL (ref 2.5–4.5)
POTASSIUM SERPL-SCNC: 3.9 MMOL/L (ref 3.4–5.3)
POTASSIUM SERPL-SCNC: 4.4 MMOL/L (ref 3.4–5.3)
POTASSIUM SERPL-SCNC: 4.8 MMOL/L (ref 3.4–5.3)
SODIUM SERPL-SCNC: 129 MMOL/L (ref 136–145)
SODIUM SERPL-SCNC: 134 MMOL/L (ref 136–145)
SODIUM SERPL-SCNC: 135 MMOL/L (ref 136–145)

## 2023-03-22 PROCEDURE — 83735 ASSAY OF MAGNESIUM: CPT | Performed by: SURGERY

## 2023-03-22 PROCEDURE — 80048 BASIC METABOLIC PNL TOTAL CA: CPT | Performed by: PHYSICIAN ASSISTANT

## 2023-03-22 PROCEDURE — 36415 COLL VENOUS BLD VENIPUNCTURE: CPT | Performed by: SURGERY

## 2023-03-22 PROCEDURE — 82310 ASSAY OF CALCIUM: CPT | Performed by: SURGERY

## 2023-03-22 PROCEDURE — 250N000011 HC RX IP 250 OP 636: Performed by: SURGERY

## 2023-03-22 PROCEDURE — 250N000011 HC RX IP 250 OP 636: Performed by: STUDENT IN AN ORGANIZED HEALTH CARE EDUCATION/TRAINING PROGRAM

## 2023-03-22 PROCEDURE — 250N000009 HC RX 250: Performed by: SURGERY

## 2023-03-22 PROCEDURE — 250N000011 HC RX IP 250 OP 636: Performed by: PHYSICIAN ASSISTANT

## 2023-03-22 PROCEDURE — 120N000002 HC R&B MED SURG/OB UMMC

## 2023-03-22 PROCEDURE — 36415 COLL VENOUS BLD VENIPUNCTURE: CPT | Performed by: PHYSICIAN ASSISTANT

## 2023-03-22 PROCEDURE — 84100 ASSAY OF PHOSPHORUS: CPT | Performed by: SURGERY

## 2023-03-22 PROCEDURE — 250N000013 HC RX MED GY IP 250 OP 250 PS 637: Performed by: SURGERY

## 2023-03-22 PROCEDURE — B4185 PARENTERAL SOL 10 GM LIPIDS: HCPCS | Performed by: SURGERY

## 2023-03-22 PROCEDURE — 36592 COLLECT BLOOD FROM PICC: CPT | Performed by: SURGERY

## 2023-03-22 PROCEDURE — 250N000013 HC RX MED GY IP 250 OP 250 PS 637: Performed by: STUDENT IN AN ORGANIZED HEALTH CARE EDUCATION/TRAINING PROGRAM

## 2023-03-22 PROCEDURE — 250N000013 HC RX MED GY IP 250 OP 250 PS 637: Performed by: PHYSICIAN ASSISTANT

## 2023-03-22 RX ORDER — HYDROCODONE BITARTRATE AND ACETAMINOPHEN 5; 325 MG/1; MG/1
1 TABLET ORAL EVERY 4 HOURS PRN
Status: DISCONTINUED | OUTPATIENT
Start: 2023-03-22 | End: 2023-03-24 | Stop reason: HOSPADM

## 2023-03-22 RX ORDER — HYDROMORPHONE HCL IN WATER/PF 6 MG/30 ML
0.2 PATIENT CONTROLLED ANALGESIA SYRINGE INTRAVENOUS EVERY 8 HOURS PRN
Status: DISCONTINUED | OUTPATIENT
Start: 2023-03-22 | End: 2023-03-23

## 2023-03-22 RX ORDER — HYDROMORPHONE HCL IN WATER/PF 6 MG/30 ML
0.2 PATIENT CONTROLLED ANALGESIA SYRINGE INTRAVENOUS EVERY 6 HOURS PRN
Status: DISCONTINUED | OUTPATIENT
Start: 2023-03-22 | End: 2023-03-22

## 2023-03-22 RX ORDER — HALOPERIDOL 5 MG/ML
1 INJECTION INTRAMUSCULAR EVERY 6 HOURS PRN
Status: DISCONTINUED | OUTPATIENT
Start: 2023-03-22 | End: 2023-03-23

## 2023-03-22 RX ADMIN — HYDROMORPHONE HYDROCHLORIDE 0.2 MG: 0.2 INJECTION, SOLUTION INTRAMUSCULAR; INTRAVENOUS; SUBCUTANEOUS at 19:01

## 2023-03-22 RX ADMIN — ONDANSETRON 4 MG: 4 TABLET, ORALLY DISINTEGRATING ORAL at 16:11

## 2023-03-22 RX ADMIN — Medication 5 ML: at 16:11

## 2023-03-22 RX ADMIN — ONDANSETRON 4 MG: 4 TABLET, ORALLY DISINTEGRATING ORAL at 10:09

## 2023-03-22 RX ADMIN — ENOXAPARIN SODIUM 40 MG: 40 INJECTION SUBCUTANEOUS at 16:11

## 2023-03-22 RX ADMIN — ONDANSETRON 4 MG: 4 TABLET, ORALLY DISINTEGRATING ORAL at 04:26

## 2023-03-22 RX ADMIN — HYDROCODONE BITARTRATE AND ACETAMINOPHEN 1 TABLET: 5; 325 TABLET ORAL at 14:51

## 2023-03-22 RX ADMIN — HYDROMORPHONE HYDROCHLORIDE 0.2 MG: 0.2 INJECTION, SOLUTION INTRAMUSCULAR; INTRAVENOUS; SUBCUTANEOUS at 00:26

## 2023-03-22 RX ADMIN — OLIVE OIL AND SOYBEAN OIL 250 ML: 16; 4 INJECTION, EMULSION INTRAVENOUS at 19:09

## 2023-03-22 RX ADMIN — HYDROMORPHONE HYDROCHLORIDE 0.2 MG: 0.2 INJECTION, SOLUTION INTRAMUSCULAR; INTRAVENOUS; SUBCUTANEOUS at 04:26

## 2023-03-22 RX ADMIN — SIMETHICONE 80 MG: 80 TABLET, CHEWABLE ORAL at 19:16

## 2023-03-22 RX ADMIN — ONDANSETRON 4 MG: 4 TABLET, ORALLY DISINTEGRATING ORAL at 22:02

## 2023-03-22 RX ADMIN — HYDROMORPHONE HYDROCHLORIDE 0.2 MG: 0.2 INJECTION, SOLUTION INTRAMUSCULAR; INTRAVENOUS; SUBCUTANEOUS at 10:34

## 2023-03-22 RX ADMIN — SERTRALINE HYDROCHLORIDE 50 MG: 50 TABLET ORAL at 22:02

## 2023-03-22 RX ADMIN — HYDROCODONE BITARTRATE AND ACETAMINOPHEN 1 TABLET: 5; 325 TABLET ORAL at 07:52

## 2023-03-22 RX ADMIN — Medication 5 ML: at 09:03

## 2023-03-22 RX ADMIN — MAGNESIUM SULFATE HEPTAHYDRATE: 500 INJECTION, SOLUTION INTRAMUSCULAR; INTRAVENOUS at 19:06

## 2023-03-22 ASSESSMENT — ACTIVITIES OF DAILY LIVING (ADL)
ADLS_ACUITY_SCORE: 22

## 2023-03-22 NOTE — PLAN OF CARE
Goal Outcome Evaluation:           Overall Patient Progress: improvingOverall Patient Progress: improving    Ileostomy take down site dressing changed this am, scant drainage on dressing. Passing loose stool, tolerating clear liquid diet. Voiding spont, difficult to measure secondary to loose stool. PICC with TPN.Norco and Dilaudid IV, Fentanyl patch with fair pain relief, up ad el in halls.

## 2023-03-22 NOTE — PROGRESS NOTES
"/59 (BP Location: Left arm)   Pulse 78   Temp 99.4  F (37.4  C) (Oral)   Resp 16   Ht 1.702 m (5' 7.01\")   Wt 78.5 kg (173 lb)   LMP  (LMP Unknown)   SpO2 100%      Vicki coleman a 37 yo pt admitted 03/08/23 for ileostomy take down and small bowel anastomosis    C/O constant abd pain dilaudid 0.2 mg IV x1 with some relief, fentanyl patch on right shoulder, RA, A&O x4, UAL, ileostomy taken down this AM, site CDI, abd incision moist with scant output, TPN infusing at 75 mL/hr continuous, lipids infusing 20.8 mL/hr for 12 hrs, denied nausea/vomiting, continue with POC    "

## 2023-03-22 NOTE — PLAN OF CARE
"Goal Outcome Evaluation:    [id number]    BP 99/50 (BP Location: Left arm)   Pulse 67   Temp 98.5  F (36.9  C) (Oral)   Resp 16   Ht 1.702 m (5' 7.01\")   Wt 78.5 kg (173 lb)   LMP  (LMP Unknown)   SpO2 97%   BMI 27.09 kg/m      Pt. A&O x4. Stable on RA, afebrile. c/o of abd pain manage with PRN iv Dilaudid q4h. Fentanyl patch still in place on right shoulder. Up independently in room. Pt. reported x2 loose BM this shift.Voiding spontaneously. DL PICC infusing continuous TPN/Lipids, LR @ 35 ml/hr. NPO excepts for meds/ice chips. Denies nausea. Takedown ileostomy site dressing c/d/I. Daily bg checked  Plan: No acute change this shift. Continue w/poc  "

## 2023-03-22 NOTE — PROGRESS NOTES
Colorectal Surgery Progress Note  [facility]  POD#14    Subjective:  Denies nausea or emesis since NG tube removed. Minimal pain. Passing stool and gas. Feels some abdominal discomfort and attributes it to hunger.  Urinating appropriately, endorses dark color since starting TPN. Reports 3 BMs overnight! Amenable to reduction in IV dilaudid and acknowledged need to rely more on oral norco in anticipation of discharge.    Vitals:  Vitals:    03/21/23 0801 03/21/23 1700 03/21/23 2250 03/22/23 0548   BP:  105/59 99/49 99/50   BP Location:  Left arm Left arm Left arm   Pulse:  78 70 67   Resp:  16 15 16   Temp:  99.4  F (37.4  C) 97.8  F (36.6  C) 98.5  F (36.9  C)   TempSrc:  Oral Temporal Oral   SpO2:  100% 98% 97%   Weight: 78.5 kg (173 lb)      Height:         I/O:  I/O last 3 completed shifts:  In: 1182.5 [P.O.:240; I.V.:450.51]  Out: 600 [Urine:475; Emesis/NG output:125]    Physical Exam:  Gen: AAOx3, NAD  Pulm: Non-labored breathing  Abd: Soft, moderately distended. Nontender, no guarding.  Wound: Prior stoma site covered in dressing. Appearing clean and dry with small amount of serosanguinous strikethrough    ASSESSMENT: Vicki is a 38 year old woman s/p redo LAR for stricuture, now POD#12 s/p DLI closure with prolonged ileus and resolving anastomotic hematoma. Started on TPN 3/14.    Plan:  Changes for 3/22: Clear liquid diet today, decrease IV idlaudid to q6hrs PRN, stop IV fluids  Diet:  - Clear liquid  - PICC in place, continue TPN  - plan to discontinue TPN prior to discharge  N/V:  - Continue antiemetics: zofran, haldol, scopolamine patch  - IV zofran  - restarted haldol and stopped ativan per pt preference  - continue erythromycin for gut motility  - EKG 3/15 and 3/20 with acceptable QTc  - Per Pharmacy, should check EKG every 3-5 days to monitor QTc while using Haldol.  FEN:  - discontinue IVF  Pain:  - Continue home fentanyl patch  - PRN IV dilaudid; reduce dose to q6hrs  PRN  - prioritize oral norco  Wound:  - Daily dressing change to prior stoma site  Activity/PPx  - SCDs, IS, OOB, ambulate, lovenox PPx      Discussed plan of care with Dr. Richardson.    Donavan Victoria, MS3  March 22, 2023 9:24 AM        Addendum:  Pt was seen and examined independent of the medical student.     Agree with note above.     Seen again later in early afternoon.  Pt noticeably more sore after reduction in IV dilaudid.  Will increase norco and decrease IV dilaudid.  Discussed safer for her to always try to decrease opiates as able.  Does feel hungry.  Has had few additional BMs since being seen earlier this morning on rounds.  Subsequent BMs smaller than the initial ones.     O:  Vitals, I&Os reviewed  NAD  Norm resp effort  abd soft, less distended.       A/P:   - CLD  - continue TPN for now  - strict in and outs  - decrease IV dilaudid to q8hr prn and increased norco to q4 hr prn  - stop haldol, no longer any nausea.  Continues on scopolamine patch & scheduled zofran.  If does well today-tomorrow may stop scopolamine patch next.  If has recurrent nausea - will need to further assess but can consider low dose zyprexa also?  Ppx:  lovenox ppx  Dispo:  Now with return of bowel function, should not need TPN on discharge.      Katia Workman PA-C  Colon and Rectal Surgery     Seen and discussed with Dr. Anglin    The above plan of care was performed and communicated to me by Dr. Richardson    I spent 20 minute face-to-face or coordinating care of Vicki Shell. Over 50% of our time on the unit was spent counseling the patient and/or coordinating care as documented in the assessment and plan.     [id number] post op hospital visit

## 2023-03-23 LAB
HOLD SPECIMEN: NORMAL
POTASSIUM SERPL-SCNC: 3.7 MMOL/L (ref 3.4–5.3)

## 2023-03-23 PROCEDURE — 36592 COLLECT BLOOD FROM PICC: CPT | Performed by: SURGERY

## 2023-03-23 PROCEDURE — 84132 ASSAY OF SERUM POTASSIUM: CPT | Performed by: SURGERY

## 2023-03-23 PROCEDURE — 250N000009 HC RX 250: Performed by: SURGERY

## 2023-03-23 PROCEDURE — 250N000011 HC RX IP 250 OP 636: Performed by: STUDENT IN AN ORGANIZED HEALTH CARE EDUCATION/TRAINING PROGRAM

## 2023-03-23 PROCEDURE — B4185 PARENTERAL SOL 10 GM LIPIDS: HCPCS | Performed by: SURGERY

## 2023-03-23 PROCEDURE — 120N000002 HC R&B MED SURG/OB UMMC

## 2023-03-23 PROCEDURE — 250N000011 HC RX IP 250 OP 636: Performed by: PHYSICIAN ASSISTANT

## 2023-03-23 PROCEDURE — 250N000011 HC RX IP 250 OP 636: Performed by: SURGERY

## 2023-03-23 PROCEDURE — 250N000013 HC RX MED GY IP 250 OP 250 PS 637: Performed by: SURGERY

## 2023-03-23 PROCEDURE — 250N000009 HC RX 250: Performed by: PHYSICIAN ASSISTANT

## 2023-03-23 PROCEDURE — 250N000013 HC RX MED GY IP 250 OP 250 PS 637: Performed by: PHYSICIAN ASSISTANT

## 2023-03-23 RX ORDER — CALCIUM CARBONATE 500 MG/1
500-1000 TABLET, CHEWABLE ORAL 3 TIMES DAILY PRN
Status: DISCONTINUED | OUTPATIENT
Start: 2023-03-23 | End: 2023-03-24 | Stop reason: HOSPADM

## 2023-03-23 RX ORDER — HYDROMORPHONE HCL IN WATER/PF 6 MG/30 ML
0.2 PATIENT CONTROLLED ANALGESIA SYRINGE INTRAVENOUS EVERY 8 HOURS PRN
Status: COMPLETED | OUTPATIENT
Start: 2023-03-23 | End: 2023-03-23

## 2023-03-23 RX ORDER — OLANZAPINE 5 MG/1
5 TABLET, ORALLY DISINTEGRATING ORAL AT BEDTIME
Status: CANCELLED | OUTPATIENT
Start: 2023-03-23

## 2023-03-23 RX ORDER — PROCHLORPERAZINE MALEATE 5 MG
5 TABLET ORAL EVERY 8 HOURS PRN
Status: DISCONTINUED | OUTPATIENT
Start: 2023-03-23 | End: 2023-03-24 | Stop reason: HOSPADM

## 2023-03-23 RX ORDER — PANTOPRAZOLE SODIUM 40 MG/1
40 TABLET, DELAYED RELEASE ORAL
Status: DISCONTINUED | OUTPATIENT
Start: 2023-03-23 | End: 2023-03-24 | Stop reason: HOSPADM

## 2023-03-23 RX ADMIN — Medication 5 ML: at 07:30

## 2023-03-23 RX ADMIN — ONDANSETRON 4 MG: 4 TABLET, ORALLY DISINTEGRATING ORAL at 10:52

## 2023-03-23 RX ADMIN — HYDROCODONE BITARTRATE AND ACETAMINOPHEN 1 TABLET: 5; 325 TABLET ORAL at 20:29

## 2023-03-23 RX ADMIN — ONDANSETRON 4 MG: 4 TABLET, ORALLY DISINTEGRATING ORAL at 03:11

## 2023-03-23 RX ADMIN — HYDROCODONE BITARTRATE AND ACETAMINOPHEN 1 TABLET: 5; 325 TABLET ORAL at 11:38

## 2023-03-23 RX ADMIN — ONDANSETRON 4 MG: 4 TABLET, ORALLY DISINTEGRATING ORAL at 15:40

## 2023-03-23 RX ADMIN — Medication 5 ML: at 03:07

## 2023-03-23 RX ADMIN — ONDANSETRON 4 MG: 4 TABLET, ORALLY DISINTEGRATING ORAL at 21:22

## 2023-03-23 RX ADMIN — HYDROMORPHONE HYDROCHLORIDE 0.2 MG: 0.2 INJECTION, SOLUTION INTRAMUSCULAR; INTRAVENOUS; SUBCUTANEOUS at 03:06

## 2023-03-23 RX ADMIN — HYDROCODONE BITARTRATE AND ACETAMINOPHEN 1 TABLET: 5; 325 TABLET ORAL at 15:40

## 2023-03-23 RX ADMIN — SCOPALAMINE 1 PATCH: 1 PATCH, EXTENDED RELEASE TRANSDERMAL at 16:41

## 2023-03-23 RX ADMIN — Medication 5 ML: at 15:41

## 2023-03-23 RX ADMIN — MAGNESIUM SULFATE HEPTAHYDRATE: 500 INJECTION, SOLUTION INTRAMUSCULAR; INTRAVENOUS at 19:00

## 2023-03-23 RX ADMIN — ENOXAPARIN SODIUM 40 MG: 40 INJECTION SUBCUTANEOUS at 15:41

## 2023-03-23 RX ADMIN — OLIVE OIL AND SOYBEAN OIL 250 ML: 16; 4 INJECTION, EMULSION INTRAVENOUS at 19:00

## 2023-03-23 RX ADMIN — HYDROMORPHONE HYDROCHLORIDE 0.2 MG: 0.2 INJECTION, SOLUTION INTRAMUSCULAR; INTRAVENOUS; SUBCUTANEOUS at 10:57

## 2023-03-23 RX ADMIN — HYDROMORPHONE HYDROCHLORIDE 0.2 MG: 0.2 INJECTION, SOLUTION INTRAMUSCULAR; INTRAVENOUS; SUBCUTANEOUS at 18:56

## 2023-03-23 RX ADMIN — SERTRALINE HYDROCHLORIDE 50 MG: 50 TABLET ORAL at 21:22

## 2023-03-23 RX ADMIN — PANTOPRAZOLE SODIUM 40 MG: 40 TABLET, DELAYED RELEASE ORAL at 10:52

## 2023-03-23 ASSESSMENT — ACTIVITIES OF DAILY LIVING (ADL)
ADLS_ACUITY_SCORE: 22

## 2023-03-23 NOTE — DISCHARGE INSTRUCTIONS
PICC Line Care Apt.  [facility]  [address]  P:[phone]  F:[phone]  MARICEL Grier-   Friday, March 31st at 1300

## 2023-03-23 NOTE — PLAN OF CARE
"/74 (BP Location: Left arm)   Pulse 73   Temp 98.5  F (36.9  C) (Oral)   Resp 16   Ht 1.702 m (5' 7.01\")   Wt 79.4 kg (175 lb)   LMP  (LMP Unknown)   SpO2 99%   BMI 27.40 kg/m      Assumed care 1500 to 2330  Pt rounded on hourly  Jairo: alert and oriented   Pain: pt has pain in abd PRN dilaudid given and PRN simethicone.   GI/: Denies nausea. Bowel sounds present. Pt independent to the bathroom   Cardiac: WDL  Respiratory: denies SOB lung sounds clear bilaterally  Skin: 4x4 in place to ileostomy take down site. fentanyl patch in place to R shoulder. scopolamine patch in place to L ear   Lines: PICC infusing TPN and lipids   Assist level: I  Will continue to monitor and follow plan of care.   Plan: full bowel return, pain and nausea controlled, and able to tolerate diet.     Call light in reach, Pt able to make needs known, Will continue to monitor and follow plan of care.     Goal Outcome Evaluation:     Plan of Care Reviewed With: pt     Overall Patient Progress: progressing      Outcome Evaluation: ull bowel return, pain and nausea controlled, and able to tolerate diet.     "

## 2023-03-23 NOTE — PROVIDER NOTIFICATION
Provider notified (name): MICHELLE MONTERO  Reason for notification: pt having nausea and vomiting zofran not effective  Recommendation/request given to provider: pt says she has used haldol in the past  Response from provider:

## 2023-03-23 NOTE — PLAN OF CARE
"Goal Outcome Evaluation:       Patient sleeping at start of shift, writer awoke patient around 9, at which time patient requested to continue resting a while longer. Upon awaking, patient requested IV Dilaudid for generalized abdominal pain. Encouraged patient to try Norco first but she declined because \"I'm not sure my stomach could handle it right now.\" Encouraged patient to eat a little something and take scheduled antiemetics prior to Norco. Patient then tolerated [place] without reports of nausea. Showered independently. On Low Fiber diet, taking slowly. Ate 75% of mashed potatoes and 25% of chicken breast this afternoon.  Continue with POC.  "

## 2023-03-23 NOTE — PROGRESS NOTES
Care Management Follow Up    Length of Stay (days): 15    Expected Discharge Date: 03/27/2023     Concerns to be Addressed: discharge planning      Patient plan of care discussed at interdisciplinary rounds: Yes    Anticipated Discharge Disposition: Home     Anticipated Discharge Services:  [facility]  Anticipated Discharge DME:  TPN via PICC      Education Provided on the Discharge Plan: yes    Patient/Family in Agreement with the Plan: yes      Private pay costs discussed: Not applicable    Additional Information:  RNCC notified Pt is likely to discharge in the next 1-2 days. She will discharge with TPN via her PICC. RNCC spoke with Pt who confirmed she felt comfortable administering the TPN independently and confirmed [facility] liaison, Peyton Brody, has done bedside teaching with her.  See her note from 3/17.    Pt stated her friend will pick her up and she will stay in [place] until her SO, Giovanny, can pick her up.     RNCC updated Peyton on discharge plan, VM left.     RNCC arranged for PICC dressing changes at the Pt's primary clinic. Appointment information listed below as well as in the discharge instructions. Primary RNCC to fax DC summary when available.       [facility]  TPN supplies and formula  * orders placed    PICC Line Care Apt.  [facility]  [address]  P:[phone]  F:[phone]  MARICEL Grier-   Friday, March 31st at 1300    1530 Update: RNCC notified of confirmed home care for further teaching and management in the home. Home care orders placed. RNCC to verify DC location immediately following DC and update HC as needed.    [facility] at home   ph: [phone]   fax [phone]  Skilled Nursing       Care management will continue to follow and support safe discharge planning as needed.    Summer Cunha, RN  RNCC [place]

## 2023-03-23 NOTE — PROGRESS NOTES
"Colorectal Surgery Progress Note  [facility]  POD#15    Subjective:  Nauseated last night.  Requiring IV haldol.  But also feels that her nausea is due to being overly hungry.  \"I feel like i'm digesting my own stomach.\"  Had some heartburn.  Has continued to pass gas and stools.      Vitals:  Vitals:    03/22/23 1000 03/22/23 1512 03/22/23 2209 03/23/23 0727   BP:  111/65 112/74 122/60   BP Location:  Left arm Left arm Left arm   Pulse:  78 73 72   Resp:  16 16 16   Temp:  (!) 96.1  F (35.6  C) 98.5  F (36.9  C) 97.6  F (36.4  C)   TempSrc:  Oral Oral Temporal   SpO2:  96% 99% 99%   Weight: 79.4 kg (175 lb)      Height:         I/O:  I/O last 3 completed shifts:  In: 3177.33 [P.O.:1060; I.V.:40]  Out: -     Physical Exam:  Gen: AAOx3, NAD  Pulm: Non-labored breathing  Abd: Soft, mildly distended. Nontender, no guarding.  Wound: dressed    ASSESSMENT: Vicki is a 38 year old woman s/p redo LAR for stricuture, now s/p DLI closure with prolonged ileus and resolving anastomotic hematoma on 3/8. Started on TPN 3/14.      Diet:  - LRD  - PICC in place, continue TPN  N/V:  - Continue antiemetics: zofran, scopolamine patch, add compazine and night time zyprexa  Pain:  - Continue home fentanyl patch  - PRN IV dilaudid q8 hr prn, norco q4 hr.   - prioritize oral norco    Wound:  - Daily dressing change to prior stoma site  Activity/PPx  - SCDs, IS, OOB, ambulate, lovenox PPx, add protonix today  - may require TPN on discharge now with recurrent nausea    MARICEL Azar-SOPHIA ..................3/23/2023   8:06 AM  Colon and Rectal Surgery    Patient was seen and discussed with Dr. Caro    The above plan of care was performed and communicated to me by Dr. Richardson    I spent 35 minute face-to-face or coordinating care of Vicki Shell. Over 50% of our time on the unit was spent counseling the patient and/or coordinating care as documented in the assessment and plan.     80733 post op " hospital visit

## 2023-03-23 NOTE — PLAN OF CARE
[id number] Dressing dry and intact over ostomy takedown site. Passing flatus, no BM overnight. Denies nausea, on scheduled Zofran and scopolamine patch in place. Pt continues to rate abdominal pain 7/10. Writer offered and encouraged Norco, but pt declined and requested IV Dilaudid. IV Dilaudid given x1. Fentanyl patch on right arm. Voiding spontaneously, not saving. PICC infusing TPN and lipids. Tolerating sips of clear liquid diet overnight, advanced to low fiber diet this morning. Up ad el. VSS on room air.

## 2023-03-24 VITALS
SYSTOLIC BLOOD PRESSURE: 106 MMHG | OXYGEN SATURATION: 99 % | BODY MASS INDEX: 27.47 KG/M2 | DIASTOLIC BLOOD PRESSURE: 56 MMHG | RESPIRATION RATE: 16 BRPM | HEART RATE: 68 BPM | WEIGHT: 175 LBS | TEMPERATURE: 97.4 F | HEIGHT: 67 IN

## 2023-03-24 LAB
ANION GAP SERPL CALCULATED.3IONS-SCNC: 10 MMOL/L (ref 7–15)
BUN SERPL-MCNC: 16.4 MG/DL (ref 6–20)
CALCIUM SERPL-MCNC: 8.8 MG/DL (ref 8.6–10)
CHLORIDE SERPL-SCNC: 102 MMOL/L (ref 98–107)
CREAT SERPL-MCNC: 0.59 MG/DL (ref 0.51–0.95)
DEPRECATED HCO3 PLAS-SCNC: 23 MMOL/L (ref 22–29)
GFR SERPL CREATININE-BSD FRML MDRD: >90 ML/MIN/1.73M2
GLUCOSE BLDC GLUCOMTR-MCNC: 115 MG/DL (ref 70–99)
GLUCOSE BLDC GLUCOMTR-MCNC: 133 MG/DL (ref 70–99)
GLUCOSE SERPL-MCNC: 118 MG/DL (ref 70–99)
MAGNESIUM SERPL-MCNC: 1.8 MG/DL (ref 1.7–2.3)
PHOSPHATE SERPL-MCNC: 3.1 MG/DL (ref 2.5–4.5)
POTASSIUM SERPL-SCNC: 3.9 MMOL/L (ref 3.4–5.3)
SODIUM SERPL-SCNC: 135 MMOL/L (ref 136–145)

## 2023-03-24 PROCEDURE — 36415 COLL VENOUS BLD VENIPUNCTURE: CPT | Performed by: SURGERY

## 2023-03-24 PROCEDURE — 84100 ASSAY OF PHOSPHORUS: CPT | Performed by: SURGERY

## 2023-03-24 PROCEDURE — 250N000013 HC RX MED GY IP 250 OP 250 PS 637: Performed by: PHYSICIAN ASSISTANT

## 2023-03-24 PROCEDURE — 250N000011 HC RX IP 250 OP 636: Performed by: SURGERY

## 2023-03-24 PROCEDURE — 83735 ASSAY OF MAGNESIUM: CPT | Performed by: SURGERY

## 2023-03-24 PROCEDURE — 82310 ASSAY OF CALCIUM: CPT | Performed by: SURGERY

## 2023-03-24 PROCEDURE — 250N000011 HC RX IP 250 OP 636: Performed by: STUDENT IN AN ORGANIZED HEALTH CARE EDUCATION/TRAINING PROGRAM

## 2023-03-24 RX ORDER — HYDROCODONE BITARTRATE AND ACETAMINOPHEN 5; 325 MG/1; MG/1
1 TABLET ORAL EVERY 4 HOURS PRN
Qty: 45 TABLET | Refills: 0 | Status: SHIPPED | OUTPATIENT
Start: 2023-03-24 | End: 2023-03-24

## 2023-03-24 RX ORDER — PROCHLORPERAZINE MALEATE 5 MG
5 TABLET ORAL EVERY 8 HOURS PRN
Qty: 15 TABLET | Refills: 0 | Status: SHIPPED | OUTPATIENT
Start: 2023-03-24

## 2023-03-24 RX ORDER — PANTOPRAZOLE SODIUM 40 MG/1
40 TABLET, DELAYED RELEASE ORAL
Qty: 14 TABLET | Refills: 0 | Status: SHIPPED | OUTPATIENT
Start: 2023-03-24 | End: 2023-04-07

## 2023-03-24 RX ORDER — HYDROCODONE BITARTRATE AND ACETAMINOPHEN 5; 325 MG/1; MG/1
1 TABLET ORAL EVERY 4 HOURS PRN
Qty: 45 TABLET | Refills: 0 | Status: SHIPPED | OUTPATIENT
Start: 2023-03-24

## 2023-03-24 RX ORDER — ONDANSETRON 4 MG/1
4 TABLET, ORALLY DISINTEGRATING ORAL EVERY 6 HOURS PRN
Qty: 42 TABLET | Refills: 0 | Status: SHIPPED | OUTPATIENT
Start: 2023-03-24

## 2023-03-24 RX ORDER — GABAPENTIN 300 MG/1
300 CAPSULE ORAL 3 TIMES DAILY
Qty: 40 CAPSULE | Refills: 0 | Status: SHIPPED | OUTPATIENT
Start: 2023-03-24

## 2023-03-24 RX ADMIN — PANTOPRAZOLE SODIUM 40 MG: 40 TABLET, DELAYED RELEASE ORAL at 10:12

## 2023-03-24 RX ADMIN — HYDROCODONE BITARTRATE AND ACETAMINOPHEN 1 TABLET: 5; 325 TABLET ORAL at 14:56

## 2023-03-24 RX ADMIN — HYDROCODONE BITARTRATE AND ACETAMINOPHEN 1 TABLET: 5; 325 TABLET ORAL at 04:54

## 2023-03-24 RX ADMIN — HYDROCODONE BITARTRATE AND ACETAMINOPHEN 1 TABLET: 5; 325 TABLET ORAL at 00:52

## 2023-03-24 RX ADMIN — Medication 5 ML: at 08:21

## 2023-03-24 RX ADMIN — ONDANSETRON 4 MG: 4 TABLET, ORALLY DISINTEGRATING ORAL at 04:14

## 2023-03-24 RX ADMIN — ONDANSETRON 4 MG: 4 TABLET, ORALLY DISINTEGRATING ORAL at 10:12

## 2023-03-24 RX ADMIN — HYDROCODONE BITARTRATE AND ACETAMINOPHEN 1 TABLET: 5; 325 TABLET ORAL at 09:11

## 2023-03-24 RX ADMIN — FENTANYL 1 PATCH: 25 PATCH TRANSDERMAL at 11:44

## 2023-03-24 ASSESSMENT — ACTIVITIES OF DAILY LIVING (ADL)
ADLS_ACUITY_SCORE: 22

## 2023-03-24 NOTE — PLAN OF CARE
AVS reviewed by previous RN. PICC removed by previous RN, site with no drainage. Patient wheeled down to lobby/discharge pharmacy by writer.

## 2023-03-24 NOTE — PROGRESS NOTES
"Colorectal Surgery Progress Note  [facility]  POD#16    Subjective:  No emesis overnight. Tolerated few bites chicken and mashed potatoes yesterday afternoon. One episode of nausea at approximately 0400 that improved with oral fluids; attributes to hunger. No BM overnight, passing flatus. Bloating feels improved, upper part of abdomen is \"softer\". Oral pain management well tolerated for the past 12 hours, amenable to discontinuing IV dilaudid today.     Vitals:  Vitals:    03/23/23 0727 03/23/23 1447 03/23/23 2307 03/24/23 0729   BP: 122/60 101/59 92/53 106/56   BP Location: Left arm Left arm Left arm Left arm   Pulse: 72 65 63 68   Resp: 16 16 17 16   Temp: 97.6  F (36.4  C) 97.6  F (36.4  C) 98.4  F (36.9  C) 97.4  F (36.3  C)   TempSrc: Temporal Temporal Temporal Temporal   SpO2: 99% 97% 96% 99%   Weight:       Height:         I/O:  I/O last 3 completed shifts:  In: 1981.15 [P.O.:1360]  Out: -     Physical Exam:  Gen: AAOx3, NAD  Pulm: Non-labored breathing  Abd: Soft, mildly distended. Nontender, no guarding.  Wound: ostomy closure on left abdomen is clean, dry, intact. Replaced dressing.    ASSESSMENT: Vicki is a 38 year old woman s/p redo LAR for stricuture, now s/p DLI closure with prolonged ileus and resolving anastomotic hematoma on 3/8. Started on TPN 3/14. Persistent nausea with improvement after NG tube; removed on 3/21. Nausea and distension improved thereafter with gradual ROBF and slow diet advancement.     Diet:  - LRD as tolerated, monitor nausea  - PICC in place  - continue TPN  N/V:  - Continue antiemetics: zofran, scopolamine patch, add compazine and night time zyprexa  Pain:  - Continue home fentanyl patch  - PRN IV norco q4 hr  - Stop IV dilaudid  Wound:  - Daily dressing change to prior stoma site  Activity/PPx  - SCDs, IS, OOB, ambulate, lovenox PPx, AM protonix  - continue TPN on discharge and wean as outpatient    Dieter Michael Victoria, MS3  March 24, 2023 " 8:47 AM      I saw and examined the patient with the medical student and agree with the findings as documented. I have edited the note to reflect my findings and plan where necessary.    Shani Anglin MD (PGY-3)  Surgery

## 2023-03-24 NOTE — DISCHARGE SUMMARY
[facility]  Discharge Summary  Colon and Rectal Surgery     Vicki Shell MRN# [id number]   YOB: 1985 Age: 38 year old     Date of Admission:  3/8/2023  Date of Discharge::  3/24/2023  Admitting Physician:  Tad Richardson MD  Discharge Physician:  Tad Richardson MD  Primary Care Physician:        Karen Hernandez          Admission Diagnoses:   S/P ileostomy (H) [Z93.2]  Anastomotic stricture of colorectal region [K91.30]  Ileostomy status (H) [Z93.2]  Undesired ileostomy    History of pelvic endometriosis  History of pelvic sepsis 2/2 rectal injury requiring Hartmanns procedure  Chronic pain on chronic opiates         Discharge Diagnosis:   S/P ileostomy (H) [Z93.2]  Anastomotic stricture of colorectal region [K91.30]  Ileostomy status (H) [Z93.2]  Undesired ileostomy  Post-operative ileus  Nausea  Emesis  Intra-abdominal hematoma  Acute on chronic abdominal pain    History of pelvic endometriosis  History of pelvic sepsis 2/2 rectal injury requiring Hartmanns procedure  Chronic pain on chronic opiates         Procedures:   3/8/2023:   PROCEDURE:  1. Loop ileostomy takedown.  2.  Small bowel, stapled, side-to-side anastomosis.  3.  Repair of abdominal wall at ileostomy site.  4. Flexible sigmoidoscopy     ANESTHESIA: General endotracheal anesthesia plus bilateral TAP blocks local anesthesia.          Consultations:   OCCUPATIONAL THERAPY ADULT IP CONSULT  PHYSICAL THERAPY ADULT IP CONSULT  CARE MANAGEMENT / SOCIAL WORK IP CONSULT  PAIN MANAGEMENT ADULT IP CONSULT  NURSING TO CONSULT FOR VASCULAR ACCESS CARE IP CONSULT  NURSING TO CONSULT FOR VASCULAR ACCESS CARE IP CONSULT  NURSING TO CONSULT FOR VASCULAR ACCESS CARE IP CONSULT  VASCULAR ACCESS FOR PICC PLACEMENT ADULT IP CONSULT  PHARMACY/NUTRITION TO START AND MANAGE TPN  PHARMACY IP CONSULT  PATIENT LEARNING CENTER IP CONSULT  PHARMACY IP CONSULT         Imaging Studies:     Results for orders  placed or performed during the hospital encounter of 03/08/23   POC US Guidance Needle Placement    Impression    Bilateral TAP block.   XR Abdomen Port 1 View    Narrative    EXAM: XR ABDOMEN PORT 1 VIEW  3/11/2023 2:22 PM     HISTORY:  Persistent nausea despite meds, eval ileus       TECHNIQUE: Single frontal radiograph of the abdomen    COMPARISON:  CT abdomen and pelvis 5/4/2022.    FINDINGS:   Portable AP supine radiograph of the abdomen. Dilated loops of small  bowel measuring up to 3.8 cm. Dilated colon in the hepatic flexure  measuring up to 6.7 cm. No pneumatosis or portal venous gas. No acute  osseous abnormalities.      Impression    IMPRESSION: Dilated loops of small and large bowel projecting over the  abdomen, which can be seen in adynamic ileus versus developing  obstruction.     I have personally reviewed the examination and initial interpretation  and I agree with the findings.    HOWIE FERRO MD         SYSTEM ID:  [id number]   XR Abdomen 2 Views    Narrative    EXAM: XR ABDOMEN 2 VIEWS  3/12/2023 10:15 AM     HISTORY:  continued nausea and emesis s/p ileostomy closure       COMPARISON:  3/11/2023    FINDINGS:     Persistent and further distention of small bowel. There are some  associated air-fluid levels seen throughout the bowel. Contrast is  seen throughout the large and small bowel and into the rectum. No  pneumatosis or portal venous gas. Lung bases are clear.      Impression    IMPRESSION:    Persistent air-filled dilated loops small bowel with associated  multiple air-fluid levels. Findings representing ongoing multiple  obstruction versus adynamic ileus.    I have personally reviewed the examination and initial interpretation  and I agree with the findings.    BELLA IVORY MD         SYSTEM ID:  [id number]   CT Abdomen Pelvis w Contrast     Value    Radiologist flags Pelvic hematoma (Urgent)    Narrative    EXAMINATION: CT ABDOMEN PELVIS W CONTRAST, 3/13/2023 8:55 PM    INDICATION:  POD #5 s/p ileostomy takedown with persistent nausea,  vomiting, distention and without a bowel movement (passing flatus.)    COMPARISON STUDY: CT AP 5/4/2022    TECHNIQUE: CT scan of the abdomen and pelvis was performed on  multidetector CT scanner using volumetric acquisition technique, and  images were reconstructed in multiple planes with variable thickness  and reviewed on dedicated workstations. CT scan radiation dose is  optimized to minimum requisite dose using automated dose modulation  techniques.    CONTRAST: 100 mL Isovue 370.     FINDINGS:  Lower Chest:   Bibasilar linear subsegmental atelectasis.     Abdomen and Pelvis:  Liver: No mass. No intrahepatic biliary ductal dilation.    Biliary System: Normal gallbladder. No extrahepatic biliary ductal  dilation.    Pancreas: No mass or pancreatic ductal dilation.    Adrenal glands: No mass or nodules    Spleen: Normal.    Kidneys/Ureters/Bladder: No suspicious mass, obstructing calculus or  hydronephrosis.  The bladder is normally distended without wall  thickening. Small amount of air in the lumen could be from recent  instrumentation.      Gastrointestinal tract: Postsurgical changes of rectosigmoid resection  and ileostomy takedown. There is a large hyperdense collection in the  pelvis adjacent to the ileocecal junction, measuring 11.4 x 7.1 x 6.6  cm (series 3, image 70) displacing the small bowel loops to the left  side. There is contrast within the rectum and large bowel without  evidence of extravasation. There is contrast within the small bowel  which is dilated measuring up to 4.2 cm in diameter with transition  point in the pelvis where the small bowel is compressed by the  hematoma. Right ovarian cyst measuring 3.7 cm(series 3, image 63.  The  uterus is surgically absent.    Mesentery/peritoneum/retroperitoneum: Fluid with average attenuation  of 10-20 Hounsfield units can be seen along the liver, spleen, and  bilateral paracolic gutters. Please  see GI tract section for more  information.    Lymph nodes: No significant lymphadenopathy.    Vasculature: Patent major abdominal vasculature.    Osseous structures: No aggressive or acute osseous lesion.      Soft tissues: Left lower quadrant ileostomy soft tissue defect.        Impression    IMPRESSION:   1.  Post surgical changes of ileostomy takedown.  2.  Large hyperdense collection suggestive of hematoma in the pelvis  adjacent to the ileocecal junction, measuring 11.4 x 7.1 x 6.6 cm. No  active extravasation of contrast detected.  3.  Mildly dilated small bowel with transition point in the pelvis  adjacent to the hematoma, suggestive of obstruction/extrinsic  compression.  4.  Small volume ascites. No pneumoperitoneum.   5.  3.7 cm right ovarian cyst.      [Urgent Result: Pelvic hematoma]    Finding was identified on 3/13/2023 9:02 PM.     Surgery team was contacted by Dr. Coreas at 3/13/2023 9:25 PM and  verbalized understanding of the urgent finding.     I have personally reviewed the examination and initial interpretation  and I agree with the findings.    BRITTANY LOCO MD         SYSTEM ID:  [id number]   XR Chest Port 1 View    Narrative    EXAM: XR CHEST PORT 1 VIEW  3/14/2023 8:48 PM     HISTORY:  Confirm PICC placement       COMPARISON:  CT abdomen and pelvis 3/13/2023    FINDINGS:   Right upper extremity PICC tip terminates over the SVC. The cardiac  silhouette is within normal limits. Streaky bibasilar opacities. No  pneumothorax or pleural effusion.      Impression    IMPRESSION:   1. Right upper extremity PICC terminates over the low SVC.  2. Streaky left basilar opacities, likely atelectasis.    I have personally reviewed the examination and initial interpretation  and I agree with the findings.    JULIO ODONNELL MD         SYSTEM ID:  [id number]   CT Abdomen Pelvis w Contrast    Narrative    Abdomen and pelvis CT with contrast    INDICATION: Persistent nausea and vomiting post ileostomy  takedown.    Contrast: 500 mL oral Omnipaque. Rectal contrast inserted through  rectal tube. 100 mL intravenous Isovue-370    COMPARISON: 3/13/2023    FINDINGS: The included lower chest shows bibasilar dependent and  subsegmental atelectasis, also in the lingula. No suspicious nodule or  consolidation or effusion.  Within the abdomen and pelvis, portal venous phase imaging was  obtained. Liver, gallbladder, spleen. Bilateral adrenal glands and  kidneys appear normal. No enlarged mesenteric or retroperitoneal lymph  nodes. Pancreas appears unremarkable. Extensive fluid-filled small  bowel noted. This is also somewhat distended. Contrast noted in the  colon and in the rectum. Left lower quadrant ostomy takedown. There is  a small amount of air again noted similar to previous and the left  paramedian anterior subcutaneous soft tissues. Surgical anastomosis  involving the bowel in the left anterior abdominal cavity as well  appears similar. This ascites near the liver is decreased an almost  completely resolved. Hyperdense collection in the anterior lower  abdomen appears similar. Urinary bladder appears unremarkable. No new  adnexal mass. The previous right adnexal cyst is no longer simple  cystic. This measures 2.2 cm previously 3.7 cm. This may represent  proteinaceous or hemorrhagic or even involuting cyst. The uterus is  reportedly surgically absent. No layering free fluid in the pelvis. No  enlarged pelvic or inguinal lymph nodes.  Rectal contrast menstruation shows trailing just to the left as  before. This may represent diverticulum rather postsurgical change  again, this does not show free extravasation.  Bone detail shows normal mineralization of the bony structures.      Impression    IMPRESSION:  1. Continued appearance of ileostomy takedown.  2. Large probable hematoma again in the pelvis not significantly  changed from 4 days ago.  3. Distended small bowel which may represent ileus. Recommend  continued  follow-up to exclude developing obstruction. For example,  short-term follow-up CT later today without additional contrast may be  beneficial to allow for contrast transit and further assessment. No  evidence of perforation or other free air. I discussed this by  telephone with the surgical team at 1111 hours of 3/17/2023.  4. Decreased size of right adnexal cystic area.  5. Decreased ascites.    HOWIE FERRO MD         SYSTEM ID:  [id number]   CT Abdomen Pelvis w/o Contrast    Narrative    CT abdomen and pelvis without contrast    INDICATION: Postoperative day 9 from ileostomy takedown with  persistent nausea and emesis    COMPARISON: CT earlier today    FINDINGS: Scan performed at approximately 1355 hours 3/17/2023  following a scan at approximately 1030 hours earlier today. On the  previous scan, oral, intravenous and rectal contrast had been  administered. No additional contrast was given for this scan.  Included lung bases show mild bibasilar subsegmental atelectasis also  in the lingula.  Within the abdomen and pelvis, fluid-filled small bowel persists.  There is some relatively less dense contrast noted in some of the  loops of small bowel compared with previous. Surgical anastomoses in  the left lower abdomen anteriorly at the sigmoid colon appears  similar. There is some narrowing of the proximal sigmoid colon region  adjacent loops of small bowel in the left psoas muscle but the  proximal colon to this is not distended.  No free air. No free fluid. Large probable pelvic hematoma unchanged  in the short interval. Right adnexal cystic area unchanged. Contrast  is now filled the urinary bladder which appears normal.  Bone detail shows good mineralization throughout. Prior left lower  quadrant ostomy takedown noted. This site does not appear  significantly changed.  Remainder of the abdominal organs appear unchanged and unremarkable  otherwise.      Impression    IMPRESSION: Poor transit of oral contrast  through the distended  fluid-filled small bowel concerning for diffuse ileus although partial  obstruction certainly cannot be excluded. Anastomoses appear  unchanged. Interval filling of the urinary bladder from the recent  intravenous contrast which appears normal. Less more aggressive  evaluation is decided upon, recommend serial plain films of the  abdomen at approximately 3, 6 and 9 hours or can be scheduled  differently as appropriate to assess for passage of contrast. No  catastrophic sequelae of any bowel obstruction noted at this time.    HOWIE FERRO MD         SYSTEM ID:  [id number]   XR Abdomen Port 1 View    Narrative    EXAM: XR ABDOMEN PORT 1 VIEW  3/17/2023 5:42 PM      HISTORY: POD 9 s/p ileostomy takedown, please evaluate contrast  transit.    COMPARISON: Abdominal x-ray 3/12/2023, CT AP 3/17/2023    FINDINGS: Single AP view of the abdomen. Contrast can be seen within  the colon and rectum and within a few loops of small bowel. Contrast  filled bladder.    A few dilated loops of small bowel measuring up to 5.5 cm in the right  lower quadrant. No pneumatosis. No portal venous gas. Left basilar  platelike atelectasis.. No acute osseous abnormality.      Impression    IMPRESSION:   1.  Contrast seen within a few loops of small bowel, large bowel and  rectum in a similar pattern relative to the CT AP 3/17/2023.  2.  Few dilated loops of small bowel measuring up to 5.5 cm, similar  to the  on same-day CT CAP. Findings continue to represent ileus  versus possible obstruction.    I have personally reviewed the examination and initial interpretation  and I agree with the findings.    ALEXUS LEY MD         SYSTEM ID:  [id number]   XR Abdomen Port 1 View    Narrative    EXAMINATION:  XR ABDOMEN PORT 1 VIEW 3/18/2023 10:45 AM.    COMPARISON: Radiograph 3/17/2023, CT 3/17/2020    HISTORY:  NGT placement    FINDINGS:   Portable AP view of the chest. Enteric tube tip projects over the  stomach.  Multiple distended air-filled loops of bowel present in  visualized upper abdomen. There may be some contrast partially  visualized in the descending colonic region. No pneumatosis or portal  venous gas. The visualized lung bases are clear. No acute osseous  adenopathy.      Impression    IMPRESSION:   1. Enteric tube tip and sidehole projected over the stomach.  2. Persistent multiple distended air-filled loops of bowel present in  the visualized upper abdomen, may represent ongoing ileus versus  possible obstruction.    I have personally reviewed the examination and initial interpretation  and I agree with the findings.    ALEXUS LEY MD         SYSTEM ID:  [id number]   XR Abdomen Port 1 View    Narrative    EXAMINATION:  XR ABDOMEN PORT 1 VIEW 3/20/2023 10:21 AM.    COMPARISON: 3/18/2023, 3/17/2023.    HISTORY:  eval NGT    FINDINGS: Single portable supine view of the abdomen.  Gastric tube  tip and sidehole project over the stomach. Gas-filled bowel, some of  which is opacified with oral contrast, overall not significantly  changed. Visualized lung bases are unremarkable.      Impression    IMPRESSION:   1.  Gastric tube tip and sidehole project over the stomach.   2.  Ongoing air distended bowel throughout the abdomen without  significant change.    CANDI MONROE MD         SYSTEM ID:  [id number]            Medications Prior to Admission:     Medications Prior to Admission   Medication Sig Dispense Refill Last Dose     fentaNYL (DURAGESIC) 25 mcg/hr 72 hr patch Place 1 patch onto the skin every 72 hours remove old patch.        hydrOXYzine (ATARAX) 25 MG tablet Take 1 tablet (25 mg) by mouth every 8 hours as needed for itching or anxiety 15 tablet 0 3/8/2023 at 0800     multivitamin w/minerals (THERA-VIT-M) tablet Take 1 tablet by mouth daily Takes when she remembers   Past Week     sertraline (ZOLOFT) 50 MG tablet Take 50 mg by mouth every evening   3/7/2023 at 2300     vitamin D3 (CHOLECALCIFEROL) 1.25 MG  (65723 UT) capsule Take 50,000 Units by mouth every 7 days Patient typically takes on Sunday        [DISCONTINUED] acetaminophen (TYLENOL) 500 MG tablet Take 2 tablets (1,000 mg) by mouth every 6 hours 80 tablet 0 More than a month     [DISCONTINUED] gabapentin (NEURONTIN) 300 MG capsule Take 1 capsule (300 mg) by mouth 3 times daily 40 capsule 0 More than a month     [DISCONTINUED] HYDROcodone-acetaminophen (NORCO) 5-325 MG tablet Take 1 tablet by mouth every 6 hours as needed Resume when you have completed your post-operative course of pain medications  0 3/8/2023 at 0800     [DISCONTINUED] ondansetron (ZOFRAN ODT) 4 MG ODT tab Take 4 mg by mouth every 8 hours as needed for nausea               Discharge Medications:     Current Discharge Medication List      START taking these medications    Details   pantoprazole (PROTONIX) 40 MG EC tablet Take 1 tablet (40 mg) by mouth every morning (before breakfast) for 14 days  Qty: 14 tablet, Refills: 0    Associated Diagnoses: Acute post-operative pain      prochlorperazine (COMPAZINE) 5 MG tablet Take 1 tablet (5 mg) by mouth every 8 hours as needed for nausea or vomiting  Qty: 15 tablet, Refills: 0    Associated Diagnoses: Nausea         CONTINUE these medications which have CHANGED    Details   gabapentin (NEURONTIN) 300 MG capsule Take 1 capsule (300 mg) by mouth 3 times daily  Qty: 40 capsule, Refills: 0    Associated Diagnoses: Acute post-operative pain      HYDROcodone-acetaminophen (NORCO) 5-325 MG tablet Take 1 tablet by mouth every 4 hours as needed for moderate to severe pain  Qty: 45 tablet, Refills: 0    Associated Diagnoses: Acute post-operative pain      ondansetron (ZOFRAN ODT) 4 MG ODT tab Take 1 tablet (4 mg) by mouth every 6 hours as needed for nausea  Qty: 42 tablet, Refills: 0    Associated Diagnoses: Nausea         CONTINUE these medications which have NOT CHANGED    Details   fentaNYL (DURAGESIC) 25 mcg/hr 72 hr patch Place 1 patch onto the skin  every 72 hours remove old patch.      hydrOXYzine (ATARAX) 25 MG tablet Take 1 tablet (25 mg) by mouth every 8 hours as needed for itching or anxiety  Qty: 15 tablet, Refills: 0    Associated Diagnoses: Acute post-operative pain      multivitamin w/minerals (THERA-VIT-M) tablet Take 1 tablet by mouth daily Takes when she remembers      sertraline (ZOLOFT) 50 MG tablet Take 50 mg by mouth every evening      vitamin D3 (CHOLECALCIFEROL) 1.25 MG (50899 UT) capsule Take 50,000 Units by mouth every 7 days Patient typically takes on Sunday         STOP taking these medications       acetaminophen (TYLENOL) 500 MG tablet Comments:   Reason for Stopping:                     Brief History of Illness:   38 year old female, PMH endometriosis, h/o rectal injury s/p Hartmanns procedure, s/p subsequent colostomy take down w/ colorectal anastomosis w/ diverting loop ileostomy complicated by colorectal anastomotic stricture, s/p subsequent chronic pain on chronic opiates,            Hospital Course:   Post-operatively pt was gently fluid resuscitated.  Sommer was removed and pt was able to void.  Pt had some initial bowel function but unfortunately had stuttering bowel function.  CT obtained on 3/13 showed a sizable hematoma adj to anastomosis but no extravasation of contrast to suggest leak.  Hematoma likely cause of post-operative ileus and pSBO. Pt did continue to pass gas but only intermittent stool.  However pt had persistent nausea and emesis.  Therefore NGT was placed.  TPN was started for nutritional support during this time.  Pt had eventual return of bowel function.  NGT was clamped for which patient tolerated and thus removed. Pt was able to tolerate small amounts of a low fiber diet. There were discussions regarding discharging home with TPN, however ultimately pt decided to discharge to home without TPN and would try to consume at least 2 protein drinks per day.      Post-operative pain was difficult to control.  Pt  "eventually was able to wean down off ketamine and off IV dilaudid.  At the time of discharge pain was somewhat controlled with robaxin and prn norco.  Pt can resume her home gabapentin on discharge. Pt counseled to wean down on opiates for her safety.  Per pt, she will continue to discuss opiates and weaning as an outpatient with local provider.     Patient is to follow up in the Colon and Rectal Surgery Clinic in 2-3 week with Chantal Leahy NP or Sharlene Valencia PA-C and then with Dr. Richardson in 2-3 weeks after.          Day of Discharge Physical Exam:   Blood pressure 106/56, pulse 68, temperature 97.4  F (36.3  C), temperature source Temporal, resp. rate 16, height 1.702 m (5' 7.01\"), weight 79.4 kg (175 lb), SpO2 99 %, not currently breastfeeding.    Gen: AAOx3, NAD  Pulm: Non-labored breathing  Abd: Soft, appropriately tender, no guarding/rebound   Incision C/D/I    Old stoma site healing well  Ext:  Warm and well-perfused         Final Pathology Result:     Case Report   Surgical Pathology Report                         Case: [id number]                                   Authorizing Provider:  Tad Richardson MD       Collected:           03/08/2023 02:10 PM           Ordering Location:     [facility] OR                 Received:            03/08/2023 02:33 PM           Pathologist:           Aj Jarrett MD                                                                  Specimen:    Other, Ileostomy                                                                           Final Diagnosis   A. ILEOSTOMY, TAKEDOWN:  -Benign small intestine and skin compatible with ileostomy site, negative for dysplasia and malignancy            Discharge Instructions and Follow-Up:     Discharge Procedure Orders   Home Infusion Referral   Referral Priority: Routine: Next available opening Referral Type: Consultation "   Number of Visits Requested: 1     Home Care Referral   Referral Priority: Routine: Next available opening Referral Type: Home Health Therapies & Aides   Number of Visits Requested: 1     Reason for your hospital stay   Order Comments: 3/8/2023:   PROCEDURE:  1. Loop ileostomy takedown.  2.  Small bowel, stapled, side-to-side anastomosis.  3.  Repair of abdominal wall at ileostomy site.  4. Flexible sigmoidoscopy     ANESTHESIA: General endotracheal anesthesia plus bilateral TAP blocks local anesthesia.     Activity   Order Comments: Your activity upon discharge:   ACTIVITY  -No lifting, pushing, pulling greater than 10 lbs and no strenuous exercise for 6 weeks   -No driving while on narcotic analgesics (i.e. Percocet, oxycodone, Vicodin)  -No driving until you are able to fully twist to both sides or slam on brakes quickly and without any pain  -We encourage walking at least 4-5 times per day     Order Specific Question Answer Comments   Is discharge order? Yes      Adult [facility] Follow-up and recommended labs and tests   Order Comments: WOUND CARE  -Inspect your wounds daily for signs of infection (increased redness, drainage, pain)  -Keep your wound clean and dry  -You may shower, but do not soak in tub or pool  -Cover old ostomy site with gauze and secure in place with medical adhesive.  Change dressing twice daily and more as needed for drainage/saturation.     NOTIFY  Please contact Anai Durand RN or Deann Lee RN at [phone] for problems after discharge such as:  -Temperature > 101F, chills, rigors, dizziness  -Redness around or purulent drainage from wound  -Inability to tolerate diet, nausea or vomiting  -You stop passing gas, develop significant bloating, abdominal pain  -Have blood in stools/vomit  -Have severe diarrhea/constipation  -Any other questions or concerns.  - At nights (after 4:30pm), on weekends, or if urgent, call [phone] and ask the  to speak with the on-call Colorectal  Surgery resident or fellow      Medication Instructions  Some of your medications may have changed. Please take only prescribed and resumed medications     FOLLOW-UP  1.  You will need to follow-up with Chantal Leahy NP or Sharlene Valencia PA-C in the Colon and Rectal Surgery clinic in 2-3 week(s) and then with [facility] Staff: Dr. Tad Richardson in 4-5 weeks after.  Please contact our Nurses (phone # [phone]) if you have not heard from our clinic in 3 business days afer discharge to schedule a follow-up appointment.    2.  Follow up with your primary care provider in 1-2 weeks after discharge from the hospital to review this hospitalization.   We highly recommend tapering down on opiates as soon as possible and to use the lowest doses possible and decrease frequency as soon as possible for your safety.          Appointments on [place] and/or [facility] (with [facility] or [facility] provider or service). Call [phone] if you haven't heard regarding these appointments within 7 days of discharge.     Diet   Order Comments: Follow this diet upon discharge:   DIET  - 2 protein shakes per day  -Low Residue Diet for at least 4-6 weeks unless cleared by Colorectal surgery.  No raw vegetables, fruit skins, fibrous foods that require a lot of chewing, nuts, seeds, corn, popcorn.   -We recommend eating slowly, chewing thoroughly, eating small frequent meals throughout the day  -Stay well hydrated.     Order Specific Question Answer Comments   Is discharge order? Yes             Home Health Care:     Not needed           Discharge Disposition:     Discharged to home      Condition at discharge: Stable      Katia Workman PA-C ..................3/24/2023   9:52 AM  Colon and Rectal Surgery     Pt was seen and discussed with Dr. Murray on 3/24/2023    The above plan of care was performed and communicated to me by CRS Staff: Dr. Tad Richardson     I spent 60 minute face-to-face or coordinating care of Vicki SANCHEZ  Suleman. Over 50% of our time on the unit was spent counseling the patient and/or coordinating care as documented in the assessment and plan.     [address] post op hospital visit

## 2023-03-24 NOTE — PLAN OF CARE
"/59 (BP Location: Left arm)   Pulse 65   Temp 97.6  F (36.4  C) (Temporal)   Resp 16   Ht 1.702 m (5' 7.01\")   Wt 79.4 kg (175 lb)   LMP  (LMP Unknown)   SpO2 97%   BMI 27.40 kg/m    Assumed care 1500 to 2330  Pt rounded on hourly  Jairo: alert and oriented   Pain: pt has pain in abd PRN dilaudid given x1 provider talked with pt about taking norco over IV dilaudid   GI/: nausea intermittent scheduled nausea meds given. Bowel sounds present. Pt passing flatus. Pt independent to the bathroom   Cardiac: WDL  Respiratory: denies SOB lung sounds clear bilaterally  Skin: 4x4 in place to ileostomy take down site. fentanyl patch in place to R shoulder. New scopolamine patch placed to L ear pt lost previous patch in shower.   Lines: PICC infusing TPN and lipids   Assist level: I  Will continue to monitor and follow plan of care.   Plan: full bowel return, pain and nausea controlled, and able to tolerate diet.    Potasium WNL recheck tomorrow morning  Call light in reach, Pt able to make needs known, Will continue to monitor and follow plan of care.      Goal Outcome Evaluation:     Plan of Care Reviewed With: pt     Overall Patient Progress: progressing      Outcome Evaluation: full bowel return, pain and nausea controlled, and able to tolerate diet.      "

## 2023-03-24 NOTE — PROGRESS NOTES
[id number]  Temp: 98.4  F (36.9  C) Temp src: Temporal BP: 92/53 Pulse: 63   Resp: 17 SpO2: 96 % O2 Device: None (Room air)       Pt is AOx4. Flat affect. Soft Bps. OVSS. RA. Pain in the abdomen/lower back managed with Norco. Nausea managed with jean-claude Zofran and scopolamine. Ileostomy takedown site with 4x4 dressing, CDI. Passing gas, no BM overnight. Voiding spontaneously. PICC infusing with TPN/lipids. Up ad el.

## 2023-03-24 NOTE — PROGRESS NOTES
Gen:      AAOx3, NAD  Pulm:    Non-labored breathing  Abd:      Soft, mildly distended. Nontender, no guarding.  Wound: ostomy closure on left abdomen is clean, dry, intact. Replaced dressing.     ASSESSMENT: Vicki is a 38 year old woman s/p redo LAR for stricuture, now s/p DLI closure with prolonged ileus and resolving anastomotic hematoma on 3/8. Started on TPN 3/14. Persistent nausea with improvement after NG tube; removed on 3/21. Nausea and distension improved thereafter with gradual ROBF and slow diet advancement ate about 20% of breakfast and drank 240 ml of liquid. KJ vitals were,within normal limit. Medications were given as scheduled and well tolerated. PRN Nor co was administered pain rated at 5/10. Some comfort indicated after meds was administered. Patient will be leaving later today. Discharge has been put in place. Transderm scop  Patch was in place during assessment. Located in her upper left ear. PICC line was removed. Pt discharging on self care.

## 2023-03-27 ENCOUNTER — PATIENT OUTREACH (OUTPATIENT)
Dept: SURGERY | Facility: CLINIC | Age: 38
End: 2023-03-27
Payer: MEDICAID

## 2023-03-27 ENCOUNTER — PATIENT OUTREACH (OUTPATIENT)
Dept: CARE COORDINATION | Facility: CLINIC | Age: 38
End: 2023-03-27
Payer: MEDICAID

## 2023-03-27 NOTE — PROGRESS NOTES
Post Op Note     Called to check on patient postoperatively after hospital discharge.     Patient is s/p ileostomy takedown with Dr. Tad Richardson for ileostomy status.   Admitted 3/8 and discharged on 3/24.    LVM x2 with direct contact number to discuss follow up and schedule appts.     Anai [phone]  Colon & Rectal Surgery Clinic  [facility]

## 2023-03-27 NOTE — PROGRESS NOTES
Connected Care Resource Center Contact  [facility]/Voicemail     Clinical Data: Transitional Care Management Outreach     Outreach attempted x 2.  Left message on patient's voicemail, providing [facility]'s 24/7 scheduling and nurse triage phone number [phone] ([phone]) for questions/concerns and/or to schedule an appt with an [facility] provider, if they do not have a PCP.      Plan:  [facility] will do no further outreaches at this time.       Anya Kern  Connected Care Resource Center, [facility]    *Connected Care Resource Team does NOT follow patient ongoing. Referrals are identified based on internal discharge reports and the outreach is to ensure patient has an understanding of their discharge instructions.

## 2024-03-09 ENCOUNTER — HEALTH MAINTENANCE LETTER (OUTPATIENT)
Age: 39
End: 2024-03-09

## 2024-10-09 NOTE — PROGRESS NOTES
88 y/o Chinese speaking F with pmhx HTN, CHF, DM on eliquis presents to ED with c/o generalized weakness, dizziness, and SOB. Per family, pt was found sitting on ground at home but denied fall or injury. BEFAST negative. Pt denies chest pain, n/v/d/c, urinary symptoms, cough. GCS 15, A&O x4, VSS.    Colon and Rectal Surgery Clinic Note    RE: Vicki Shell.  : 1985.  NORMA: 2022.    Reason for visit: Colostomy reversal     HPI (Initially seen on 22): 37 yo F presenting for reversal of colostomy.  -On , she underwent underwent a diagnostic laparoscopy, extensive lysis of adhesions, and a right ovarian cystectomy. Dr. Pepe was called in the operating room to evaluate for any potential bowel injury. There was an area of intestine, which was actually the appendix, that was sort of stuck to the ovary. This did not appear to be injured. There was no evidence of succus or spillage from what I could tell at that time in the operation. It appeared that all the work had been done on the right side for this cyst.   -On : presented with peritonitis, at which time she underwent emergent ex lap, upper rectal/sigmoid resection, and appendectomy.   Per OR note of Findings from Dr. Pepe: Upon entering the abdomen, there was succus and grossly formed stool within the abdominal cavity, there was a 1.5 cm hole or so along the left side of the rectum, kind of left midline rectum. Where the cystectomy had been performed on the right side, this area looked good, I did not see any evidence of injury on that side. There was fluid around the liver, spleen, mid abdomen. The stoma was created in the left lower quadrant. A blue Prolene stitch was placed on the distal rectal stump.  Hospital course was two weeks long, discharged on . She had an ileus, was on TPN. She had a CT can showing a left pleural effusion, left abdominal fluid collection and pelvic fluid collection. She was on antibiotics, and IR drained the left pleural effusion and placed an abdominal drain.  Seen on 22:: Doing well overall, no issues with stoma. Weight stable. Chronic pelvic and back pain, suspected due to left ovarian cyst.  Last Colonoscopy: reportedly normal, performed on .    Interval Hx: doing well, no issues. Eager to proceed  "with surgery. Colonoscopy performed and clean, CT performed, read pending. Quit smoking on 2/7.     Medical history:  Chronic low back pain  Tobacco use. 0.5 ppd for 10 years  Cervico-occipital Neuralgia     Surgical history:  9/10/20:HYSTERECTOMY ABDOMINAL, BILATERAL SALPINGECTOMY, INTRAOPERATIVE CYSTOSCOPY  Laparoscopic cystectomy and Emergent Ex lap as above.    Medications:  Current Outpatient Medications   Medication Sig Dispense Refill     HYDROcodone-acetaminophen (NORCO) 5-325 MG tablet hydrocodone 5 mg-acetaminophen 325 mg tablet   One po bid as needed for pain       sertraline (ZOLOFT) 50 MG tablet every 24 hours         Allergies:  No Known Allergies    Social history:   Social History     Tobacco Use     Smoking status: Current Some Day Smoker     Smokeless tobacco: Never Used   Substance Use Topics     Alcohol use: Not Currently     Marital status: single.    ROS:  A complete review of systems was performed with the patient and all systems negative except as per HPI.    Physical Examination:  Exam was chaperoned by Rama Swift CMA  /71 (BP Location: Left arm, Patient Position: Sitting, Cuff Size: Adult Regular)   Pulse 75   Ht 1.702 m (5' 7\")   Wt 84.6 kg (186 lb 9.6 oz)   SpO2 100%   BMI 29.23 kg/m    General: Well hydrated. No acute distress.  Abdomen: Soft, NT, nondisteded. Well healed midline incision with small lap incisions. Stoma healthy. No inguinal adenopathy palpated.  Perianal external examination:  Perianal skin: Intact with no excoriation or lichenification.  Lesions: No evidence of an external lesion, nodularity, or induration in the perianal region.  Eversion of buttocks: There was not evidence of an anal fissure. Details: N/A.  Skin tags or external hemorrhoids: None.  Digital rectal examination: Was performed.   Sphincter tone: Good.  Palpable lesions: No.  Other: None.  Bimanual examination: was not performed.    Anoscopy: Was deferred    Procedures:  Prior to the start " of the procedure and with procedural staff participation, I verbally confirmed the patient s identity using two indicators, relevant allergies, that the procedure was appropriate and matched the consent or emergent situation, and that the correct equipment/implants were available. Immediately prior to starting the procedure I conducted the Time Out with the procedural staff and re-confirmed the patient s name, procedure, and site/side. (The Joint Commission universal protocol was followed.)  Yes    Sedation (Moderate or Deep): None    Flexible sigmoidoscopy was performed to 22 cm to top of rectosigmoid junction. Marking sutures and staples seen. Pictures taken, uploaded into epic.      Imaging personally reviewed by me:  CT CAP:  pending    ASSESSMENT  1. Colostomy status  2. Current smoker  3. Chronic back pain     PLAN: Laparoscopic possible open colostomy reversal, cyst with stents, possible diverting loop ileostomy, possible bilateral oophorectomy.  1. I would like to wait at least 6 months prior to operating given the extent of peritonitis she had previously. Last operation was 8/20 and complicated by abscesses. Plan for surgery in March.   2. Smoking cessation, Quit Partner referal. This is a requirement prior to proceeding with an extensive and high risk colostomy reversal. Quit, last on 2/7.  3. Advised patient to continue protein shakes: Premier or Pure protein given high protein, low carb ratio for pre-operative rehab.  4. At time of surgery, I would like her to be marked for possible diverting loop ileostomy.   5. Colonoscopy performed locally, clean.   6. Preop labs: CBC, CMP, prealbumin, coags.     For surgery: will require cysto/stents, GYN involvement for likely bilateral oophorectomy given recurrent cysts/scarring.     Risks, benefits, and alternatives of operative treatment were thoroughly discussed with the patient, she understands these well and agrees to proceed.    Time spent: 45 minutes.  >50%  spent in discussing, counseling and coordinating care.    Tad Richardson M.D    Division of Colon and Rectal Surgery  [facility]    Referring Provider:  No referring provider defined for this encounter.     Primary Care Provider:  Karen Hernandez

## 2025-03-16 ENCOUNTER — HEALTH MAINTENANCE LETTER (OUTPATIENT)
Age: 40
End: 2025-03-16

## (undated) DEVICE — ENDO CAP AND TUBING STERILE FOR ENDOGATOR  100130

## (undated) DEVICE — STPL LINEAR 90 X 3.5MM TA9035S

## (undated) DEVICE — WIPES FOLEY CARE SURESTEP PROVON DFC100

## (undated) DEVICE — SU PDS II 0 CT-1 27" Z340H

## (undated) DEVICE — DRAPE GYN/UROLOGY FLUID POUCH TUR 29455

## (undated) DEVICE — SU PDS II 3-0 SH 27" Z316H

## (undated) DEVICE — SU VICRYL 3-0 SH 27" UND J416H

## (undated) DEVICE — SU SILK 2-0 TIE 12X30" A305H

## (undated) DEVICE — Device

## (undated) DEVICE — STPL CIRCULAR 29MM CVD CDH29P

## (undated) DEVICE — GLOVE PROTEXIS MICRO 7.5  2D73PM75

## (undated) DEVICE — LINEN GOWN XLG 5407

## (undated) DEVICE — KIT PATIENT POSITIONING PIGAZZI LATEX FREE 40580

## (undated) DEVICE — GLOVE PROTEXIS W/NEU-THERA 8.0  2D73TE80

## (undated) DEVICE — KIT CONNECTOR FOR OLYMPUS ENDOSCOPES DEFENDO 100310

## (undated) DEVICE — SU MONOCRYL 4-0 PS-2 27" UND Y426H

## (undated) DEVICE — SUCTION MANIFOLD NEPTUNE 2 SYS 4 PORT 0702-020-000

## (undated) DEVICE — SYSTEM LAPAROVUE VISIBILITY LAPVUE10

## (undated) DEVICE — SU SILK 0 TIE 6X18" A186H

## (undated) DEVICE — SU SILK 3-0 TIE 12X30" A304H

## (undated) DEVICE — PACK CYSTO UMMC CUSTOM

## (undated) DEVICE — ENDO SYSTEM WATER BOTTLE & TUBING W/CO2 FILTER 00711549

## (undated) DEVICE — NDL ANGIOCATH 14GA 1.25" 4048

## (undated) DEVICE — PREP POVIDONE IODINE SOLUTION 10% 4OZ BOTTLE 29906-004

## (undated) DEVICE — CATH URETERAL OPEN END 5FRX70CM M0064002010

## (undated) DEVICE — DRSG ABDOMINAL 07 1/2X8" 7197D

## (undated) DEVICE — DRAPE IOBAN INCISE 23X17" 6650EZ

## (undated) DEVICE — SU SILK 2-0 SH 30" K833H

## (undated) DEVICE — LINEN TOWEL PACK X30 5481

## (undated) DEVICE — GUIDEWIRE SENSOR DUAL FLEX STR 0.035"X150CM M0066703080

## (undated) DEVICE — PREP SKIN SCRUB TRAY 4461A

## (undated) DEVICE — ENDO TROCAR FIRST ENTRY KII FIOS ADV FIX 05X100MM CFF03

## (undated) DEVICE — SU PROLENE 2-0 SHDA 36" 8523H

## (undated) DEVICE — SOL WATER IRRIG 3000ML BAG 2B7117

## (undated) DEVICE — STPL RELOAD LINEAR 90 X 3.5MM  TA9035L

## (undated) DEVICE — ENDO TROCAR SLEEVE KII ADV FIXATION 05X100MM CFS02

## (undated) DEVICE — ENDO TROCAR BLUNT TIP KII BALLOON 12X100MM C0R47

## (undated) DEVICE — ENDO TUBING CO2 SMARTCAP STERILE DISP 100145CO2EXT

## (undated) DEVICE — CATH TRAY FOLEY SURESTEP 16FR W/URNE MTR STLK LATEX A303316A

## (undated) DEVICE — SOL NACL 0.9% INJ 1000ML BAG 07983-09

## (undated) DEVICE — DRAPE LEGGINGS CLEAR 8430

## (undated) DEVICE — GOWN IMPERVIOUS BREATHABLE SMART XLG 89045

## (undated) DEVICE — BARRIER SEPRAFILM 3X5" X6 SHEETS 5086-02

## (undated) DEVICE — SYR BULB IRRIG DOVER 60 ML LATEX FREE 67000

## (undated) DEVICE — SPONGE LAP 18X18" X8435

## (undated) DEVICE — STPL LINEAR CUT 75MM TLC75

## (undated) DEVICE — PROTECTOR ARM ONE-STEP TRENDELENBURG 40418

## (undated) DEVICE — WIPE PREMOIST CLEANSING WASHCLOTHS 7988

## (undated) DEVICE — JELLY LUBRICATING SURGILUBE 2OZ TUBE

## (undated) DEVICE — LINEN TOWEL PACK X5 5464

## (undated) DEVICE — PACK ENDOSCOPY GI CUSTOM UMMC

## (undated) DEVICE — SOL WATER IRRIG 1000ML BOTTLE 2F7114

## (undated) DEVICE — VESSEL LOOPS YELLOW MAXI 31145694

## (undated) DEVICE — SU VICRYL 0 CT-1 27" UND J260H

## (undated) DEVICE — STPL ECHELON CONTOUR CUTTER CVD 40MM GREEN GCS40G

## (undated) DEVICE — SUCTION TIP YANKAUER STR K87

## (undated) DEVICE — LINEN TOWEL PACK X6 WHITE 5487

## (undated) DEVICE — ESU GROUND PAD ADULT W/CORD E7507

## (undated) DEVICE — SU VICRYL 3-0 SH 8X18" UND J864D

## (undated) DEVICE — GLOVE PROTEXIS W/NEU-THERA 7.0  2D73TE70

## (undated) DEVICE — PAD CHUX UNDERPAD 23X24" 7136

## (undated) DEVICE — ATTACHMENT CAP DISTAL REVEAL 11.8MM BX00711771

## (undated) DEVICE — KIT ENDO FIRST STEP DISINFECTANT 200ML W/POUCH EP-4

## (undated) DEVICE — TAPE MEDIPORE 6"X2YD 2866

## (undated) DEVICE — SURGICEL POWDER ABSORBABLE HEMOSTAT 3GM 3013SP

## (undated) DEVICE — ADH SKIN CLOSURE PREMIERPRO EXOFIN 1.0ML 3470

## (undated) DEVICE — DRSG GAUZE FLUFTEX RADIOPAQUE 4.5"X4.1YD 11-020

## (undated) DEVICE — PREP POVIDONE IODINE SOLUTION 10% 4OZ

## (undated) DEVICE — DRAPE SHEET REV FOLD 3/4 9349

## (undated) DEVICE — ENDO EXTRACTOR BALLOON 09-12MM 4690

## (undated) DEVICE — SU PDS II 1 TP-1 48" Z880G

## (undated) DEVICE — WIRE GUIDE 0.025"X450CM STR VISIGLIDE G-240-2545S

## (undated) DEVICE — SU VICRYL 0 CTX 36" J370H

## (undated) DEVICE — SYR 10ML LL W/O NDL 302995

## (undated) DEVICE — TUBING SUCTION 10'X3/16" N510

## (undated) DEVICE — DRAPE SLEEVE 599

## (undated) DEVICE — WIRE GUIDE 0.025"X450CM ANG VISIGLIDE G-240-2545A

## (undated) DEVICE — PREP CHLORAPREP 26ML TINTED HI-LITE ORANGE 930815

## (undated) DEVICE — ENDO PROBE COVER ULTRASOUND BALLOON LATEX  MAJ-249

## (undated) DEVICE — DRSG PREVENA NEGATIVE PRESSURE 20CM WND PRE1001US

## (undated) DEVICE — TUBING SMOKE EVAC PNEUMOCLEAR HIGH FLOW 0620050250

## (undated) DEVICE — STPL LINEAR 90X4.8MM TA9048S

## (undated) DEVICE — ENDO FUSION OMNI-TOME G31903

## (undated) DEVICE — KIT NEW IMAGE COLOSTOMY/ILEOSTOMY 2 3/4" LF 19354

## (undated) DEVICE — STPL LINEAR CUT 30X4.8MM TX30G

## (undated) DEVICE — SUCTION IRR STRYKERFLOW II W/TIP 250-070-520

## (undated) DEVICE — GLOVE PROTEXIS W/NEU-THERA 7.5  2D73TE75

## (undated) DEVICE — OSTOMY STOMADHESIVE 2OZ 79300

## (undated) DEVICE — BALLOON ELATION 240CML 14-15-16.5-18-19MM 5.5CM EPCX15

## (undated) DEVICE — PREP POVIDONE IODINE SCRUB 7.5% 4OZ APL82212

## (undated) DEVICE — DRSG GAUZE 4X4" TRAY 6939

## (undated) DEVICE — GLOVE PROTEXIS BLUE W/NEU-THERA 8.0  2D73EB80

## (undated) DEVICE — SU VICRYL 2-0 UR-6 27" J602H

## (undated) DEVICE — TUBING IRRIG CYSTO/BLADDER SET 81" LF 2C4040

## (undated) DEVICE — ESU PENCIL SMOKE EVAC W/ROCKER SWITCH 0703-047-000

## (undated) DEVICE — SUCTION TIP POOLE K770

## (undated) DEVICE — ESU LIGASURE IMPACT OPEN SEALER/DVDR CVD LG JAW LF4418

## (undated) DEVICE — SU SILK 0 TIE 6X30" A306H

## (undated) RX ORDER — METRONIDAZOLE 500 MG/100ML
INJECTION, SOLUTION INTRAVENOUS
Status: DISPENSED
Start: 2023-03-08

## (undated) RX ORDER — HYDROMORPHONE HCL IN WATER/PF 6 MG/30 ML
PATIENT CONTROLLED ANALGESIA SYRINGE INTRAVENOUS
Status: DISPENSED
Start: 2022-04-28

## (undated) RX ORDER — ONDANSETRON 2 MG/ML
INJECTION INTRAMUSCULAR; INTRAVENOUS
Status: DISPENSED
Start: 2022-09-13

## (undated) RX ORDER — ONDANSETRON 2 MG/ML
INJECTION INTRAMUSCULAR; INTRAVENOUS
Status: DISPENSED
Start: 2023-01-04

## (undated) RX ORDER — ACETAMINOPHEN 325 MG/1
TABLET ORAL
Status: DISPENSED
Start: 2022-04-28

## (undated) RX ORDER — PROPOFOL 10 MG/ML
INJECTION, EMULSION INTRAVENOUS
Status: DISPENSED
Start: 2022-04-28

## (undated) RX ORDER — EPHEDRINE SULFATE 50 MG/ML
INJECTION, SOLUTION INTRAMUSCULAR; INTRAVENOUS; SUBCUTANEOUS
Status: DISPENSED
Start: 2022-04-28

## (undated) RX ORDER — SODIUM CHLORIDE, SODIUM LACTATE, POTASSIUM CHLORIDE, CALCIUM CHLORIDE 600; 310; 30; 20 MG/100ML; MG/100ML; MG/100ML; MG/100ML
INJECTION, SOLUTION INTRAVENOUS
Status: DISPENSED
Start: 2022-04-28

## (undated) RX ORDER — FENTANYL CITRATE 50 UG/ML
INJECTION, SOLUTION INTRAMUSCULAR; INTRAVENOUS
Status: DISPENSED
Start: 2022-09-13

## (undated) RX ORDER — LABETALOL HYDROCHLORIDE 5 MG/ML
INJECTION, SOLUTION INTRAVENOUS
Status: DISPENSED
Start: 2023-03-08

## (undated) RX ORDER — ONDANSETRON 2 MG/ML
INJECTION INTRAMUSCULAR; INTRAVENOUS
Status: DISPENSED
Start: 2023-03-08

## (undated) RX ORDER — FENTANYL CITRATE 50 UG/ML
INJECTION, SOLUTION INTRAMUSCULAR; INTRAVENOUS
Status: DISPENSED
Start: 2022-04-28

## (undated) RX ORDER — FENTANYL CITRATE 50 UG/ML
INJECTION, SOLUTION INTRAMUSCULAR; INTRAVENOUS
Status: DISPENSED
Start: 2023-03-08

## (undated) RX ORDER — CEFAZOLIN SODIUM/WATER 2 G/20 ML
SYRINGE (ML) INTRAVENOUS
Status: DISPENSED
Start: 2023-03-08

## (undated) RX ORDER — HYDROMORPHONE HYDROCHLORIDE 1 MG/ML
INJECTION, SOLUTION INTRAMUSCULAR; INTRAVENOUS; SUBCUTANEOUS
Status: DISPENSED
Start: 2023-03-08

## (undated) RX ORDER — EPHEDRINE SULFATE 50 MG/ML
INJECTION, SOLUTION INTRAMUSCULAR; INTRAVENOUS; SUBCUTANEOUS
Status: DISPENSED
Start: 2023-01-04

## (undated) RX ORDER — CEFAZOLIN SODIUM/WATER 2 G/20 ML
SYRINGE (ML) INTRAVENOUS
Status: DISPENSED
Start: 2022-04-28

## (undated) RX ORDER — PROPOFOL 10 MG/ML
INJECTION, EMULSION INTRAVENOUS
Status: DISPENSED
Start: 2023-03-08

## (undated) RX ORDER — GABAPENTIN 300 MG/1
CAPSULE ORAL
Status: DISPENSED
Start: 2023-03-08

## (undated) RX ORDER — HYDROMORPHONE HYDROCHLORIDE 1 MG/ML
INJECTION, SOLUTION INTRAMUSCULAR; INTRAVENOUS; SUBCUTANEOUS
Status: DISPENSED
Start: 2023-01-04

## (undated) RX ORDER — CEFAZOLIN SODIUM 1 G/3ML
INJECTION, POWDER, FOR SOLUTION INTRAMUSCULAR; INTRAVENOUS
Status: DISPENSED
Start: 2022-04-28

## (undated) RX ORDER — FENTANYL CITRATE 50 UG/ML
INJECTION, SOLUTION INTRAMUSCULAR; INTRAVENOUS
Status: DISPENSED
Start: 2023-01-04

## (undated) RX ORDER — DEXAMETHASONE SODIUM PHOSPHATE 4 MG/ML
INJECTION, SOLUTION INTRA-ARTICULAR; INTRALESIONAL; INTRAMUSCULAR; INTRAVENOUS; SOFT TISSUE
Status: DISPENSED
Start: 2022-09-13

## (undated) RX ORDER — LIDOCAINE HYDROCHLORIDE 20 MG/ML
INJECTION, SOLUTION EPIDURAL; INFILTRATION; INTRACAUDAL; PERINEURAL
Status: DISPENSED
Start: 2022-04-28

## (undated) RX ORDER — GLYCOPYRROLATE 0.2 MG/ML
INJECTION, SOLUTION INTRAMUSCULAR; INTRAVENOUS
Status: DISPENSED
Start: 2022-04-28

## (undated) RX ORDER — LIDOCAINE HYDROCHLORIDE 10 MG/ML
INJECTION, SOLUTION EPIDURAL; INFILTRATION; INTRACAUDAL; PERINEURAL
Status: DISPENSED
Start: 2022-05-06

## (undated) RX ORDER — ENOXAPARIN SODIUM 100 MG/ML
INJECTION SUBCUTANEOUS
Status: DISPENSED
Start: 2023-01-04

## (undated) RX ORDER — FENTANYL CITRATE-0.9 % NACL/PF 10 MCG/ML
PLASTIC BAG, INJECTION (ML) INTRAVENOUS
Status: DISPENSED
Start: 2022-08-03

## (undated) RX ORDER — SODIUM CHLORIDE, SODIUM LACTATE, POTASSIUM CHLORIDE, CALCIUM CHLORIDE 600; 310; 30; 20 MG/100ML; MG/100ML; MG/100ML; MG/100ML
INJECTION, SOLUTION INTRAVENOUS
Status: DISPENSED
Start: 2023-03-08

## (undated) RX ORDER — FENTANYL CITRATE-0.9 % NACL/PF 10 MCG/ML
PLASTIC BAG, INJECTION (ML) INTRAVENOUS
Status: DISPENSED
Start: 2023-01-04

## (undated) RX ORDER — ACETAMINOPHEN 325 MG/1
TABLET ORAL
Status: DISPENSED
Start: 2023-03-08

## (undated) RX ORDER — DEXAMETHASONE SODIUM PHOSPHATE 4 MG/ML
INJECTION, SOLUTION INTRA-ARTICULAR; INTRALESIONAL; INTRAMUSCULAR; INTRAVENOUS; SOFT TISSUE
Status: DISPENSED
Start: 2023-01-04

## (undated) RX ORDER — CEFAZOLIN SODIUM/WATER 2 G/20 ML
SYRINGE (ML) INTRAVENOUS
Status: DISPENSED
Start: 2022-08-03

## (undated) RX ORDER — DEXAMETHASONE SODIUM PHOSPHATE 4 MG/ML
INJECTION, SOLUTION INTRA-ARTICULAR; INTRALESIONAL; INTRAMUSCULAR; INTRAVENOUS; SOFT TISSUE
Status: DISPENSED
Start: 2022-04-28

## (undated) RX ORDER — ENOXAPARIN SODIUM 100 MG/ML
INJECTION SUBCUTANEOUS
Status: DISPENSED
Start: 2023-03-08

## (undated) RX ORDER — ACETAMINOPHEN 325 MG/1
TABLET ORAL
Status: DISPENSED
Start: 2023-01-04

## (undated) RX ORDER — PROPOFOL 10 MG/ML
INJECTION, EMULSION INTRAVENOUS
Status: DISPENSED
Start: 2023-01-04

## (undated) RX ORDER — PROPOFOL 10 MG/ML
INJECTION, EMULSION INTRAVENOUS
Status: DISPENSED
Start: 2022-09-13

## (undated) RX ORDER — FENTANYL CITRATE 50 UG/ML
INJECTION, SOLUTION INTRAMUSCULAR; INTRAVENOUS
Status: DISPENSED
Start: 2022-05-06

## (undated) RX ORDER — METRONIDAZOLE 500 MG/100ML
INJECTION, SOLUTION INTRAVENOUS
Status: DISPENSED
Start: 2022-04-28

## (undated) RX ORDER — ONDANSETRON 2 MG/ML
INJECTION INTRAMUSCULAR; INTRAVENOUS
Status: DISPENSED
Start: 2022-08-03

## (undated) RX ORDER — ESMOLOL HYDROCHLORIDE 10 MG/ML
INJECTION INTRAVENOUS
Status: DISPENSED
Start: 2023-01-04

## (undated) RX ORDER — DIAZEPAM 10 MG/2ML
INJECTION, SOLUTION INTRAMUSCULAR; INTRAVENOUS
Status: DISPENSED
Start: 2022-04-28

## (undated) RX ORDER — SODIUM CHLORIDE, SODIUM LACTATE, POTASSIUM CHLORIDE, CALCIUM CHLORIDE 600; 310; 30; 20 MG/100ML; MG/100ML; MG/100ML; MG/100ML
INJECTION, SOLUTION INTRAVENOUS
Status: DISPENSED
Start: 2023-01-04

## (undated) RX ORDER — ONDANSETRON 2 MG/ML
INJECTION INTRAMUSCULAR; INTRAVENOUS
Status: DISPENSED
Start: 2022-04-28

## (undated) RX ORDER — CELECOXIB 200 MG/1
CAPSULE ORAL
Status: DISPENSED
Start: 2023-01-04

## (undated) RX ORDER — GABAPENTIN 300 MG/1
CAPSULE ORAL
Status: DISPENSED
Start: 2023-01-04

## (undated) RX ORDER — FENTANYL CITRATE-0.9 % NACL/PF 10 MCG/ML
PLASTIC BAG, INJECTION (ML) INTRAVENOUS
Status: DISPENSED
Start: 2022-04-28

## (undated) RX ORDER — ERTAPENEM 1 G/1
INJECTION, POWDER, LYOPHILIZED, FOR SOLUTION INTRAMUSCULAR; INTRAVENOUS
Status: DISPENSED
Start: 2023-01-04

## (undated) RX ORDER — ROCURONIUM BROMIDE 50 MG/5 ML
SYRINGE (ML) INTRAVENOUS
Status: DISPENSED
Start: 2022-04-28

## (undated) RX ORDER — LIDOCAINE HYDROCHLORIDE 20 MG/ML
INJECTION, SOLUTION EPIDURAL; INFILTRATION; INTRACAUDAL; PERINEURAL
Status: DISPENSED
Start: 2022-08-03

## (undated) RX ORDER — ENOXAPARIN SODIUM 100 MG/ML
INJECTION SUBCUTANEOUS
Status: DISPENSED
Start: 2022-04-28

## (undated) RX ORDER — GABAPENTIN 300 MG/1
CAPSULE ORAL
Status: DISPENSED
Start: 2022-04-28

## (undated) RX ORDER — HYDROMORPHONE HYDROCHLORIDE 1 MG/ML
INJECTION, SOLUTION INTRAMUSCULAR; INTRAVENOUS; SUBCUTANEOUS
Status: DISPENSED
Start: 2022-08-03